# Patient Record
Sex: FEMALE | Race: WHITE | NOT HISPANIC OR LATINO | URBAN - METROPOLITAN AREA
[De-identification: names, ages, dates, MRNs, and addresses within clinical notes are randomized per-mention and may not be internally consistent; named-entity substitution may affect disease eponyms.]

---

## 2021-02-16 ENCOUNTER — INPATIENT (INPATIENT)
Facility: HOSPITAL | Age: 76
LOS: 23 days | Discharge: OTHER INSTITUTION | DRG: 216 | End: 2021-03-12
Attending: THORACIC SURGERY (CARDIOTHORACIC VASCULAR SURGERY) | Admitting: INTERNAL MEDICINE
Payer: MEDICARE

## 2021-02-16 ENCOUNTER — TRANSCRIPTION ENCOUNTER (OUTPATIENT)
Age: 76
End: 2021-02-16

## 2021-02-16 VITALS
HEART RATE: 76 BPM | SYSTOLIC BLOOD PRESSURE: 135 MMHG | DIASTOLIC BLOOD PRESSURE: 64 MMHG | OXYGEN SATURATION: 99 % | RESPIRATION RATE: 18 BRPM | TEMPERATURE: 98 F

## 2021-02-16 DIAGNOSIS — I21.4 NON-ST ELEVATION (NSTEMI) MYOCARDIAL INFARCTION: ICD-10-CM

## 2021-02-16 DIAGNOSIS — K92.2 GASTROINTESTINAL HEMORRHAGE, UNSPECIFIED: ICD-10-CM

## 2021-02-16 DIAGNOSIS — I25.10 ATHEROSCLEROTIC HEART DISEASE OF NATIVE CORONARY ARTERY WITHOUT ANGINA PECTORIS: ICD-10-CM

## 2021-02-16 DIAGNOSIS — D64.9 ANEMIA, UNSPECIFIED: ICD-10-CM

## 2021-02-16 DIAGNOSIS — N30.00 ACUTE CYSTITIS WITHOUT HEMATURIA: ICD-10-CM

## 2021-02-16 DIAGNOSIS — E03.9 HYPOTHYROIDISM, UNSPECIFIED: ICD-10-CM

## 2021-02-16 DIAGNOSIS — Z90.89 ACQUIRED ABSENCE OF OTHER ORGANS: Chronic | ICD-10-CM

## 2021-02-16 DIAGNOSIS — I35.0 NONRHEUMATIC AORTIC (VALVE) STENOSIS: ICD-10-CM

## 2021-02-16 LAB
A1C WITH ESTIMATED AVERAGE GLUCOSE RESULT: 5.2 % — SIGNIFICANT CHANGE UP (ref 4–5.6)
ABO RH CONFIRMATION: SIGNIFICANT CHANGE UP
ALBUMIN SERPL ELPH-MCNC: 3.3 G/DL — SIGNIFICANT CHANGE UP (ref 3.3–5.2)
ALP SERPL-CCNC: 112 U/L — SIGNIFICANT CHANGE UP (ref 40–120)
ALT FLD-CCNC: 17 U/L — SIGNIFICANT CHANGE UP
ANION GAP SERPL CALC-SCNC: 10 MMOL/L — SIGNIFICANT CHANGE UP (ref 5–17)
APPEARANCE UR: CLEAR — SIGNIFICANT CHANGE UP
APTT BLD: 74.4 SEC — HIGH (ref 27.5–35.5)
AST SERPL-CCNC: 35 U/L — HIGH
BASOPHILS # BLD AUTO: 0.04 K/UL — SIGNIFICANT CHANGE UP (ref 0–0.2)
BASOPHILS NFR BLD AUTO: 0.4 % — SIGNIFICANT CHANGE UP (ref 0–2)
BILIRUB SERPL-MCNC: 0.5 MG/DL — SIGNIFICANT CHANGE UP (ref 0.4–2)
BILIRUB UR-MCNC: NEGATIVE — SIGNIFICANT CHANGE UP
BLD GP AB SCN SERPL QL: SIGNIFICANT CHANGE UP
BUN SERPL-MCNC: 22 MG/DL — HIGH (ref 8–20)
CALCIUM SERPL-MCNC: 8.8 MG/DL — SIGNIFICANT CHANGE UP (ref 8.6–10.2)
CHLORIDE SERPL-SCNC: 109 MMOL/L — HIGH (ref 98–107)
CO2 SERPL-SCNC: 18 MMOL/L — LOW (ref 22–29)
COLOR SPEC: YELLOW — SIGNIFICANT CHANGE UP
CREAT SERPL-MCNC: 0.74 MG/DL — SIGNIFICANT CHANGE UP (ref 0.5–1.3)
DIFF PNL FLD: NEGATIVE — SIGNIFICANT CHANGE UP
EOSINOPHIL # BLD AUTO: 0.06 K/UL — SIGNIFICANT CHANGE UP (ref 0–0.5)
EOSINOPHIL NFR BLD AUTO: 0.6 % — SIGNIFICANT CHANGE UP (ref 0–6)
EPI CELLS # UR: ABNORMAL
ESTIMATED AVERAGE GLUCOSE: 103 MG/DL — SIGNIFICANT CHANGE UP (ref 68–114)
GLUCOSE SERPL-MCNC: 95 MG/DL — SIGNIFICANT CHANGE UP (ref 70–99)
GLUCOSE UR QL: NEGATIVE MG/DL — SIGNIFICANT CHANGE UP
HCT VFR BLD CALC: 35.5 % — SIGNIFICANT CHANGE UP (ref 34.5–45)
HGB BLD-MCNC: 11 G/DL — LOW (ref 11.5–15.5)
IMM GRANULOCYTES NFR BLD AUTO: 0.4 % — SIGNIFICANT CHANGE UP (ref 0–1.5)
INR BLD: 1.27 RATIO — HIGH (ref 0.88–1.16)
KETONES UR-MCNC: NEGATIVE — SIGNIFICANT CHANGE UP
LEUKOCYTE ESTERASE UR-ACNC: ABNORMAL
LYMPHOCYTES # BLD AUTO: 0.92 K/UL — LOW (ref 1–3.3)
LYMPHOCYTES # BLD AUTO: 9.6 % — LOW (ref 13–44)
MAGNESIUM SERPL-MCNC: 1.9 MG/DL — SIGNIFICANT CHANGE UP (ref 1.6–2.6)
MCHC RBC-ENTMCNC: 28.1 PG — SIGNIFICANT CHANGE UP (ref 27–34)
MCHC RBC-ENTMCNC: 31 GM/DL — LOW (ref 32–36)
MCV RBC AUTO: 90.6 FL — SIGNIFICANT CHANGE UP (ref 80–100)
MONOCYTES # BLD AUTO: 1.02 K/UL — HIGH (ref 0–0.9)
MONOCYTES NFR BLD AUTO: 10.7 % — SIGNIFICANT CHANGE UP (ref 2–14)
NEUTROPHILS # BLD AUTO: 7.46 K/UL — HIGH (ref 1.8–7.4)
NEUTROPHILS NFR BLD AUTO: 78.3 % — HIGH (ref 43–77)
NITRITE UR-MCNC: NEGATIVE — SIGNIFICANT CHANGE UP
NT-PROBNP SERPL-SCNC: HIGH PG/ML (ref 0–300)
PH UR: 5 — SIGNIFICANT CHANGE UP (ref 5–8)
PLATELET # BLD AUTO: 227 K/UL — SIGNIFICANT CHANGE UP (ref 150–400)
POTASSIUM SERPL-MCNC: 4.3 MMOL/L — SIGNIFICANT CHANGE UP (ref 3.5–5.3)
POTASSIUM SERPL-SCNC: 4.3 MMOL/L — SIGNIFICANT CHANGE UP (ref 3.5–5.3)
PREALB SERPL-MCNC: 15 MG/DL — LOW (ref 18–38)
PROT SERPL-MCNC: 5.9 G/DL — LOW (ref 6.6–8.7)
PROT UR-MCNC: 30 MG/DL
PROTHROM AB SERPL-ACNC: 14.6 SEC — HIGH (ref 10.6–13.6)
RBC # BLD: 3.92 M/UL — SIGNIFICANT CHANGE UP (ref 3.8–5.2)
RBC # FLD: 17.3 % — HIGH (ref 10.3–14.5)
RBC CASTS # UR COMP ASSIST: NEGATIVE /HPF — SIGNIFICANT CHANGE UP (ref 0–4)
SODIUM SERPL-SCNC: 137 MMOL/L — SIGNIFICANT CHANGE UP (ref 135–145)
SP GR SPEC: 1.02 — SIGNIFICANT CHANGE UP (ref 1.01–1.02)
TROPONIN T SERPL-MCNC: 1.57 NG/ML — HIGH (ref 0–0.06)
TROPONIN T SERPL-MCNC: 1.83 NG/ML — HIGH (ref 0–0.06)
TSH SERPL-MCNC: 11.63 UIU/ML — HIGH (ref 0.27–4.2)
UROBILINOGEN FLD QL: NEGATIVE MG/DL — SIGNIFICANT CHANGE UP
WBC # BLD: 9.54 K/UL — SIGNIFICANT CHANGE UP (ref 3.8–10.5)
WBC # FLD AUTO: 9.54 K/UL — SIGNIFICANT CHANGE UP (ref 3.8–10.5)
WBC UR QL: SIGNIFICANT CHANGE UP

## 2021-02-16 PROCEDURE — 99223 1ST HOSP IP/OBS HIGH 75: CPT

## 2021-02-16 PROCEDURE — 99222 1ST HOSP IP/OBS MODERATE 55: CPT

## 2021-02-16 PROCEDURE — 93880 EXTRACRANIAL BILAT STUDY: CPT | Mod: 26

## 2021-02-16 PROCEDURE — 93010 ELECTROCARDIOGRAM REPORT: CPT

## 2021-02-16 RX ORDER — METOPROLOL TARTRATE 50 MG
25 TABLET ORAL ONCE
Refills: 0 | Status: COMPLETED | OUTPATIENT
Start: 2021-02-16 | End: 2021-02-16

## 2021-02-16 RX ORDER — PANTOPRAZOLE SODIUM 20 MG/1
40 TABLET, DELAYED RELEASE ORAL EVERY 12 HOURS
Refills: 0 | Status: DISCONTINUED | OUTPATIENT
Start: 2021-02-16 | End: 2021-02-24

## 2021-02-16 RX ORDER — CEFTRIAXONE 500 MG/1
1000 INJECTION, POWDER, FOR SOLUTION INTRAMUSCULAR; INTRAVENOUS EVERY 24 HOURS
Refills: 0 | Status: COMPLETED | OUTPATIENT
Start: 2021-02-16 | End: 2021-02-16

## 2021-02-16 RX ORDER — PANTOPRAZOLE SODIUM 20 MG/1
40 TABLET, DELAYED RELEASE ORAL ONCE
Refills: 0 | Status: COMPLETED | OUTPATIENT
Start: 2021-02-16 | End: 2021-02-16

## 2021-02-16 RX ORDER — SODIUM CHLORIDE 9 MG/ML
3 INJECTION INTRAMUSCULAR; INTRAVENOUS; SUBCUTANEOUS EVERY 8 HOURS
Refills: 0 | Status: DISCONTINUED | OUTPATIENT
Start: 2021-02-16 | End: 2021-02-24

## 2021-02-16 RX ORDER — SOD SULF/SODIUM/NAHCO3/KCL/PEG
1000 SOLUTION, RECONSTITUTED, ORAL ORAL EVERY 4 HOURS
Refills: 0 | Status: COMPLETED | OUTPATIENT
Start: 2021-02-16 | End: 2021-02-16

## 2021-02-16 RX ORDER — ASPIRIN/CALCIUM CARB/MAGNESIUM 324 MG
81 TABLET ORAL ONCE
Refills: 0 | Status: COMPLETED | OUTPATIENT
Start: 2021-02-16 | End: 2021-02-16

## 2021-02-16 RX ORDER — ASPIRIN/CALCIUM CARB/MAGNESIUM 324 MG
81 TABLET ORAL DAILY
Refills: 0 | Status: DISCONTINUED | OUTPATIENT
Start: 2021-02-16 | End: 2021-02-24

## 2021-02-16 RX ORDER — LEVOTHYROXINE SODIUM 125 MCG
75 TABLET ORAL DAILY
Refills: 0 | Status: DISCONTINUED | OUTPATIENT
Start: 2021-02-16 | End: 2021-02-24

## 2021-02-16 RX ORDER — HYDRALAZINE HCL 50 MG
10 TABLET ORAL EVERY 8 HOURS
Refills: 0 | Status: DISCONTINUED | OUTPATIENT
Start: 2021-02-16 | End: 2021-02-17

## 2021-02-16 RX ORDER — METOPROLOL TARTRATE 50 MG
25 TABLET ORAL DAILY
Refills: 0 | Status: DISCONTINUED | OUTPATIENT
Start: 2021-02-16 | End: 2021-02-19

## 2021-02-16 RX ORDER — ATORVASTATIN CALCIUM 80 MG/1
40 TABLET, FILM COATED ORAL AT BEDTIME
Refills: 0 | Status: DISCONTINUED | OUTPATIENT
Start: 2021-02-16 | End: 2021-02-24

## 2021-02-16 RX ADMIN — PANTOPRAZOLE SODIUM 40 MILLIGRAM(S): 20 TABLET, DELAYED RELEASE ORAL at 08:30

## 2021-02-16 RX ADMIN — Medication 75 MICROGRAM(S): at 14:01

## 2021-02-16 RX ADMIN — Medication 10 MILLIGRAM(S): at 14:01

## 2021-02-16 RX ADMIN — Medication 10 MILLIGRAM(S): at 22:41

## 2021-02-16 RX ADMIN — ATORVASTATIN CALCIUM 40 MILLIGRAM(S): 80 TABLET, FILM COATED ORAL at 22:41

## 2021-02-16 RX ADMIN — Medication 1000 MILLILITER(S): at 22:41

## 2021-02-16 RX ADMIN — SODIUM CHLORIDE 3 MILLILITER(S): 9 INJECTION INTRAMUSCULAR; INTRAVENOUS; SUBCUTANEOUS at 22:42

## 2021-02-16 RX ADMIN — CEFTRIAXONE 100 MILLIGRAM(S): 500 INJECTION, POWDER, FOR SOLUTION INTRAMUSCULAR; INTRAVENOUS at 14:00

## 2021-02-16 RX ADMIN — Medication 25 MILLIGRAM(S): at 08:30

## 2021-02-16 RX ADMIN — Medication 81 MILLIGRAM(S): at 08:30

## 2021-02-16 RX ADMIN — Medication 1000 MILLILITER(S): at 20:31

## 2021-02-16 RX ADMIN — PANTOPRAZOLE SODIUM 40 MILLIGRAM(S): 20 TABLET, DELAYED RELEASE ORAL at 18:33

## 2021-02-16 NOTE — H&P PST ADULT - ASSESSMENT
Assessment: 75y/o female never smoker with history of hypothyroid and DIVYA who was brought to [facility] for dyspnea and diarrhea and was found to have melena. She received 2 units of PRBC for a hemoglobin of 6.9. She was found to have a troponin of 656 and 710 and a pro-BNP of 29,809. An echo showed moderately to severely reduced LVSF with moderate diffuse hypokinesis with regional variations, moderate concentric LVH and severe AS. She was also diagnosed with sepsis secondary to a UTI. She admits to GANN (walking up one flight of stairs or < 100 feet). She also states recent black stools, and loss of approximately 30 pounds over 1 year secondary to poor PO intake secondary to ageusia. She denies chest pain, orthopnea, PND, palpitations or syncope/near syncope.    ASA: 3  Mall: 2  ABR: 14.7%  COVID: Negative at [facility]    Problem List:   1. NSTEMI  ·	LHC and possible intervention. Consent obtained.  ·	Will start DAPT if PCI performed.  ·	IV: NS KVO  ·	Aspirin if not taken today.  ·	Lipitor 40 mg daily    2. Severe AS  ·	TAVR work up as per CT surgery    3. HFrEF  ·	Heart failure teaching  ·	Daily weights  ·	Strict I&O's  ·	Beta Blocker: [id number] mg daily  ·	ACEI/ARB: Will address with CT surgery  ·	Diuretic: Will assess need post procedure.    4. DIVYA secondary to GI bleed  ·	Trend CBC and transfuse as necessary  ·	Protonix 40 mg IV every 12 hours.    5. Sepsis secondary to UTI at previous hospital  ·	Urine C&S, UA  ·	continue Rocephin 2 gm IV daily    6. Hypothyroid  ·	Levothyroxine 75 mcg daily

## 2021-02-16 NOTE — PROGRESS NOTE ADULT - ASSESSMENT
Assessment: 76y Female with diagnosis of severe AS    Problem List:  1. Multivessel CAD:   ·	CT surgery evaluation for CABG vs. high risk PCI.  ·	Continue aspirin 81 mg daily.  ·	If PCI performed, will start DAPT  ·	Remove radial band at 11:00AM  ·	Right arm restrictions explained.    2. Severe AS:   ·	CT surgery evaluation for SAVR vs. TAVR    3. HFrEF:   ·	Toprol 25 mg daily  ·	Will hold off on RAAS inhibition due to probable CT surgery.  ·	Hydralazine 10 mg every 8 hours.  ·	Strict I&O    4. Iron deficiency anemia secondary to GI bleed:   ·	GI consult called.  ·	Protonix 40 mg IV BID  ·	Trend CBC  ·	Stool for OB    5. UTI  ·	Continue Rocephin 1 gm IV daily    To be followed by [facility]

## 2021-02-16 NOTE — CONSULT NOTE ADULT - SUBJECTIVE AND OBJECTIVE BOX
HPI:  77y/o female never smoker with history of hypothyroid and DIVYA who was brought to [facility] for dyspnea and diarrhea and was found to have ?dark stools. She received 2 units of PRBC for a hemoglobin of 6.9. She was found to have a troponin of 656 and 710 and a pro-BNP of 29,809. An echo showed moderately to severely reduced LVSF with moderate diffuse hypokinesis with regional variations, moderate concentric LVH and severe AS. She was also diagnosed with sepsis secondary to a UTI. She admits to GANN (walking up one flight of stairs or < 100 feet). She also states recent black stools, and loss of approximately 30 pounds over 1 year secondary to poor PO intake secondary to ageusia. She denies chest pain, orthopnea, PND, palpitations or syncope/near syncope.      Assessment of LVEF:       EF: 25-30%       Assessed by: Echo       Date: 2/13/2021      Echo: 2/13/2021       LVSF: Moderately to severely reduced LVSF with moderate diffuse hypokinesis with regional variations. Moderate concentric LVH.       EF: 25-30%       RVSF: Poorly visualized, mild to moderate pulmonary hypertension.       LA: Mildly dilated       RA: Normal       Mitral Valve: Severely to moderately calcified leaflets, severe MAC, moderate MR. Possible vegetation       Aortic Valve: Moderately calcified leaflets, mild AR, severe AS (max gradient: 77, mean gradient: 43).       Tricuspid Valve: Poorly visualized, mild TR.       Pulmonic Valve: Poorly visualized, no AR.              PAST MEDICAL & SURGICAL HISTORY:  Iron deficiency anemia due to chronic blood loss    Hypothyroid    History of tonsillectomy        ROS:  No Heartburn, regurgitation, dysphagia, odynophagia.  No dyspepsia  No abdominal pain.    No Nausea, vomiting.  No Bleeding.  No hematemesis.   No diarrhea.    No hematochesia.  No weight loss, anorexia.  No edema.      MEDICATIONS  (STANDING):  aspirin  chewable 81 milliGRAM(s) Oral daily  atorvastatin 40 milliGRAM(s) Oral at bedtime  cefTRIAXone   IVPB 1000 milliGRAM(s) IV Intermittent every 24 hours  hydrALAZINE 10 milliGRAM(s) Oral every 8 hours  levothyroxine 75 MICROGram(s) Oral daily  metoprolol succinate ER 25 milliGRAM(s) Oral daily  pantoprazole  Injectable 40 milliGRAM(s) IV Push every 12 hours  sodium chloride 0.9% lock flush 3 milliLiter(s) IV Push every 8 hours    MEDICATIONS  (PRN):      Allergies    No Known Allergies    Intolerances        SOCIAL HISTORY:Denies x 3    ENDOSCOPIC/GI HISTORY: No EGD. Colonoscopy 6 years ago    FAMILY HISTORY:  Family history of cardiac disorder (Father)    Family history of diabetes mellitus (Father)        Vital Signs Last 24 Hrs  T(C): 36.4 (16 Feb 2021 06:58), Max: 36.4 (16 Feb 2021 06:58)  T(F): 97.6 (16 Feb 2021 06:58), Max: 97.6 (16 Feb 2021 06:58)  HR: 64 (16 Feb 2021 12:50) (57 - 76)  BP: 99/56 (16 Feb 2021 12:50) (94/57 - 135/64)  BP(mean): --  RR: 16 (16 Feb 2021 12:50) (16 - 18)  SpO2: 100% (16 Feb 2021 12:50) (99% - 100%)    PHYSICAL EXAM:    GENERAL: NAD, well-groomed, well-developed  HEAD:  Atraumatic, Normocephalic  EYES: EOMI, PERRLA, conjunctiva and sclera clear  ENMT: No tonsillar erythema, exudates, or enlargement; Moist mucous membranes, Good dentition, No lesions  NECK: Supple, No JVD, Normal thyroid  CHEST/LUNG: Clear to percussion bilaterally; No rales, rhonchi, wheezing, or rubs  HEART: Regular rate and rhythm; No murmurs, rubs, or gallops  ABDOMEN: Soft, Nontender, Nondistended; Bowel sounds present  RECTAL: Formed dark stools  EXTREMITIES:  2+ Peripheral Pulses, No clubbing, cyanosis, or edema  LYMPH: No lymphadenopathy noted  SKIN: No rashes or lesions      LABS:                        11.0   9.54  )-----------( 227      ( 16 Feb 2021 08:20 )             35.5     02-16    137  |  109<H>  |  22.0<H>  ----------------------------<  95  4.3   |  18.0<L>  |  0.74    Ca    8.8      16 Feb 2021 08:20  Mg     1.9     02-16    TPro  5.9<L>  /  Alb  3.3  /  TBili  0.5  /  DBili  x   /  AST  35<H>  /  ALT  17  /  AlkPhos  112  02-16    PT/INR - ( 16 Feb 2021 12:38 )   PT: 14.6 sec;   INR: 1.27 ratio         PTT - ( 16 Feb 2021 12:38 )  PTT:74.4 sec       LIVER FUNCTIONS - ( 16 Feb 2021 08:20 )  Alb: 3.3 g/dL / Pro: 5.9 g/dL / ALK PHOS: 112 U/L / ALT: 17 U/L / AST: 35 U/L / GGT: x                 RADIOLOGY & ADDITIONAL STUDIES:

## 2021-02-16 NOTE — CONSULT NOTE ADULT - SUBJECTIVE AND OBJECTIVE BOX
Surgeon:  Nancy    Consult requesting by: Nito    HISTORY OF PRESENT ILLNESS:  76y Female with PMH anemia, and hypothyroidism.  Pt presented to [facility] a few days ago with fever and SOB.  She was found to have a UTI/sepsis with elevated lactate, + UA, and temp of 102.  She was started on Rocephin.  HGB was 6.  She was transfused 2 units of PRBC.  Per medical record, a year ago she had a GI bleed and was offered an endoscopy and colonoscopy but she refused and then failed to follow up outpatient.  Per medical record she did not want to stay at [place] but agreed once she ruled in for NSTEMI.  She was transferred to [facility] for cardiac cath. H/H stable today.  Cardiac cath today showed MVD, and she also has severe AS.  CT surgery called for evaluation.    Pt sitting up on stretcher in cath recovery.  Denies CP/SOB.  NAD noted.  Pt reports that she doesn't "believe" that she had a GI bleed or UTI, and that she would rather go home.    Stating that her  is very sick a fell at home a few days ago and she is worried about him.        PAST MEDICAL & SURGICAL HISTORY:  Iron deficiency anemia due to chronic blood loss    Hypothyroid    History of tonsillectomy    MEDICATIONS  (STANDING):  aspirin  chewable 81 milliGRAM(s) Oral daily  atorvastatin 40 milliGRAM(s) Oral at bedtime  cefTRIAXone   IVPB 1000 milliGRAM(s) IV Intermittent every 24 hours  hydrALAZINE 10 milliGRAM(s) Oral every 8 hours  levothyroxine 75 MICROGram(s) Oral daily  metoprolol succinate ER 25 milliGRAM(s) Oral daily  pantoprazole  Injectable 40 milliGRAM(s) IV Push every 12 hours  sodium chloride 0.9% lock flush 3 milliLiter(s) IV Push every 8 hours    MEDICATIONS  (PRN):    Antiplatelet therapy: ASA 81mg last dose today.        Last dose/amt:    Allergies    No Known Allergies    Intolerances    SOCIAL HISTORY:  Smoker: [ ] Yes  [x ] No        PACK YEARS:                         WHEN QUIT?  ETOH use: [ ] Yes  [x ] No              FREQUENCY / QUANTITY:  Ilicit Drug use:  [ ] Yes  [x ] No  Occupation: none  Live with: .  Assisted device use: cane and walker.    FAMILY HISTORY:  Family history of cardiac disorder (Father)    Family history of diabetes mellitus (Father)      Review of Systems  CONSTITUTIONAL:  Fevers[x ] chills[ ] sweats[ ] fatigue[ ] weight loss[ ] weight gain [ ]                                     NEGATIVE [ ]   NEURO:  parathesias[ ] seizures [ ]  syncope [ ]  confusion [ ]                                                                                NEGATIVE[x ]   EYES: glasses[ ]  blurry vision[ ]  discharge[ ] pain[ ] glaucoma [ ]                                                                          NEGATIVE[x ]   ENMT:  difficulty hearing [ ]  vertigo[ ]  dysphagia[ ] epistaxis[ ] recent dental work [ ]                                    NEGATIVE[x ]   CV:  chest pain[ ] palpitations[ ] GANN [ ] diaphoresis [ ]                                                                                           NEGATIVE[x ]   RESPIRATORY:  wheezing[ ] SOB[x ] cough [ ] sputum[ ] hemoptysis[ ]                                                                  NEGATIVE[ ]   GI:  nausea[ ]  vommiting [ ]  diarrhea[ ] constipation [ ] melena [ ]                                                                      NEGATIVE[x ]   : hematuria[ ]  dysuria[ ] urgency[ ] incontinence[ x]                                                                                            NEGATIVE[ ]   MUSKULOSKELETAL:  arthritis[ ]  joint swelling [ ] muscle weakness [ ]                                                                NEGATIVE[x ]   SKIN/BREAST:  rash[ ] itching [ ]  hair loss[ ] masses[ ]                                                                                              NEGATIVE[x ]   PSYCH:  dementia [ ] depresion [ ] anxiety[ ]                                                                                                               NEGATIVE[x ]   HEME/LYMPH:  bruises easily[ ] enlarged lymph nodes[ ] tender lymph nodes[ ]                                               NEGATIVE[x ]   ENDOCRINE:  cold intolerance[ ] heat intolerance[ ] polydipsia[ ]                                                                          NEGATIVE[x ]     PHYSICAL EXAM  Vital Signs Last 24 Hrs  T(C): 36.4 (16 Feb 2021 06:58), Max: 36.4 (16 Feb 2021 06:58)  T(F): 97.6 (16 Feb 2021 06:58), Max: 97.6 (16 Feb 2021 06:58)  HR: 58 (16 Feb 2021 10:20) (57 - 76)  BP: 98/62 (16 Feb 2021 10:05) (94/57 - 135/64)  BP(mean): --  RR: 16 (16 Feb 2021 10:20) (16 - 18)  SpO2: 100% (16 Feb 2021 10:20) (99% - 100%)    CONSTITUTIONAL:                                                                         Neuro: WNL[x ] Normal exam oriented to person/place & time with no focal motor or sensory  deficits. Other                     Eyes: WNL[x ]   Normal exam of conjunctiva & lids, pupils equally reactive. Other     ENT: WNL[x ]    Normal exam of nasal/oral mucosa with absence of cyanosis. Other  Neck: WNL[x ]  Normal exam of jugular veins, trachea & thyroid. Other  Chest: WNL[x ] Normal lung exam with good air movement absence of wheezes, rales, or rhonchi: Other                                                                                CV:  Auscultation: normal [x ] S3[ ] S4[ ] Irregular [ ] Rub[ ] Clicks[ ]    Murmurs none:[ ]systolic [x ]  diastolic [ ] holosystolic [ ]  Carotids: No Bruits[x ] Other                 Abdominal Aorta: normal [x ] nonpalpable[ ]Other                                                                                      GI:           WNL[ x] Normal exam of abdomen, liver & spleen with no noted masses or tenderness. Other                                                                                                        Extremities: WNL[x ] Normal no evidence of cyanosis or deformity Edema: none[x ]trace[ ]1+[ ]2+[ ]3+[ ]4+[ ]  Lower Extremity Pulses: Right[ pp] Left[pp ]Varicosities[- ]  SKIN :WNL[x ] Normal exam to inspection & palpation. Other:                                                          LABS:                        11.0   9.54  )-----------( 227      ( 16 Feb 2021 08:20 )             35.5     02-16    137  |  109<H>  |  22.0<H>  ----------------------------<  95  4.3   |  18.0<L>  |  0.74    Ca    8.8      16 Feb 2021 08:20  Mg     1.9     02-16    TPro  5.9<L>  /  Alb  3.3  /  TBili  0.5  /  DBili  x   /  AST  35<H>  /  ALT  17  /  AlkPhos  112  02-16      Cardiac Cath: < from: Cardiac Cath Lab - Adult (02.16.21 @ 08:33) >  INTERVENTIONAL IMPRESSIONS: Moderate Pulmonary Hypertension and elevation  of filling pressures. 2. Critical Aortic Stenosis with a mean PG >40mm Hg  and an SREEDHAR of <0.5cm2. 3. Severe LV Systolic dysfunction by TTE from  2/13/2021 with an estimated LVEF of 30%. 4. Triple Vessel CAD.  INTERVENTIONAL RECOMMENDATIONS: GDMT. 2. CTS consultation.  Prepared and signed by  Christopher Beauchamp M.D.  Signed 02/16/2021 09:44:44    < end of copied text >      TTE / LUIS MIGUEL: none

## 2021-02-16 NOTE — H&P PST ADULT - VENOUS THROMBOEMBOLISM CURRENT STATUS
(1) acute myocardial infarction (within 1 month)/(1) congestive heart failure (within 1 month)/(1) sepsis (within 1 month)

## 2021-02-16 NOTE — CONSULT NOTE ADULT - PROBLEM SELECTOR RECOMMENDATION 5
Continue home dose synthroid.  TSH pending.    Discussed with Dr. Cruz. Continue home dose synthroid.  TSH pending.

## 2021-02-16 NOTE — H&P PST ADULT - NSICDXFAMILYHX_GEN_ALL_CORE_FT
FAMILY HISTORY:  Father  Still living? No  Family history of cardiac disorder, Age at diagnosis: Age Unknown  Family history of diabetes mellitus, Age at diagnosis: Age Unknown

## 2021-02-16 NOTE — CONSULT NOTE ADULT - SUBJECTIVE AND OBJECTIVE BOX
HPI:  75y/o female never smoker with history of hypothyroid and DIVYA who was brought to [facility] for dyspnea and diarrhea and was found to have melena. She received 2 units of PRBC for a hemoglobin of 6.9. She was found to have a troponin high 600's and high BNP.  . An echo showed moderately to severely reduced LVSF with moderate diffuse hypokinesis with regional variations, moderate concentric LVH and severe AS. She was also diagnosed with sepsis secondary to a UTI.  She also states recent black stools, and loss of approximately 30 lbs  over 1 year due to ageusia with low appetite. She denies chest pain, orthopnea, PND, palpitations or syncope/near syncope.    Associated Risk Factors:        Cerebrovascular Disease: N/A       Chronic Lung Disease: N/A       Peripheral Arterial Disease: N/A       Chronic Kidney Disease (if yes, what is GFR): N/A       Uncontrolled Diabetes (if yes, what is HgbA1C or FBS): N/A       Poorly Controlled Hypertension (if yes, what is SBP): N/A       Morbid Obesity (if yes, what is BMI): N/A       History of Recent Ventricular Arrhythmia: N/A       Inability to Ambulate Safely: N/A       Need for Therapeutic Anticoagulation: N/A       Antiplatelet or Contrast Allergy: N/A (16 Feb 2021 07:35)    PAST MEDICAL & SURGICAL HISTORY:  Iron deficiency anemia due to chronic blood loss  Hypothyroid  History of tonsillectomy    FAMILY HISTORY:  Family history of cardiac disorder (Father)  Family history of diabetes mellitus (Father)    SOCIAL HISTORY:    REVIEW OF SYSTEMS:  Constitutional: No fever, no chills, no change in weight.  Eyes: No eye swelling,no  blurry vision, no redness, no loss of vision.  Neck: No neck pain, no change in voice.   Lungs: No shortness of breath, no wheezing, no cough  CV: No chest pain, no palpitations  GI: No nausea, no vomiting, no constipation, no diarrhea, no abdominal pain  : No urinary frequency, no blood in urine  Musculoskeletal: No muscle pain, no joint pain, no swelling.  Skin: No rash  Neurologic: No headaches, no weakness  Endocrine: No heat intolerance, no cold intolerance  Psych: No depression, no anxiety    MEDICATIONS  (STANDING):  aspirin  chewable 81 milliGRAM(s) Oral daily  atorvastatin 40 milliGRAM(s) Oral at bedtime  hydrALAZINE 10 milliGRAM(s) Oral every 8 hours  levothyroxine 75 MICROGram(s) Oral daily  metoprolol succinate ER 25 milliGRAM(s) Oral daily  pantoprazole  Injectable 40 milliGRAM(s) IV Push every 12 hours  polyethylene glycol/electrolyte Solution 1000 milliLiter(s) Oral every 4 hours  sodium chloride 0.9% lock flush 3 milliLiter(s) IV Push every 8 hours    Allergies  No Known Allergies    PHYSICAL EXAM:  Vital Signs Last 24 Hrs  T(C): 36.4 (16 Feb 2021 06:58), Max: 36.4 (16 Feb 2021 06:58)  T(F): 97.6 (16 Feb 2021 06:58), Max: 97.6 (16 Feb 2021 06:58)  HR: 64 (16 Feb 2021 12:50) (57 - 76)  BP: 99/56 (16 Feb 2021 12:50) (94/57 - 135/64)  BP(mean): --  RR: 16 (16 Feb 2021 12:50) (16 - 18)  SpO2: 100% (16 Feb 2021 12:50) (99% - 100%)  PHYSICAL EXAM:  GENERAL: NAD, well-groomed, well-developed  HEAD:  Atraumatic, Normocephalic  EYES: EOMI, PERRLA, conjunctiva and sclera clear  NECK: Supple, No JVD, Normal thyroid  CHEST/LUNG: Clear to percussion bilaterally; No rales, rhonchi, wheezing, or rubs  HEART: Regular rate and rhythm; No murmurs, rubs, or gallops  ABDOMEN: Soft, Nontender, Nondistended; Bowel sounds present  EXTREMITIES:  2+ Peripheral Pulses, No clubbing, cyanosis, or edema  SKIN: No rashes or lesions    LABS:                      11.0   9.54  )-----------( 227      ( 16 Feb 2021 08:20 )             35.5     02-16    137  |  109<H>  |  22.0<H>  ----------------------------<  95  4.3   |  18.0<L>  |  0.74    Ca    8.8      16 Feb 2021 08:20  Mg     1.9     02-16  TPro  5.9<L>  /  Alb  3.3  /  TBili  0.5  /  DBili  x   /  AST  35<H>  /  ALT  17  /  AlkPhos  112  02-16    LIVER FUNCTIONS - ( 16 Feb 2021 08:20 )  Alb: 3.3 g/dL / Pro: 5.9 g/dL / ALK PHOS: 112 U/L / ALT: 17 U/L / AST: 35 U/L / GGT       HPI:  77y/o female never smoker with history of hypothyroid and DIVYA who was brought to [facility] for dyspnea and diarrhea and was found to have melena. She received 2 units of PRBC for a hemoglobin of 6.9. She was found to have a troponin high 600's and high BNP.  . An echo showed moderately to severely reduced LVSF with moderate diffuse hypokinesis with regional variations, moderate concentric LVH and severe AS. She was also diagnosed with sepsis secondary to a UTI.  She also states recent black stools, and loss of approximately 30 lbs  over 1 year due to ageusia with low appetite. She denies chest pain, orthopnea, PND, palpitations or syncope/near syncope.    Associated Risk Factors:        Cerebrovascular Disease: N/A       Chronic Lung Disease: N/A       Peripheral Arterial Disease: N/A       Chronic Kidney Disease (if yes, what is GFR): N/A       Uncontrolled Diabetes (if yes, what is HgbA1C or FBS): N/A       Poorly Controlled Hypertension (if yes, what is SBP): N/A       Morbid Obesity (if yes, what is BMI): N/A       History of Recent Ventricular Arrhythmia: N/A       Inability to Ambulate Safely: N/A       Need for Therapeutic Anticoagulation: N/A       Antiplatelet or Contrast Allergy: N/A (16 Feb 2021 07:35)    PAST MEDICAL & SURGICAL HISTORY:  Iron deficiency anemia due to chronic blood loss  Hypothyroid  History of tonsillectomy    FAMILY HISTORY:  Family history of cardiac disorder (Father)  Family history of diabetes mellitus (Father)    SOCIAL HISTORY: no etoh, no tobacco, no drugs, lives w subband     REVIEW OF SYSTEMS:  Constitutional: No fever, no chills, no change in weight.  Eyes: No eye swelling,no  blurry vision, no redness, no loss of vision.  Neck: No neck pain, no change in voice.   Lungs: has GANN, no wheezing, no cough  CV: No chest pain, no palpitations  GI: No nausea, no vomiting, no constipation, no diarrhea, no abdominal pain, had melena   : No urinary frequency, no blood in urine  Skin: No rash  Neurologic: No headaches, no weakness  Endocrine: No heat intolerance, no cold intolerance  Psych: No depression, no anxiety    MEDICATIONS  (STANDING):  aspirin  chewable 81 milliGRAM(s) Oral daily  atorvastatin 40 milliGRAM(s) Oral at bedtime  hydrALAZINE 10 milliGRAM(s) Oral every 8 hours  levothyroxine 75 MICROGram(s) Oral daily  metoprolol succinate ER 25 milliGRAM(s) Oral daily  pantoprazole  Injectable 40 milliGRAM(s) IV Push every 12 hours  polyethylene glycol/electrolyte Solution 1000 milliLiter(s) Oral every 4 hours  sodium chloride 0.9% lock flush 3 milliLiter(s) IV Push every 8 hours    Allergies  No Known Allergies    PHYSICAL EXAM:  Vital Signs Last 24 Hrs  T(C): 36.4 (16 Feb 2021 06:58), Max: 36.4 (16 Feb 2021 06:58)  T(F): 97.6 (16 Feb 2021 06:58), Max: 97.6 (16 Feb 2021 06:58)  HR: 64 (16 Feb 2021 12:50) (57 - 76)  BP: 99/56 (16 Feb 2021 12:50) (94/57 - 135/64)  BP(mean): --  RR: 16 (16 Feb 2021 12:50) (16 - 18)  SpO2: 100% (16 Feb 2021 12:50) (99% - 100%)  PHYSICAL EXAM:  GENERAL: NAD, well-groomed, well-developed  HEAD:  Atraumatic, Normocephalic  EYES: EOMI, PERRLA, conjunctiva and sclera clear  NECK: Supple, No JVD, Normal thyroid  CHEST/LUNG: Clear to percussion bilaterally; No rales, rhonchi, wheezing, or rubs  HEART: Regular rate and rhythm; No murmurs, rubs, or gallops  ABDOMEN: Soft, Nontender, Nondistended; Bowel sounds present  EXTREMITIES:  2+ Peripheral Pulses, No clubbing, cyanosis, or edema  SKIN: No rashes or lesions    LABS:                      11.0   9.54  )-----------( 227      ( 16 Feb 2021 08:20 )             35.5     02-16    137  |  109<H>  |  22.0<H>  ----------------------------<  95  4.3   |  18.0<L>  |  0.74    Ca    8.8      16 Feb 2021 08:20  Mg     1.9     02-16  TPro  5.9<L>  /  Alb  3.3  /  TBili  0.5  /  DBili  x   /  AST  35<H>  /  ALT  17  /  AlkPhos  112  02-16    LIVER FUNCTIONS - ( 16 Feb 2021 08:20 )  Alb: 3.3 g/dL / Pro: 5.9 g/dL / ALK PHOS: 112 U/L / ALT: 17 U/L / AST: 35 U/L / GGT

## 2021-02-16 NOTE — CONSULT NOTE ADULT - PROBLEM SELECTOR RECOMMENDATION 9
TVD on cath today.  Admit to CT surgery service to Dr. Cruz.  Preop workup ordered including TTE, cxr, ekg, carotid US, PFT's, labs, UA, MRSA/MSSA PCR.  Beta blocker and statin.  Physical therapy evaluation.

## 2021-02-16 NOTE — CONSULT NOTE ADULT - PROBLEM SELECTOR RECOMMENDATION 6
UTI/sepsis at [facility] with elevated lactate, temp 102 and + UA.    Started on Rocephin.  Repeat UA pending.  Continue Rocephin.  WIll call ID consult.    Discussed with Dr. Cruz.

## 2021-02-16 NOTE — CONSULT NOTE ADULT - ASSESSMENT
76y Female with PMH anemia, and hypothyroidism.  Pt presented to [facility] a few days ago with fever and SOB.  She was found to have a UTI/sepsis with elevated lactate, + UA, and temp of 102.  She was started on Rocephin.  HGB was 6.  She was transfused 2 units of PRBC.  Per medical record, a year ago she had a GI bleed and was offered an endoscopy and colonoscopy but she refused and then failed to follow up outpatient.  Per medical record she did not want to stay at [facility] but agreed once she ruled in for NSTEMI.  She was transferred to [facility] for cardiac cath. H/H stable today.  Cardiac cath today showed MVD, and she also has severe AS.  CT surgery called for evaluation.

## 2021-02-16 NOTE — H&P PST ADULT - HISTORY OF PRESENT ILLNESS
75y/o female never smoker with history of hypothyroid and DIVYA who was brought to [facility] for dyspnea and diarrhea and was found to have melena. She received 2 units of PRBC for a hemoglobin of 6.9. She was found to have a troponin of 656 and 710 and a pro-BNP of 29,809. An echo showed moderately to severely reduced LVSF with moderate diffuse hypokinesis with regional variations, moderate concentric LVH and severe AS. She was also diagnosed with sepsis secondary to a UTI. She admits to GANN (walking up one flight of stairs or < 100 feet). She also states recent black stools, and loss of approximately 30 pounds over 1 year secondary to poor PO intake secondary to ageusia. She denies chest pain, orthopnea, PND, palpitations or syncope/near syncope.    Symptoms:        Angina (Class): N/A       Ischemic Symptoms: GANN (CCs class 3 anginal equivalent)    Heart Failure: ACC/AHA stage C, NYHA functional class 3 HFrEF    Assessment of LVEF:       EF: 25-30%       Assessed by: Echo       Date: 2/13/2021    Prior Cardiac Interventions:       PCI's: N/A       CABG: N/A    Noninvasive Testing:   Stress Test: N/A    Echo: 2/13/2021       LVSF: Moderately to severely reduced LVSF with moderate diffuse hypokinesis with regional variations. Moderate concentric LVH.       EF: 25-30%       RVSF: Poorly visualized, mild to moderate pulmonary hypertension.       LA: Mildly dilated       RA: Normal       Mitral Valve: Severely to moderately calcified leaflets, severe MAC, moderate MR. Possible vegetation       Aortic Valve: Moderately calcified leaflets, mild AR, severe AS (max gradient: 77, mean gradient: 43).       Tricuspid Valve: Poorly visualized, mild TR.       Pulmonic Valve: Poorly visualized, no WA.    XR: Enlarged but stable cardiac silhouette.    Antianginal Therapies:        Beta Blockers: Toprol 25 mg daily       Calcium Channel Blockers: N/A       Long Acting Nitrates: N/A       Ranexa: N/A    Associated Risk Factors:        Cerebrovascular Disease: N/A       Chronic Lung Disease: N/A       Peripheral Arterial Disease: N/A       Chronic Kidney Disease (if yes, what is GFR): N/A       Uncontrolled Diabetes (if yes, what is HgbA1C or FBS): N/A       Poorly Controlled Hypertension (if yes, what is SBP): N/A       Morbid Obesity (if yes, what is BMI): N/A       History of Recent Ventricular Arrhythmia: N/A       Inability to Ambulate Safely: N/A       Need for Therapeutic Anticoagulation: N/A       Antiplatelet or Contrast Allergy: N/A

## 2021-02-16 NOTE — CONSULT NOTE ADULT - ASSESSMENT
74 year old female with history of hypothyroidism and iron deficiency anemia brought to ED by son due to dyspnea and diarrhea. Patient was found to have melena in ED today. Patient states that she has been dyspneic on-an-off for the past year. She is a poor historian and cannot recall how long this episode of dyspnea lasted. Patient presented at [facility] 9/2019 for anemia found incidentally on labs at [facility]. She received 2U pRBCs at that time for Hgb of 6.9.     Andrei was admitted on 2/12/2021 to [facility].  She was hospitalized with Anemia (6.5) from GI bleed, s/p 2 U PRBC with improvement in Hgb to 8.5, remains >8 (8.2 on discharge). Patient also found to have NSTEMI on admission (Trop peak to 1700s max), Echo showed new onset HFrEF (EF 25-30%) and severe AS. she was septic with fever + hypotension and UA showed UTI, Urine Cx: E.coli (B.Cx remains negative for 2 days), and was treated on Ceftr for 2 days (need one more day to complete 3 days course). patient required pressor during the first night but remains hemodynamically stable off pressor for the past 24 hours. she also remains afebrile for more than the last 24 hours.  Will be transferred per Cardiology rec to [facility] for [facility] on Tuesday 02/16/2021. condition on discharge: fair  wer are called to evaluate patient post transfer to [facility] for her hx of UTI.   urine cx from 2/12/2021 from [facility] showed E coli SS to all tested agents, including Ceftriaxone.   Per note at [facility] on transfer, patient was on day #2 of 3 as of 2/16.  patient denies fevers, denies dysuria.  denies urinary frequency. denies foul smelling urine.     Impression:  E coli infection   UTI  Anemia  GIB  troponin elevation    Plan:  E coli infection   UTI  - will continue Ceftriaxone for 1 more day to complete a 3 day course as initially prescribed      Anemia due to GIB  - hemoglobin is stable; Hemoglobin: 11.0 g/dL (02-16-21 @ 08:20)    Troponin elevation  - work up as per cardiology team      GIB  troponin elevation

## 2021-02-16 NOTE — H&P PST ADULT - NEGATIVE NEUROLOGICAL SYMPTOMS
no transient paralysis/no weakness/no paresthesias/no generalized seizures/no focal seizures/no syncope/no tremors/no vertigo/no loss of sensation/no difficulty walking/no headache/no loss of consciousness/no hemiparesis

## 2021-02-16 NOTE — H&P PST ADULT - CARDIOVASCULAR
Motrin) for pain or fussiness. Read and follow all instructions on the label. Gently rub your child's gum where the tooth is erupting for about 2 minutes at a time. Make sure your finger is clean, or use a clean teething ring. Give your child safe objects to chew on, such as teething rings. Talk to your child's doctor about using numbing gels on your child's gums and other teething remedies. If your child is eating solids, try offering cold foods and fluids, which help to ease gum pain. You can also dip a clean washcloth in water, freeze it, and let your child chew on it. When should you call for help? Call your doctor now or seek immediate medical care if:   Your child has a fever. Your child keeps pulling on his or her ears. Your child has diarrhea or a severe diaper rash. Watch closely for changes in your child's health, and be sure to contact your doctor if:   You think your child has tooth decay. Your child is 21 months old and has not had an erupting tooth yet. Where can you learn more? Go to [email or web address] [email or web address] org and sign in to your LiveDeal account. Enter [phone] in the [facility] box to learn more about Teething: After Your Child's Visit.     If you do not have an account, please click on the Sign Up Now link. © 1881-9245 Healthwise, Incorporated. Care instructions adapted under license by [facility]. This care instruction is for use with your licensed healthcare professional. If you have questions about a medical condition or this instruction, always ask your healthcare professional. [place] [address] any warranty or liability for your use of this information. Content Version: 4.8.774302; Last Revised: June 8, 2011          Patient Education        Child's Well Visit, 12 Months: Care Instructions  Your Care Instructions    Your baby may start showing his or her own personality at 12 months.  He or she may show interest in the world around him or her. At this age, your baby may be ready to walk while holding on to furniture. Pat-a-cake and peekaboo are common games your baby may enjoy. He or she may point with fingers and look for hidden objects. Your baby may say 1 to 3 words and feed himself or herself. Follow-up care is a key part of your child's treatment and safety. Be sure to make and go to all appointments, and call your doctor if your child is having problems. It's also a good idea to know your child's test results and keep a list of the medicines your child takes. How can you care for your child at home? Feeding  · Keep breastfeeding as long as it works for you and your baby. · Give your child whole cow's milk or full-fat soy milk. Your child can drink nonfat or low-fat milk at age 3. If your child age 3 to 2 years has a family history of heart disease or obesity, reduced-fat (2%) soy or cow's milk may be okay. Ask your doctor what is best for your child. · Cut or grind your child's food into small pieces. · Let your child decide how much to eat. · Encourage your child to drink from a cup. Water and milk are best. Juice does not have the valuable fiber that whole fruit has. If you must give your child juice, limit it to 4 to 6 ounces a day. · Offer many types of healthy foods each day. These include fruits, well-cooked vegetables, low-sugar cereal, yogurt, cheese, whole-grain breads and crackers, lean meat, fish, and tofu. Safety  · Watch your child at all times when he or she is near water. Be careful around pools, hot tubs, buckets, bathtubs, toilets, and lakes. Swimming pools should be fenced on all sides and have a self-latching gate. · For every ride in a car, secure your child into a properly installed car seat that meets all current safety standards. For questions about car seats, call the [employer] at 6-667.247.4810.   · To prevent choking, do not let your child eat while he or she is walking around. Make sure your child sits down to eat. Do not let your child play with toys that have buttons, marbles, coins, balloons, or small parts that can be removed. Do not give your child foods that may cause choking. These include nuts, whole grapes, hard or sticky candy, and popcorn. · Keep drapery cords and electrical cords out of your child's reach. · If your child can't breathe or cry, he or she is probably choking. Call 911 right away. Then follow the 's instructions. · Do not use walkers. They can easily tip over and lead to serious injury. · Use sliding moore at both ends of stairs. Do not use accordion-style moore, because a child's head could get caught. Look for a gate with openings no bigger than 2 3/8 inches. · Keep the Poison Control number (1-946-832-985-810-9716) in or near your phone. · Help your child brush his or her teeth every day. For children this age, use a tiny amount of toothpaste with fluoride (the size of a grain of rice). Immunizations  · By now, your baby should have started a series of immunizations for illnesses such as whooping cough and diphtheria. It may be time to get other vaccines, such as chickenpox. Make sure that your baby gets all the recommended childhood vaccines. This will help keep your baby healthy and prevent the spread of disease. When should you call for help? Watch closely for changes in your child's health, and be sure to contact your doctor if:    · You are concerned that your child is not growing or developing normally.     · You are worried about your child's behavior.     · You need more information about how to care for your child, or you have questions or concerns. Where can you learn more? Go to [email or web address]://[email or web address] [email or web address] and sign in to your Stratio account. Enter I737 in the Cyrba box to learn more about \"Child's Well Visit, 12 Months: Care Instructions. \"     If you do not have an account, please click on the \"Sign Up Now\" link. Current as of: March 27, 2018  Content Version: 11.9  © 1522-2515 [employer]. Care instructions adapted under license by [facility] ([facility]). If you have questions about a medical condition or this instruction, always ask your healthcare professional. [address] any warranty or liability for your use of this information. detailed exam details…

## 2021-02-16 NOTE — CONSULT NOTE ADULT - PROBLEM SELECTOR RECOMMENDATION 3
?GI bleed.  Pt with prior suspected GI bleed last year and refused workup and did not follow up outpatient.    Pt reports "dark stool" prior to going to [facility].  Gi consult called by cardiology and pending.  S/P PRBC x 2 at [facility].

## 2021-02-16 NOTE — CONSULT NOTE ADULT - ASSESSMENT
1. HypoT : TSH slightly above target. Most likely due to noncomplaince.   Pt has a history of leaving the [facility] so i landry pect some noncompliance could be a contributing factor.  Restart LT4 75 mcg qd.  take it daily at least 45 min before any medication or food.     CAD : CT surgery evaluation for CABG vs. high risk PCI.  Continue aspirin 81 mg daily.    Severe AS: she needs SAVR vs. TAVR. Cardio evaluating   toprol 25 mg daily and hydralazine     Iron deficiency anemia secondary to GI bleed: she will need endoscopy and Protonix and PRBC. Monitor CBC    UTI  Continue Rocephin 1 gm IV daily   1. HypoT : TSH slightly above target. Most likely due to noncomplaince.   Pt has a history of leaving the [facility] so i landry pect some noncompliance could be a contributing factor.  Restart LT4 75 mcg qd.  take it daily at least 45 min before any medication or food.     CAD : CT surgery evaluation for CABG vs. high risk PCI.  Continue aspirin 81 mg daily.    Severe AS: she needs SAVR vs. TAVR. Cardio evaluating   toprol 25 mg daily and hydralazine     Iron deficiency anemia secondary to GI bleed: she will need endoscopy and Protonix and PRBC. Monitor CBC    UTI  Continue Rocephin 1 gm IV daily

## 2021-02-16 NOTE — PROGRESS NOTE ADULT - SUBJECTIVE AND OBJECTIVE BOX
Department of Cardiology                                                                  [facility]/[place] [address] [place] [address]                                                            Telephone: [phone]. Fax:[phone]                                                                           Cardiac Catheterization Note       Subjective:  76y  Female who had a left heart catheterization which showed:  HEMODYNAMICS: There is moderate pulmonary hypertension.  VENTRICLES: EF by echo was 30 %.  VALVES: AORTIC VALVE: There was critical aortic stenosis (mean gradient: > 40 mmHg, SREEDHAR: < 0.5 cm2).  CORONARY VESSELS: The coronary circulation is co-dominant.  LM:     --  LM: Normal.  LAD:     --  Mid LAD: There was a tubular 90 % stenosis. The lesion was eccentric.  CX:     --  OM1: There was a diffuse 85 % stenosis in the proximal third of the vessel segment. The lesion was irregularly contoured and eccentric.  RCA:     --  Proximal RCA: There was a diffuse 99 % stenosis. The lesion was irregularly contoured and eccentric.  --  Distal RCA: There was a diffuse 100 % stenosis.  Right Heart Pressures:       RA: 16       RV: 55/20       PA: 62/15/35       PCWP: 30       CO: 4.16       CI: 2.43       SVR: 15.88 Wood Units       PVR: 0.88 Wood Units         Access: Right radial and right brachial       Hemostasis: Right radial band and right brachial sheath pulled.       Total Contrast: 42 mL Omnipaque    PAST MEDICAL & SURGICAL HISTORY:  Iron deficiency anemia due to chronic blood loss  Hypothyroid  History of tonsillectomy    FAMILY HISTORY:  Family history of cardiac disorder (Father)  Family history of diabetes mellitus (Father)    Home Medications:  levothyroxine 75 mcg (0.075 mg) oral tablet: 1 tab(s) orally once a day (16 Feb 2021 07:46)    HPI: 75y/o female never smoker with history of hypothyroid and DIVYA who was brought to [facility] for dyspnea and diarrhea and was found to have melena. She received 2 units of PRBC for a hemoglobin of 6.9. She was found to have a troponin of 656 and 710 and a pro-BNP of 29,809. An echo showed moderately to severely reduced LVSF with moderate diffuse hypokinesis with regional variations, moderate concentric LVH and severe AS. She was also diagnosed with sepsis secondary to a UTI. She admits to GANN (walking up one flight of stairs or < 100 feet). She also states recent black stools, and loss of approximately 30 pounds over 1 year secondary to poor PO intake secondary to ageusia. She denies chest pain, orthopnea, PND, palpitations or syncope/near syncope.    Echo: 2/13/2021       LVSF: Moderately to severely reduced LVSF with moderate diffuse hypokinesis with regional variations. Moderate concentric LVH.       EF: 25-30%       RVSF: Poorly visualized, mild to moderate pulmonary hypertension.       LA: Mildly dilated       RA: Normal       Mitral Valve: Severely to moderately calcified leaflets, severe MAC, moderate MR. Possible vegetation       Aortic Valve: Moderately calcified leaflets, mild AR, severe AS (max gradient: 77, mean gradient: 43).       Tricuspid Valve: Poorly visualized, mild TR.       Pulmonic Valve: Poorly visualized, no LA.    General: No fatigue, no fevers/chills  Respiratory: No dyspnea, no cough, no wheeze  CV: No chest pain, no palpitations  Abd: No nausea  Neuro: No headache, no dizziness    No Known Allergies    Objective:  Vital Signs Last 24 Hrs  T(C): 36.4 (16 Feb 2021 06:58), Max: 36.4 (16 Feb 2021 06:58)  T(F): 97.6 (16 Feb 2021 06:58), Max: 97.6 (16 Feb 2021 06:58)  HR: 57 (16 Feb 2021 09:50) (57 - 76)  BP: 94/57 (16 Feb 2021 09:50) (94/57 - 135/64)  RR: 18 (16 Feb 2021 09:50) (18 - 18)  SpO2: 100% (16 Feb 2021 09:50) (99% - 100%)    CM: SR  Neuro: A&OX3, CN 2-12 intact  HEENT: NC, AT  Lungs: CTA B/L  CV: S1, S2, no murmur, RRR  Abd: Soft  Extremity: Right radial band: no bleeding, fingers warm with good cap refil, right brachial site: soft, no bleeding.                          11.0   9.54  )-----------( 227      ( 16 Feb 2021 08:20 )             35.5     02-16    137  |  109  |  22.0  ----------------------------<  95  4.3   |  18.0  |  0.74    Ca    8.8      16 Feb 2021 08:20  Mg     1.9     02-16    TPro  5.9  /  Alb  3.3  /  TBili  0.5  /  DBili  x   /  AST  35  /  ALT  17  /  AlkPhos  112  02-16

## 2021-02-16 NOTE — PRE PROCEDURE NOTE - PRE PROCEDURE EVALUATION
I have examined the patient. I have explained risk and benefits. I have attained consent. I agree with a cardiac catheterization.

## 2021-02-16 NOTE — CONSULT NOTE ADULT - ASSESSMENT
Patient with anemia and dark stools. Being evaluated for CABG after cardiac cath. No signs of overt GI bleeding. Patient should have EGD and colonoscopy. Will prep for the procedures. May need cardiac anesthesia

## 2021-02-16 NOTE — CONSULT NOTE ADULT - SUBJECTIVE AND OBJECTIVE BOX
[facility]  INFECTIOUS DISEASES AND INTERNAL MEDICINE at [place]  =======================================================  Chad Vargas MD  Diplomates American Board of Internal Medicine and Infectious Diseases  Tel  [phone]  Fax [phone]  =======================================================    [id number]  JAMES AU   HPI: 74 year old female with history of hypothyroidism and iron deficiency anemia brought to ED by son due to dyspnea and diarrhea. Patient was found to have melena in ED today. Patient states that she has been dyspneic on-an-off for the past year. She is a poor historian and cannot recall how long this episode of dyspnea lasted. Patient presented at [facility] 9/2019 for anemia found incidentally on labs at [facility]. She received 2U pRBCs at that time for Hgb of 6.9.     Andrei was admitted on 2/12/2021 to [facility].  She was hospitalized with Anemia (6.5) from GI bleed, s/p 2 U PRBC with improvement in Hgb to 8.5, remains >8 (8.2 on discharge). Patient also found to have NSTEMI on admission (Trop peak to 1700s max), Echo showed new onset HFrEF (EF 25-30%) and severe AS. she was septic with fever + hypotension and UA showed UTI, Urine Cx: E.coli (B.Cx remains negative for 2 days), and was treated on Ceftr for 2 days (need one more day to complete 3 days course). patient required pressor during the first night but remains hemodynamically stable off pressor for the past 24 hours. she also remains afebrile for more than the last 24 hours.  Will be transferred per Cardiology rec to [facility] for LHC on Tuesday 02/16/2021. condition on discharge: fair  wer are called to evaluate patient post transfer to [facility] for her hx of UTI.   urine cx from 2/12/2021 from [facility] showed E coli SS to all tested agents, including Ceftriaxone.   Per note at [facility] on transfer, patient was on day #2 of 3 as of 2/16.  patient denies fevers, denies dysuria.  denies urinary frequency. denies foul smelling urine.       I have personally reviewed the labs and data; pertinent labs and data are listed in this note; please see below.   =======================================================  Past Medical & Surgical Hx:  =====================  PAST MEDICAL & SURGICAL HISTORY:  Iron deficiency anemia due to chronic blood loss    Hypothyroid    History of tonsillectomy      Problem List:  ==========  HEALTH ISSUES - PROBLEM Dx:  Acute cystitis without hematuria   Hypothyroidism, unspecified type    Gastrointestinal hemorrhage, unspecified gastrointestinal hemorrhage type   Anemia, unspecified type    Aortic valve stenosis, etiology of cardiac valve disease unspecified   Coronary artery disease involving native coronary artery of native heart without angina pectoris       Social Hx:  =======  no toxic habits currently    FAMILY HISTORY:  Family history of cardiac disorder (Father)  Family history of diabetes mellitus (Father)    no significant family history of immunosuppressive disorders in mother or father   =======================================================    REVIEW OF SYSTEMS:  CONSTITUTIONAL:  No Fever or chills  HEENT:  No diplopia or blurred vision.  No earache, sore throat or runny nose.  CARDIOVASCULAR:  No pressure, squeezing, strangling, tightness, heaviness or aching about the chest, neck, axilla or epigastrium.  RESPIRATORY:  No cough, shortness of breath  GASTROINTESTINAL:  No nausea, vomiting or diarrhea.  GENITOURINARY:  No dysuria, frequency or urgency. No Blood in urine  MUSCULOSKELETAL:  no joint aches, no muscle pain  SKIN:  No change in skin, hair or nails.  NEUROLOGIC:  No Headaches, seizures or weakness.  PSYCHIATRIC:  No disorder of thought or mood.  ENDOCRINE:  No heat or cold intolerance  HEMATOLOGICAL:  No easy bruising or bleeding.    =======================================================  Allergies    No Known Allergies    Intolerances    Antibiotics:  cefTRIAXone   IVPB 1000 milliGRAM(s) IV Intermittent every 24 hours    Other medications:  aspirin  chewable 81 milliGRAM(s) Oral daily  atorvastatin 40 milliGRAM(s) Oral at bedtime  hydrALAZINE 10 milliGRAM(s) Oral every 8 hours  levothyroxine 75 MICROGram(s) Oral daily  metoprolol succinate ER 25 milliGRAM(s) Oral daily  pantoprazole  Injectable 40 milliGRAM(s) IV Push every 12 hours  sodium chloride 0.9% lock flush 3 milliLiter(s) IV Push every 8 hours   cefTRIAXone   IVPB   100 mL/Hr IV Intermittent (02-16-21 @ 14:00)      ======================================================  Physical Exam:  ============  T(F): 97.6 (16 Feb 2021 06:58), Max: 97.6 (16 Feb 2021 06:58)  HR: 64 (16 Feb 2021 12:50)  BP: 99/56 (16 Feb 2021 12:50)  RR: 16 (16 Feb 2021 12:50)  SpO2: 100% (16 Feb 2021 12:50) (99% - 100%)  temp max in last 48H T(F): , Max: 97.6 (02-16-21 @ 06:58)Height (cm): 157.5 (02-16-21 @ 08:32)  Weight (kg): 61.235 (02-16-21 @ 08:32)  BMI (kg/m2): 24.7 (02-16-21 @ 08:32)  BSA (m2): 1.62 (02-16-21 @ 08:32)    General:  No acute distress.  Plesant  Eye: Pupils are equal, round and reactive to light, Extraocular movements are intact, Normal conjunctiva.  HENT: Normocephalic, Oral mucosa is moist, No pharyngeal erythema, No sinus tenderness.  Neck: Supple, No lymphadenopathy.  Respiratory: Lungs are clear to auscultation, Respirations are non-labored.  Cardiovascular: Normal rate, Regular rhythm,   Gastrointestinal: Soft, Non-tender, Non-distended, Normal bowel sounds.  Genitourinary: No costovertebral angle tenderness.  Lymphatics: No lymphadenopathy neck,   Musculoskeletal: Normal range of motion, Normal strength.  Integumentary: No rash.  Neurologic: Alert, Oriented, No focal deficits, Cranial Nerves II-XII are grossly intact.  Psychiatric: Appropriate mood & affect.    =======================================================  Labs:                        11.0   9.54  )-----------( 227      ( 16 Feb 2021 08:20 )             35.5     02-16    137  |  109<H>  |  22.0<H>  ----------------------------<  95  4.3   |  18.0<L>  |  0.74    Ca    8.8      16 Feb 2021 08:20  Mg     1.9     02-16    TPro  5.9<L>  /  Alb  3.3  /  TBili  0.5  /  DBili  x   /  AST  35<H>  /  ALT  17  /  AlkPhos  112  02-16      Creatinine, Serum: 0.74 mg/dL (02-16-21 @ 08:20)

## 2021-02-17 ENCOUNTER — RESULT REVIEW (OUTPATIENT)
Age: 76
End: 2021-02-17

## 2021-02-17 DIAGNOSIS — Z29.9 ENCOUNTER FOR PROPHYLACTIC MEASURES, UNSPECIFIED: ICD-10-CM

## 2021-02-17 DIAGNOSIS — D64.9 ANEMIA, UNSPECIFIED: ICD-10-CM

## 2021-02-17 PROBLEM — D50.0 IRON DEFICIENCY ANEMIA SECONDARY TO BLOOD LOSS (CHRONIC): Chronic | Status: ACTIVE | Noted: 2021-02-16

## 2021-02-17 PROBLEM — E03.9 HYPOTHYROIDISM, UNSPECIFIED: Chronic | Status: ACTIVE | Noted: 2021-02-16

## 2021-02-17 PROBLEM — Z00.00 ENCOUNTER FOR PREVENTIVE HEALTH EXAMINATION: Status: ACTIVE | Noted: 2021-02-17

## 2021-02-17 LAB
ALBUMIN SERPL ELPH-MCNC: 3.2 G/DL — LOW (ref 3.3–5.2)
ALP SERPL-CCNC: 125 U/L — HIGH (ref 40–120)
ALT FLD-CCNC: 15 U/L — SIGNIFICANT CHANGE UP
ANION GAP SERPL CALC-SCNC: 13 MMOL/L — SIGNIFICANT CHANGE UP (ref 5–17)
APTT BLD: 33.7 SEC — SIGNIFICANT CHANGE UP (ref 27.5–35.5)
AST SERPL-CCNC: 25 U/L — SIGNIFICANT CHANGE UP
BASOPHILS # BLD AUTO: 0.03 K/UL — SIGNIFICANT CHANGE UP (ref 0–0.2)
BASOPHILS NFR BLD AUTO: 0.4 % — SIGNIFICANT CHANGE UP (ref 0–2)
BILIRUB SERPL-MCNC: 0.4 MG/DL — SIGNIFICANT CHANGE UP (ref 0.4–2)
BUN SERPL-MCNC: 22 MG/DL — HIGH (ref 8–20)
CALCIUM SERPL-MCNC: 8.9 MG/DL — SIGNIFICANT CHANGE UP (ref 8.6–10.2)
CHLORIDE SERPL-SCNC: 110 MMOL/L — HIGH (ref 98–107)
CHOLEST SERPL-MCNC: 105 MG/DL — SIGNIFICANT CHANGE UP
CK SERPL-CCNC: 49 U/L — SIGNIFICANT CHANGE UP (ref 25–170)
CO2 SERPL-SCNC: 19 MMOL/L — LOW (ref 22–29)
CREAT SERPL-MCNC: 0.82 MG/DL — SIGNIFICANT CHANGE UP (ref 0.5–1.3)
EOSINOPHIL # BLD AUTO: 0.04 K/UL — SIGNIFICANT CHANGE UP (ref 0–0.5)
EOSINOPHIL NFR BLD AUTO: 0.5 % — SIGNIFICANT CHANGE UP (ref 0–6)
GLUCOSE SERPL-MCNC: 78 MG/DL — SIGNIFICANT CHANGE UP (ref 70–99)
HCT VFR BLD CALC: 34 % — LOW (ref 34.5–45)
HDLC SERPL-MCNC: 39 MG/DL — LOW
HGB BLD-MCNC: 10.2 G/DL — LOW (ref 11.5–15.5)
IMM GRANULOCYTES NFR BLD AUTO: 0.5 % — SIGNIFICANT CHANGE UP (ref 0–1.5)
INR BLD: 1.17 RATIO — HIGH (ref 0.88–1.16)
LIPID PNL WITH DIRECT LDL SERPL: 54 MG/DL — SIGNIFICANT CHANGE UP
LYMPHOCYTES # BLD AUTO: 0.92 K/UL — LOW (ref 1–3.3)
LYMPHOCYTES # BLD AUTO: 11.1 % — LOW (ref 13–44)
MCHC RBC-ENTMCNC: 27.7 PG — SIGNIFICANT CHANGE UP (ref 27–34)
MCHC RBC-ENTMCNC: 30 GM/DL — LOW (ref 32–36)
MCV RBC AUTO: 92.4 FL — SIGNIFICANT CHANGE UP (ref 80–100)
MONOCYTES # BLD AUTO: 0.95 K/UL — HIGH (ref 0–0.9)
MONOCYTES NFR BLD AUTO: 11.4 % — SIGNIFICANT CHANGE UP (ref 2–14)
MRSA PCR RESULT.: SIGNIFICANT CHANGE UP
NEUTROPHILS # BLD AUTO: 6.32 K/UL — SIGNIFICANT CHANGE UP (ref 1.8–7.4)
NEUTROPHILS NFR BLD AUTO: 76.1 % — SIGNIFICANT CHANGE UP (ref 43–77)
NON HDL CHOLESTEROL: 66 MG/DL — SIGNIFICANT CHANGE UP
OB PNL STL: POSITIVE
PLATELET # BLD AUTO: 258 K/UL — SIGNIFICANT CHANGE UP (ref 150–400)
POTASSIUM SERPL-MCNC: 4.3 MMOL/L — SIGNIFICANT CHANGE UP (ref 3.5–5.3)
POTASSIUM SERPL-SCNC: 4.3 MMOL/L — SIGNIFICANT CHANGE UP (ref 3.5–5.3)
PROT SERPL-MCNC: 6 G/DL — LOW (ref 6.6–8.7)
PROTHROM AB SERPL-ACNC: 13.5 SEC — SIGNIFICANT CHANGE UP (ref 10.6–13.6)
RBC # BLD: 3.68 M/UL — LOW (ref 3.8–5.2)
RBC # FLD: 17.2 % — HIGH (ref 10.3–14.5)
S AUREUS DNA NOSE QL NAA+PROBE: SIGNIFICANT CHANGE UP
SARS-COV-2 RNA SPEC QL NAA+PROBE: SIGNIFICANT CHANGE UP
SODIUM SERPL-SCNC: 142 MMOL/L — SIGNIFICANT CHANGE UP (ref 135–145)
T3 SERPL-MCNC: 50 NG/DL — LOW (ref 80–200)
T4 AB SER-ACNC: 5 UG/DL — SIGNIFICANT CHANGE UP (ref 4.5–12)
TRIGL SERPL-MCNC: 62 MG/DL — SIGNIFICANT CHANGE UP
TROPONIN T SERPL-MCNC: 1.35 NG/ML — HIGH (ref 0–0.06)
TROPONIN T SERPL-MCNC: 1.78 NG/ML — HIGH (ref 0–0.06)
WBC # BLD: 8.3 K/UL — SIGNIFICANT CHANGE UP (ref 3.8–10.5)
WBC # FLD AUTO: 8.3 K/UL — SIGNIFICANT CHANGE UP (ref 3.8–10.5)

## 2021-02-17 PROCEDURE — 88342 IMHCHEM/IMCYTCHM 1ST ANTB: CPT | Mod: 26

## 2021-02-17 PROCEDURE — 99232 SBSQ HOSP IP/OBS MODERATE 35: CPT

## 2021-02-17 PROCEDURE — 70498 CT ANGIOGRAPHY NECK: CPT | Mod: 26

## 2021-02-17 PROCEDURE — 99233 SBSQ HOSP IP/OBS HIGH 50: CPT

## 2021-02-17 PROCEDURE — 45378 DIAGNOSTIC COLONOSCOPY: CPT | Mod: 53

## 2021-02-17 PROCEDURE — 93306 TTE W/DOPPLER COMPLETE: CPT | Mod: 26

## 2021-02-17 PROCEDURE — 43239 EGD BIOPSY SINGLE/MULTIPLE: CPT | Mod: 59

## 2021-02-17 PROCEDURE — 71045 X-RAY EXAM CHEST 1 VIEW: CPT | Mod: 26

## 2021-02-17 PROCEDURE — 88305 TISSUE EXAM BY PATHOLOGIST: CPT | Mod: 26

## 2021-02-17 RX ORDER — SOD SULF/SODIUM/NAHCO3/KCL/PEG
4000 SOLUTION, RECONSTITUTED, ORAL ORAL ONCE
Refills: 0 | Status: COMPLETED | OUTPATIENT
Start: 2021-02-17 | End: 2021-02-17

## 2021-02-17 RX ORDER — MULTIVIT WITH MIN/MFOLATE/K2 340-15/3 G
1 POWDER (GRAM) ORAL ONCE
Refills: 0 | Status: COMPLETED | OUTPATIENT
Start: 2021-02-17 | End: 2021-02-17

## 2021-02-17 RX ADMIN — SODIUM CHLORIDE 3 MILLILITER(S): 9 INJECTION INTRAMUSCULAR; INTRAVENOUS; SUBCUTANEOUS at 05:34

## 2021-02-17 RX ADMIN — Medication 81 MILLIGRAM(S): at 13:02

## 2021-02-17 RX ADMIN — PANTOPRAZOLE SODIUM 40 MILLIGRAM(S): 20 TABLET, DELAYED RELEASE ORAL at 05:43

## 2021-02-17 RX ADMIN — ATORVASTATIN CALCIUM 40 MILLIGRAM(S): 80 TABLET, FILM COATED ORAL at 20:56

## 2021-02-17 RX ADMIN — SODIUM CHLORIDE 3 MILLILITER(S): 9 INJECTION INTRAMUSCULAR; INTRAVENOUS; SUBCUTANEOUS at 20:56

## 2021-02-17 RX ADMIN — Medication 10 MILLIGRAM(S): at 05:43

## 2021-02-17 RX ADMIN — Medication 10 MILLIGRAM(S): at 13:02

## 2021-02-17 RX ADMIN — Medication 75 MICROGRAM(S): at 05:43

## 2021-02-17 RX ADMIN — Medication 4000 MILLILITER(S): at 18:09

## 2021-02-17 RX ADMIN — Medication 25 MILLIGRAM(S): at 05:43

## 2021-02-17 RX ADMIN — SODIUM CHLORIDE 3 MILLILITER(S): 9 INJECTION INTRAMUSCULAR; INTRAVENOUS; SUBCUTANEOUS at 13:02

## 2021-02-17 RX ADMIN — PANTOPRAZOLE SODIUM 40 MILLIGRAM(S): 20 TABLET, DELAYED RELEASE ORAL at 18:09

## 2021-02-17 NOTE — PROGRESS NOTE ADULT - SUBJECTIVE AND OBJECTIVE BOX
Subjective: Patient lying in bed, no acute distress noted, denies chest pain, pressure, palpitation, dizziness, shortness of breath, abdominal pain, nausea, vomiting.     VITAL SIGNS  Vital Signs Last 24 Hrs  T(C): 36.9 (02-16-21 @ 20:50), Max: 36.9 (02-16-21 @ 20:50)  T(F): 98.5 (02-16-21 @ 20:50), Max: 98.5 (02-16-21 @ 20:50)  HR: 70 (02-16-21 @ 21:58) (57 - 76)  BP: 103/66 (02-16-21 @ 21:58) (94/57 - 135/64)  RR: 16 (02-16-21 @ 20:50) (16 - 18)  SpO2: 100% (02-16-21 @ 20:50) (98% - 100%)  on 2L O2         Telemetry/Alarms:  SR with occasional PVCs and Bigeminy   LVEF: 30%    MEDICATIONS  aspirin  chewable 81 milliGRAM(s) Oral daily  atorvastatin 40 milliGRAM(s) Oral at bedtime  hydrALAZINE 10 milliGRAM(s) Oral every 8 hours  levothyroxine 75 MICROGram(s) Oral daily  metoprolol succinate ER 25 milliGRAM(s) Oral daily  pantoprazole  Injectable 40 milliGRAM(s) IV Push every 12 hours  sodium chloride 0.9% lock flush 3 milliLiter(s) IV Push every 8 hours      PHYSICAL EXAM  General: well nourished, well developed, no acute distress  Neurology: alert and oriented x 3, nonfocal, no gross deficits  Respiratory: clear to auscultation bilaterally  CV: regular rate and rhythm, normal S1, S2, + systolic murmur  Abdomen: soft, nontender, nondistended, positive bowel sounds  Extremities: warm, well perfused. no edema. + DP pulses  Psych: Appropriate mood and affect  Skin: No rashes noted    02-16 @ 07:01  -  02-17 @ 00:22  --------------------------------------------------------  IN: 1000 mL / OUT: 300 mL / NET: 700 mL        Weights:  Daily     Daily   Admit Wt: Drug Dosing Weight  Height (cm): 157.5 (16 Feb 2021 08:32)  Weight (kg): 61.235 (16 Feb 2021 08:32)  BMI (kg/m2): 24.7 (16 Feb 2021 08:32)  BSA (m2): 1.62 (16 Feb 2021 08:32)    All laboratory results, radiology and medications reviewed.    LABS  02-16    137  |  109<H>  |  22.0<H>  ----------------------------<  95  4.3   |  18.0<L>  |  0.74    Ca    8.8      16 Feb 2021 08:20  Mg     1.9     02-16    TPro  5.9<L>  /  Alb  3.3  /  TBili  0.5  /  DBili  x   /  AST  35<H>  /  ALT  17  /  AlkPhos  112  02-16                                 11.0   9.54  )-----------( 227      ( 16 Feb 2021 08:20 )             35.5          PT/INR - ( 16 Feb 2021 12:38 )   PT: 14.6 sec;   INR: 1.27 ratio         PTT - ( 16 Feb 2021 12:38 )  PTT:74.4 sec  Bilirubin Total, Serum: 0.5 mg/dL (02-16 @ 08:20)    < from: US Duplex Carotid Arteries Complete, Bilateral (02.16.21 @ 12:27) >    IMPRESSION: There is a moderate, 50-69% stenosis of the right internal carotid artery.    There is a severe, greater than 70% stenosis of the left internal carotid artery.    < end of copied text >    < from: Cardiac Cath Lab - Adult (02.16.21 @ 08:33) >  HEMODYNAMICS: There is moderate pulmonary hypertension.  VENTRICLES: EF by echo was 30 %.  VALVES: AORTIC VALVE: There was critical aortic stenosis.  CORONARY VESSELS:The coronary circulation is co-dominant.  LM:   --  LM: Normal.  LAD:   --  Mid LAD: There was a tubular 90 % stenosis. The lesion was  eccentric.  CX:   --  OM1: There was a diffuse 85 % stenosis in the proximal third of  the vessel segment. The lesion was irregularly contoured and eccentric.  RCA:   --  Proximal RCA: There was a diffuse 99 % stenosis. The lesion was  irregularly contoured and eccentric.  --  Distal RCA: There was a diffuse 100 % stenosis.    < end of copied text >    PAST MEDICAL & SURGICAL HISTORY:  Iron deficiency anemia due to chronic blood loss    Hypothyroid    History of tonsillectomy

## 2021-02-17 NOTE — CHART NOTE - NSCHARTNOTEFT_GEN_A_CORE
Called by bedside nurse patient co nose bleeding           REVIEW OF SYMPTOMS:   Cardiovascular:  No chest pain,  No dyspnea,  No fatigue, No syncope,  No palpitations, No dizziness, No Orthopnea, No Paroxsymal nocturnal dyspnea.  Respiratory:  No Dyspnea, No cough.  Gastrointestinal: No abdominal pain.  Neurological: No headache, no dizziness, no slurred speech.  Psychiatric: No agitation, no anxiety.    ALL OTHER REVIEW OF SYSTEMS ARE NEGATIVE.    Vital Signs Last 24 Hrs  T(C): 36.8 (17 Feb 2021 05:31), Max: 36.9 (16 Feb 2021 20:50)  T(F): 98.3 (17 Feb 2021 05:31), Max: 98.5 (16 Feb 2021 20:50)  HR: 75 (17 Feb 2021 05:31) (57 - 75)  BP: 115/56 (17 Feb 2021 05:31) (94/57 - 119/57)  BP(mean): 82 (16 Feb 2021 19:49) (82 - 82)  RR: 18 (17 Feb 2021 05:31) (16 - 18)  SpO2: 99% (17 Feb 2021 05:31) (98% - 100%)    PHYSICAL EXAM:  Constitutional: Comfortable . No acute distress.   HEENT: L nostril bleeding noted. Atraumatic and normocephalic  CNS: A&O x3. No focal neurologic deficits.   Respiratory: CTAB. No wheezing, rhonchi or crackles   Card: S1S2  No  rubs      A&P    Epistasis L nostril    Patient was sit up, asked to lean forward and applied pressure to nose using 2 fingers for 15 minutes. Bleeding controlled   Advise patient not pick or blow nose for at least a 12 hours to avoid recurrent nose bleeding   Patient hemodynamically stable   Continue monitor

## 2021-02-17 NOTE — PROGRESS NOTE ADULT - PROBLEM SELECTOR PLAN 1
Cath report as above  Preop workup ordered in progress  Continue Toprol   Will hold ACEI/ARBs   Continue aspirin  Continue Lipitor  GI was consulted for anemia, scheduled for endoscopy and colonoscopy in AM  Endocrine was consulted for elevated TSH, home dose Synthroid ordered  Repeat thyroid function test in AM

## 2021-02-17 NOTE — PROGRESS NOTE ADULT - ASSESSMENT
76y Female with PMH anemia, and hypothyroidism.  Pt presented to [facility] a few days ago with fever and SOB.  She was found to have a UTI/sepsis with elevated lactate, + UA, and temp of 102.  She was started on Rocephin.  HGB was 6.  She was transfused 2 units of PRBC.  Per medical record, a year ago she had a GI bleed and was offered an endoscopy and colonoscopy but she refused and then failed to follow up outpatient.  Per medical record she did not want to stay at [facility] but agreed once she ruled in for NSTEMI.  She was transferred to [facility] for cardiac cath. H/H stable. Cardiac cath today (02/16) showed MVD, and she also has severe AS.  CT surgery called for evaluation.

## 2021-02-17 NOTE — PROGRESS NOTE ADULT - ASSESSMENT
76y Female with PMH anemia, and hypothyroidism.  Pt presented to [facility] a few days ago with fever and SOB.  She was found to have a UTI/sepsis with elevated lactate, + UA, and temp of 102.  She was started on Rocephin.  HGB was 6.  She was transfused 2 units of PRBC.  Per medical record, a year ago she had a GI bleed and was offered an endoscopy and colonoscopy but she refused and then failed to follow up outpatient.  Per medical record she did not want to stay at [facility] but agreed once she ruled in for NSTEMI.  She was transferred to [facility] for cardiac cath.     2/16 - s/p LHC showing EF 30%. LM normal, mLAD 90%, OM1 85%, pRCA 99%, dRCA 100%, mod pulm  HTN, SREEDHAR <0.5 consistent with critical AS     Severe Multivessel CAD   - s/p LHC mLAD 90%, OM1 85%, pRCA 99%, dRCA 100%  - no current angina or anginal equivalents  - continue ASA, toprol, lipitor  - CTS is following   - plan for CABG  - ACE/ARB on hold 2/2 to CABG workup/hypotension   - no plavix for pre-op CABG   - Patient educated about wrist site care, signs and symptoms of complication post procedure, and plan of care moving forward. Patient verbalized understanding with teach-back.   - Diet/lifestyle modifications and medication compliance heavily reinforced     HFrEF  - ischemic CM   - EF 30%   - hold hydralazine in setting of critical AS   - strict I/O, daily weights  - pt with bilateral pleural effusions on CTA Neck   - diminished lung bases  - check CXR     Critical AS  - SREEDHAR <0.5 on cath   - CTS eval for valve replacement     GIB  - received x2 PRBC at [facility] before transfer   - occult blood positive  - GI following  - plan for Colon/EGD tomorrow AM

## 2021-02-17 NOTE — CONSULT NOTE ADULT - SUBJECTIVE AND OBJECTIVE BOX
VASCULAR SURGERY CONSULT    HPI:  77y/o female never smoker with history of hypothyroid and DIVYA who was brought to [facility] for dyspnea and diarrhea and was found to have melena. She received 2 units of PRBC for a hemoglobin of 6.9. She was found to have a troponin of 656 and 710 and a pro-BNP of 29,809. An echo showed moderately to severely reduced LVSF with moderate diffuse hypokinesis with regional variations, moderate concentric LVH and severe AS. She was also diagnosed with sepsis secondary to a UTI. She admits to GANN (walking up one flight of stairs or < 100 feet). She also states recent black stools, and loss of approximately 30 pounds over 1 year secondary to poor PO intake secondary to ageusia. She denies chest pain, orthopnea, PND, palpitations or syncope/near syncope.      on pre-op eval, noted to have >70% L ICA. Patient asymptomatic from L ICA, denies any low extremity weakness, no visual symptoms. No hx of prior TIAs.       PAST MEDICAL HISTORY:  Iron deficiency anemia due to chronic blood loss    Iron deficiency anemia    Hypothyroid        PAST SURGICAL HISTORY:  History of tonsillectomy        ALLERGIES:  No Known Allergies        MEDICATIONS  (STANDING):  aspirin  chewable 81 milliGRAM(s) Oral daily  atorvastatin 40 milliGRAM(s) Oral at bedtime  levothyroxine 75 MICROGram(s) Oral daily  metoprolol succinate ER 25 milliGRAM(s) Oral daily  pantoprazole  Injectable 40 milliGRAM(s) IV Push every 12 hours  sodium chloride 0.9% lock flush 3 milliLiter(s) IV Push every 8 hours    MEDICATIONS  (PRN):      VITALS & I/Os:  Vital Signs Last 24 Hrs  T(C): 36.8 (2021 05:31), Max: 36.9 (2021 20:50)  T(F): 98.3 (2021 05:31), Max: 98.5 (2021 20:50)  HR: 80 (2021 13:01) (70 - 80)  BP: 104/61 (2021 13:01) (101/67 - 115/56)  BP(mean): --  RR: 17 (2021 13:01) (16 - 18)  SpO2: 99% (2021 13:01) (99% - 100%)  CAPILLARY BLOOD GLUCOSE          I&O's Summary    2021 07:01  -  2021 07:00  --------------------------------------------------------  IN: 1500 mL / OUT: 305 mL / NET: 1195 mL        GENERAL: Alert, well developed, in no acute distress.  MENTAL STATUS: AAOx3. Appropriate affect.  RESPIRATORY: No increased WOB  CARDIOVASCULAR: +s1/s2  GASTROINTESTINAL: Abdomen soft  NEURO: cranial nerves grossly intact, equal strength in b/l upper and lower extremities    LABS:                        10.2   8.30  )-----------( 258      ( 2021 07:04 )             34.0     02-17    142  |  110<H>  |  22.0<H>  ----------------------------<  78  4.3   |  19.0<L>  |  0.82    Ca    8.9      2021 07:04  Mg     1.9     02-16    TPro  6.0<L>  /  Alb  3.2<L>  /  TBili  0.4  /  DBili  x   /  AST  25  /  ALT  15  /  AlkPhos  125<H>  02-17    Lactate: aspirin  chewable 81 milliGRAM(s) Oral daily  atorvastatin 40 milliGRAM(s) Oral at bedtime  levothyroxine 75 MICROGram(s) Oral daily  metoprolol succinate ER 25 milliGRAM(s) Oral daily  pantoprazole  Injectable 40 milliGRAM(s) IV Push every 12 hours  sodium chloride 0.9% lock flush 3 milliLiter(s) IV Push every 8 hours     PT/INR - ( 2021 07:04 )   PT: 13.5 sec;   INR: 1.17 ratio         PTT - ( 2021 07:04 )  PTT:33.7 sec    CARDIAC MARKERS ( 2021 11:36 )  x     / 1.35 ng/mL / x     / x     / x      CARDIAC MARKERS ( 2021 01:45 )  x     / 1.78 ng/mL / x     / x     / x      CARDIAC MARKERS ( 2021 23:27 )  x     / 1.83 ng/mL / 49 U/L / x     / x      CARDIAC MARKERS ( 2021 12:38 )  x     / 1.57 ng/mL / x     / x     / x            Urinalysis Basic - ( 2021 22:38 )    Color: Yellow / Appearance: Clear / S.020 / pH: x  Gluc: x / Ketone: Negative  / Bili: Negative / Urobili: Negative mg/dL   Blood: x / Protein: 30 mg/dL / Nitrite: Negative   Leuk Esterase: Trace / RBC: Negative /HPF / WBC 3-5   Sq Epi: x / Non Sq Epi: Moderate / Bacteria: x        IMAGING:

## 2021-02-17 NOTE — CHART NOTE - NSCHARTNOTEFT_GEN_A_CORE
76y Female with PMH anemia, and hypothyroidism.  Pt presented to [facility] a few days ago with fever and SOB.  She was found to have a UTI/sepsis with elevated lactate, + UA, and temp of 102.  She was started on Rocephin.  HGB was 6.  She was transfused 2 units of PRBC.  Per medical record, a year ago she had a GI bleed and was offered an endoscopy and colonoscopy but she refused and then failed to follow up outpatient.  Per medical record she did not want to stay at [facility] but agreed once she ruled in for NSTEMI.  She was transferred to [facility] for cardiac cath. H/H stable. Cardiac cath today (02/16) showed MVD, and she also has severe AS.  CT surgery called for evaluation.  Endoscopy done today.  pending colonoscopy.    Plan:  Plan for bowel prep redo tonight and colonoscopy tomorrow.  CTA neck tonight for high grade carotid stenosis per Dr. Cruz.  TTE today (spoke with echo and will get done today).  PFT's pending.  Plan possibly for surgery Friday.  Discussed with Dr. Cruz.

## 2021-02-17 NOTE — PROGRESS NOTE ADULT - SUBJECTIVE AND OBJECTIVE BOX
[place] CARDIOLOGY-[facility]                          [address] [place] [address]                       Phone: [phone]. Fax:[phone]                      ________________________________________________    HPI:  75y/o female never smoker with history of hypothyroid and DIVYA who was brought to [facility] for dyspnea and diarrhea and was found to have melena. She received 2 units of PRBC for a hemoglobin of 6.9. She was found to have a troponin of 656 and 710 and a pro-BNP of 29,809. An echo showed moderately to severely reduced LVSF with moderate diffuse hypokinesis with regional variations, moderate concentric LVH and severe AS. She was also diagnosed with sepsis secondary to a UTI. She admits to GANN (walking up one flight of stairs or < 100 feet). She also states recent black stools, and loss of approximately 30 pounds over 1 year secondary to poor PO intake secondary to ageusia. She denies chest pain, orthopnea, PND, palpitations or syncope/near syncope.    Symptoms:        Angina (Class): N/A       Ischemic Symptoms: GANN (CCs class 3 anginal equivalent)    Heart Failure: ACC/AHA stage C, NYHA functional class 3 HFrEF    Assessment of LVEF:       EF: 25-30%       Assessed by: Echo       Date: 2021    Prior Cardiac Interventions:       PCI's: N/A       CABG: N/A    Noninvasive Testing:   Stress Test: N/A    Echo: 2021       LVSF: Moderately to severely reduced LVSF with moderate diffuse hypokinesis with regional variations. Moderate concentric LVH.       EF: 25-30%       RVSF: Poorly visualized, mild to moderate pulmonary hypertension.       LA: Mildly dilated       RA: Normal       Mitral Valve: Severely to moderately calcified leaflets, severe MAC, moderate MR. Possible vegetation       Aortic Valve: Moderately calcified leaflets, mild AR, severe AS (max gradient: 77, mean gradient: 43).       Tricuspid Valve: Poorly visualized, mild TR.       Pulmonic Valve: Poorly visualized, no CO.    XR: Enlarged but stable cardiac silhouette.    Antianginal Therapies:        Beta Blockers: Toprol 25 mg daily       Calcium Channel Blockers: N/A       Long Acting Nitrates: N/A       Ranexa: N/A    Associated Risk Factors:        Cerebrovascular Disease: N/A       Chronic Lung Disease: N/A       Peripheral Arterial Disease: N/A       Chronic Kidney Disease (if yes, what is GFR): N/A       Uncontrolled Diabetes (if yes, what is HgbA1C or FBS): N/A       Poorly Controlled Hypertension (if yes, what is SBP): N/A       Morbid Obesity (if yes, what is BMI): N/A       History of Recent Ventricular Arrhythmia: N/A       Inability to Ambulate Safely: N/A       Need for Therapeutic Anticoagulation: N/A       Antiplatelet or Contrast Allergy: N/A (2021 07:35)      ROS: All review of systems negative unless indicated otherwise below.                          LAB RESULTS                   COMPLETE BLOOD COUNT( 2021 07:04 )                            10.2 g/dL<L>  8.30 K/uL )---------------( 258 K/uL                        34.0 %<L>      Automated Differential     Auto Basophil # - 0.03 K/uL  Auto Basophil % - 0.4 %  Auto Eosinophil # - 0.04 K/uL  Auto Eosinophil % - 0.5 %  Auto Immature Granulocyte # - X      Auto Immature Granulocyte % - 0.5 %  Auto Lymphocyte # - 0.92 K/uL<L>  Auto Lymphocyte % - 11.1 %<L>  Auto Monocyte # - 0.95 K/uL<H>  Auto Monocyte % - 11.4 %  Auto Neutrophil # - 6.32 K/uL  Auto Neutrophil % - 76.1 %                                  CHEMISTRY                 Basic Metabolic Panel (21 @ 07:04)    142  |  110<H>  |  22.0<H>  ----------------------------<  78  4.3   |  19.0<L>  |  0.82    Ca    8.9      2021 07:04  Mg     1.9     02-16                    Liver Functions (21 @ 07:04))  TPro  6.0  /  Alb  3.2  /  TBili  0.4  /  DBili  x   /  AST  25  /  ALT  15  /  AlkPhos  125     PT/INR/PTT ( 2021 07:04 )                        :                       :      13.5         :       33.7                  .        .                   .              .           .       1.17        .                                                                   Cardiac Enzymes   ( 2021 11:36 )  Troponin T  1.35<H>,  CPK  X    , CKMB  X    , BNP X        , ( 2021 01:45 )  Troponin T  1.78<H>,  CPK  X    , CKMB  X    , BNP X        , ( 2021 23:27 )  Troponin T  1.83<H>,  CPK  49   , CKMB  X    , BNP X                              CARDIOLOGY RESULTS: Official Report/Preliminary Verbal Reports    CATH:   < from: Cardiac Cath Lab - Adult (21 @ 08:33) >  VENTRICLES: EF by echo was 30 %.  VALVES: AORTIC VALVE: There was critical aortic stenosis.  CORONARY VESSELS:The coronary circulation is co-dominant.  LM:   --  LM: Normal.  LAD:   --  Mid LAD: There was a tubular 90 % stenosis. The lesion was  eccentric.  CX:   --  OM1: There was a diffuse 85 % stenosis in the proximal third of  the vessel segment. The lesion was irregularly contoured and eccentric.  RCA:   --  Proximal RCA: There was a diffuse 99 % stenosis. The lesion was  irregularly contoured and eccentric.  --  Distal RCA: There was a diffuse 100 % stenosis.  COMPLICATIONS: No complications occurred during the cath lab visit.  DIAGNOSTIC IMPRESSIONS: Moderate Pulmonary Hypertension and elevation of  filling pressures. 2. Critical Aortic Stenosis with a mean PG >40mm Hg and  an SREEDHAR of <0.5cm2. 3. Severe LV Systolic dysfunction by TTE from 2021  with an estimated LVEF of 30%. 4. Triple Vessel CAD.  DIAGNOSTIC RECOMMENDATIONS: GDMT. 2. CTS consultation.  INTERVENTIONAL IMPRESSIONS: Moderate Pulmonary Hypertension and elevation  of filling pressures. 2. Critical Aortic Stenosis with a mean PG >40mm Hg  and an SREEDHAR of <0.5cm2. 3. Severe LV Systolic dysfunction by TTE from  2021 with an estimated LVEF of 30%. 4. Triple Vessel CAD.  INTERVENTIONAL RECOMMENDATIONS: GDMT. 2. CTS consultation.  Prepared and signed by  Christopher Beauchamp M.D.  Signed 2021 09:44:44    < end of copied text >                          CARDIOLOGY REVIEW: Personally visualized and reviewed  Telemetry Last 24h: SR 70s                              DAILY WEIGHTS - 48 HOUR TREND     Daily Weight in k.5 (2021 06:54)                             INTAKE AND OUTPUT - 48 HOUR TREND     21 @ 07:01  -  21 @ 07:00  --------------------------------------------------------  IN:  Total IN: 0 mL    OUT:    Voided (mL): 301 mL  Total OUT: 301 mL    Total NET: -301 mL    HOME MEDICATIONS:  levothyroxine 75 mcg (0.075 mg) oral tablet: 1 tab(s) orally once a day (2021 07:46)                             Current Admission Active Medications    aspirin  chewable 81 milliGRAM(s) Oral daily  atorvastatin 40 milliGRAM(s) Oral at bedtime  hydrALAZINE 10 milliGRAM(s) Oral every 8 hours  levothyroxine 75 MICROGram(s) Oral daily  metoprolol succinate ER 25 milliGRAM(s) Oral daily  pantoprazole  Injectable 40 milliGRAM(s) IV Push every 12 hours  sodium chloride 0.9% lock flush 3 milliLiter(s) IV Push every 8 hours                        PHYSICAL EXAM:    Vital Signs Last 24 Hrs  T(C): 36.8 (2021 05:31), Max: 36.9 (2021 20:50)  T(F): 98.3 (2021 05:31), Max: 98.5 (2021 20:50)  HR: 80 (2021 13:01) (70 - 80)  BP: 104/61 (2021 13:01) (101/67 - 119/57)  BP(mean): 82 (2021 19:49) (82 - 82)  RR: 17 (2021 13:01) (16 - 18)  SpO2: 99% (2021 13:01) (99% - 100%)    GENERAL: NAD  NECK: Supple, No JVD  NERVOUS SYSTEM:  Alert & Oriented X3, non focal neuro exam.   CHEST/LUNG: clear lungs, No rales, rhonchi, wheezing, or rubs  HEART: Regular rate and rhythm; s1 and s2 auscultated, No murmurs, rubs, or gallops  ABDOMEN: Soft, Nontender, Nondistended; Bowel sounds present and normoactive.   EXTREMITIES:  2+ Peripheral Pulses, No clubbing, cyanosis, or edema  CATH SITE: Right wrist benign s/p cath.  No bleeding, no ecchymosis, no hematoma. Extremity Warm to touch, with palpable distal pulses, and brisk capillary refill.

## 2021-02-17 NOTE — CONSULT NOTE ADULT - ASSESSMENT
76F w/ severe CAD and severe AS, on pre-operative eval for CABG found to have >70% L ICA stenosis, asymptomatic    -patient with asymptomatic high grade stenosis of L ICA, no indication for CEA at this time  -may proceed with CABG at this time  -vascular surgery will follow

## 2021-02-18 DIAGNOSIS — R19.5 OTHER FECAL ABNORMALITIES: ICD-10-CM

## 2021-02-18 LAB
ALBUMIN SERPL ELPH-MCNC: 2.9 G/DL — LOW (ref 3.3–5.2)
ALP SERPL-CCNC: 113 U/L — SIGNIFICANT CHANGE UP (ref 40–120)
ALT FLD-CCNC: 10 U/L — SIGNIFICANT CHANGE UP
ANION GAP SERPL CALC-SCNC: 13 MMOL/L — SIGNIFICANT CHANGE UP (ref 5–17)
AST SERPL-CCNC: 18 U/L — SIGNIFICANT CHANGE UP
BILIRUB SERPL-MCNC: 0.3 MG/DL — LOW (ref 0.4–2)
BUN SERPL-MCNC: 21 MG/DL — HIGH (ref 8–20)
CALCIUM SERPL-MCNC: 9.1 MG/DL — SIGNIFICANT CHANGE UP (ref 8.6–10.2)
CHLORIDE SERPL-SCNC: 111 MMOL/L — HIGH (ref 98–107)
CO2 SERPL-SCNC: 20 MMOL/L — LOW (ref 22–29)
CREAT SERPL-MCNC: 0.77 MG/DL — SIGNIFICANT CHANGE UP (ref 0.5–1.3)
GLUCOSE SERPL-MCNC: 85 MG/DL — SIGNIFICANT CHANGE UP (ref 70–99)
HCT VFR BLD CALC: 31.2 % — LOW (ref 34.5–45)
HGB BLD-MCNC: 9.5 G/DL — LOW (ref 11.5–15.5)
MAGNESIUM SERPL-MCNC: 1.7 MG/DL — SIGNIFICANT CHANGE UP (ref 1.6–2.6)
MCHC RBC-ENTMCNC: 27.7 PG — SIGNIFICANT CHANGE UP (ref 27–34)
MCHC RBC-ENTMCNC: 30.4 GM/DL — LOW (ref 32–36)
MCV RBC AUTO: 91 FL — SIGNIFICANT CHANGE UP (ref 80–100)
PHOSPHATE SERPL-MCNC: 3.1 MG/DL — SIGNIFICANT CHANGE UP (ref 2.4–4.7)
PLATELET # BLD AUTO: 245 K/UL — SIGNIFICANT CHANGE UP (ref 150–400)
POTASSIUM SERPL-MCNC: 3.8 MMOL/L — SIGNIFICANT CHANGE UP (ref 3.5–5.3)
POTASSIUM SERPL-SCNC: 3.8 MMOL/L — SIGNIFICANT CHANGE UP (ref 3.5–5.3)
PROT SERPL-MCNC: 5.3 G/DL — LOW (ref 6.6–8.7)
RBC # BLD: 3.43 M/UL — LOW (ref 3.8–5.2)
RBC # FLD: 17.2 % — HIGH (ref 10.3–14.5)
SODIUM SERPL-SCNC: 144 MMOL/L — SIGNIFICANT CHANGE UP (ref 135–145)
WBC # BLD: 6.32 K/UL — SIGNIFICANT CHANGE UP (ref 3.8–10.5)
WBC # FLD AUTO: 6.32 K/UL — SIGNIFICANT CHANGE UP (ref 3.8–10.5)

## 2021-02-18 PROCEDURE — 99232 SBSQ HOSP IP/OBS MODERATE 35: CPT

## 2021-02-18 PROCEDURE — 99233 SBSQ HOSP IP/OBS HIGH 50: CPT

## 2021-02-18 RX ORDER — MULTIVIT WITH MIN/MFOLATE/K2 340-15/3 G
1 POWDER (GRAM) ORAL ONCE
Refills: 0 | Status: COMPLETED | OUTPATIENT
Start: 2021-02-18 | End: 2021-02-18

## 2021-02-18 RX ORDER — MAGNESIUM SULFATE 500 MG/ML
2 VIAL (ML) INJECTION ONCE
Refills: 0 | Status: COMPLETED | OUTPATIENT
Start: 2021-02-18 | End: 2021-02-18

## 2021-02-18 RX ORDER — INFLUENZA VIRUS VACCINE 15; 15; 15; 15 UG/.5ML; UG/.5ML; UG/.5ML; UG/.5ML
0.5 SUSPENSION INTRAMUSCULAR ONCE
Refills: 0 | Status: DISCONTINUED | OUTPATIENT
Start: 2021-02-18 | End: 2021-02-19

## 2021-02-18 RX ADMIN — Medication 25 MILLIGRAM(S): at 05:33

## 2021-02-18 RX ADMIN — Medication 1 BOTTLE: at 12:12

## 2021-02-18 RX ADMIN — ATORVASTATIN CALCIUM 40 MILLIGRAM(S): 80 TABLET, FILM COATED ORAL at 20:33

## 2021-02-18 RX ADMIN — SODIUM CHLORIDE 3 MILLILITER(S): 9 INJECTION INTRAMUSCULAR; INTRAVENOUS; SUBCUTANEOUS at 05:32

## 2021-02-18 RX ADMIN — SODIUM CHLORIDE 3 MILLILITER(S): 9 INJECTION INTRAMUSCULAR; INTRAVENOUS; SUBCUTANEOUS at 20:34

## 2021-02-18 RX ADMIN — SODIUM CHLORIDE 3 MILLILITER(S): 9 INJECTION INTRAMUSCULAR; INTRAVENOUS; SUBCUTANEOUS at 14:21

## 2021-02-18 RX ADMIN — PANTOPRAZOLE SODIUM 40 MILLIGRAM(S): 20 TABLET, DELAYED RELEASE ORAL at 05:33

## 2021-02-18 RX ADMIN — Medication 75 MICROGRAM(S): at 05:33

## 2021-02-18 RX ADMIN — Medication 10 MILLIGRAM(S): at 21:22

## 2021-02-18 RX ADMIN — PANTOPRAZOLE SODIUM 40 MILLIGRAM(S): 20 TABLET, DELAYED RELEASE ORAL at 18:03

## 2021-02-18 RX ADMIN — Medication 81 MILLIGRAM(S): at 12:12

## 2021-02-18 RX ADMIN — Medication 50 GRAM(S): at 18:04

## 2021-02-18 NOTE — PROGRESS NOTE ADULT - ASSESSMENT
76y Female with PMH anemia, and hypothyroidism.  Pt presented to [facility] a few days ago with fever and SOB.  She was found to have a UTI/sepsis with elevated lactate, + UA, and temp of 102.  She was started on Rocephin.  HGB was 6.  She was transfused 2 units of PRBC.  Per medical record, a year ago she had a GI bleed and was offered an endoscopy and colonoscopy but she refused and then failed to follow up outpatient.  Per medical record she did not want to stay at [facility] but agreed once she ruled in for NSTEMI.  She was transferred to [facility] for cardiac cath.     2/16 - s/p LHC showing EF 30%. LM normal, mLAD 90%, OM1 85%, pRCA 99%, dRCA 100%, mod pulm  HTN, SREEDHAR <0.5 consistent with critical AS  2-17- EGD with old blood suspect from pharynx/epistaxis?, poor bowel prep  2/18- plan for repeat colonoscopy     Severe Multivessel CAD   - s/p C mLAD 90%, OM1 85%, pRCA 99%, dRCA 100%  - no current angina or anginal equivalents  - continue ASA 81, low dose metoprolol, statin, LDL 54  - CTS is following, plan for CABG  - ACE/ARB on hold 2/2 to CABG workup/hypotension   - no P2Y12 inhibitor for pre-op CABG       HFrEF 30%  - ischemic CM   - hold hydralazine in setting of critical AS and low BP  - strict I/O, daily weights  - pt with bilateral pleural effusions noted on CTA neck and Echo, CXR likely incorrect  - consider low dose IV Lasix 20mg after colonoscopy given on bowel prep currently    Critical AS  - SREEDHAR <0.5 on cath   - CTS eval for valve replacement with CABG    GIB  - received x2 PRBC at [facility] before transfer   - occult blood positive, source unclear  - GI following, plan for repeat colonoscopy with cardiac anesthesia      Chet Grimaldo DO, [facility]  Faculty Non-Invasive Cardiologist  [phone]

## 2021-02-18 NOTE — PROGRESS NOTE ADULT - SUBJECTIVE AND OBJECTIVE BOX
[place] CARDIOLOGY-[facility]                                                               Office: [address] [address]                                                              Telephone: [phone]. Fax:[phone]                                                                             PROGRESS NOTE  Reason for follow up: CAD, severe AS  Overnight: No new events.   Update: drinking bowel prep since last night, planning for repeat colonoscopy this afternoon      Review of symptoms:   Cardiac:  No chest pain. No dyspnea. No palpitations.  Respiratory: No cough. No dyspnea  Gastrointestinal: No diarrhea. No abdominal pain. No bleeding.     Past medical history: No updates.   	  Vital Signs Last 24 Hrs  T(C): 36.4 (02-18-21 @ 08:13), Max: 36.6 (02-18-21 @ 05:29)  T(F): 97.6 (02-18-21 @ 08:13), Max: 97.8 (02-18-21 @ 05:29)  HR: 66 (02-18-21 @ 08:13) (66 - 68)  BP: 111/70 (02-18-21 @ 08:13) (103/61 - 111/70)  BP(mean): --  RR: 17 (02-18-21 @ 08:13) (17 - 17)  SpO2: 97% (02-18-21 @ 08:13) (97% - 99%)  I&O's Summary    17 Feb 2021 07:01  -  18 Feb 2021 07:00  --------------------------------------------------------  IN: 1240 mL / OUT: 153 mL / NET: 1087 mL          PHYSICAL EXAM:  Appearance: Comfortable. No acute distress  HEENT:  Head and neck: Atraumatic. Normocephalic.  Normal oral mucosa, PERRL, Neck is supple.   Neurologic: A&Ox 3, no focal deficits. EOMI, Cranial nerves are intact.  Lymphatic: No cervical lymphadenopathy  Cardiovascular: Normal S1 S2, 3/6 systolic murmur, rubs/gallops.   Respiratory: decreased breath sounds basilar   Gastrointestinal:  Soft, Non-tender, + BS  Lower Extremities: No edema  Psychiatry: Patient is calm. No agitation. Mood & affect appropriate  Skin: No rashes/ecchymoses/cyanosis/ulcers visualized on the face, hands or feet.      CURRENT MEDICATIONS:  MEDICATIONS  (STANDING):  aspirin  chewable 81 milliGRAM(s) Oral daily  atorvastatin 40 milliGRAM(s) Oral at bedtime  bisacodyl 10 milliGRAM(s) Oral once  influenza   Vaccine 0.5 milliLiter(s) IntraMuscular once  levothyroxine 75 MICROGram(s) Oral daily  magnesium sulfate  IVPB 2 Gram(s) IV Intermittent once  metoprolol succinate ER 25 milliGRAM(s) Oral daily  pantoprazole  Injectable 40 milliGRAM(s) IV Push every 12 hours  sodium chloride 0.9% lock flush 3 milliLiter(s) IV Push every 8 hours    MEDICATIONS  (PRN):      DIAGNOSTIC TESTING:  [ ] Echocardiogram:   < from: TTE Echo Complete w/ Contrast w/ Doppler (02.17.21 @ 14:43) >    Summary:   1. Severely decreased global left ventricular systolic function.   2. Left ventricular ejection fraction, by visual estimation, is 25 to 30%.   3. Normal left ventricular internal cavity size.   4. Severely increased LV wall thickness.   5. Left ventricle chordal calcification. Severe global left ventricle hypokinesis.   6. Mildly enlarged right ventricle.   7. Mildly reduced RV systolic function.   8. Severely enlarged left atrium.   9. Right atrial enlargement.  10. Small secundum atrial septal defect with predominantly left to right shunting across the atrial septum.  11. Severe mitral annular calcification.  12. Severe thickening and calcification of the anterior and posterior mitral valve leaflets.  13. Moderate mitral valve regurgitation.  14. Moderate tricuspid regurgitation.  15. The aortic valve leaflets are heavily calcified. Severe/critical aortic valve stenosis (peak velocity 5.2 m/s, mean gradient 64 mmHg, calculated SREEDHAR by continuity equation 0.35 cm^2, dimensionless index 0.11).  16. Mild aortic regurgitation.  17. Mild pulmonic valve regurgitation.  18. There is at least mild pulmonary hypertension (RVSP    44 mmHg).  19. Trivial pericardial effusion.  20. Moderate pleural effusion in both left and right lateral regions.    < end of copied text >    [ ]  Catheterization:  < from: Cardiac Cath Lab - Adult (02.16.21 @ 08:33) >  CORONARY VESSELS:The coronary circulation is co-dominant.  LM:   --  LM: Normal.  LAD:   --  Mid LAD: There was a tubular 90 % stenosis. The lesion was  eccentric.  CX:   --  OM1: There was a diffuse 85 % stenosis in the proximal third of  the vessel segment. The lesion was irregularly contoured and eccentric.  RCA:   --  Proximal RCA: There was a diffuse 99 % stenosis. The lesion was  irregularly contoured and eccentric.  --  Distal RCA: There was a diffuse 100 % stenosis.  COMPLICATIONS: No complications occurred during the cath lab visit.  DIAGNOSTIC IMPRESSIONS: Moderate Pulmonary Hypertension and elevation of  filling pressures. 2. Critical Aortic Stenosis with a mean PG >40mm Hg and  an SREEDHAR of <0.5cm2. 3. Severe LV Systolic dysfunction by TTE from 2/13/2021  with an estimated LVEF of 30%. 4. Triple Vessel CAD.  DIAGNOSTIC RECOMMENDATIONS: GDMT. 2. CTS consultation.  INTERVENTIONAL IMPRESSIONS: Moderate Pulmonary Hypertension and elevation  of filling pressures. 2. Critical Aortic Stenosis with a mean PG >40mm Hg  and an SREEDHAR of <0.5cm2. 3. Severe LV Systolic dysfunction by TTE from  2/13/2021 with an estimated LVEF of 30%. 4. Triple Vessel CAD.  INTERVENTIONAL RECOMMENDATIONS: GDMT. 2. CTS consultation.    < end of copied text >    [ ] Stress Test:      Labs:                        9.5    6.32  )-----------( 245      ( 18 Feb 2021 06:29 )             31.2     02-18    144  |  111<H>  |  21.0<H>  ----------------------------<  85  3.8   |  20.0<L>  |  0.77    Ca    9.1      18 Feb 2021 06:29  Phos  3.1     02-18  Mg     1.7     02-18    TPro  5.3<L>  /  Alb  2.9<L>  /  TBili  0.3<L>  /  DBili  x   /  AST  18  /  ALT  10  /  AlkPhos  113  02-18 17 Feb 2021 11:36 Troponin 1.35 ng/mL / Creatine Kinase x     /  CKMB x     / CPK Mass Assay % x       17 Feb 2021 01:45 Troponin 1.78 ng/mL / Creatine Kinase x     /  CKMB x     / CPK Mass Assay % x       16 Feb 2021 23:27 Troponin 1.83 ng/mL / Creatine Kinase 49 U/L /  CKMB x     / CPK Mass Assay % x          Serum Pro-Brain Natriuretic Peptide: 02117 pg/mL (02-16-21 @ 12:38)    Cholesterol 105 mg/dL; Direct LDL --; HDL Cholesterol, Serum 39 mg/dL; HDL/ Total Cholesterol Ratio Measurement --; Total Cholesterol/ HDL Ratio Measurement --; Triglycerides, Serum 62 mg/dL  A1C with Estimated Average Glucose Result: 5.2 % (02-16-21 @ 12:38)      TELEMETRY: Sinus 60-70s, PVCs

## 2021-02-18 NOTE — PROGRESS NOTE ADULT - PROBLEM SELECTOR PLAN 1
Cath report as above. Critical AS also noted.   Preop workup in progress for possible CABG/AVR.   Continue ASA 81mg and lipitor for CAD.   Continue Toprol.  Will hold ACEI/ARBs in preop setting.   GI was consulted for anemia. Endoscopy 2/17 revealed a hiatal hernia. Plan for Colonoscopy later today.   Endocrine was consulted for elevated TSH due to medication noncompliance. Continue home dose Synthroid.

## 2021-02-18 NOTE — PROGRESS NOTE ADULT - SUBJECTIVE AND OBJECTIVE BOX
Preop multivessel CAD, Severe AS, and Moderate MR    PAST MEDICAL & SURGICAL HISTORY:  Iron deficiency anemia due to chronic blood loss  Hypothyroid  History of tonsillectomy    FAMILY HISTORY:  Family history of cardiac disorder (Father)  Family history of diabetes mellitus (Father)    Brief Hospital Course: 76 year old Female with a PMH of anemia, hypothyroidism, and GI bleed 1 year ago (refused Endo/colonoscopy and never followed up) presented to [facility] originally with fever and SOB.  She was found to have a UTI/sepsis with elevated lactate, + UA, and temp of 102.  She was treated with Rocephin (completed course ). Anemia noted with a Hgb of 6 and 2 units of PRBC transfused.  Patient wanted to sign out AMA from [place], but then ruled in for a NSTEMI, so she was agreeable to transfer to [facility] for cardiac cath. Cardiac cath  revealed Multivessel CAD and Severe AS. Further workup revealed moderate Right ICA stenosis and Severe Left ICA stenosis on carotid US, which was confirmed on CTA neck. Vascular surgery was consulted and cleared patient for open heart surgery with no plan for CEA at this time. Patient underwent Endoscopy  as part of her GI anemia workup, which only revealed a hiatal hernia. Plan is for Colonoscopy later today for further evaluation.      Subjective: Patient lying in bed in no acute distress. Patient refused her bowel prep initially last night, but then was agreeable later on. Denies fevers, chills, lightheadedness, dizziness, HA, CP, palpitations, SOB, cough, abdominal pain, N/V, numbness/tingling in extremities, or any other acute complaints.    MEDICATIONS  (STANDING):  aspirin  chewable 81 milliGRAM(s) Oral daily  atorvastatin 40 milliGRAM(s) Oral at bedtime  levothyroxine 75 MICROGram(s) Oral daily  metoprolol succinate ER 25 milliGRAM(s) Oral daily  pantoprazole  Injectable 40 milliGRAM(s) IV Push every 12 hours  sodium chloride 0.9% lock flush 3 milliLiter(s) IV Push every 8 hours    Allergies: No Known Allergies    Vitals   T(C): 36.5 (2021 20:54), Max: 36.8 (2021 05:31)  T(F): 97.7 (2021 20:54), Max: 98.3 (2021 05:31)  HR: 68 (2021 20:54) (68 - 80)  BP: 107/67 (2021 20:54) (104/61 - 115/56)  RR: 17 (2021 20:54) (17 - 18)  SpO2: 98% (2021 20:54) (98% - 99%)    I&O's Detail    2021 07:01  -  2021 07:00  --------------------------------------------------------  IN:    Oral Fluid: 1500 mL  Total IN: 1500 mL    OUT:    Stool (mL): 4 mL    Voided (mL): 301 mL  Total OUT: 305 mL    Total NET: 1195 mL      2021 07:01  -  2021 02:47  --------------------------------------------------------  IN:    Oral Fluid: 1000 mL  Total IN: 1000 mL    OUT:    Stool (mL): 1 mL  Total OUT: 1 mL    Total NET: 999 mL    Physical Exam  Neuro: A+O x 3, non-focal, speech clear and intact  HEENT:  NCAT, PERRL, EOMI. No conjuctival edema or icterus, no thrush.    Neck:  Supple, trachea midline  Pulm: Diminished BSs at bases b/l, no rales/rhonchi/wheezing, no accessory muscle use noted  CV: regular rate, regular rhythm, +S1S2, +ANJEL  Abd: soft, NT, ND, + BS  Ext: YATES x 4, no edema, no cyanosis or clubbing, distal motor/neuro/circ intact  Skin: warm, dry, well perfused    LABS                        10.2   8.30  )-----------( 258      ( 2021 07:04 )             34.0     02-17    142  |  110<H>  |  22.0<H>  ----------------------------<  78  4.3   |  19.0<L>  |  0.82    Ca    8.9      2021 07:04  Mg     1.9         TPro  6.0<L>  /  Alb  3.2<L>  /  TBili  0.4  /  DBili  x   /  AST  25  /  ALT  15  /  AlkPhos  125<H>      PT/INR - ( 2021 07:04 )   PT: 13.5 sec;   INR: 1.17 ratio      PTT - ( 2021 07:04 )  PTT:33.7 sec  CARDIAC MARKERS ( 2021 11:36 )  CKx     / CKMBx     / Troponin T1.35 ng/mL /    Urinalysis Basic - ( 2021 22:38 )  Color: Yellow / Appearance: Clear / S.020 / pH: x  Gluc: x / Ketone: Negative  / Bili: Negative / Urobili: Negative mg/dL   Blood: x / Protein: 30 mg/dL / Nitrite: Negative   Leuk Esterase: Trace / RBC: Negative /HPF / WBC 3-5   Sq Epi: x / Non Sq Epi: Moderate / Bacteria: x    MRSA/MSSA PCR (21 @ 22:39)    MRSA PCR Result.: NotDetec: NOT DETECTED RESULT: MRSA and MSSA not detected    Staph Aureus PCR Result: NotDetec    Cardiac Cath:  < from: Cardiac Cath Lab - Adult (21 @ 08:33) >  HEMODYNAMICS: There is moderate pulmonary hypertension.  VENTRICLES: EF by echo was 30 %.  VALVES: AORTIC VALVE: There was critical aortic stenosis.  CORONARY VESSELS:The coronary circulation is co-dominant.  LM:   --  LM: Normal.  LAD:   --  Mid LAD: There was a tubular 90 % stenosis. The lesion was eccentric.  CX:   --  OM1: There was a diffuse 85 % stenosis in the proximal third of the vessel segment. The lesion was irregularly contoured and eccentric.  RCA:   --  Proximal RCA: There was a diffuse 99 % stenosis. The lesion was irregularly contoured and eccentric.  --  Distal RCA: There was a diffuse 100 % stenosis.  COMPLICATIONS: No complications occurred during the cath lab visit.  DIAGNOSTIC IMPRESSIONS: Moderate Pulmonary Hypertension and elevation of filling pressures. 2. Critical Aortic Stenosis with a mean PG >40mm Hg and an SREEDHAR of <0.5cm2. 3. Severe LV Systolic dysfunction by TTE from 2021 with an estimated LVEF of 30%. 4. Triple Vessel CAD.  < end of copied text >    Last CXR:  Formal radiological reading pending.     TTE:  < from: TTE Echo Complete w/ Contrast w/ Doppler (21 @ 14:43) >  Summary:   1. Severely decreased global left ventricular systolic function.   2. Left ventricular ejection fraction, by visual estimation, is 25 to 30%.   3. Normal left ventricular internal cavity size.   4. Severely increased LV wall thickness.   5. Left ventricle chordal calcification. Severe global left ventricle hypokinesis.   6. Mildly enlarged right ventricle.   7. Mildly reduced RV systolic function.   8. Severely enlarged left atrium.   9. Right atrial enlargement.  10. Small secundum atrial septal defect with predominantly left to right shunting across the atrial septum.  11. Severe mitral annular calcification.  12. Severe thickening and calcification of the anterior and posterior mitral valve leaflets.  13. Moderate mitral valve regurgitation.  14. Moderate tricuspid regurgitation.  15. The aortic valve leaflets are heavily calcified. Severe/critical aortic valve stenosis (peak velocity 5.2 m/s, mean gradient 64 mmHg, calculated SREEDHAR by continuity equation 0.35 cm^2, dimensionless index 0.11).  16. Mild aortic regurgitation.  17. Mild pulmonic valve regurgitation.  18. There is at least mild pulmonary hypertension (RVSP 44 mmHg).  19. Trivial pericardial effusion.  20. Moderate pleural effusion in both left and right lateral regions.  21. Recommend clinical correlation with the above findings.  < end of copied text >    Carotid US:  < from: US Duplex Carotid Arteries Complete, Bilateral (21 @ 12:27) >  IMPRESSION: There is a moderate, 50-69% stenosis of the right internal carotid artery.  There is a severe, greater than 70% stenosis of the left internal carotid artery.  < end of copied text >

## 2021-02-18 NOTE — PROGRESS NOTE ADULT - SUBJECTIVE AND OBJECTIVE BOX
Pt seen and examined f/u for anemia, N/N with stool positive for occult blood    This AM patient only took about 1/3 of prep and none of the mag citrate. Rectal still with liquid and some solid brown stool. No other complaints. Hgb down slightly to 9.5. EGD yesterday was normal other than some scant blood in the posterior pharynx and esophagus due to a nosebleed.    REVIEW OF SYSTEMS:    CONSTITUTIONAL: No fever, weight loss, or fatigue  EYES: No eye pain, visual disturbances, or discharge  ENMT:  No difficulty hearing, tinnitus, vertigo; No sinus or throat pain  RESPIRATORY: No cough, wheezing, chills or hemoptysis; No shortness of breath  CARDIOVASCULAR: No chest pain, palpitations, dizziness, or leg swelling  GASTROINTESTINAL: No abdominal or epigastric pain. No nausea, vomiting, or hematemesis; No diarrhea or constipation. No melena or hematochezia.    MEDICATIONS:  MEDICATIONS  (STANDING):  aspirin  chewable 81 milliGRAM(s) Oral daily  atorvastatin 40 milliGRAM(s) Oral at bedtime  influenza   Vaccine 0.5 milliLiter(s) IntraMuscular once  levothyroxine 75 MICROGram(s) Oral daily  magnesium sulfate  IVPB 2 Gram(s) IV Intermittent once  metoprolol succinate ER 25 milliGRAM(s) Oral daily  pantoprazole  Injectable 40 milliGRAM(s) IV Push every 12 hours  sodium chloride 0.9% lock flush 3 milliLiter(s) IV Push every 8 hours    MEDICATIONS  (PRN):      Allergies    No Known Allergies    Intolerances        Vital Signs Last 24 Hrs  T(C): 36.6 (2021 05:29), Max: 36.6 (2021 05:29)  T(F): 97.8 (2021 05:29), Max: 97.8 (2021 05:29)  HR: 68 (2021 05:29) (68 - 80)  BP: 103/61 (2021 05:29) (103/61 - 107/67)  BP(mean): --  RR: 17 (2021 05:29) (17 - 17)  SpO2: 99% (2021 05:29) (98% - 99%)    - @ 07:01  -  - @ 07:00  --------------------------------------------------------  IN: 1240 mL / OUT: 153 mL / NET: 1087 mL        PHYSICAL EXAM:    General: Well developed; well nourished; in no acute distress  HEENT: MMM, conjunctiva and sclera clear  Gastrointestinal:Abdomen: Soft non-tender non-distended; Normal bowel sounds; No hepatosplenomegaly  Extremities: no cyanosis, clubbing or edema.  Skin: Warm and dry. No obvious rash  IQRA: liquids and scant solid brown stool present    LABS:      CBC Full  -  ( 2021 06:29 )  WBC Count : 6.32 K/uL  RBC Count : 3.43 M/uL  Hemoglobin : 9.5 g/dL  Hematocrit : 31.2 %  Platelet Count - Automated : 245 K/uL  Mean Cell Volume : 91.0 fl  Mean Cell Hemoglobin : 27.7 pg  Mean Cell Hemoglobin Concentration : 30.4 gm/dL  Auto Neutrophil # : x  Auto Lymphocyte # : x  Auto Monocyte # : x  Auto Eosinophil # : x  Auto Basophil # : x  Auto Neutrophil % : x  Auto Lymphocyte % : x  Auto Monocyte % : x  Auto Eosinophil % : x  Auto Basophil % : x    02-18    144  |  111<H>  |  21.0<H>  ----------------------------<  85  3.8   |  20.0<L>  |  0.77    Ca    9.1      2021 06:29  Phos  3.1     02-18  Mg     1.7     -18    TPro  5.3<L>  /  Alb  2.9<L>  /  TBili  0.3<L>  /  DBili  x   /  AST  18  /  ALT  10  /  AlkPhos  113  02-18    PT/INR - ( 2021 07:04 )   PT: 13.5 sec;   INR: 1.17 ratio         PTT - ( 2021 07:04 )  PTT:33.7 sec      Urinalysis Basic - ( 2021 22:38 )    Color: Yellow / Appearance: Clear / S.020 / pH: x  Gluc: x / Ketone: Negative  / Bili: Negative / Urobili: Negative mg/dL   Blood: x / Protein: 30 mg/dL / Nitrite: Negative   Leuk Esterase: Trace / RBC: Negative /HPF / WBC 3-5   Sq Epi: x / Non Sq Epi: Moderate / Bacteria: x

## 2021-02-18 NOTE — PROGRESS NOTE ADULT - SUBJECTIVE AND OBJECTIVE BOX
Subjective: "Oh, I can't have the surgery tomorrow? I thought I was drinking it all" Patient cancelled for colonoscopy today due to poor bowel prep and failure to complete prep. Pt denies chest pain, palpitations, dizziness, headache, shortness of breath, abdominal pain or N/V/C.     VITAL SIGNS  Vital Signs Last 24 Hrs  T(C): 36.4 (21 @ 08:13), Max: 36.6 (21 @ 05:29)  T(F): 97.6 (21 @ 08:13), Max: 97.8 (21 @ 05:29)  HR: 66 (21 @ 08:13) (66 - 68)  BP: 111/70 (21 @ 08:13) (103/61 - 111/70)  RR: 17 (21 @ 08:13) (17 - 17)  SpO2: 97% (21 @ 08:13) (97% - 99%)  on 2 L NC             Telemetry/Alarms:  SR PACs  LVEF: 30    MEDICATIONS  aspirin  chewable 81 milliGRAM(s) Oral daily  atorvastatin 40 milliGRAM(s) Oral at bedtime  bisacodyl 10 milliGRAM(s) Oral once  influenza   Vaccine 0.5 milliLiter(s) IntraMuscular once  levothyroxine 75 MICROGram(s) Oral daily  magnesium sulfate  IVPB 2 Gram(s) IV Intermittent once  metoprolol succinate ER 25 milliGRAM(s) Oral daily  pantoprazole  Injectable 40 milliGRAM(s) IV Push every 12 hours  sodium chloride 0.9% lock flush 3 milliLiter(s) IV Push every 8 hours      PHYSICAL EXAM  General: well nourished, well developed, no acute distress  Neurology: alert and oriented x 3, nonfocal, no gross deficits  Respiratory: diminished right base  CV: regular rate and rhythm, normal S1, S2 + alma  Abdomen: soft, nontender, nondistended, positive bowel sounds, last bowel movement today  Extremities: warm, well perfused. +1 edema. + DP pulses    02-17 @ 07:01  -   @ 07:00  --------------------------------------------------------  IN: 1240 mL / OUT: 153 mL / NET: 1087 mL        Weights:  Daily     Daily Weight in k.5 (2021 05:29)  Admit Wt: Drug Dosing Weight  Height (cm): 157.5 (2021 08:32)  Weight (kg): 61.235 (2021 08:32)  BMI (kg/m2): 24.7 (2021 08:32)  BSA (m2): 1.62 (2021 08:32)    All laboratory results, radiology and medications reviewed.    LABS      144  |  111<H>  |  21.0<H>  ----------------------------<  85  3.8   |  20.0<L>  |  0.77    Ca    9.1      2021 06:29  Phos  3.1       Mg     1.7         TPro  5.3<L>  /  Alb  2.9<L>  /  TBili  0.3<L>  /  DBili  x   /  AST  18  /  ALT  10  /  AlkPhos  113                                   9.5    6.32  )-----------( 245      ( 2021 06:29 )             31.2          PT/INR - ( 2021 07:04 )   PT: 13.5 sec;   INR: 1.17 ratio         PTT - ( 2021 07:04 )  PTT:33.7 sec  Bilirubin Total, Serum: 0.3 mg/dL ( @ 06:29)    CAPILLARY BLOOD GLUCOSE               Last CXR:   < from: Xray Chest 1 View- PORTABLE-Urgent (Xray Chest 1 View- PORTABLE-Urgent .) (21 @ 19:19) >      IMPRESSION:   No evidence of active chest disease.     Lateral radiograph recommended to exclude posterior lung base pathology.    < end of copied text >    Last EKG: < from: 12 Lead ECG (21 @ 07:57) >    Ventricular Rate 74 BPM    Atrial Rate 74 BPM    P-R Interval 180 ms    QRS Duration 110 ms    Q-T Interval 410 ms    QTC Calculation(Bazett) 455 ms    P Axis 46 degrees    R Axis 36 degrees    T Axis 196 degrees    Diagnosis Line *** Poor data quality, interpretation may be adversely affected  Normal sinus rhythm  Possible Left atrial enlargement  Anterior infarct , age undetermined  Marked ST abnormality, possible lateral subendocardial injury  Abnormal ECG    Confirmed by NADIRA WAGGONER ([id number])on 2021 6:09:06 PM    < end of copied text >      PAST MEDICAL & SURGICAL HISTORY:  Iron deficiency anemia due to chronic blood loss    Hypothyroid    History of tonsillectomy

## 2021-02-18 NOTE — PROGRESS NOTE ADULT - ASSESSMENT
76 year old Female with a PMH of anemia, hypothyroidism, and GI bleed 1 year ago (refused Endo/colonoscopy and never followed up) presented to [facility] originally with fever and SOB.  She was found to have a UTI/sepsis with elevated lactate, + UA, and temp of 102.  She was treated with Rocephin (completed course 2/17). Anemia noted with a Hgb of 6 and 2 units of PRBC transfused.  Patient wanted to sign out AMA from [place], but then ruled in for a NSTEMI, so she was agreeable to transfer to [facility] for cardiac cath. Cardiac cath 2/16 revealed Multivessel CAD and Severe AS. Further workup revealed moderate Right ICA stenosis and Severe Left ICA stenosis on carotid US, which was confirmed on CTA neck. Vascular surgery was consulted and cleared patient for open heart surgery with no plan for CEA at this time. Patient underwent Endoscopy 2/17 as part of her GI anemia workup, which only revealed a hiatal hernia. Plan is for Colonoscopy later today for further evaluation.

## 2021-02-18 NOTE — PROGRESS NOTE ADULT - ASSESSMENT
76 year old Female with a PMH of anemia, hypothyroidism, and GI bleed 1 year ago (refused Endo/colonoscopy and never followed up) presented to [facility] originally with fever and SOB.  She was found to have a UTI/sepsis with elevated lactate, + UA, and temp of 102.  She was treated with Rocephin (completed course 2/17). Anemia noted with a Hgb of 6 and 2 units of PRBC transfused.  Patient wanted to sign out AMA from [place], but then ruled in for a NSTEMI, so she was agreeable to transfer to [facility] for cardiac cath. Cardiac cath 2/16 revealed Multivessel CAD and Severe AS. Further workup revealed moderate Right ICA stenosis and Severe Left ICA stenosis on carotid US, which was confirmed on CTA neck. Vascular surgery was consulted and cleared patient for open heart surgery with no plan for CEA at this time. Patient underwent Endoscopy 2/17 as part of her GI anemia workup, which only revealed a hiatal hernia. 2/18 failed adequate bowel prep again. Plan is for Colonoscopy now TOMORROW. Open heart surgery now cancelled and to be rescheduled.

## 2021-02-18 NOTE — PROGRESS NOTE ADULT - PROBLEM SELECTOR PLAN 1
Cath report as above. Critical AS also noted.   Preop workup in progress for possible CABG/AVR.   Continue ASA 81mg and lipitor for CAD.   Continue Toprol.  Will hold ACEI/ARBs in preop setting.   GI was consulted for anemia. Endoscopy 2/17 revealed a hiatal hernia. Plan for Colonoscopy tomorrow.   Endocrine was consulted for elevated TSH due to medication noncompliance. Continue home dose Synthroid.

## 2021-02-19 ENCOUNTER — APPOINTMENT (OUTPATIENT)
Dept: CARDIOTHORACIC SURGERY | Facility: HOSPITAL | Age: 76
End: 2021-02-19

## 2021-02-19 DIAGNOSIS — I65.23 OCCLUSION AND STENOSIS OF BILATERAL CAROTID ARTERIES: ICD-10-CM

## 2021-02-19 LAB
ANION GAP SERPL CALC-SCNC: 12 MMOL/L — SIGNIFICANT CHANGE UP (ref 5–17)
BUN SERPL-MCNC: 18 MG/DL — SIGNIFICANT CHANGE UP (ref 8–20)
CALCIUM SERPL-MCNC: 8.7 MG/DL — SIGNIFICANT CHANGE UP (ref 8.6–10.2)
CHLORIDE SERPL-SCNC: 111 MMOL/L — HIGH (ref 98–107)
CO2 SERPL-SCNC: 19 MMOL/L — LOW (ref 22–29)
CREAT SERPL-MCNC: 0.58 MG/DL — SIGNIFICANT CHANGE UP (ref 0.5–1.3)
GLUCOSE SERPL-MCNC: 92 MG/DL — SIGNIFICANT CHANGE UP (ref 70–99)
HCT VFR BLD CALC: 32.5 % — LOW (ref 34.5–45)
HGB BLD-MCNC: 10 G/DL — LOW (ref 11.5–15.5)
MAGNESIUM SERPL-MCNC: 2 MG/DL — SIGNIFICANT CHANGE UP (ref 1.6–2.6)
MCHC RBC-ENTMCNC: 28 PG — SIGNIFICANT CHANGE UP (ref 27–34)
MCHC RBC-ENTMCNC: 30.8 GM/DL — LOW (ref 32–36)
MCV RBC AUTO: 91 FL — SIGNIFICANT CHANGE UP (ref 80–100)
PHOSPHATE SERPL-MCNC: 2.8 MG/DL — SIGNIFICANT CHANGE UP (ref 2.4–4.7)
PLATELET # BLD AUTO: 277 K/UL — SIGNIFICANT CHANGE UP (ref 150–400)
POTASSIUM SERPL-MCNC: 3.5 MMOL/L — SIGNIFICANT CHANGE UP (ref 3.5–5.3)
POTASSIUM SERPL-SCNC: 3.5 MMOL/L — SIGNIFICANT CHANGE UP (ref 3.5–5.3)
RBC # BLD: 3.57 M/UL — LOW (ref 3.8–5.2)
RBC # FLD: 16.9 % — HIGH (ref 10.3–14.5)
SODIUM SERPL-SCNC: 142 MMOL/L — SIGNIFICANT CHANGE UP (ref 135–145)
WBC # BLD: 6.44 K/UL — SIGNIFICANT CHANGE UP (ref 3.8–10.5)
WBC # FLD AUTO: 6.44 K/UL — SIGNIFICANT CHANGE UP (ref 3.8–10.5)

## 2021-02-19 PROCEDURE — 99232 SBSQ HOSP IP/OBS MODERATE 35: CPT

## 2021-02-19 PROCEDURE — 45378 DIAGNOSTIC COLONOSCOPY: CPT

## 2021-02-19 PROCEDURE — 99233 SBSQ HOSP IP/OBS HIGH 50: CPT

## 2021-02-19 PROCEDURE — 93010 ELECTROCARDIOGRAM REPORT: CPT

## 2021-02-19 RX ORDER — FUROSEMIDE 40 MG
40 TABLET ORAL ONCE
Refills: 0 | Status: COMPLETED | OUTPATIENT
Start: 2021-02-19 | End: 2021-02-19

## 2021-02-19 RX ORDER — FUROSEMIDE 40 MG
40 TABLET ORAL
Refills: 0 | Status: DISCONTINUED | OUTPATIENT
Start: 2021-02-20 | End: 2021-02-22

## 2021-02-19 RX ORDER — POTASSIUM CHLORIDE 20 MEQ
20 PACKET (EA) ORAL
Refills: 0 | Status: DISCONTINUED | OUTPATIENT
Start: 2021-02-20 | End: 2021-02-23

## 2021-02-19 RX ORDER — POTASSIUM CHLORIDE 20 MEQ
40 PACKET (EA) ORAL EVERY 4 HOURS
Refills: 0 | Status: COMPLETED | OUTPATIENT
Start: 2021-02-19 | End: 2021-02-19

## 2021-02-19 RX ADMIN — PANTOPRAZOLE SODIUM 40 MILLIGRAM(S): 20 TABLET, DELAYED RELEASE ORAL at 05:25

## 2021-02-19 RX ADMIN — SODIUM CHLORIDE 3 MILLILITER(S): 9 INJECTION INTRAMUSCULAR; INTRAVENOUS; SUBCUTANEOUS at 05:24

## 2021-02-19 RX ADMIN — SODIUM CHLORIDE 3 MILLILITER(S): 9 INJECTION INTRAMUSCULAR; INTRAVENOUS; SUBCUTANEOUS at 15:33

## 2021-02-19 RX ADMIN — SODIUM CHLORIDE 3 MILLILITER(S): 9 INJECTION INTRAMUSCULAR; INTRAVENOUS; SUBCUTANEOUS at 23:11

## 2021-02-19 RX ADMIN — Medication 40 MILLIEQUIVALENT(S): at 15:31

## 2021-02-19 RX ADMIN — Medication 40 MILLIGRAM(S): at 15:31

## 2021-02-19 RX ADMIN — PANTOPRAZOLE SODIUM 40 MILLIGRAM(S): 20 TABLET, DELAYED RELEASE ORAL at 18:41

## 2021-02-19 RX ADMIN — Medication 75 MICROGRAM(S): at 05:25

## 2021-02-19 RX ADMIN — ATORVASTATIN CALCIUM 40 MILLIGRAM(S): 80 TABLET, FILM COATED ORAL at 23:15

## 2021-02-19 RX ADMIN — Medication 40 MILLIEQUIVALENT(S): at 18:41

## 2021-02-19 RX ADMIN — Medication 81 MILLIGRAM(S): at 15:31

## 2021-02-19 NOTE — PROGRESS NOTE ADULT - SUBJECTIVE AND OBJECTIVE BOX
[facility]                                                               Office: [address] [address]                                                              Telephone: [phone]. Fax:[phone]                                                                             PROGRESS NOTE  Reason for follow up:   Overnight: No new events.   Update:       Review of symptoms:   Cardiac:  No chest pain. No dyspnea. No palpitations.  Respiratory: No cough. No dyspnea  Gastrointestinal: No diarrhea. No abdominal pain. No bleeding.     Past medical history: No updates.   	  Vital Signs Last 24 Hrs  T(C): 36.3 (02-19-21 @ 10:32), Max: 36.9 (02-18-21 @ 15:41)  T(F): 97.4 (02-19-21 @ 10:32), Max: 98.4 (02-18-21 @ 15:41)  HR: 61 (02-19-21 @ 10:32) (54 - 77)  BP: 113/59 (02-19-21 @ 10:32) (105/55 - 126/74)  BP(mean): --  RR: 20 (02-19-21 @ 10:32) (17 - 20)  SpO2: 96% (02-19-21 @ 10:32) (96% - 99%)  I&O's Summary    18 Feb 2021 07:01  -  19 Feb 2021 07:00  --------------------------------------------------------  IN: 240 mL / OUT: 201 mL / NET: 39 mL          PHYSICAL EXAM:  Appearance: Comfortable. No acute distress  HEENT:  Head and neck: Atraumatic. Normocephalic.  Normal oral mucosa, PERRL, Neck is supple. No JVD, No carotid bruit.   Neurologic: A&Ox 3, no focal deficits. EOMI, Cranial nerves are intact.  Lymphatic: No cervical lymphadenopathy  Cardiovascular: Normal S1 S2, No murmur, rubs/gallops. No JVD, No edema  Respiratory: Lungs clear to auscultation  Gastrointestinal:  Soft, Non-tender, + BS  Lower Extremities: No edema  Psychiatry: Patient is calm. No agitation. Mood & affect appropriate  Skin: No rashes/ecchymoses/cyanosis/ulcers visualized on the face, hands or feet.      CURRENT MEDICATIONS:  MEDICATIONS  (STANDING):  aspirin  chewable 81 milliGRAM(s) Oral daily  atorvastatin 40 milliGRAM(s) Oral at bedtime  levothyroxine 75 MICROGram(s) Oral daily  pantoprazole  Injectable 40 milliGRAM(s) IV Push every 12 hours  sodium chloride 0.9% lock flush 3 milliLiter(s) IV Push every 8 hours    MEDICATIONS  (PRN):      DIAGNOSTIC TESTING:  [ ] Echocardiogram:   [ ]  Catheterization:  [ ] Stress Test:      Labs:                        10.0   6.44  )-----------( 277      ( 19 Feb 2021 06:28 )             32.5     02-19    142  |  111<H>  |  18.0  ----------------------------<  92  3.5   |  19.0<L>  |  0.58    Ca    8.7      19 Feb 2021 06:28  Phos  2.8     02-19  Mg     2.0     02-19    TPro  5.3<L>  /  Alb  2.9<L>  /  TBili  0.3<L>  /  DBili  x   /  AST  18  /  ALT  10  /  AlkPhos  113  02-18 17 Feb 2021 11:36 Troponin 1.35 ng/mL / Creatine Kinase x     /  CKMB x     / CPK Mass Assay % x       17 Feb 2021 01:45 Troponin 1.78 ng/mL / Creatine Kinase x     /  CKMB x     / CPK Mass Assay % x       16 Feb 2021 23:27 Troponin 1.83 ng/mL / Creatine Kinase 49 U/L /  CKMB x     / CPK Mass Assay % x          Serum Pro-Brain Natriuretic Peptide: 93742 pg/mL (02-16-21 @ 12:38)    Cholesterol 105 mg/dL; Direct LDL --; HDL Cholesterol, Serum 39 mg/dL; HDL/ Total Cholesterol Ratio Measurement --; Total Cholesterol/ HDL Ratio Measurement --; Triglycerides, Serum 62 mg/dL  A1C with Estimated Average Glucose Result: 5.2 % (02-16-21 @ 12:38)      TELEMETRY: Reviewed    ECG:  Reviewed by me. 	                                                                     [facility]                                                               Office: [address] [address]                                                              Telephone: [phone]. Fax:[phone]                                                                             PROGRESS NOTE  Reason for follow up: CAD/TVD, ICM/HFrEF, severe AS  Overnight: No new events.   Update: underwent colonoscopy today found extensive diveritculosis, no bleeding source, denies any chest pain or shortness of breath; overnight telemetry and AM noted with episodes of junctional bradycardia 40-50s.       Review of symptoms:   Cardiac:  No chest pain. No dyspnea. No palpitations.  Respiratory: No cough. No dyspnea  Gastrointestinal: No diarrhea. No abdominal pain. No bleeding.     Past medical history: No updates.   	  Vital Signs Last 24 Hrs  T(C): 36.3 (02-19-21 @ 10:32), Max: 36.9 (02-18-21 @ 15:41)  T(F): 97.4 (02-19-21 @ 10:32), Max: 98.4 (02-18-21 @ 15:41)  HR: 61 (02-19-21 @ 10:32) (54 - 77)  BP: 113/59 (02-19-21 @ 10:32) (105/55 - 126/74)  BP(mean): --  RR: 20 (02-19-21 @ 10:32) (17 - 20)  SpO2: 96% (02-19-21 @ 10:32) (96% - 99%)  I&O's Summary    18 Feb 2021 07:01  -  19 Feb 2021 07:00  --------------------------------------------------------  IN: 240 mL / OUT: 201 mL / NET: 39 mL          PHYSICAL EXAM:  Appearance: Comfortable. No acute distress  HEENT:  Head and neck: Atraumatic. Normocephalic.  Normal oral mucosa, PERRL, Neck is supple. No JVD, No carotid bruit.   Neurologic: A&Ox 3, no focal deficits. EOMI, Cranial nerves are intact.  Lymphatic: No cervical lymphadenopathy  Cardiovascular: Normal S1 S2, No murmur, rubs/gallops. JVD above clavicle   Respiratory: Lungs clear to auscultation  Gastrointestinal:  Soft, Non-tender, + BS  Lower Extremities: No edema  Psychiatry: Patient is calm. No agitation. Mood & affect appropriate  Skin: No rashes/ecchymoses/cyanosis/ulcers visualized on the face, hands or feet.      CURRENT MEDICATIONS:  MEDICATIONS  (STANDING):  aspirin  chewable 81 milliGRAM(s) Oral daily  atorvastatin 40 milliGRAM(s) Oral at bedtime  levothyroxine 75 MICROGram(s) Oral daily  pantoprazole  Injectable 40 milliGRAM(s) IV Push every 12 hours  sodium chloride 0.9% lock flush 3 milliLiter(s) IV Push every 8 hours    MEDICATIONS  (PRN):      DIAGNOSTIC TESTING:  [ ] Echocardiogram:   [ ]  Catheterization:  [ ] Stress Test:      Labs:                        10.0   6.44  )-----------( 277      ( 19 Feb 2021 06:28 )             32.5     02-19    142  |  111<H>  |  18.0  ----------------------------<  92  3.5   |  19.0<L>  |  0.58    Ca    8.7      19 Feb 2021 06:28  Phos  2.8     02-19  Mg     2.0     02-19    TPro  5.3<L>  /  Alb  2.9<L>  /  TBili  0.3<L>  /  DBili  x   /  AST  18  /  ALT  10  /  AlkPhos  113  02-18 17 Feb 2021 11:36 Troponin 1.35 ng/mL / Creatine Kinase x     /  CKMB x     / CPK Mass Assay % x       17 Feb 2021 01:45 Troponin 1.78 ng/mL / Creatine Kinase x     /  CKMB x     / CPK Mass Assay % x       16 Feb 2021 23:27 Troponin 1.83 ng/mL / Creatine Kinase 49 U/L /  CKMB x     / CPK Mass Assay % x          Serum Pro-Brain Natriuretic Peptide: 76241 pg/mL (02-16-21 @ 12:38)    Cholesterol 105 mg/dL; Direct LDL --; HDL Cholesterol, Serum 39 mg/dL; HDL/ Total Cholesterol Ratio Measurement --; Total Cholesterol/ HDL Ratio Measurement --; Triglycerides, Serum 62 mg/dL  A1C with Estimated Average Glucose Result: 5.2 % (02-16-21 @ 12:38)      TELEMETRY: Reviewed    ECG:  Reviewed by me. 	                                                                     [facility]                                                               Office: [address] [address]                                                              Telephone: [phone]. Fax:[phone]                                                                             PROGRESS NOTE  Reason for follow up: CAD/TVD, ICM/HFrEF, severe AS  Overnight: No new events.   Update: underwent colonoscopy today found extensive diveritculosis, no bleeding source, denies any chest pain or shortness of breath; overnight telemetry and AM noted with episodes of junctional bradycardia 40-50s.       Review of symptoms:   Cardiac:  No chest pain. No dyspnea. No palpitations.  Respiratory: No cough. No dyspnea  Gastrointestinal: No diarrhea. No abdominal pain. No bleeding.     Past medical history: No updates.   	  Vital Signs Last 24 Hrs  T(C): 36.3 (02-19-21 @ 10:32), Max: 36.9 (02-18-21 @ 15:41)  T(F): 97.4 (02-19-21 @ 10:32), Max: 98.4 (02-18-21 @ 15:41)  HR: 61 (02-19-21 @ 10:32) (54 - 77)  BP: 113/59 (02-19-21 @ 10:32) (105/55 - 126/74)  BP(mean): --  RR: 20 (02-19-21 @ 10:32) (17 - 20)  SpO2: 96% (02-19-21 @ 10:32) (96% - 99%)  I&O's Summary    18 Feb 2021 07:01  -  19 Feb 2021 07:00  --------------------------------------------------------  IN: 240 mL / OUT: 201 mL / NET: 39 mL          PHYSICAL EXAM:  Appearance: Comfortable. No acute distress  HEENT:  Head and neck: Atraumatic. Normocephalic.  Normal oral mucosa, PERRL, Neck is supple. No JVD, No carotid bruit.   Neurologic: A&Ox 3, no focal deficits. EOMI, Cranial nerves are intact.  Lymphatic: No cervical lymphadenopathy  Cardiovascular: Normal S1 S2, No murmur, rubs/gallops. JVD above clavicle   Respiratory: Lungs clear to auscultation  Gastrointestinal:  Soft, Non-tender, + BS  Lower Extremities: No edema  Psychiatry: Patient is calm. No agitation. Mood & affect appropriate  Skin: No rashes/ecchymoses/cyanosis/ulcers visualized on the face, hands or feet.      CURRENT MEDICATIONS:  MEDICATIONS  (STANDING):  aspirin  chewable 81 milliGRAM(s) Oral daily  atorvastatin 40 milliGRAM(s) Oral at bedtime  levothyroxine 75 MICROGram(s) Oral daily  pantoprazole  Injectable 40 milliGRAM(s) IV Push every 12 hours  sodium chloride 0.9% lock flush 3 milliLiter(s) IV Push every 8 hours    MEDICATIONS  (PRN):      DIAGNOSTIC TESTING:  [ ] Echocardiogram:   < from: TTE Echo Complete w/ Contrast w/ Doppler (02.17.21 @ 14:43) >    Summary:   1. Severely decreased global left ventricular systolic function.   2. Left ventricular ejection fraction, by visual estimation, is 25 to 30%.   3. Normal left ventricular internal cavity size.   4. Severely increased LV wall thickness.   5. Left ventricle chordal calcification. Severe global left ventricle hypokinesis.   6. Mildly enlarged right ventricle.   7. Mildly reduced RV systolic function.   8. Severely enlarged left atrium.   9. Right atrial enlargement.  10. Small secundum atrial septal defect with predominantly left to right shunting across the atrial septum.  11. Severe mitral annular calcification.  12. Severe thickening and calcification of the anterior and posterior mitral valve leaflets.  13. Moderate mitral valve regurgitation.  14. Moderate tricuspid regurgitation.  15. The aortic valve leaflets are heavily calcified. Severe/critical aortic valve stenosis (peak velocity 5.2 m/s, mean gradient 64 mmHg, calculated SREEDHAR by continuity equation 0.35 cm^2, dimensionless index 0.11).  16. Mild aortic regurgitation.  17. Mild pulmonic valve regurgitation.  18. There is at least mild pulmonary hypertension (RVSP    44 mmHg).  19. Trivial pericardial effusion.  20. Moderate pleural effusion in both left and right lateral regions.    < end of copied text >    [ ]  Catheterization:  < from: Cardiac Cath Lab - Adult (02.16.21 @ 08:33) >  CORONARY VESSELS:The coronary circulation is co-dominant.  LM:   --  LM: Normal.  LAD:   --  Mid LAD: There was a tubular 90 % stenosis. The lesion was  eccentric.  CX:   --  OM1: There was a diffuse 85 % stenosis in the proximal third of  the vessel segment. The lesion was irregularly contoured and eccentric.  RCA:   --  Proximal RCA: There was a diffuse 99 % stenosis. The lesion was  irregularly contoured and eccentric.  --  Distal RCA: There was a diffuse 100 % stenosis.  COMPLICATIONS: No complications occurred during the cath lab visit.  DIAGNOSTIC IMPRESSIONS: Moderate Pulmonary Hypertension and elevation of  filling pressures. 2. Critical Aortic Stenosis with a mean PG >40mm Hg and  an SREEDHAR of <0.5cm2. 3. Severe LV Systolic dysfunction by TTE from 2/13/2021  with an estimated LVEF of 30%. 4. Triple Vessel CAD.  DIAGNOSTIC RECOMMENDATIONS: GDMT. 2. CTS consultation.  INTERVENTIONAL IMPRESSIONS: Moderate Pulmonary Hypertension and elevation  of filling pressures. 2. Critical Aortic Stenosis with a mean PG >40mm Hg  and an SREEDHAR of <0.5cm2. 3. Severe LV Systolic dysfunction by TTE from  2/13/2021 with an estimated LVEF of 30%. 4. Triple Vessel CAD.  INTERVENTIONAL RECOMMENDATIONS: GDMT. 2. CTS consultation.     Labs:                        10.0   6.44  )-----------( 277      ( 19 Feb 2021 06:28 )             32.5     02-19    142  |  111<H>  |  18.0  ----------------------------<  92  3.5   |  19.0<L>  |  0.58    Ca    8.7      19 Feb 2021 06:28  Phos  2.8     02-19  Mg     2.0     02-19    TPro  5.3<L>  /  Alb  2.9<L>  /  TBili  0.3<L>  /  DBili  x   /  AST  18  /  ALT  10  /  AlkPhos  113  02-18     17 Feb 2021 11:36 Troponin 1.35 ng/mL / Creatine Kinase x     /  CKMB x     / CPK Mass Assay % x       17 Feb 2021 01:45 Troponin 1.78 ng/mL / Creatine Kinase x     /  CKMB x     / CPK Mass Assay % x       16 Feb 2021 23:27 Troponin 1.83 ng/mL / Creatine Kinase 49 U/L /  CKMB x     / CPK Mass Assay % x          Serum Pro-Brain Natriuretic Peptide: 50414 pg/mL (02-16-21 @ 12:38)    Cholesterol 105 mg/dL; Direct LDL --; HDL Cholesterol, Serum 39 mg/dL; HDL/ Total Cholesterol Ratio Measurement --; Total Cholesterol/ HDL Ratio Measurement --; Triglycerides, Serum 62 mg/dL  A1C with Estimated Average Glucose Result: 5.2 % (02-16-21 @ 12:38)      TELEMETRY: Sinus 60s, episode of junctional kunal 40-50s

## 2021-02-19 NOTE — PROGRESS NOTE ADULT - PROBLEM SELECTOR PLAN 8
SCD for DVT prophylaxis.  Protonix for PUD prophylaxis.    Plan for OR on Monday.  Discussed with Dr Cruz

## 2021-02-19 NOTE — PROGRESS NOTE ADULT - SUBJECTIVE AND OBJECTIVE BOX
Subjective:  PT sitting up in bed.  Denies CP or SOB.  NAD noted.      Tele:  SB-SR with intermittent JR 40-50 overnight .                         T(F): 97.4 (21 @ 10:32), Max: 98.4 (21 @ 15:41)  HR: 61 (21 @ 10:32) (54 - 77)  BP: 113/59 (21 @ 10:32) (105/55 - 126/74)  RR: 20 (21 @ 10:32) (17 - 20)  SpO2: 96% (21 @ 10:32) (96% - 99%) room air.   Wt(kg): --      Daily     Daily Weight in k.6 (2021 04:15)    LV EF: 30%    No Known Allergies          142  |  111<H>  |  18.0  ----------------------------<  92  3.5   |  19.0<L>  |  0.58    Ca    8.7      2021 06:28  Phos  2.8       Mg     2.0         TPro  5.3<L>  /  Alb  2.9<L>  /  TBili  0.3<L>  /  DBili  x   /  AST  18  /  ALT  10  /  AlkPhos  113                                 10.0   6.44  )-----------( 277      ( 2021 06:28 )             32.5          CXR: < from: Xray Chest 1 View- PORTABLE-Urgent (Xray Chest 1 View- PORTABLE-Urgent .) (21 @ 19:19) >     EXAM:  XR CHEST PORTABLE URGENT 1V                          PROCEDURE DATE:  2021      INTERPRETATION:  Portable chest radiograph    CLINICAL INFORMATION: Pleural effusion    TECHNIQUE:  Portable  AP view of the chest was obtained.    COMPARISON: No previous examinations are available for review.    FINDINGS:  The lungs are clear of airspace consolidations or effusions. No pneumothorax.    The heart and mediastinum are within normal limits.    Visualized osseous structures are intact.    IMPRESSION:   No evidence of active chest disease.     Lateral radiograph recommended to exclude posterior lung base pathology.    MAYRA JOHNSON MD; Attending Radiologist  This document has been electronically signed. 2021  8:24AM    < end of copied text >      I&O's Detail    2021 07:01  -  2021 07:00  --------------------------------------------------------  IN:    Oral Fluid: 240 mL  Total IN: 240 mL    OUT:    Stool (mL): 1 mL    Voided (mL): 200 mL  Total OUT: 201 mL    Total NET: 39 mL      2021 07:01  -  2021 15:07  --------------------------------------------------------  IN:    Oral Fluid: 360 mL  Total IN: 360 mL    OUT:  Total OUT: 0 mL    Total NET: 360 mL    Active Medications:  aspirin  chewable 81 milliGRAM(s) Oral daily  atorvastatin 40 milliGRAM(s) Oral at bedtime  furosemide   Injectable 40 milliGRAM(s) IV Push once  levothyroxine 75 MICROGram(s) Oral daily  pantoprazole  Injectable 40 milliGRAM(s) IV Push every 12 hours  sodium chloride 0.9% lock flush 3 milliLiter(s) IV Push every 8 hours      Physical Exam:    Neuro: AAOX3.  Non focal.     Pulm: Diminished BLL.    CV: RRR.  +S1+S2.    Abd: Soft/NT/ND.  +BS.     Extremities: Mild BLE edema.  +pp.                     PAST MEDICAL & SURGICAL HISTORY:  Iron deficiency anemia due to chronic blood loss    Hypothyroid    History of tonsillectomy

## 2021-02-19 NOTE — PROGRESS NOTE ADULT - ASSESSMENT
76 year old Female with a PMH of anemia, hypothyroidism, and GI bleed 1 year ago (refused Endo/colonoscopy and never followed up) presented to [facility] originally with fever and SOB.  She was found to have a UTI/sepsis with elevated lactate, + UA, and temp of 102.  She was treated with Rocephin (completed course 2/17). Anemia noted with a Hgb of 6 and 2 units of PRBC transfused.  Patient wanted to sign out AMA from [place], but then ruled in for a NSTEMI, so she was agreeable to transfer to [facility] for cardiac cath. Cardiac cath 2/16 revealed Multivessel CAD and Severe AS. Further workup revealed moderate Right ICA stenosis and Severe Left ICA stenosis on carotid US, which was confirmed on CTA neck. Vascular surgery was consulted and cleared patient for open heart surgery with no plan for CEA at this time. Patient underwent Endoscopy 2/17 as part of her GI anemia workup, which only revealed a hiatal hernia. 2/18 failed adequate bowel prep again.

## 2021-02-19 NOTE — PROGRESS NOTE ADULT - PROBLEM SELECTOR PLAN 1
Cath report as above. Critical AS also noted.   Preop workup in progress for possible CABG/AVR.   Continue ASA 81mg and lipitor for CAD.   Toprol held with bradycardia/junctional rhythm.  Will hold ACEI/ARBs in preop setting.   GI was consulted for anemia. Endoscopy 2/17 revealed a hiatal hernia. Colonoscopy today without bleeding or acute findings.  Cleared for surgery from GI standpoint.  Endocrine was consulted for elevated TSH due to medication noncompliance. Continue home dose Synthroid.

## 2021-02-19 NOTE — PROGRESS NOTE ADULT - ASSESSMENT
76y Female with PMH anemia, and hypothyroidism.  Pt presented to [facility] a few days ago with fever and SOB.  She was found to have a UTI/sepsis with elevated lactate, + UA, and temp of 102.  She was started on Rocephin.  HGB was 6.  She was transfused 2 units of PRBC.  Per medical record, a year ago she had a GI bleed and was offered an endoscopy and colonoscopy but she refused and then failed to follow up outpatient.  Per medical record she did not want to stay at [facility] but agreed once she ruled in for NSTEMI.  She was transferred to [facility] for cardiac cath.     2/16 - s/p LHC showing EF 30%. LM normal, mLAD 90%, OM1 85%, pRCA 99%, dRCA 100%, mod pulm  HTN, SREEDHAR <0.5 consistent with critical AS  2-17- EGD with old blood suspect from pharynx/epistaxis?, poor bowel prep  2/18- plan for repeat colonoscopy     Severe Multivessel CAD   - s/p C mLAD 90%, OM1 85%, pRCA 99%, dRCA 100%  - no current angina or anginal equivalents  - continue ASA 81, statin, LDL 54  - CTS is following, plan for CABG possibly Monday?  - ACE/ARB on hold 2/2 to CABG workup/hypotension   - no P2Y12 inhibitor for pre-op CABG       HFrEF 30%  - ischemic CM  - hold hydralazine in setting of critical AS and low BP  - hold metoprolol given junctional bradycardia  - strict I/O, daily weights  - bilateral pleural effusions noted on CTA neck and Echo, CXR likely incorrect  - consider low dose IV Lasix 20mg daily for diuresis prior to OR- repeat pBNP in 2 days    Critical AS  - SREEDHAR <0.5 on cath   - CTS eval for valve replacement with CABG    Acute blood loss anemia  - received x2 PRBC at [place] before transfer   - occult blood positive, source unclear, colonoscopy with diverticulosis          Chet Grimaldo DO, [facility]  Faculty Non-Invasive Cardiologist  [phone]

## 2021-02-20 LAB
ALBUMIN SERPL ELPH-MCNC: 2.9 G/DL — LOW (ref 3.3–5.2)
ALP SERPL-CCNC: 110 U/L — SIGNIFICANT CHANGE UP (ref 40–120)
ALT FLD-CCNC: 10 U/L — SIGNIFICANT CHANGE UP
ANION GAP SERPL CALC-SCNC: 9 MMOL/L — SIGNIFICANT CHANGE UP (ref 5–17)
AST SERPL-CCNC: 17 U/L — SIGNIFICANT CHANGE UP
BILIRUB SERPL-MCNC: 0.3 MG/DL — LOW (ref 0.4–2)
BUN SERPL-MCNC: 12 MG/DL — SIGNIFICANT CHANGE UP (ref 8–20)
CALCIUM SERPL-MCNC: 8.5 MG/DL — LOW (ref 8.6–10.2)
CHLORIDE SERPL-SCNC: 110 MMOL/L — HIGH (ref 98–107)
CO2 SERPL-SCNC: 22 MMOL/L — SIGNIFICANT CHANGE UP (ref 22–29)
CREAT SERPL-MCNC: 0.76 MG/DL — SIGNIFICANT CHANGE UP (ref 0.5–1.3)
GLUCOSE SERPL-MCNC: 94 MG/DL — SIGNIFICANT CHANGE UP (ref 70–99)
HCT VFR BLD CALC: 30.8 % — LOW (ref 34.5–45)
HGB BLD-MCNC: 9.4 G/DL — LOW (ref 11.5–15.5)
MAGNESIUM SERPL-MCNC: 1.8 MG/DL — SIGNIFICANT CHANGE UP (ref 1.6–2.6)
MCHC RBC-ENTMCNC: 27.6 PG — SIGNIFICANT CHANGE UP (ref 27–34)
MCHC RBC-ENTMCNC: 30.5 GM/DL — LOW (ref 32–36)
MCV RBC AUTO: 90.3 FL — SIGNIFICANT CHANGE UP (ref 80–100)
NT-PROBNP SERPL-SCNC: HIGH PG/ML (ref 0–300)
PLATELET # BLD AUTO: 263 K/UL — SIGNIFICANT CHANGE UP (ref 150–400)
POTASSIUM SERPL-MCNC: 4.1 MMOL/L — SIGNIFICANT CHANGE UP (ref 3.5–5.3)
POTASSIUM SERPL-SCNC: 4.1 MMOL/L — SIGNIFICANT CHANGE UP (ref 3.5–5.3)
PROT SERPL-MCNC: 5.2 G/DL — LOW (ref 6.6–8.7)
RBC # BLD: 3.41 M/UL — LOW (ref 3.8–5.2)
RBC # FLD: 17.2 % — HIGH (ref 10.3–14.5)
SODIUM SERPL-SCNC: 141 MMOL/L — SIGNIFICANT CHANGE UP (ref 135–145)
TSH SERPL-MCNC: 10.94 UIU/ML — HIGH (ref 0.27–4.2)
WBC # BLD: 7.6 K/UL — SIGNIFICANT CHANGE UP (ref 3.8–10.5)
WBC # FLD AUTO: 7.6 K/UL — SIGNIFICANT CHANGE UP (ref 3.8–10.5)

## 2021-02-20 PROCEDURE — 99232 SBSQ HOSP IP/OBS MODERATE 35: CPT

## 2021-02-20 PROCEDURE — 71045 X-RAY EXAM CHEST 1 VIEW: CPT | Mod: 26

## 2021-02-20 RX ORDER — CHLORHEXIDINE GLUCONATE 213 G/1000ML
15 SOLUTION TOPICAL
Refills: 0 | Status: DISCONTINUED | OUTPATIENT
Start: 2021-02-20 | End: 2021-02-20

## 2021-02-20 RX ORDER — CHLORHEXIDINE GLUCONATE 213 G/1000ML
1 SOLUTION TOPICAL
Refills: 0 | Status: DISCONTINUED | OUTPATIENT
Start: 2021-02-20 | End: 2021-02-20

## 2021-02-20 RX ORDER — PHYTONADIONE (VIT K1) 5 MG
5 TABLET ORAL DAILY
Refills: 0 | Status: COMPLETED | OUTPATIENT
Start: 2021-02-20 | End: 2021-02-23

## 2021-02-20 RX ORDER — MAGNESIUM SULFATE 500 MG/ML
2 VIAL (ML) INJECTION ONCE
Refills: 0 | Status: COMPLETED | OUTPATIENT
Start: 2021-02-20 | End: 2021-02-20

## 2021-02-20 RX ADMIN — Medication 5 MILLIGRAM(S): at 18:26

## 2021-02-20 RX ADMIN — Medication 20 MILLIEQUIVALENT(S): at 16:45

## 2021-02-20 RX ADMIN — Medication 20 MILLIEQUIVALENT(S): at 07:03

## 2021-02-20 RX ADMIN — SODIUM CHLORIDE 3 MILLILITER(S): 9 INJECTION INTRAMUSCULAR; INTRAVENOUS; SUBCUTANEOUS at 13:30

## 2021-02-20 RX ADMIN — Medication 81 MILLIGRAM(S): at 16:45

## 2021-02-20 RX ADMIN — Medication 40 MILLIGRAM(S): at 16:45

## 2021-02-20 RX ADMIN — Medication 50 GRAM(S): at 10:47

## 2021-02-20 RX ADMIN — PANTOPRAZOLE SODIUM 40 MILLIGRAM(S): 20 TABLET, DELAYED RELEASE ORAL at 16:45

## 2021-02-20 RX ADMIN — Medication 75 MICROGRAM(S): at 07:04

## 2021-02-20 RX ADMIN — ATORVASTATIN CALCIUM 40 MILLIGRAM(S): 80 TABLET, FILM COATED ORAL at 22:10

## 2021-02-20 RX ADMIN — PANTOPRAZOLE SODIUM 40 MILLIGRAM(S): 20 TABLET, DELAYED RELEASE ORAL at 07:03

## 2021-02-20 RX ADMIN — SODIUM CHLORIDE 3 MILLILITER(S): 9 INJECTION INTRAMUSCULAR; INTRAVENOUS; SUBCUTANEOUS at 06:58

## 2021-02-20 RX ADMIN — SODIUM CHLORIDE 3 MILLILITER(S): 9 INJECTION INTRAMUSCULAR; INTRAVENOUS; SUBCUTANEOUS at 22:08

## 2021-02-20 RX ADMIN — Medication 40 MILLIGRAM(S): at 10:47

## 2021-02-20 NOTE — PROGRESS NOTE ADULT - ASSESSMENT
76 year old Female with a PMH of anemia, hypothyroidism, and GI bleed 1 year ago (refused Endo/colonoscopy and never followed up) presented to [facility] originally with fever and SOB.  She was found to have a UTI/sepsis with elevated lactate, + UA, and temp of 102.  She was treated with Rocephin (completed course 2/17). Anemia noted with a Hgb of 6 and 2 units of PRBC transfused.  Patient wanted to sign out AMA from [place], but then ruled in for a NSTEMI, so she was agreeable to transfer to [facility] for cardiac cath. Cardiac cath 2/16 revealed Multivessel CAD and Severe AS. Further workup revealed moderate Right ICA stenosis and Severe Left ICA stenosis on carotid US, which was confirmed on CTA neck. Vascular surgery was consulted and cleared patient for open heart surgery with no plan for CEA at this time. Patient underwent Endoscopy 2/17 as part of her GI anemia workup, which only revealed a hiatal hernia. Colonoscopy 2/19 without bleeding or acute pathology. Cleared for OR from GI standpoint. Plan for CABG/AVR next week.

## 2021-02-20 NOTE — PROGRESS NOTE ADULT - PROBLEM SELECTOR PLAN 1
Cath report as above. Critical AS also noted.   Continue ASA 81mg and lipitor for CAD.   Toprol held with bradycardia/junctional rhythm.  Will hold ACEI/ARBs in preop setting.   GI was consulted for anemia.   Endoscopy 2/17 revealed a hiatal hernia.   Colonoscopy 2/19 without acute bleed or pathology.  Cleared for surgery from GI standpoint.  Endocrine was consulted for elevated TSH due to medication noncompliance. Continue home dose Synthroid.  Preop workup completed.   Plan for CABG/AVR next week.

## 2021-02-20 NOTE — PROGRESS NOTE ADULT - ASSESSMENT
EGD negative.  Colonoscopy:  Diverticulosis coli, external hemorrhoid.  Tolerated both procedures well despite recent NSTMI.    Awaiting Cardiac surgery.  Tolerates DASH/ High fiber diet.    Source for anemia and + FOBT:  likely swallowed blood for epistaxis.    Reconsult GI prn.

## 2021-02-20 NOTE — PROGRESS NOTE ADULT - SUBJECTIVE AND OBJECTIVE BOX
Patient seen and examined; chart reviewed.  Tolerated yesterdays colonoscopy well.  No abdominal pain or fever.  No BM's since.  Resumed diet.  Hgb remains stable.  No chest pain.    Colonoscopy:  Near universal diverticulosis and external hemorrhoid.    Remains comfortable at rest.      PAST MEDICAL & SURGICAL HISTORY:  Iron deficiency anemia due to chronic blood loss    Hypothyroid    History of tonsillectomy        ROS:  No Heartburn, regurgitation, dysphagia, odynophagia.  No dyspepsia  No abdominal pain.    No Nausea, vomiting.  No Bleeding.  No hematemesis.   No diarrhea.    No hematochesia.  No weight loss, anorexia.  No edema.      MEDICATIONS  (STANDING):  aspirin  chewable 81 milliGRAM(s) Oral daily  atorvastatin 40 milliGRAM(s) Oral at bedtime  furosemide   Injectable 40 milliGRAM(s) IV Push <User Schedule>  levothyroxine 75 MICROGram(s) Oral daily  pantoprazole  Injectable 40 milliGRAM(s) IV Push every 12 hours  potassium chloride    Tablet ER 20 milliEquivalent(s) Oral two times a day  sodium chloride 0.9% lock flush 3 milliLiter(s) IV Push every 8 hours    MEDICATIONS  (PRN):      Allergies    No Known Allergies    Intolerances        Vital Signs Last 24 Hrs  T(C): 37.1 (20 Feb 2021 09:02), Max: 37.5 (19 Feb 2021 23:17)  T(F): 98.8 (20 Feb 2021 09:02), Max: 99.5 (19 Feb 2021 23:17)  HR: 73 (20 Feb 2021 10:39) (72 - 91)  BP: 110/65 (20 Feb 2021 10:39) (93/58 - 110/65)  BP(mean): --  RR: 18 (20 Feb 2021 09:02) (18 - 18)  SpO2: 94% (20 Feb 2021 09:02) (94% - 96%)    PHYSICAL EXAM:    GENERAL: NAD, well-groomed, well-developed  HEAD:  Atraumatic, Normocephalic  EYES: EOMI, PERRLA, conjunctiva and sclera clear  ENMT: No tonsillar erythema, exudates, or enlargement; Moist mucous membranes, Good dentition, No lesions  NECK: Supple, No JVD, Normal thyroid  CHEST/LUNG: Clear to percussion bilaterally; No rales, rhonchi, wheezing, or rubs  HEART: Regular rate and rhythm; No murmurs, rubs, or gallops  ABDOMEN: Soft, Nontender, Nondistended; Bowel sounds present  EXTREMITIES:  2+ Peripheral Pulses, No clubbing, cyanosis, or edema  LYMPH: No lymphadenopathy noted  SKIN: No rashes or lesions      LABS:                        9.4    7.60  )-----------( 263      ( 20 Feb 2021 06:31 )             30.8     02-20    141  |  110<H>  |  12.0  ----------------------------<  94  4.1   |  22.0  |  0.76    Ca    8.5<L>      20 Feb 2021 06:31  Phos  2.8     02-19  Mg     1.8     02-20    TPro  5.2<L>  /  Alb  2.9<L>  /  TBili  0.3<L>  /  DBili  x   /  AST  17  /  ALT  10  /  AlkPhos  110  02-20             RADIOLOGY & ADDITIONAL STUDIES:

## 2021-02-20 NOTE — PROGRESS NOTE ADULT - SUBJECTIVE AND OBJECTIVE BOX
Preop multivessel CAD, Severe AS, and Moderate MR    PAST MEDICAL & SURGICAL HISTORY:  Iron deficiency anemia due to chronic blood loss  Hypothyroid  History of tonsillectomy    FAMILY HISTORY:  Family history of cardiac disorder (Father)  Family history of diabetes mellitus (Father)    Brief Hospital Course: 76 year old Female with a PMH of anemia, hypothyroidism, and GI bleed 1 year ago (refused Endo/colonoscopy and never followed up) presented to [facility] originally with fever and SOB.  She was found to have a UTI/sepsis with elevated lactate, + UA, and temp of 102.  She was treated with Rocephin (completed course ). Anemia noted with a Hgb of 6 and 2 units of PRBC transfused.  Patient wanted to sign out AMA from [place], but then ruled in for a NSTEMI, so she was agreeable to transfer to [facility] for cardiac cath. Cardiac cath  revealed Multivessel CAD and Severe AS. Further workup revealed moderate Right ICA stenosis and Severe Left ICA stenosis on carotid US, which was confirmed on CTA neck. Vascular surgery was consulted and cleared patient for open heart surgery with no plan for CEA at this time. Patient underwent Endoscopy  as part of her GI anemia workup, which only revealed a hiatal hernia. Colonoscopy  without bleeding or acute pathology. Cleared for OR from GI standpoint. Plan for CABG/AVR next week.     Subjective: Patient lying in bed in no acute distress. Patient had successful colonoscopy yesterday. Feeling well. Awaiting her surgery. "I'm ready whenever you are. My  isn't doing good at home. He falls a lot. I need to get back there."  Denies fevers, chills, lightheadedness, dizziness, HA, CP, palpitations, SOB, cough, abdominal pain, N/V, numbness/tingling in extremities, or any other acute complaints.    MEDICATIONS  (STANDING):  aspirin  chewable 81 milliGRAM(s) Oral daily  atorvastatin 40 milliGRAM(s) Oral at bedtime  furosemide   Injectable 40 milliGRAM(s) IV Push <User Schedule>  levothyroxine 75 MICROGram(s) Oral daily  pantoprazole  Injectable 40 milliGRAM(s) IV Push every 12 hours  potassium chloride    Tablet ER 20 milliEquivalent(s) Oral two times a day  sodium chloride 0.9% lock flush 3 milliLiter(s) IV Push every 8 hours    Allergies: No Known Allergies    Vitals   T(C): 37.5 (2021 23:17), Max: 37.5 (2021 23:17)  T(F): 99.5 (2021 23:17), Max: 99.5 (2021 23:17)  HR: 74 (2021 23:17) (54 - 91)  BP: 97/57 (2021 23:17) (97/57 - 113/59)  RR: 18 (2021 23:17) (17 - 20)  SpO2: 96% (2021 23:17) (94% - 98%)    I&O's Detail    2021 07:01  -  2021 07:00  --------------------------------------------------------  IN:    Oral Fluid: 240 mL  Total IN: 240 mL    OUT:    Stool (mL): 1 mL    Voided (mL): 200 mL  Total OUT: 201 mL    Total NET: 39 mL      2021 07:01  -  2021 01:17  --------------------------------------------------------  IN:    Oral Fluid: 480 mL  Total IN: 480 mL    OUT:    Voided (mL): 1100 mL  Total OUT: 1100 mL    Total NET: -620 mL    Physical Exam  Neuro: A+O x 3, non-focal, speech clear and intact  HEENT:  NCAT, PERRL, EOMI. No conjuctival edema or icterus, no thrush.    Neck:  Supple, trachea midline  Pulm: Diminished BSs at bases b/l, no rales/rhonchi/wheezing, no accessory muscle use noted  CV: regular rate, regular rhythm, +S1S2, +ANJEL  Abd: soft, NT, ND, + BS  Ext: YATES x 4, no edema, no cyanosis or clubbing, distal motor/neuro/circ intact  Skin: warm, dry, well perfused    LABS                        10.0   6.44  )-----------( 277      ( 2021 06:28 )             32.5         142  |  111<H>  |  18.0  ----------------------------<  92  3.5   |  19.0<L>  |  0.58    Ca    8.7      2021 06:28  Phos  2.8       Mg     2.0         TPro  5.3<L>  /  Alb  2.9<L>  /  TBili  0.3<L>  /  DBili  x   /  AST  18  /  ALT  10  /  AlkPhos  113      Urinalysis Basic - ( 2021 22:38 )  Color: Yellow / Appearance: Clear / S.020 / pH: x  Gluc: x / Ketone: Negative  / Bili: Negative / Urobili: Negative mg/dL   Blood: x / Protein: 30 mg/dL / Nitrite: Negative   Leuk Esterase: Trace / RBC: Negative /HPF / WBC 3-5   Sq Epi: x / Non Sq Epi: Moderate / Bacteria: x    MRSA/MSSA PCR (21 @ 22:39)    MRSA PCR Result.: NotDetec: NOT DETECTED RESULT: MRSA and MSSA not detected    Staph Aureus PCR Result: NotDetec    Last CXR:  < from: Xray Chest 1 View- PORTABLE-Urgent (Xray Chest 1 View- PORTABLE-Urgent .) (21 @ 19:19) >  FINDINGS:  The lungs are clear of airspace consolidations or effusions. No pneumothorax.  The heart and mediastinum are within normal limits.  Visualized osseous structures are intact.  IMPRESSION:   No evidence of active chest disease.  Lateral radiograph recommended to exclude posterior lung base pathology.  < end of copied text >    Cardiac Cath:  < from: Cardiac Cath Lab - Adult (21 @ 08:33) >  HEMODYNAMICS: There is moderate pulmonary hypertension.  VENTRICLES: EF by echo was 30 %.  VALVES: AORTIC VALVE: There was critical aortic stenosis.  CORONARY VESSELS:The coronary circulation is co-dominant.  LM:   --  LM: Normal.  LAD:   --  Mid LAD: There was a tubular 90 % stenosis. The lesion was eccentric.  CX:   --  OM1: There was a diffuse 85 % stenosis in the proximal third of the vessel segment. The lesion was irregularly contoured and eccentric.  RCA:   --  Proximal RCA: There was a diffuse 99 % stenosis. The lesion was irregularly contoured and eccentric.  --  Distal RCA: There was a diffuse 100 % stenosis.  COMPLICATIONS: No complications occurred during the cath lab visit.  DIAGNOSTIC IMPRESSIONS: Moderate Pulmonary Hypertension and elevation of filling pressures. 2. Critical Aortic Stenosis with a mean PG >40mm Hg and an SREEDHAR of <0.5cm2. 3. Severe LV Systolic dysfunction by TTE from 2021 with an estimated LVEF of 30%. 4. Triple Vessel CAD.  < end of copied text >    TTE:  < from: TTE Echo Complete w/ Contrast w/ Doppler (21 @ 14:43) >  Summary:   1. Severely decreased global left ventricular systolic function.   2. Left ventricular ejection fraction, by visual estimation, is 25 to 30%.   3. Normal left ventricular internal cavity size.   4. Severely increased LV wall thickness.   5. Left ventricle chordal calcification. Severe global left ventricle hypokinesis.   6. Mildly enlarged right ventricle.   7. Mildly reduced RV systolic function.   8. Severely enlarged left atrium.   9. Right atrial enlargement.  10. Small secundum atrial septal defect with predominantly left to right shunting across the atrial septum.  11. Severe mitral annular calcification.  12. Severe thickening and calcification of the anterior and posterior mitral valve leaflets.  13. Moderate mitral valve regurgitation.  14. Moderate tricuspid regurgitation.  15. The aortic valve leaflets are heavily calcified. Severe/critical aortic valve stenosis (peak velocity 5.2 m/s, mean gradient 64 mmHg, calculated SREEDHAR by continuity equation 0.35 cm^2, dimensionless index 0.11).  16. Mild aortic regurgitation.  17. Mild pulmonic valve regurgitation.  18. There is at least mild pulmonary hypertension (RVSP 44 mmHg).  19. Trivial pericardial effusion.  20. Moderate pleural effusion in both left and right lateral regions.  21. Recommend clinical correlation with the above findings.  < end of copied text >    Carotid US:  < from: US Duplex Carotid Arteries Complete, Bilateral (21 @ 12:27) >  IMPRESSION: There is a moderate, 50-69% stenosis of the right internal carotid artery.  There is a severe, greater than 70% stenosis of the left internal carotid artery.  < end of copied text >

## 2021-02-20 NOTE — PROGRESS NOTE ADULT - PROBLEM SELECTOR PLAN 8
SCD for DVT prophylaxis.  Protonix for PUD prophylaxis.    Plan for OR next week.  Plan to be discussed further with CT Surgery attending / team in AM rounds.

## 2021-02-20 NOTE — CHART NOTE - NSCHARTNOTEFT_GEN_A_CORE
CTS ACP Addendum    Briefly, 76F h/o hypothyroid, anemia with TVD and severe AS s/p EGD with hiatal hernia and colonoscopy with no bleeding. Justice syed noted 2/18, resolved.    Patient seen and examined. Notes, flowsheets, medications, radiologic images and labs reviewed. SR 70-80, soft BP 93/58, crackles right and diminished left base on exam with trace LE edema, RRR + ANJEL.     Plan:  - OR Wednesday, AVR/CABG  - TSH remains elevated  - T3T4 ordered  - Continue diuresis for small bilateral effusion    Case discussed with Dr. Delaney

## 2021-02-21 LAB
ANION GAP SERPL CALC-SCNC: 11 MMOL/L — SIGNIFICANT CHANGE UP (ref 5–17)
BLD GP AB SCN SERPL QL: SIGNIFICANT CHANGE UP
BUN SERPL-MCNC: 16 MG/DL — SIGNIFICANT CHANGE UP (ref 8–20)
CALCIUM SERPL-MCNC: 8.6 MG/DL — SIGNIFICANT CHANGE UP (ref 8.6–10.2)
CHLORIDE SERPL-SCNC: 104 MMOL/L — SIGNIFICANT CHANGE UP (ref 98–107)
CO2 SERPL-SCNC: 25 MMOL/L — SIGNIFICANT CHANGE UP (ref 22–29)
CREAT SERPL-MCNC: 0.79 MG/DL — SIGNIFICANT CHANGE UP (ref 0.5–1.3)
GLUCOSE SERPL-MCNC: 99 MG/DL — SIGNIFICANT CHANGE UP (ref 70–99)
HCT VFR BLD CALC: 32.7 % — LOW (ref 34.5–45)
HGB BLD-MCNC: 10.2 G/DL — LOW (ref 11.5–15.5)
MAGNESIUM SERPL-MCNC: 1.9 MG/DL — SIGNIFICANT CHANGE UP (ref 1.8–2.6)
MCHC RBC-ENTMCNC: 27.8 PG — SIGNIFICANT CHANGE UP (ref 27–34)
MCHC RBC-ENTMCNC: 31.2 GM/DL — LOW (ref 32–36)
MCV RBC AUTO: 89.1 FL — SIGNIFICANT CHANGE UP (ref 80–100)
NT-PROBNP SERPL-SCNC: HIGH PG/ML (ref 0–300)
PLATELET # BLD AUTO: 290 K/UL — SIGNIFICANT CHANGE UP (ref 150–400)
POTASSIUM SERPL-MCNC: 4 MMOL/L — SIGNIFICANT CHANGE UP (ref 3.5–5.3)
POTASSIUM SERPL-SCNC: 4 MMOL/L — SIGNIFICANT CHANGE UP (ref 3.5–5.3)
RBC # BLD: 3.67 M/UL — LOW (ref 3.8–5.2)
RBC # FLD: 17.1 % — HIGH (ref 10.3–14.5)
SARS-COV-2 RNA SPEC QL NAA+PROBE: SIGNIFICANT CHANGE UP
SODIUM SERPL-SCNC: 140 MMOL/L — SIGNIFICANT CHANGE UP (ref 135–145)
T3 SERPL-MCNC: 60 NG/DL — LOW (ref 80–200)
T4 AB SER-ACNC: 4.9 UG/DL — SIGNIFICANT CHANGE UP (ref 4.5–12)
WBC # BLD: 8.32 K/UL — SIGNIFICANT CHANGE UP (ref 3.8–10.5)
WBC # FLD AUTO: 8.32 K/UL — SIGNIFICANT CHANGE UP (ref 3.8–10.5)

## 2021-02-21 PROCEDURE — 71045 X-RAY EXAM CHEST 1 VIEW: CPT | Mod: 26

## 2021-02-21 PROCEDURE — 99232 SBSQ HOSP IP/OBS MODERATE 35: CPT

## 2021-02-21 RX ORDER — MAGNESIUM SULFATE 500 MG/ML
1 VIAL (ML) INJECTION ONCE
Refills: 0 | Status: COMPLETED | OUTPATIENT
Start: 2021-02-21 | End: 2021-02-21

## 2021-02-21 RX ORDER — CHLORHEXIDINE GLUCONATE 213 G/1000ML
15 SOLUTION TOPICAL
Refills: 0 | Status: DISCONTINUED | OUTPATIENT
Start: 2021-02-21 | End: 2021-02-24

## 2021-02-21 RX ORDER — MAGNESIUM SULFATE 500 MG/ML
2 VIAL (ML) INJECTION
Refills: 0 | Status: DISCONTINUED | OUTPATIENT
Start: 2021-02-21 | End: 2021-02-21

## 2021-02-21 RX ORDER — VANCOMYCIN HCL 1 G
1000 VIAL (EA) INTRAVENOUS ONCE
Refills: 0 | Status: COMPLETED | OUTPATIENT
Start: 2021-02-21 | End: 2022-01-20

## 2021-02-21 RX ORDER — CHLORHEXIDINE GLUCONATE 213 G/1000ML
1 SOLUTION TOPICAL
Refills: 0 | Status: DISCONTINUED | OUTPATIENT
Start: 2021-02-22 | End: 2021-02-24

## 2021-02-21 RX ORDER — CEFUROXIME AXETIL 250 MG
1500 TABLET ORAL ONCE
Refills: 0 | Status: COMPLETED | OUTPATIENT
Start: 2021-02-21 | End: 2022-01-20

## 2021-02-21 RX ADMIN — Medication 75 MICROGRAM(S): at 05:03

## 2021-02-21 RX ADMIN — ATORVASTATIN CALCIUM 40 MILLIGRAM(S): 80 TABLET, FILM COATED ORAL at 21:45

## 2021-02-21 RX ADMIN — Medication 20 MILLIEQUIVALENT(S): at 16:08

## 2021-02-21 RX ADMIN — Medication 81 MILLIGRAM(S): at 16:09

## 2021-02-21 RX ADMIN — SODIUM CHLORIDE 3 MILLILITER(S): 9 INJECTION INTRAMUSCULAR; INTRAVENOUS; SUBCUTANEOUS at 14:00

## 2021-02-21 RX ADMIN — Medication 20 MILLIEQUIVALENT(S): at 05:03

## 2021-02-21 RX ADMIN — CHLORHEXIDINE GLUCONATE 15 MILLILITER(S): 213 SOLUTION TOPICAL at 16:07

## 2021-02-21 RX ADMIN — Medication 5 MILLIGRAM(S): at 16:07

## 2021-02-21 RX ADMIN — PANTOPRAZOLE SODIUM 40 MILLIGRAM(S): 20 TABLET, DELAYED RELEASE ORAL at 05:03

## 2021-02-21 RX ADMIN — Medication 100 GRAM(S): at 14:20

## 2021-02-21 RX ADMIN — SODIUM CHLORIDE 3 MILLILITER(S): 9 INJECTION INTRAMUSCULAR; INTRAVENOUS; SUBCUTANEOUS at 05:15

## 2021-02-21 RX ADMIN — SODIUM CHLORIDE 3 MILLILITER(S): 9 INJECTION INTRAMUSCULAR; INTRAVENOUS; SUBCUTANEOUS at 22:54

## 2021-02-21 RX ADMIN — Medication 40 MILLIGRAM(S): at 14:20

## 2021-02-21 RX ADMIN — PANTOPRAZOLE SODIUM 40 MILLIGRAM(S): 20 TABLET, DELAYED RELEASE ORAL at 16:07

## 2021-02-21 NOTE — PROGRESS NOTE ADULT - ASSESSMENT
76 year old Female with a PMH of anemia, hypothyroidism, and GI bleed 1 year ago (refused Endo/colonoscopy and never followed up) presented to [facility] originally with fever and SOB.  She was found to have a UTI/sepsis with elevated lactate, + UA, and temp of 102.  She was treated with Rocephin (completed course 2/17). Anemia noted with a Hgb of 6 and 2 units of PRBC transfused.  Patient wanted to sign out AMA from [place], but then ruled in for a NSTEMI, so she was agreeable to transfer to [facility] for cardiac cath. Cardiac cath 2/16 revealed Multivessel CAD and Severe AS. Further workup revealed moderate Right ICA stenosis and Severe Left ICA stenosis on carotid US, which was confirmed on CTA neck. Vascular surgery was consulted and cleared patient for open heart surgery with no plan for CEA at this time. Patient underwent Endoscopy 2/17 as part of her GI anemia workup, which only revealed a hiatal hernia. Colonoscopy 2/19 without bleeding or acute pathology. Cleared for OR from GI standpoint. Plan for CABG/AVR Wednesday 2/24/21.

## 2021-02-21 NOTE — DIETITIAN INITIAL EVALUATION ADULT. - PERTINENT LABORATORY DATA
02-21 Na140 mmol/L Glu 99 mg/dL K+ 4.0 mmol/L Cr  0.79 mg/dL BUN 16.0 mg/dL Phos n/a   Alb n/a   PAB n/a

## 2021-02-21 NOTE — DIETITIAN INITIAL EVALUATION ADULT. - ORAL INTAKE PTA/DIET HISTORY
Please inform patient that H. Pylori breath test is negative. H. Pylori has been treated and he requires no further antibiotic treatment. Recommend to decrease Protonix to 40mg once a day to control heartburn if he still has heartburn.
Pt reports good po intake PTA. Pt reports current weight is now ~150#. Bedscale weight is 176#. Pt c/o constipation. As per EMR pt reports 30# weight loss in one year.

## 2021-02-21 NOTE — PROGRESS NOTE ADULT - PROBLEM SELECTOR PLAN 1
Cath report as above. Critical AS also noted.   Continue ASA 81mg and lipitor for CAD.   Toprol held with bradycardia/junctional rhythm.  Will hold ACEI/ARBs in preop setting.   GI was consulted for anemia.   Endoscopy 2/17 revealed a hiatal hernia.   Colonoscopy 2/19 without acute bleed or pathology.  Cleared for surgery from GI standpoint.  Endocrine was consulted for elevated TSH due to medication noncompliance. Continue home dose Synthroid.  Preop workup completed.   Plan for CABG/AVR Wednesday 2/24/21.

## 2021-02-21 NOTE — DIETITIAN INITIAL EVALUATION ADULT. - OTHER INFO
76 year old Female with a PMH of anemia, hypothyroidism, and GI bleed 1 year ago (refused Endo/colonoscopy and never followed up) presented to [facility] originally with fever and SOB.  She was found to have a UTI/sepsis.   Patient wanted to sign out AMA from [place], but then ruled in for a NSTEMI, so she was agreeable to transfer to [facility] for cardiac cath. Cardiac cath 2/16 revealed Multivessel CAD and Severe AS. Further workup revealed moderate Right ICA stenosis and Severe Left ICA stenosis on carotid US. Patient underwent Endoscopy 2/17 as part of her GI anemia workup, which only revealed a hiatal hernia. Colonoscopy 2/19 without bleeding or acute pathology. Cleared for OR from GI standpoint. Plan for CABG/AVR Wednesday 2/24/21. Pt insists she is not 171# as per bedscale. Pt believes she is ~150#. Food preferences obtained. Pt unhappy she only received oatmeal for breakfast this am. Having fresh fruit delivered. Reviewed high fiber, dash diet with pt with fair understanding.

## 2021-02-21 NOTE — PROGRESS NOTE ADULT - SUBJECTIVE AND OBJECTIVE BOX
Preop multivessel CAD, Severe AS, and Moderate MR    PAST MEDICAL & SURGICAL HISTORY:  Iron deficiency anemia due to chronic blood loss  Hypothyroid  History of tonsillectomy    FAMILY HISTORY:  Family history of cardiac disorder (Father)  Family history of diabetes mellitus (Father)    Brief Hospital Course: 76 year old Female with a PMH of anemia, hypothyroidism, and GI bleed 1 year ago (refused Endo/colonoscopy and never followed up) presented to [facility] originally with fever and SOB.  She was found to have a UTI/sepsis with elevated lactate, + UA, and temp of 102.  She was treated with Rocephin (completed course 2/17). Anemia noted with a Hgb of 6 and 2 units of PRBC transfused.  Patient wanted to sign out AMA from [place], but then ruled in for a NSTEMI, so she was agreeable to transfer to [facility] for cardiac cath. Cardiac cath 2/16 revealed Multivessel CAD and Severe AS. Further workup revealed moderate Right ICA stenosis and Severe Left ICA stenosis on carotid US, which was confirmed on CTA neck. Vascular surgery was consulted and cleared patient for open heart surgery with no plan for CEA at this time. Patient underwent Endoscopy 2/17 as part of her GI anemia workup, which only revealed a hiatal hernia. Colonoscopy 2/19 without bleeding or acute pathology. Cleared for OR from GI standpoint. Plan for CABG/AVR Wednesday 2/24/21.     Subjective: Patient lying in bed in no acute distress. Denies fevers, chills, lightheadedness, dizziness, HA, CP, palpitations, SOB, cough, abdominal pain, N/V, numbness/tingling in extremities, or any other acute complaints.    MEDICATIONS  (STANDING):  aspirin  chewable 81 milliGRAM(s) Oral daily  atorvastatin 40 milliGRAM(s) Oral at bedtime  furosemide   Injectable 40 milliGRAM(s) IV Push <User Schedule>  levothyroxine 75 MICROGram(s) Oral daily  pantoprazole  Injectable 40 milliGRAM(s) IV Push every 12 hours  phytonadione   Solution 5 milliGRAM(s) Oral daily  potassium chloride    Tablet ER 20 milliEquivalent(s) Oral two times a day  sodium chloride 0.9% lock flush 3 milliLiter(s) IV Push every 8 hours    Allergies: No Known Allergies    Vitals   T(C): 37.6 (20 Feb 2021 22:15), Max: 37.6 (20 Feb 2021 22:15)  T(F): 99.7 (20 Feb 2021 22:15), Max: 99.7 (20 Feb 2021 22:15)  HR: 75 (20 Feb 2021 22:15) (72 - 87)  BP: 105/64 (20 Feb 2021 22:15) (93/58 - 110/65)  RR: 18 (20 Feb 2021 22:15) (18 - 18)  SpO2: 96% (20 Feb 2021 22:15) (94% - 98%)    I&O's Detail    19 Feb 2021 07:01  -  20 Feb 2021 07:00  --------------------------------------------------------  IN:    Oral Fluid: 600 mL  Total IN: 600 mL    OUT:    Voided (mL): 2000 mL  Total OUT: 2000 mL    Total NET: -1400 mL      20 Feb 2021 07:01  -  21 Feb 2021 01:35  --------------------------------------------------------  IN:  Total IN: 0 mL    OUT:    Voided (mL): 1950 mL  Total OUT: 1950 mL    Total NET: -1950 mL    Physical Exam  Neuro: A+O x 3, non-focal, speech clear and intact  HEENT:  NCAT, PERRL, EOMI. No conjuctival edema or icterus, no thrush.    Neck:  Supple, trachea midline  Pulm: Diminished BSs at bases b/l, no rales/rhonchi/wheezing, no accessory muscle use noted  CV: regular rate, regular rhythm, +S1S2, +ANJEL  Abd: soft, NT, ND, + BS  Ext: YATES x 4, no edema, no cyanosis or clubbing, distal motor/neuro/circ intact  Skin: warm, dry, well perfused    LABS                        9.4    7.60  )-----------( 263      ( 20 Feb 2021 06:31 )             30.8     02-20    141  |  110<H>  |  12.0  ----------------------------<  94  4.1   |  22.0  |  0.76    Ca    8.5<L>      20 Feb 2021 06:31  Phos  2.8     02-19  Mg     1.8     02-20    TPro  5.2<L>  /  Alb  2.9<L>  /  TBili  0.3<L>  /  DBili  x   /  AST  17  /  ALT  10  /  AlkPhos  110  02-20      Last CXR:  < from: Xray Chest 1 View- PORTABLE-Routine (Xray Chest 1 View- PORTABLE-Routine in AM.) (02.20.21 @ 05:04) >  Heart is enlarged.  There is a mild to moderate right base effusionand a mild left base effusion.  Mitral area is calcified.  Chest is similar to February 17.  IMPRESSION: Unchanged chest as above with basilar effusions.  < end of copied text >  Cardiac Cath:  < from: Cardiac Cath Lab - Adult (02.16.21 @ 08:33) >  HEMODYNAMICS: There is moderate pulmonary hypertension.  VENTRICLES: EF by echo was 30 %.  VALVES: AORTIC VALVE: There was critical aortic stenosis.  CORONARY VESSELS: The coronary circulation is co-dominant.  LM:   --  LM: Normal.  LAD:   --  Mid LAD: There was a tubular 90 % stenosis. The lesion was eccentric.  CX:   --  OM1: There was a diffuse 85 % stenosis in the proximal third of the vessel segment. The lesion was irregularly contoured and eccentric.  RCA:   --  Proximal RCA: There was a diffuse 99 % stenosis. The lesion was irregularly contoured and eccentric.  --  Distal RCA: There was a diffuse 100 % stenosis.  COMPLICATIONS: No complications occurred during the cath lab visit.  DIAGNOSTIC IMPRESSIONS: Moderate Pulmonary Hypertension and elevation of filling pressures. 2. Critical Aortic Stenosis with a mean PG >40mm Hg and an SREEDHAR of <0.5cm2. 3. Severe LV Systolic dysfunction by TTE from 2/13/2021 with an estimated LVEF of 30%. 4. Triple Vessel CAD.  < end of copied text >    TTE:  < from: TTE Echo Complete w/ Contrast w/ Doppler (02.17.21 @ 14:43) >  Summary:   1. Severely decreased global left ventricular systolic function.   2. Left ventricular ejection fraction, by visual estimation, is 25 to 30%.   3. Normal left ventricular internal cavity size.   4. Severely increased LV wall thickness.   5. Left ventricle chordal calcification. Severe global left ventricle hypokinesis.   6. Mildly enlarged right ventricle.   7. Mildly reduced RV systolic function.   8. Severely enlarged left atrium.   9. Right atrial enlargement.  10. Small secundum atrial septal defect with predominantly left to right shunting across the atrial septum.  11. Severe mitral annular calcification.  12. Severe thickening and calcification of the anterior and posterior mitral valve leaflets.  13. Moderate mitral valve regurgitation.  14. Moderate tricuspid regurgitation.  15. The aortic valve leaflets are heavily calcified. Severe/critical aortic valve stenosis (peak velocity 5.2 m/s, mean gradient 64 mmHg, calculated SREEDHAR by continuity equation 0.35 cm^2, dimensionless index 0.11).  16. Mild aortic regurgitation.  17. Mild pulmonic valve regurgitation.  18. There is at least mild pulmonary hypertension (RVSP 44 mmHg).  19. Trivial pericardial effusion.  20. Moderate pleural effusion in both left and right lateral regions.  21. Recommend clinical correlation with the above findings.  < end of copied text >    Carotid US:  < from: US Duplex Carotid Arteries Complete, Bilateral (02.16.21 @ 12:27) >  IMPRESSION: There is a moderate, 50-69% stenosis of the right internal carotid artery.  There is a severe, greater than 70% stenosis of the left internal carotid artery.  < end of copied text >

## 2021-02-21 NOTE — PROGRESS NOTE ADULT - PROBLEM SELECTOR PLAN 8
SCD for DVT prophylaxis.  Protonix for PUD prophylaxis.    Plan for OR Wednesday 2/24/21.   Plan to be discussed further with CT Surgery attending / team in AM rounds.

## 2021-02-21 NOTE — CHART NOTE - NSCHARTNOTEFT_GEN_A_CORE
CTS Addendum Note:     Briefly, 76 year old female with a PMH of anemia, hypothyroidism, and GI bleed 1 year ago (refused Endo/colonoscopy and never followed up) presented to [facility] originally with fever and SOB.  She was found to have a UTI/sepsis with elevated lactate, + UA, and temp of 102.  She was treated with Rocephin (completed course 2/17). Anemia noted with a Hgb of 6 and 2 units of PRBC transfused.  Patient wanted to sign out AMA from [place], but then ruled in for a NSTEMI, so she was agreeable to transfer to [facility] for cardiac cath. Cardiac cath 2/16 revealed Multivessel CAD and Severe AS. Further workup revealed moderate Right ICA stenosis and Severe Left ICA stenosis on carotid US, which was confirmed on CTA neck. Vascular surgery was consulted and cleared patient for open heart surgery with no plan for CEA at this time. Patient underwent Endoscopy 2/17 as part of her GI anemia workup, which only revealed a hiatal hernia. Colonoscopy 2/19 without bleeding or acute pathology. Cleared for OR from GI standpoint. Plan for CABG/AVR next week.     Pt. seen & examined, vitals stable, medications & laboratory findings reviewed.  Pt. remains in Sinus rhythm. Pt. states that she was sitting OOB to chair CTS Addendum Note:     Briefly, 76 year old female with a PMH of anemia, hypothyroidism, and GI bleed 1 year ago (refused Endo/colonoscopy and never followed up) presented to [facility] originally with fever and SOB.  She was found to have a UTI/sepsis with elevated lactate, + UA, and temp of 102.  She was treated with Rocephin (completed course 2/17). Anemia noted with a Hgb of 6 and 2 units of PRBC transfused.  Patient wanted to sign out AMA from [place], but then ruled in for a NSTEMI, so she was agreeable to transfer to [facility] for cardiac cath. Cardiac cath 2/16 revealed Multivessel CAD and Severe AS. Further workup revealed moderate Right ICA stenosis and Severe Left ICA stenosis on carotid US, which was confirmed on CTA neck. Vascular surgery was consulted and cleared patient for open heart surgery with no plan for CEA at this time. Patient underwent Endoscopy 2/17 as part of her GI anemia workup, which only revealed a hiatal hernia. Colonoscopy 2/19 without bleeding or acute pathology. Cleared for OR from GI standpoint. Plan for CABG/AVR next week.     Pt. seen & examined, vitals stable, medications & laboratory findings reviewed.  Pt. remains in Sinus rhythm. Pt. states that she was sitting OOB to chair yesterday and tired today.  Pt. denies any SOB or chest pain, NAD noted.       PHYSICAL EXAM  General: WN/WD NAD  Neurology: A&Ox3, nonfocal, YATES x 4  Respiratory: Diminished at the bases   CV: RRR, S1S2, no murmurs, rubs or gallops, III/VI systolic murmur  Abdominal: Soft, NT, ND +BS, Last BM 2/20/21  Extremities: YATES x4, +1 edema, + peripheral pulses      Plan:  Pre-op for OHS in progress  CABG/AVR tentatively on 2/24/21  Continue ASA & Lipitor  Continue diuresis  Daily weights  Strict I/O's  Discussed plan with Dr. Delaney & CTS in morning rounds

## 2021-02-22 LAB
ALBUMIN SERPL ELPH-MCNC: 3.2 G/DL — LOW (ref 3.3–5.2)
ALP SERPL-CCNC: 146 U/L — HIGH (ref 40–120)
ALT FLD-CCNC: 13 U/L — SIGNIFICANT CHANGE UP
ANION GAP SERPL CALC-SCNC: 12 MMOL/L — SIGNIFICANT CHANGE UP (ref 5–17)
AST SERPL-CCNC: 21 U/L — SIGNIFICANT CHANGE UP
BILIRUB SERPL-MCNC: 0.6 MG/DL — SIGNIFICANT CHANGE UP (ref 0.4–2)
BUN SERPL-MCNC: 16 MG/DL — SIGNIFICANT CHANGE UP (ref 8–20)
CALCIUM SERPL-MCNC: 9.1 MG/DL — SIGNIFICANT CHANGE UP (ref 8.6–10.2)
CHLORIDE SERPL-SCNC: 99 MMOL/L — SIGNIFICANT CHANGE UP (ref 98–107)
CO2 SERPL-SCNC: 27 MMOL/L — SIGNIFICANT CHANGE UP (ref 22–29)
CREAT SERPL-MCNC: 0.75 MG/DL — SIGNIFICANT CHANGE UP (ref 0.5–1.3)
GLUCOSE SERPL-MCNC: 104 MG/DL — HIGH (ref 70–99)
HCT VFR BLD CALC: 35.5 % — SIGNIFICANT CHANGE UP (ref 34.5–45)
HGB BLD-MCNC: 10.9 G/DL — LOW (ref 11.5–15.5)
MAGNESIUM SERPL-MCNC: 1.9 MG/DL — SIGNIFICANT CHANGE UP (ref 1.6–2.6)
MCHC RBC-ENTMCNC: 27.5 PG — SIGNIFICANT CHANGE UP (ref 27–34)
MCHC RBC-ENTMCNC: 30.7 GM/DL — LOW (ref 32–36)
MCV RBC AUTO: 89.4 FL — SIGNIFICANT CHANGE UP (ref 80–100)
PHOSPHATE SERPL-MCNC: 3 MG/DL — SIGNIFICANT CHANGE UP (ref 2.4–4.7)
PLATELET # BLD AUTO: 353 K/UL — SIGNIFICANT CHANGE UP (ref 150–400)
POTASSIUM SERPL-MCNC: 4.5 MMOL/L — SIGNIFICANT CHANGE UP (ref 3.5–5.3)
POTASSIUM SERPL-SCNC: 4.5 MMOL/L — SIGNIFICANT CHANGE UP (ref 3.5–5.3)
PROT SERPL-MCNC: 6.5 G/DL — LOW (ref 6.6–8.7)
RBC # BLD: 3.97 M/UL — SIGNIFICANT CHANGE UP (ref 3.8–5.2)
RBC # FLD: 17 % — HIGH (ref 10.3–14.5)
SODIUM SERPL-SCNC: 138 MMOL/L — SIGNIFICANT CHANGE UP (ref 135–145)
SURGICAL PATHOLOGY STUDY: SIGNIFICANT CHANGE UP
WBC # BLD: 8.95 K/UL — SIGNIFICANT CHANGE UP (ref 3.8–10.5)
WBC # FLD AUTO: 8.95 K/UL — SIGNIFICANT CHANGE UP (ref 3.8–10.5)

## 2021-02-22 PROCEDURE — 93010 ELECTROCARDIOGRAM REPORT: CPT

## 2021-02-22 PROCEDURE — 99233 SBSQ HOSP IP/OBS HIGH 50: CPT

## 2021-02-22 PROCEDURE — 99232 SBSQ HOSP IP/OBS MODERATE 35: CPT

## 2021-02-22 PROCEDURE — 71045 X-RAY EXAM CHEST 1 VIEW: CPT | Mod: 26

## 2021-02-22 RX ORDER — METOPROLOL TARTRATE 50 MG
12.5 TABLET ORAL
Refills: 0 | Status: DISCONTINUED | OUTPATIENT
Start: 2021-02-22 | End: 2021-02-24

## 2021-02-22 RX ORDER — SENNA PLUS 8.6 MG/1
2 TABLET ORAL AT BEDTIME
Refills: 0 | Status: DISCONTINUED | OUTPATIENT
Start: 2021-02-22 | End: 2021-02-24

## 2021-02-22 RX ORDER — BUMETANIDE 0.25 MG/ML
2 INJECTION INTRAMUSCULAR; INTRAVENOUS
Refills: 0 | Status: DISCONTINUED | OUTPATIENT
Start: 2021-02-22 | End: 2021-02-22

## 2021-02-22 RX ORDER — PSYLLIUM SEED (WITH DEXTROSE)
1 POWDER (GRAM) ORAL
Refills: 0 | Status: DISCONTINUED | OUTPATIENT
Start: 2021-02-22 | End: 2021-02-24

## 2021-02-22 RX ORDER — ACETAMINOPHEN 500 MG
650 TABLET ORAL EVERY 6 HOURS
Refills: 0 | Status: DISCONTINUED | OUTPATIENT
Start: 2021-02-22 | End: 2021-02-24

## 2021-02-22 RX ORDER — FUROSEMIDE 40 MG
40 TABLET ORAL DAILY
Refills: 0 | Status: DISCONTINUED | OUTPATIENT
Start: 2021-02-23 | End: 2021-02-23

## 2021-02-22 RX ADMIN — Medication 650 MILLIGRAM(S): at 12:51

## 2021-02-22 RX ADMIN — PANTOPRAZOLE SODIUM 40 MILLIGRAM(S): 20 TABLET, DELAYED RELEASE ORAL at 04:48

## 2021-02-22 RX ADMIN — Medication 5 MILLIGRAM(S): at 12:51

## 2021-02-22 RX ADMIN — PANTOPRAZOLE SODIUM 40 MILLIGRAM(S): 20 TABLET, DELAYED RELEASE ORAL at 17:20

## 2021-02-22 RX ADMIN — CHLORHEXIDINE GLUCONATE 15 MILLILITER(S): 213 SOLUTION TOPICAL at 21:04

## 2021-02-22 RX ADMIN — SENNA PLUS 2 TABLET(S): 8.6 TABLET ORAL at 21:03

## 2021-02-22 RX ADMIN — Medication 75 MICROGRAM(S): at 04:49

## 2021-02-22 RX ADMIN — Medication 40 MILLIGRAM(S): at 04:48

## 2021-02-22 RX ADMIN — CHLORHEXIDINE GLUCONATE 1 APPLICATION(S): 213 SOLUTION TOPICAL at 21:03

## 2021-02-22 RX ADMIN — Medication 20 MILLIEQUIVALENT(S): at 17:20

## 2021-02-22 RX ADMIN — Medication 20 MILLIEQUIVALENT(S): at 04:49

## 2021-02-22 RX ADMIN — CHLORHEXIDINE GLUCONATE 15 MILLILITER(S): 213 SOLUTION TOPICAL at 04:49

## 2021-02-22 RX ADMIN — SODIUM CHLORIDE 3 MILLILITER(S): 9 INJECTION INTRAMUSCULAR; INTRAVENOUS; SUBCUTANEOUS at 21:03

## 2021-02-22 RX ADMIN — ATORVASTATIN CALCIUM 40 MILLIGRAM(S): 80 TABLET, FILM COATED ORAL at 21:03

## 2021-02-22 RX ADMIN — SODIUM CHLORIDE 3 MILLILITER(S): 9 INJECTION INTRAMUSCULAR; INTRAVENOUS; SUBCUTANEOUS at 14:55

## 2021-02-22 RX ADMIN — Medication 81 MILLIGRAM(S): at 12:51

## 2021-02-22 NOTE — PROGRESS NOTE ADULT - SUBJECTIVE AND OBJECTIVE BOX
[place] [facility]                                                               Office: [address] [address]                                                              Telephone: [phone]. Fax:[phone]                                                                             PROGRESS NOTE  Reason for follow up: Severe AS, CAD/ICM  Overnight: No new events.   Update: no recurrent bradycardia on telemetry, sitting upright saturating well on room air, denies any chest pain or shortness of breath, low SBP 90s on IV Bumex 2mg BID, tentative plan for CABG/AVR in 2 days. She is eager for eventual discharge as her  also just got hospitalized and has a dog at home.       Review of symptoms:   Cardiac:  No chest pain. No dyspnea. No palpitations.  Respiratory: No cough. No dyspnea  Gastrointestinal: No diarrhea. No abdominal pain. No bleeding.     Past medical history: No updates.   	  Vital Signs Last 24 Hrs  T(C): 37.1 (02-22-21 @ 11:00), Max: 37.3 (02-21-21 @ 21:30)  T(F): 98.7 (02-22-21 @ 11:00), Max: 99.1 (02-21-21 @ 21:30)  HR: 86 (02-22-21 @ 15:49) (68 - 86)  BP: 92/60 (02-22-21 @ 14:56) (92/60 - 102/67)  BP(mean): --  RR: 18 (02-22-21 @ 15:49) (17 - 18)  SpO2: 96% (02-22-21 @ 11:00) (96% - 98%)  I&O's Summary    21 Feb 2021 07:01  -  22 Feb 2021 07:00  --------------------------------------------------------  IN: 200 mL / OUT: 1650 mL / NET: -1450 mL    22 Feb 2021 07:01  -  22 Feb 2021 16:05  --------------------------------------------------------  IN: 480 mL / OUT: 1000 mL / NET: -520 mL          PHYSICAL EXAM:  Appearance: Comfortable. No acute distress  HEENT:  Head and neck: Atraumatic. Normocephalic.  Normal oral mucosa, PERRL, Neck is supple.  Neurologic: A&Ox 3, no focal deficits. EOMI, Cranial nerves are intact.  Lymphatic: No cervical lymphadenopathy  Cardiovascular: Normal S1 S2, No murmur, rubs/gallops.   Respiratory: decreased breath sounds basilar R>L  Gastrointestinal:  Soft, Non-tender, + BS  Lower Extremities: No edema  Psychiatry: Patient is calm. No agitation. Mood & affect appropriate  Skin: No rashes/ecchymoses/cyanosis/ulcers visualized on the face, hands or feet.      CURRENT MEDICATIONS:  MEDICATIONS  (STANDING):  aspirin  chewable 81 milliGRAM(s) Oral daily  atorvastatin 40 milliGRAM(s) Oral at bedtime  cefuroxime  IVPB 1500 milliGRAM(s) IV Intermittent once  chlorhexidine 0.12% Liquid 15 milliLiter(s) Swish and Spit two times a day  chlorhexidine 4% Liquid 1 Application(s) Topical two times a day  levothyroxine 75 MICROGram(s) Oral daily  metoprolol tartrate 12.5 milliGRAM(s) Oral two times a day  pantoprazole  Injectable 40 milliGRAM(s) IV Push every 12 hours  phytonadione   Solution 5 milliGRAM(s) Oral daily  potassium chloride    Tablet ER 20 milliEquivalent(s) Oral two times a day  sodium chloride 0.9% lock flush 3 milliLiter(s) IV Push every 8 hours  vancomycin  IVPB 1000 milliGRAM(s) IV Intermittent once    MEDICATIONS  (PRN):  acetaminophen   Tablet .. 650 milliGRAM(s) Oral every 6 hours PRN Moderate Pain (4 - 6)      DIAGNOSTIC TESTING:  [ ] Echocardiogram:   < from: TTE Echo Complete w/ Contrast w/ Doppler (02.17.21 @ 14:43) >    Summary:   1. Severely decreased global left ventricular systolic function.   2. Left ventricular ejection fraction, by visual estimation, is 25 to 30%.   3. Normal left ventricular internal cavity size.   4. Severely increased LV wall thickness.   5. Left ventricle chordal calcification. Severe global left ventricle hypokinesis.   6. Mildly enlarged right ventricle.   7. Mildly reduced RV systolic function.   8. Severely enlarged left atrium.   9. Right atrial enlargement.  10. Small secundum atrial septal defect with predominantly left to right shunting across the atrial septum.  11. Severe mitral annular calcification.  12. Severe thickening and calcification of the anterior and posterior mitral valve leaflets.  13. Moderate mitral valve regurgitation.  14. Moderate tricuspid regurgitation.  15. The aortic valve leaflets are heavily calcified. Severe/critical aortic valve stenosis (peak velocity 5.2 m/s, mean gradient 64 mmHg, calculated SREEDHAR by continuity equation 0.35 cm^2, dimensionless index 0.11).  16. Mild aortic regurgitation.  17. Mild pulmonic valve regurgitation.  18. There is at least mild pulmonary hypertension (RVSP    44 mmHg).  19. Trivial pericardial effusion.  20. Moderate pleural effusion in both left and right lateral regions.    < end of copied text >    [ ]  Catheterization:  < from: Cardiac Cath Lab - Adult (02.16.21 @ 08:33) >  CORONARY VESSELS:The coronary circulation is co-dominant.  LM:   --  LM: Normal.  LAD:   --  Mid LAD: There was a tubular 90 % stenosis. The lesion was  eccentric.  CX:   --  OM1: There was a diffuse 85 % stenosis in the proximal third of  the vessel segment. The lesion was irregularly contoured and eccentric.  RCA:   --  Proximal RCA: There was a diffuse 99 % stenosis. The lesion was  irregularly contoured and eccentric.  --  Distal RCA: There was a diffuse 100 % stenosis.  COMPLICATIONS: No complications occurred during the cath lab visit.  DIAGNOSTIC IMPRESSIONS: Moderate Pulmonary Hypertension and elevation of  filling pressures. 2. Critical Aortic Stenosis with a mean PG >40mm Hg and  an SREEDHAR of <0.5cm2. 3. Severe LV Systolic dysfunction by TTE from 2/13/2021  with an estimated LVEF of 30%. 4. Triple Vessel CAD.  DIAGNOSTIC RECOMMENDATIONS: GDMT. 2. CTS consultation.  INTERVENTIONAL IMPRESSIONS: Moderate Pulmonary Hypertension and elevation  of filling pressures. 2. Critical Aortic Stenosis with a mean PG >40mm Hg  and an SREEDHAR of <0.5cm2. 3. Severe LV Systolic dysfunction by TTE from  2/13/2021 with an estimated LVEF of 30%. 4. Triple Vessel CAD.  INTERVENTIONAL RECOMMENDATIONS: GDMT. 2. CTS consultation.     Labs:                        10.9   8.95  )-----------( 353      ( 22 Feb 2021 07:52 )             35.5     02-22    138  |  99  |  16.0  ----------------------------<  104<H>  4.5   |  27.0  |  0.75    Ca    9.1      22 Feb 2021 07:52  Phos  3.0     02-22  Mg     1.9     02-22    TPro  6.5<L>  /  Alb  3.2<L>  /  TBili  0.6  /  DBili  x   /  AST  21  /  ALT  13  /  AlkPhos  146<H>  02-22 17 Feb 2021 11:36 Troponin 1.35 ng/mL / Creatine Kinase x     /  CKMB x     / CPK Mass Assay % x       17 Feb 2021 01:45 Troponin 1.78 ng/mL / Creatine Kinase x     /  CKMB x     / CPK Mass Assay % x       16 Feb 2021 23:27 Troponin 1.83 ng/mL / Creatine Kinase 49 U/L /  CKMB x     / CPK Mass Assay % x          Serum Pro-Brain Natriuretic Peptide: 57074 pg/mL (02-21-21 @ 06:04)  Serum Pro-Brain Natriuretic Peptide: 52207 pg/mL (02-20-21 @ 06:31)  Serum Pro-Brain Natriuretic Peptide: 05964 pg/mL (02-16-21 @ 12:38)    Cholesterol 105 mg/dL; Direct LDL --; HDL Cholesterol, Serum 39 mg/dL; HDL/ Total Cholesterol Ratio Measurement --; Total Cholesterol/ HDL Ratio Measurement --; Triglycerides, Serum 62 mg/dL  A1C with Estimated Average Glucose Result: 5.2 % (02-16-21 @ 12:38)      TELEMETRY: Sinus 70s

## 2021-02-22 NOTE — CHART NOTE - NSCHARTNOTEFT_GEN_A_CORE
Pre-op AVR/MVR  CABG for Wed    VSS.   Patient seen and examined. Notes, flowsheets, medications, radiologic images and labs reviewed.    Neuro: A+O x 3, non-focal, speech clear and intact  HEENT:  NCAT, PERRL, EOMI. No conjuctival edema or icterus, no thrush.  Neck: supple, trachea midline  Pulm: CTA, good air entry, equal bilaterally, no rales/rhonchi/wheezing, no accessory muscle use noted  CV: regular rate, regular rhythm, +S1S2, +murmur   Abd: soft, NT, ND, + BS  Ext: YATES x 4, no edema, no cyanosis or clubbing, R groin +ecchymosis but C/D/I/soft/no hematoma. 2+ DP b/l, distal motor/neuro/circ intact.   Skin: warm, dry, well perfused    Plan   Bumex held as per Cards, Pt now on lasix 40 QD to start tomorrow.   Toprol switched to low dose lopressor 12.5 with hold parameters.  cont ASA, and synthroid, protonix IV BID     Plan is for patient to go to OR wednesday 2/24    Plan of care d/w with Dr. Ulrich in AM rounds

## 2021-02-22 NOTE — PHYSICAL THERAPY INITIAL EVALUATION ADULT - GENERAL OBSERVATIONS, REHAB EVAL
Pt received seated in bedside chair + telemetry + Primafit. c/o "aching" pain "In my bottom", pt agreeable to PT, requesting tylenol after ambulation, RN Aware.

## 2021-02-22 NOTE — PROGRESS NOTE ADULT - SUBJECTIVE AND OBJECTIVE BOX
SUBJECTIVE   "i am okay, I am just sleepy."   without acute complaints   pillows and blankets readjusted     INTERIM HISTORY SIGNIFICANT FOR   patient currently pre op for AVR MVR CABG     Patient is a 76y old  Female who presents with a chief complaint of NSTEMI;  Anemia + FOBT. (21 Feb 2021 09:34)    HPI:  77y/o female never smoker with history of hypothyroid and DIVYA who was brought to [facility] for dyspnea and diarrhea and was found to have melena. She received 2 units of PRBC for a hemoglobin of 6.9. She was found to have a troponin of 656 and 710 and a pro-BNP of 29,809. An echo showed moderately to severely reduced LVSF with moderate diffuse hypokinesis with regional variations, moderate concentric LVH and severe AS. She was also diagnosed with sepsis secondary to a UTI. She admits to GANN (walking up one flight of stairs or < 100 feet). She also states recent black stools, and loss of approximately 30 pounds over 1 year secondary to poor PO intake secondary to ageusia. She denies chest pain, orthopnea, PND, palpitations or syncope/near syncope.    Symptoms:        Angina (Class): N/A       Ischemic Symptoms: GANN (CCs class 3 anginal equivalent)    Heart Failure: ACC/AHA stage C, NYHA functional class 3 HFrEF    Assessment of LVEF:       EF: 25-30%       Assessed by: Echo       Date: 2/13/2021    Prior Cardiac Interventions:       PCI's: N/A       CABG: N/A    Noninvasive Testing:   Stress Test: N/A    Echo: 2/13/2021       LVSF: Moderately to severely reduced LVSF with moderate diffuse hypokinesis with regional variations. Moderate concentric LVH.       EF: 25-30%       RVSF: Poorly visualized, mild to moderate pulmonary hypertension.       LA: Mildly dilated       RA: Normal       Mitral Valve: Severely to moderately calcified leaflets, severe MAC, moderate MR. Possible vegetation       Aortic Valve: Moderately calcified leaflets, mild AR, severe AS (max gradient: 77, mean gradient: 43).       Tricuspid Valve: Poorly visualized, mild TR.       Pulmonic Valve: Poorly visualized, no NY.    XR: Enlarged but stable cardiac silhouette.    Antianginal Therapies:        Beta Blockers: Toprol 25 mg daily       Calcium Channel Blockers: N/A       Long Acting Nitrates: N/A       Ranexa: N/A    Associated Risk Factors:        Cerebrovascular Disease: N/A       Chronic Lung Disease: N/A       Peripheral Arterial Disease: N/A       Chronic Kidney Disease (if yes, what is GFR): N/A       Uncontrolled Diabetes (if yes, what is HgbA1C or FBS): N/A       Poorly Controlled Hypertension (if yes, what is SBP): N/A       Morbid Obesity (if yes, what is BMI): N/A       History of Recent Ventricular Arrhythmia: N/A       Inability to Ambulate Safely: N/A       Need for Therapeutic Anticoagulation: N/A       Antiplatelet or Contrast Allergy: N/A (16 Feb 2021 07:35)    OBJECTIVE  PAST MEDICAL & SURGICAL HISTORY:  Iron deficiency anemia due to chronic blood loss    Hypothyroid    History of tonsillectomy      No Known Allergies    Home Medications:  levothyroxine 75 mcg (0.075 mg) oral tablet: 1 tab(s) orally once a day (16 Feb 2021 07:46)    VITALS  Currently in sinus rhythm with vitals as below  ICU Vital Signs Last 24 Hrs  T(C): 37.3 (21 Feb 2021 21:30), Max: 37.3 (21 Feb 2021 21:30)  T(F): 99.1 (21 Feb 2021 21:30), Max: 99.1 (21 Feb 2021 21:30)  HR: 80 (21 Feb 2021 21:30) (73 - 87)  BP: 102/67 (21 Feb 2021 21:30) (91/53 - 115/75)  BP(mean): --  ABP: --  ABP(mean): --  RR: 18 (21 Feb 2021 21:30) (18 - 18)  SpO2: 98% (21 Feb 2021 21:30) (96% - 99%)      LABS                        10.2   8.32  )-----------( 290      ( 21 Feb 2021 06:04 )             32.7   02-21    140  |  104  |  16.0  ----------------------------<  99  4.0   |  25.0  |  0.79    Ca    8.6      21 Feb 2021 06:04  Mg     1.9     02-21    TPro  5.2<L>  /  Alb  2.9<L>  /  TBili  0.3<L>  /  DBili  x   /  AST  17  /  ALT  10  /  AlkPhos  110  02-20  CAPILLARY BLOOD GLUCOSE        Serum Pro-Brain Natriuretic Peptide: 06886 pg/mL (02-21-21 @ 06:04)        IN/OUT    02-20-21 @ 07:01  -  02-21-21 @ 07:00  --------------------------------------------------------  IN: 240 mL / OUT: 2450 mL / NET: -2210 mL    02-21-21 @ 07:01  -  02-22-21 @ 03:02  --------------------------------------------------------  IN: 100 mL / OUT: 800 mL / NET: -700 mL      IMAGING  personally reviewed imaging   Xray Chest 1 View- PORTABLE-Routine:    EXAM:  XR CHEST PORTABLE ROUTINE 1V                          PROCEDURE DATE:  02/21/2021          INTERPRETATION:  AP chest on February 21, 2021 at 4:24 AM. Patient has heart failure.    Gross heart enlargement again noted.    There are bilateral effusions moderate on the right and mild on the left. There is increase particularly on the right compared to February 20.    IMPRESSION: Bilateral effusions somewhat increased particularly on the right.            MAYRA DOE MD; Attending Radiologist  This document has been electronically signed. Feb 21 2021 12:20PM (02-21-21 @ 04:52)    CURRENT MEDICATIONS  MEDICATIONS  (STANDING):  aspirin  chewable 81 milliGRAM(s) Oral daily  atorvastatin 40 milliGRAM(s) Oral at bedtime  cefuroxime  IVPB 1500 milliGRAM(s) IV Intermittent once  chlorhexidine 0.12% Liquid 15 milliLiter(s) Swish and Spit two times a day  chlorhexidine 4% Liquid 1 Application(s) Topical two times a day  furosemide   Injectable 40 milliGRAM(s) IV Push <User Schedule>  levothyroxine 75 MICROGram(s) Oral daily  pantoprazole  Injectable 40 milliGRAM(s) IV Push every 12 hours  phytonadione   Solution 5 milliGRAM(s) Oral daily  potassium chloride    Tablet ER 20 milliEquivalent(s) Oral two times a day  sodium chloride 0.9% lock flush 3 milliLiter(s) IV Push every 8 hours  vancomycin  IVPB 1000 milliGRAM(s) IV Intermittent once    MEDICATIONS  (PRN):

## 2021-02-22 NOTE — PROGRESS NOTE ADULT - ASSESSMENT
76 year old Female with a PMH of anemia, hypothyroidism, and GI bleed 1 year ago (refused Endo/colonoscopy and never followed up) presented to [facility] originally with fever and SOB.  She was found to have a UTI/sepsis with elevated lactate, + UA, and temp of 102.  She was treated with Rocephin (completed course 2/17). Anemia noted with a Hgb of 6 and 2 units of PRBC transfused.  Patient wanted to sign out AMA from [place], but then ruled in for a NSTEMI, so she was agreeable to transfer to [facility] for cardiac cath. Cardiac cath 2/16 revealed Multivessel CAD and Severe AS. Further workup revealed moderate Right ICA stenosis and Severe Left ICA stenosis on carotid US, which was confirmed on CTA neck. Vascular surgery was consulted and cleared patient for open heart surgery with no plan for CEA at this time. Patient underwent Endoscopy 2/17 as part of her GI anemia workup, which only revealed a hiatal hernia. Colonoscopy 2/19 without bleeding or acute pathology. Cleared for OR from GI standpoint. Plan for CABG/AVR/MVR Wednesday 2/24/21.

## 2021-02-22 NOTE — PROGRESS NOTE ADULT - ASSESSMENT
76y Female with PMH anemia, and hypothyroidism.  Pt presented to [facility] a few days ago with fever and SOB.  She was found to have a UTI/sepsis with elevated lactate, + UA, and temp of 102.  She was started on Rocephin.  HGB was 6.  She was transfused 2 units of PRBC.  Per medical record, a year ago she had a GI bleed and was offered an endoscopy and colonoscopy but she refused and then failed to follow up outpatient.  Per medical record she did not want to stay at [facility] but agreed once she ruled in for NSTEMI.  She was transferred to [facility] for cardiac cath.     2/16 - s/p LHC showing EF 30%. LM normal, mLAD 90%, OM1 85%, pRCA 99%, dRCA 100%, mod pulm  HTN, SREEDHAR <0.5 consistent with critical AS  2-17- EGD with old blood suspect from pharynx/epistaxis?, poor bowel prep  2/18- plan for repeat colonoscopy  2/19- diverticulosis on colonoscopy        Severe Multivessel CAD   - s/p C mLAD 90%, OM1 85%, pRCA 99%, dRCA 100%  - no current angina or anginal equivalents  - continue ASA 81, statin, LDL 54  - plan for CABG possibly Wednesday?  - ACE/ARB on hold 2/2 to CABG workup/hypotension   - no P2Y12 inhibitor for pre-op CABG       HFrEF 30%  - ischemic CM  - hold hydralazine in setting of critical AS and low BP  - on low dose metoprolol   - strict I/O, daily weights  - bilateral pleural effusions noted on CTA neck and Echo, overall asymptomatic without decompensated HF, reduce diuretic dose given low BP and severe AS preload dependent      Critical AS  - SREEDHAR <0.5 on cath   - CTS eval for valve replacement with CABG    Acute blood loss anemia  - received x2 PRBC at [place] before transfer, since H/H stable.   - occult blood positive, source unclear, colonoscopy with diverticulosis          Chet Grimaldo DO, [facility]  Faculty Non-Invasive Cardiologist  [phone]

## 2021-02-23 ENCOUNTER — TRANSCRIPTION ENCOUNTER (OUTPATIENT)
Age: 76
End: 2021-02-23

## 2021-02-23 DIAGNOSIS — I50.21 ACUTE SYSTOLIC (CONGESTIVE) HEART FAILURE: ICD-10-CM

## 2021-02-23 DIAGNOSIS — I21.4 NON-ST ELEVATION (NSTEMI) MYOCARDIAL INFARCTION: ICD-10-CM

## 2021-02-23 LAB
ANION GAP SERPL CALC-SCNC: 8 MMOL/L — SIGNIFICANT CHANGE UP (ref 5–17)
BUN SERPL-MCNC: 17 MG/DL — SIGNIFICANT CHANGE UP (ref 8–20)
CALCIUM SERPL-MCNC: 8.9 MG/DL — SIGNIFICANT CHANGE UP (ref 8.6–10.2)
CHLORIDE SERPL-SCNC: 101 MMOL/L — SIGNIFICANT CHANGE UP (ref 98–107)
CO2 SERPL-SCNC: 28 MMOL/L — SIGNIFICANT CHANGE UP (ref 22–29)
CREAT SERPL-MCNC: 0.75 MG/DL — SIGNIFICANT CHANGE UP (ref 0.5–1.3)
GLUCOSE SERPL-MCNC: 90 MG/DL — SIGNIFICANT CHANGE UP (ref 70–99)
HCT VFR BLD CALC: 33.4 % — LOW (ref 34.5–45)
HGB BLD-MCNC: 10.1 G/DL — LOW (ref 11.5–15.5)
MAGNESIUM SERPL-MCNC: 1.9 MG/DL — SIGNIFICANT CHANGE UP (ref 1.6–2.6)
MCHC RBC-ENTMCNC: 26.9 PG — LOW (ref 27–34)
MCHC RBC-ENTMCNC: 30.2 GM/DL — LOW (ref 32–36)
MCV RBC AUTO: 88.8 FL — SIGNIFICANT CHANGE UP (ref 80–100)
PLATELET # BLD AUTO: 337 K/UL — SIGNIFICANT CHANGE UP (ref 150–400)
POTASSIUM SERPL-MCNC: 4.3 MMOL/L — SIGNIFICANT CHANGE UP (ref 3.5–5.3)
POTASSIUM SERPL-SCNC: 4.3 MMOL/L — SIGNIFICANT CHANGE UP (ref 3.5–5.3)
RBC # BLD: 3.76 M/UL — LOW (ref 3.8–5.2)
RBC # FLD: 16.9 % — HIGH (ref 10.3–14.5)
SARS-COV-2 RNA SPEC QL NAA+PROBE: SIGNIFICANT CHANGE UP
SODIUM SERPL-SCNC: 137 MMOL/L — SIGNIFICANT CHANGE UP (ref 135–145)
WBC # BLD: 7.93 K/UL — SIGNIFICANT CHANGE UP (ref 3.8–10.5)
WBC # FLD AUTO: 7.93 K/UL — SIGNIFICANT CHANGE UP (ref 3.8–10.5)

## 2021-02-23 PROCEDURE — 99233 SBSQ HOSP IP/OBS HIGH 50: CPT

## 2021-02-23 RX ADMIN — ATORVASTATIN CALCIUM 40 MILLIGRAM(S): 80 TABLET, FILM COATED ORAL at 22:03

## 2021-02-23 RX ADMIN — PANTOPRAZOLE SODIUM 40 MILLIGRAM(S): 20 TABLET, DELAYED RELEASE ORAL at 05:01

## 2021-02-23 RX ADMIN — Medication 75 MICROGRAM(S): at 05:01

## 2021-02-23 RX ADMIN — CHLORHEXIDINE GLUCONATE 1 APPLICATION(S): 213 SOLUTION TOPICAL at 22:01

## 2021-02-23 RX ADMIN — Medication 12.5 MILLIGRAM(S): at 17:25

## 2021-02-23 RX ADMIN — CHLORHEXIDINE GLUCONATE 15 MILLILITER(S): 213 SOLUTION TOPICAL at 22:03

## 2021-02-23 RX ADMIN — CHLORHEXIDINE GLUCONATE 15 MILLILITER(S): 213 SOLUTION TOPICAL at 05:01

## 2021-02-23 RX ADMIN — Medication 81 MILLIGRAM(S): at 09:16

## 2021-02-23 RX ADMIN — PANTOPRAZOLE SODIUM 40 MILLIGRAM(S): 20 TABLET, DELAYED RELEASE ORAL at 17:25

## 2021-02-23 RX ADMIN — Medication 20 MILLIEQUIVALENT(S): at 05:01

## 2021-02-23 RX ADMIN — Medication 1 PACKET(S): at 05:01

## 2021-02-23 RX ADMIN — CHLORHEXIDINE GLUCONATE 1 APPLICATION(S): 213 SOLUTION TOPICAL at 13:50

## 2021-02-23 RX ADMIN — Medication 5 MILLIGRAM(S): at 12:31

## 2021-02-23 RX ADMIN — SODIUM CHLORIDE 3 MILLILITER(S): 9 INJECTION INTRAMUSCULAR; INTRAVENOUS; SUBCUTANEOUS at 13:07

## 2021-02-23 RX ADMIN — SODIUM CHLORIDE 3 MILLILITER(S): 9 INJECTION INTRAMUSCULAR; INTRAVENOUS; SUBCUTANEOUS at 22:02

## 2021-02-23 RX ADMIN — SODIUM CHLORIDE 3 MILLILITER(S): 9 INJECTION INTRAMUSCULAR; INTRAVENOUS; SUBCUTANEOUS at 05:00

## 2021-02-23 NOTE — PROGRESS NOTE ADULT - PROBLEM SELECTOR PLAN 1
OR wed 2/24 for CABG/AVR/MVR  cont asa, lipitor  ordered for lopressor 12.5q12, but held for parameters, pt SBP 90s

## 2021-02-23 NOTE — PROGRESS NOTE ADULT - ASSESSMENT
76y Female with PMH anemia, and hypothyroidism.  Pt presented to [facility] a few days ago with fever and SOB.  She was found to have a UTI/sepsis with elevated lactate, + UA, and temp of 102.  She was started on Rocephin.  HGB was 6.  She was transfused 2 units of PRBC.  Per medical record, a year ago she had a GI bleed and was offered an endoscopy and colonoscopy but she refused and then failed to follow up outpatient.  Per medical record she did not want to stay at [place] but agreed once she ruled in for NSTEMI.  She was transferred to [facility] for cardiac cath.     2/16 - s/p LHC showing EF 30%. LM normal, mLAD 90%, OM1 85%, pRCA 99%, dRCA 100%, mod pulm  HTN, SREEDHAR <0.5 consistent with critical AS  2-17- EGD with old blood suspect from pharynx/epistaxis?, poor bowel prep  2/18- plan for repeat colonoscopy  2/19- diverticulosis on colonoscopy        Severe Multivessel CAD   - s/p C mLAD 90%, OM1 85%, pRCA 99%, dRCA 100%  - no current angina or anginal equivalents  - continue ASA 81, statin, LDL 54  - plan for CABG tomorrow  - ACE/ARB on hold 2/2 to CABG workup/hypotension   - no P2Y12 inhibitor for pre-op CABG       HFrEF 30%, ICM  - ischemic CM  - on low dose metoprolol, not able to add GDMT due to low BP   - strict I/O, daily weights  - bilateral pleural effusions noted on CTA neck and Echo, overall asymptomatic without decompensated HF, not tolerating diuresis given ow BP and severe AS preload dependent, plan for drainage in the OR      Critical AS  - SREEDHAR <0.5 on cath   - CTS plan for valve replacement with CABG    Acute blood loss anemia  - received x2 PRBC at [place] before transfer, since H/H stable.   - occult blood positive, source unclear, colonoscopy with diverticulosis          Chet Grimaldo DO, [facility]  Faculty Non-Invasive Cardiologist  [phone]

## 2021-02-23 NOTE — PROGRESS NOTE ADULT - SUBJECTIVE AND OBJECTIVE BOX
[facility]                                                               Office: [address] [address]                                                              Telephone: [phone]. Fax:[phone]                                                                             PROGRESS NOTE  Reason for follow up: severe AS, TVD/ICM/HFrEF  Overnight: No new events.   Update: still with low SBP 90s not tolerating IV diuresis, denies discomfort, not dyspneic and saturating well on room air; eager to get surgery done and go home as  also hospitalized at [place].       Review of symptoms:   Cardiac:  No chest pain. No dyspnea. No palpitations.  Respiratory: No cough. No dyspnea  Gastrointestinal: No diarrhea. No abdominal pain. No bleeding.     Past medical history: No updates.   	  Vital Signs Last 24 Hrs  T(C): 36.4 (02-23-21 @ 15:00), Max: 36.9 (02-23-21 @ 09:42)  T(F): 97.5 (02-23-21 @ 15:00), Max: 98.4 (02-23-21 @ 09:42)  HR: 78 (02-23-21 @ 16:43) (69 - 78)  BP: 101/70 (02-23-21 @ 15:00) (91/52 - 103/65)  BP(mean): --  RR: 19 (02-23-21 @ 15:00) (18 - 19)  SpO2: 97% (02-23-21 @ 09:42) (96% - 97%)  I&O's Summary    22 Feb 2021 07:01  -  23 Feb 2021 07:00  --------------------------------------------------------  IN: 960 mL / OUT: 1550 mL / NET: -590 mL          PHYSICAL EXAM:  Appearance: Comfortable. No acute distress, laying flat in bed  HEENT:  Head and neck: Atraumatic. Normocephalic.  Normal oral mucosa, PERRL, Neck is supple.  Neurologic: A&Ox 3, no focal deficits. EOMI, Cranial nerves are intact.  Lymphatic: No cervical lymphadenopathy  Cardiovascular: Normal S1 S2, No murmur, rubs/gallops.   Respiratory: decreased breath basilar  Gastrointestinal:  Soft, Non-tender, + BS  Lower Extremities: trace edema  Psychiatry: Patient is calm. No agitation. Mood & affect appropriate  Skin: No rashes/ecchymoses/cyanosis/ulcers visualized on the face, hands or feet.      CURRENT MEDICATIONS:  MEDICATIONS  (STANDING):  aspirin  chewable 81 milliGRAM(s) Oral daily  atorvastatin 40 milliGRAM(s) Oral at bedtime  cefuroxime  IVPB 1500 milliGRAM(s) IV Intermittent once  chlorhexidine 0.12% Liquid 15 milliLiter(s) Swish and Spit two times a day  chlorhexidine 4% Liquid 1 Application(s) Topical two times a day  levothyroxine 75 MICROGram(s) Oral daily  metoprolol tartrate 12.5 milliGRAM(s) Oral two times a day  pantoprazole  Injectable 40 milliGRAM(s) IV Push every 12 hours  psyllium Powder 1 Packet(s) Oral two times a day  senna 2 Tablet(s) Oral at bedtime  sodium chloride 0.9% lock flush 3 milliLiter(s) IV Push every 8 hours  vancomycin  IVPB 1000 milliGRAM(s) IV Intermittent once    MEDICATIONS  (PRN):  acetaminophen   Tablet .. 650 milliGRAM(s) Oral every 6 hours PRN Moderate Pain (4 - 6)      DIAGNOSTIC TESTING:  [ ] Echocardiogram:   < from: TTE Echo Complete w/ Contrast w/ Doppler (02.17.21 @ 14:43) >    Summary:   1. Severely decreased global left ventricular systolic function.   2. Left ventricular ejection fraction, by visual estimation, is 25 to 30%.   3. Normal left ventricular internal cavity size.   4. Severely increased LV wall thickness.   5. Left ventricle chordal calcification. Severe global left ventricle hypokinesis.   6. Mildly enlarged right ventricle.   7. Mildly reduced RV systolic function.   8. Severely enlarged left atrium.   9. Right atrial enlargement.  10. Small secundum atrial septal defect with predominantly left to right shunting across the atrial septum.  11. Severe mitral annular calcification.  12. Severe thickening and calcification of the anterior and posterior mitral valve leaflets.  13. Moderate mitral valve regurgitation.  14. Moderate tricuspid regurgitation.  15. The aortic valve leaflets are heavily calcified. Severe/critical aortic valve stenosis (peak velocity 5.2 m/s, mean gradient 64 mmHg, calculated SREEDHAR by continuity equation 0.35 cm^2, dimensionless index 0.11).  16. Mild aortic regurgitation.  17. Mild pulmonic valve regurgitation.  18. There is at least mild pulmonary hypertension (RVSP    44 mmHg).  19. Trivial pericardial effusion.  20. Moderate pleural effusion in both left and right lateral regions.    < end of copied text >    [ ]  Catheterization:  < from: Cardiac Cath Lab - Adult (02.16.21 @ 08:33) >  CORONARY VESSELS:The coronary circulation is co-dominant.  LM:   --  LM: Normal.  LAD:   --  Mid LAD: There was a tubular 90 % stenosis. The lesion was  eccentric.  CX:   --  OM1: There was a diffuse 85 % stenosis in the proximal third of  the vessel segment. The lesion was irregularly contoured and eccentric.  RCA:   --  Proximal RCA: There was a diffuse 99 % stenosis. The lesion was  irregularly contoured and eccentric.  --  Distal RCA: There was a diffuse 100 % stenosis.  COMPLICATIONS: No complications occurred during the cath lab visit.  DIAGNOSTIC IMPRESSIONS: Moderate Pulmonary Hypertension and elevation of  filling pressures. 2. Critical Aortic Stenosis with a mean PG >40mm Hg and  an SREEDHAR of <0.5cm2. 3. Severe LV Systolic dysfunction by TTE from 2/13/2021  with an estimated LVEF of 30%. 4. Triple Vessel CAD.  DIAGNOSTIC RECOMMENDATIONS: GDMT. 2. CTS consultation.  INTERVENTIONAL IMPRESSIONS: Moderate Pulmonary Hypertension and elevation  of filling pressures. 2. Critical Aortic Stenosis with a mean PG >40mm Hg  and an SREEDHAR of <0.5cm2. 3. Severe LV Systolic dysfunction by TTE from  2/13/2021 with an estimated LVEF of 30%. 4. Triple Vessel CAD.  INTERVENTIONAL RECOMMENDATIONS: GDMT. 2. CTS consultation.   Labs:                        10.1   7.93  )-----------( 337      ( 23 Feb 2021 05:49 )             33.4     02-23    137  |  101  |  17.0  ----------------------------<  90  4.3   |  28.0  |  0.75    Ca    8.9      23 Feb 2021 05:49  Phos  3.0     02-22  Mg     1.9     02-23    TPro  6.5<L>  /  Alb  3.2<L>  /  TBili  0.6  /  DBili  x   /  AST  21  /  ALT  13  /  AlkPhos  146<H>  02-22 17 Feb 2021 11:36 Troponin 1.35 ng/mL / Creatine Kinase x     /  CKMB x     / CPK Mass Assay % x       17 Feb 2021 01:45 Troponin 1.78 ng/mL / Creatine Kinase x     /  CKMB x     / CPK Mass Assay % x       16 Feb 2021 23:27 Troponin 1.83 ng/mL / Creatine Kinase 49 U/L /  CKMB x     / CPK Mass Assay % x          Serum Pro-Brain Natriuretic Peptide: 58243 pg/mL (02-21-21 @ 06:04)  Serum Pro-Brain Natriuretic Peptide: 24432 pg/mL (02-20-21 @ 06:31)  Serum Pro-Brain Natriuretic Peptide: 44719 pg/mL (02-16-21 @ 12:38)    Cholesterol 105 mg/dL; Direct LDL --; HDL Cholesterol, Serum 39 mg/dL; HDL/ Total Cholesterol Ratio Measurement --; Total Cholesterol/ HDL Ratio Measurement --; Triglycerides, Serum 62 mg/dL  A1C with Estimated Average Glucose Result: 5.2 % (02-16-21 @ 12:38)      TELEMETRY: Sinus 70s, PVCs

## 2021-02-23 NOTE — PROGRESS NOTE ADULT - SUBJECTIVE AND OBJECTIVE BOX
Brief Hospital Course: 2/15 tx from [place] with NSTEMI, catheterization with multivessel dz and severe AS, s/p endoscopy and colonoscopy with diverticulosis, completed course of Rocephin for UTI, elevated TSH, endo following, seen by vascular for b/l carotid stenosis (Severe L, mod R)    Significant recent/past 24 hr events: no events overnight    Subjective: no complaints, denies CP, palpitations, SOB, cough, fever, chills, itchiness/rash, diaphoresis, vision changes, HA, dizziness/lightheadedness, numbness/tingling, abd pain, N/V     Review of Systems: as above        Patient is a 76y old  Female who presents with a chief complaint of NSTEMI;  Anemia + FOBT. (21 Feb 2021 09:34)    HPI:  77y/o female never smoker with history of hypothyroid and DIVYA who was brought to [facility] for dyspnea and diarrhea and was found to have melena. She received 2 units of PRBC for a hemoglobin of 6.9. She was found to have a troponin of 656 and 710 and a pro-BNP of 29,809. An echo showed moderately to severely reduced LVSF with moderate diffuse hypokinesis with regional variations, moderate concentric LVH and severe AS. She was also diagnosed with sepsis secondary to a UTI. She admits to GANN (walking up one flight of stairs or < 100 feet). She also states recent black stools, and loss of approximately 30 pounds over 1 year secondary to poor PO intake secondary to ageusia. She denies chest pain, orthopnea, PND, palpitations or syncope/near syncope.    Symptoms:        Angina (Class): N/A       Ischemic Symptoms: GANN (CCs class 3 anginal equivalent)    Heart Failure: ACC/AHA stage C, NYHA functional class 3 HFrEF    Assessment of LVEF:       EF: 25-30%       Assessed by: Echo       Date: 2/13/2021    Prior Cardiac Interventions:       PCI's: N/A       CABG: N/A    Noninvasive Testing:   Stress Test: N/A    Echo: 2/13/2021       LVSF: Moderately to severely reduced LVSF with moderate diffuse hypokinesis with regional variations. Moderate concentric LVH.       EF: 25-30%       RVSF: Poorly visualized, mild to moderate pulmonary hypertension.       LA: Mildly dilated       RA: Normal       Mitral Valve: Severely to moderately calcified leaflets, severe MAC, moderate MR. Possible vegetation       Aortic Valve: Moderately calcified leaflets, mild AR, severe AS (max gradient: 77, mean gradient: 43).       Tricuspid Valve: Poorly visualized, mild TR.       Pulmonic Valve: Poorly visualized, no NY.    XR: Enlarged but stable cardiac silhouette.    Antianginal Therapies:        Beta Blockers: Toprol 25 mg daily       Calcium Channel Blockers: N/A       Long Acting Nitrates: N/A       Ranexa: N/A    Associated Risk Factors:        Cerebrovascular Disease: N/A       Chronic Lung Disease: N/A       Peripheral Arterial Disease: N/A       Chronic Kidney Disease (if yes, what is GFR): N/A       Uncontrolled Diabetes (if yes, what is HgbA1C or FBS): N/A       Poorly Controlled Hypertension (if yes, what is SBP): N/A       Morbid Obesity (if yes, what is BMI): N/A       History of Recent Ventricular Arrhythmia: N/A       Inability to Ambulate Safely: N/A       Need for Therapeutic Anticoagulation: N/A       Antiplatelet or Contrast Allergy: N/A (16 Feb 2021 07:35)    PAST MEDICAL & SURGICAL HISTORY:  Iron deficiency anemia due to chronic blood loss    Hypothyroid    History of tonsillectomy    FAMILY HISTORY:  Family history of cardiac disorder (Father)  Family history of diabetes mellitus (Father)      Vitals   ICU Vital Signs Last 24 Hrs  T(C): 36.6 (22 Feb 2021 20:30), Max: 37.1 (22 Feb 2021 04:42)  T(F): 97.9 (22 Feb 2021 20:30), Max: 98.8 (22 Feb 2021 04:42)  HR: 69 (22 Feb 2021 20:30) (66 - 86)  BP: 103/65 (22 Feb 2021 20:30) (91/50 - 103/65)  RR: 18 (22 Feb 2021 20:30) (17 - 20)  SpO2: 97% (22 Feb 2021 20:30) (94% - 97%)    I&O's Detail    21 Feb 2021 07:01  -  22 Feb 2021 07:00  --------------------------------------------------------  IN:    Oral Fluid: 200 mL  Total IN: 200 mL    OUT:    Voided (mL): 1650 mL  Total OUT: 1650 mL  Total NET: -1450 mL    22 Feb 2021 07:01  -  23 Feb 2021 00:17  --------------------------------------------------------  IN:    Oral Fluid: 480 mL  Total IN: 480 mL    OUT:    Voided (mL): 1200 mL  Total OUT: 1200 mL  Total NET: -720 mL    LABS                        10.9   8.95  )-----------( 353      ( 22 Feb 2021 07:52 )             35.5     02-22    138  |  99  |  16.0  ----------------------------<  104<H>  4.5   |  27.0  |  0.75    Ca    9.1      22 Feb 2021 07:52  Phos  3.0     02-22  Mg     1.9     02-22    TPro  6.5<L>  /  Alb  3.2<L>  /  TBili  0.6  /  DBili  x   /  AST  21  /  ALT  13  /  AlkPhos  146<H>  02-22    LIVER FUNCTIONS - ( 22 Feb 2021 07:52 )  Alb: 3.2 g/dL / Pro: 6.5 g/dL / ALK PHOS: 146 U/L / ALT: 13 U/L / AST: 21 U/L / GGT: x           MEDICATIONS  (STANDING):  aspirin  chewable 81 milliGRAM(s) Oral daily  atorvastatin 40 milliGRAM(s) Oral at bedtime  cefuroxime  IVPB 1500 milliGRAM(s) IV Intermittent once  chlorhexidine 0.12% Liquid 15 milliLiter(s) Swish and Spit two times a day  chlorhexidine 4% Liquid 1 Application(s) Topical two times a day  levothyroxine 75 MICROGram(s) Oral daily  metoprolol tartrate 12.5 milliGRAM(s) Oral two times a day  pantoprazole  Injectable 40 milliGRAM(s) IV Push every 12 hours  phytonadione   Solution 5 milliGRAM(s) Oral daily  potassium chloride    Tablet ER 20 milliEquivalent(s) Oral two times a day  psyllium Powder 1 Packet(s) Oral two times a day  senna 2 Tablet(s) Oral at bedtime  sodium chloride 0.9% lock flush 3 milliLiter(s) IV Push every 8 hours  vancomycin  IVPB 1000 milliGRAM(s) IV Intermittent once    MEDICATIONS  (PRN):  acetaminophen   Tablet .. 650 milliGRAM(s) Oral every 6 hours PRN Moderate Pain (4 - 6)      Allergies:  No Known Allergies      Physical Exam:  Gen: NAD, well-groomed, well-developed  Neuro: A&Ox3, non-focal, speech clear and intact  Neck: supple, no JVD  CV: S1S2 RRR, +ANJEL  Pulm: CTA b/l, no wheezing/rales/rhonchi, no use of accessory muscles  Abd:+ BS soft NT ND  Ext: YATES, 1+ b/l LE edema, no cyanosis  Vascular:2+radial pulses b/l faint palpable DP pulses b/l  Skin: warm, well perfused, no diaphoresis or lesions  psych: calm, appropriate affect

## 2021-02-23 NOTE — PROGRESS NOTE ADULT - ASSESSMENT
76 F,  PMH of anemia, hypothyroidism, and GI bleed 1 year ago (refused Endo/colonoscopy and never followed up) presented to [facility] originally with fever and SOB.  She was found to have a UTI/sepsis with elevated lactate, + UA, and temp of 102.  She was treated with Rocephin (completed course 2/17). Anemia noted with a Hgb of 6 and 2 units of PRBC transfused.  Patient wanted to sign out AMA from [place], but then ruled in for a NSTEMI, so she was agreeable to transfer to [facility] for cardiac cath. Cardiac cath 2/16 revealed Multivessel CAD and Severe AS. Further workup revealed moderate Right ICA stenosis and Severe Left ICA stenosis on carotid US, which was confirmed on CTA neck. Vascular surgery was consulted and cleared patient for open heart surgery with no plan for CEA at this time. Patient underwent Endoscopy 2/17 as part of her GI anemia workup, which only revealed a hiatal hernia. Colonoscopy 2/19 without bleeding or acute pathology. Cleared for OR from GI standpoint. Plan for CABG/AVR/MVR Wednesday 2/24/21.    76 F,  PMH of anemia, hypothyroidism, and GI bleed 1 year ago (refused Endo/colonoscopy and never followed up) presented to [facility] originally with fever and SOB.  She was found to have a UTI/sepsis with elevated lactate, + UA, and temp of 102.  She was treated with Rocephin (completed course 2/17). Anemia noted with a Hgb of 6 and 2 units of PRBC transfused.  Patient wanted to sign out AMA from [place], but then ruled in for a NSTEMI, so she was agreeable to transfer to [facility] for cardiac cath. Cardiac cath 2/16 revealed Multivessel CAD and Severe AS. Further workup revealed moderate Right ICA stenosis and Severe Left ICA stenosis on carotid US, which was confirmed on CTA neck. Vascular surgery was consulted and cleared patient for open heart surgery with no plan for CEA at this time. Patient underwent Endoscopy 2/17 as part of her GI anemia workup, which only revealed a hiatal hernia. Colonoscopy 2/19 without bleeding or acute pathology. Cleared for OR from GI standpoint. Plan for CABG/AVR/MVR Wednesday 2/24/21.         below plan of care to be discussed with CT surgery attending in AM rounds

## 2021-02-23 NOTE — CHART NOTE - NSCHARTNOTEFT_GEN_A_CORE
Cardiac Surgery Pre-op Note:    CC: Patient is a 76y old  Female who presents with a chief complaint of NSTEMI;  Anemia + FOBT. (21 Feb 2021 09:34)      Referring Physician: Nito                                                                                                           Surgeon: Nancy    Procedure: 2/24/21 AVR/CABG, possible MVR    Allergies    No Known Allergies    Intolerances        HPI:  75y/o female never smoker with history of hypothyroid and DIVYA who was brought to [facility] for dyspnea and diarrhea and was found to have melena. She received 2 units of PRBC for a hemoglobin of 6.9. She was found to have a troponin of 656 and 710 and a pro-BNP of 29,809. An echo showed moderately to severely reduced LVSF with moderate diffuse hypokinesis with regional variations, moderate concentric LVH and severe AS. She was also diagnosed with sepsis secondary to a UTI. She admits to GANN (walking up one flight of stairs or < 100 feet). She also states recent black stools, and loss of approximately 30 pounds over 1 year secondary to poor PO intake secondary to ageusia. She denies chest pain, orthopnea, PND, palpitations or syncope/near syncope.    Symptoms:        Angina (Class): N/A       Ischemic Symptoms: GANN (CCs class 3 anginal equivalent)    Heart Failure: ACC/AHA stage C, NYHA functional class 3 HFrEF    Assessment of LVEF:       EF: 25-30%       Assessed by: Echo       Date: 2/13/2021    Prior Cardiac Interventions:       PCI's: N/A       CABG: N/A    Noninvasive Testing:   Stress Test: N/A    Echo: 2/13/2021       LVSF: Moderately to severely reduced LVSF with moderate diffuse hypokinesis with regional variations. Moderate concentric LVH.       EF: 25-30%       RVSF: Poorly visualized, mild to moderate pulmonary hypertension.       LA: Mildly dilated       RA: Normal       Mitral Valve: Severely to moderately calcified leaflets, severe MAC, moderate MR. Possible vegetation       Aortic Valve: Moderately calcified leaflets, mild AR, severe AS (max gradient: 77, mean gradient: 43).       Tricuspid Valve: Poorly visualized, mild TR.       Pulmonic Valve: Poorly visualized, no MA.    XR: Enlarged but stable cardiac silhouette.    Antianginal Therapies:        Beta Blockers: Toprol 25 mg daily       Calcium Channel Blockers: N/A       Long Acting Nitrates: N/A       Ranexa: N/A    Associated Risk Factors:        Cerebrovascular Disease: N/A       Chronic Lung Disease: N/A       Peripheral Arterial Disease: N/A       Chronic Kidney Disease (if yes, what is GFR): N/A       Uncontrolled Diabetes (if yes, what is HgbA1C or FBS): N/A       Poorly Controlled Hypertension (if yes, what is SBP): N/A       Morbid Obesity (if yes, what is BMI): N/A       History of Recent Ventricular Arrhythmia: N/A       Inability to Ambulate Safely: N/A       Need for Therapeutic Anticoagulation: N/A       Antiplatelet or Contrast Allergy: N/A (16 Feb 2021 07:35)      Subjective Assessment: No complaints. Appears comfortable    PAST MEDICAL & SURGICAL HISTORY:  Iron deficiency anemia due to chronic blood loss    Hypothyroid    History of tonsillectomy        MEDICATIONS  (STANDING):  aspirin  chewable 81 milliGRAM(s) Oral daily  atorvastatin 40 milliGRAM(s) Oral at bedtime  cefuroxime  IVPB 1500 milliGRAM(s) IV Intermittent once  chlorhexidine 0.12% Liquid 15 milliLiter(s) Swish and Spit two times a day  chlorhexidine 4% Liquid 1 Application(s) Topical two times a day  levothyroxine 75 MICROGram(s) Oral daily  metoprolol tartrate 12.5 milliGRAM(s) Oral two times a day  pantoprazole  Injectable 40 milliGRAM(s) IV Push every 12 hours  psyllium Powder 1 Packet(s) Oral two times a day  senna 2 Tablet(s) Oral at bedtime  sodium chloride 0.9% lock flush 3 milliLiter(s) IV Push every 8 hours  vancomycin  IVPB 1000 milliGRAM(s) IV Intermittent once    MEDICATIONS  (PRN):  acetaminophen   Tablet .. 650 milliGRAM(s) Oral every 6 hours PRN Moderate Pain (4 - 6)        Labs:                        10.1   7.93  )-----------( 337      ( 23 Feb 2021 05:49 )             33.4     02-23    137  |  101  |  17.0  ----------------------------<  90  4.3   |  28.0  |  0.75    Ca    8.9      23 Feb 2021 05:49  Phos  3.0     02-22  Mg     1.9     02-23    TPro  6.5<L>  /  Alb  3.2<L>  /  TBili  0.6  /  DBili  x   /  AST  21  /  ALT  13  /  AlkPhos  146<H>  02-22        Blood Type:   HGB A1C:   Prealbumin:   Pro-BNP: Serum Pro-Brain Natriuretic Peptide: 68651 pg/mL (02-21 @ 06:04)    Thyroid Panel: 02-21 @ 06:04/--  --/4.9/60  02-20 @ 06:31/10.94  --/--/--  02-16 @ 23:27/--  --/5.0/50    MRSA:  / MSSA: NEG / NEG      CXR: < from: Xray Chest 1 View- PORTABLE-Routine (Xray Chest 1 View- PORTABLE-Routine in AM.) (02.22.21 @ 06:05) >      FINDINGS:  The lungs show RIGHT greater than LEFT mild to moderate pleural effusions and/or basilar airspace disease. Upper lobes clear.    The  heart is enlarged in transverse diameter.No hilar mass.  . No pneumothorax.      Visualized osseous structures are intact.        IMPRESSION:   No significant change..      < end of copied text >        EKG: < from: 12 Lead ECG (02.22.21 @ 07:28) >      Ventricular Rate 73 BPM    Atrial Rate 73 BPM    P-R Interval 154 ms    QRS Duration 106 ms    Q-T Interval 412 ms    QTC Calculation(Bazett) 453 ms    P Axis 6 degrees    R Axis 9 degrees    T Axis 183 degrees    Diagnosis Line Normal sinus rhythm  Left ventricular hypertrophy with repolarization abnormality  Abnormal ECG    Confirmed by NADIRA WAGGONER ([id number]) on 2/22/2021 3:43:24 PM    < end of copied text >        Carotid Duplex:  < from: US Duplex Carotid Arteries Complete, Bilateral (02.16.21 @ 12:27) >      IMPRESSION: There is a moderate, 50-69% stenosis of the right internal carotid artery.    There is a severe, greater than 70% stenosis of the left internal carotid artery.    < end of copied text >        PFT's: 92% predicted    Echocardiogram: < from: TTE Echo Complete w/ Contrast w/ Doppler (02.17.21 @ 14:43) >        Summary:   1. Severely decreased global left ventricular systolic function.   2. Left ventricular ejection fraction, by visual estimation, is 25 to 30%.   3. Normal left ventricular internal cavity size.   4. Severely increased LV wall thickness.   5. Left ventricle chordal calcification. Severe global left ventricle hypokinesis.   6. Mildly enlarged right ventricle.   7. Mildly reduced RV systolic function.   8. Severely enlarged left atrium.   9. Right atrial enlargement.  10. Small secundum atrial septal defect with predominantly left to right shunting across the atrial septum.  11. Severe mitral annular calcification.  12. Severe thickening and calcification of the anterior and posterior mitral valve leaflets.  13. Moderate mitral valve regurgitation.  14. Moderate tricuspid regurgitation.  15. The aortic valve leaflets are heavily calcified. Severe/critical aortic valve stenosis (peak velocity 5.2 m/s, mean gradient 64 mmHg, calculated SREEDHAR by continuity equation 0.35 cm^2, dimensionless index 0.11).  16. Mild aortic regurgitation.  17. Mild pulmonic valve regurgitation.  18. There is at least mild pulmonary hypertension (RVSP    44 mmHg).  19. Trivial pericardial effusion.  20. Moderate pleural effusion in both left and right lateral regions.  21. Recommend clinical correlation with the above findings.    < end of copied text >        Cardiac catheterization: TVD    Vein Mapping: done    Gen: WN/WD NAD  Neuro: AAOx3, nonfocal  Pulm: CTA B/L  CV: RRR, S1S2  Abd: Soft, NT, ND +BS  Ext: +1 edema, + peripheral pulses      Pt has AICD/PPM [ ] Yes  [x ] No             Brand Name:  Pre-op Beta Blocker ordered within 24 hrs of surgery (CABG ONLY)?  [x ] Yes  [ ] No  If not, Why?  Type & Cross  [x ] Yes  [ ] No  NPO after Midnight [x ] Yes  [ ] No  Pre-op ABX ordered, to be taped on chart:  [ x] Yes  [ ] No     Hibiclens/Peridex ordered [x ] Yes  [ ] No  Intraop Items Ordered: PRBCs, CXR, LUIS MIGUEL [x ]   Consent obtained, or blank in chart  [ ] Yes  [ ] No

## 2021-02-24 ENCOUNTER — RESULT REVIEW (OUTPATIENT)
Age: 76
End: 2021-02-24

## 2021-02-24 ENCOUNTER — APPOINTMENT (OUTPATIENT)
Dept: CARDIOTHORACIC SURGERY | Facility: HOSPITAL | Age: 76
End: 2021-02-24

## 2021-02-24 LAB
ACANTHOCYTES BLD QL SMEAR: SLIGHT — SIGNIFICANT CHANGE UP
ALBUMIN SERPL ELPH-MCNC: 2.9 G/DL — LOW (ref 3.3–5.2)
ALP SERPL-CCNC: 67 U/L — SIGNIFICANT CHANGE UP (ref 40–120)
ALT FLD-CCNC: 10 U/L — SIGNIFICANT CHANGE UP
ANION GAP SERPL CALC-SCNC: 16 MMOL/L — SIGNIFICANT CHANGE UP (ref 5–17)
ANION GAP SERPL CALC-SCNC: 8 MMOL/L — SIGNIFICANT CHANGE UP (ref 5–17)
ANISOCYTOSIS BLD QL: SLIGHT — SIGNIFICANT CHANGE UP
APTT BLD: 35.7 SEC — HIGH (ref 27.5–35.5)
AST SERPL-CCNC: 39 U/L — HIGH
BASOPHILS # BLD AUTO: 0 K/UL — SIGNIFICANT CHANGE UP (ref 0–0.2)
BASOPHILS NFR BLD AUTO: 0 % — SIGNIFICANT CHANGE UP (ref 0–2)
BILIRUB SERPL-MCNC: 0.9 MG/DL — SIGNIFICANT CHANGE UP (ref 0.4–2)
BLD GP AB SCN SERPL QL: SIGNIFICANT CHANGE UP
BUN SERPL-MCNC: 14 MG/DL — SIGNIFICANT CHANGE UP (ref 8–20)
BUN SERPL-MCNC: 14 MG/DL — SIGNIFICANT CHANGE UP (ref 8–20)
CALCIUM SERPL-MCNC: 8.4 MG/DL — LOW (ref 8.6–10.2)
CALCIUM SERPL-MCNC: 9.1 MG/DL — SIGNIFICANT CHANGE UP (ref 8.6–10.2)
CHLORIDE SERPL-SCNC: 102 MMOL/L — SIGNIFICANT CHANGE UP (ref 98–107)
CHLORIDE SERPL-SCNC: 104 MMOL/L — SIGNIFICANT CHANGE UP (ref 98–107)
CO2 SERPL-SCNC: 24 MMOL/L — SIGNIFICANT CHANGE UP (ref 22–29)
CO2 SERPL-SCNC: 25 MMOL/L — SIGNIFICANT CHANGE UP (ref 22–29)
CREAT SERPL-MCNC: 0.52 MG/DL — SIGNIFICANT CHANGE UP (ref 0.5–1.3)
CREAT SERPL-MCNC: 0.85 MG/DL — SIGNIFICANT CHANGE UP (ref 0.5–1.3)
ELLIPTOCYTES BLD QL SMEAR: SLIGHT — SIGNIFICANT CHANGE UP
EOSINOPHIL # BLD AUTO: 0 K/UL — SIGNIFICANT CHANGE UP (ref 0–0.5)
EOSINOPHIL NFR BLD AUTO: 0 % — SIGNIFICANT CHANGE UP (ref 0–6)
GAS PNL BLDA: SIGNIFICANT CHANGE UP
GLUCOSE BLDC GLUCOMTR-MCNC: 196 MG/DL — HIGH (ref 70–99)
GLUCOSE BLDC GLUCOMTR-MCNC: 197 MG/DL — HIGH (ref 70–99)
GLUCOSE SERPL-MCNC: 198 MG/DL — HIGH (ref 70–99)
GLUCOSE SERPL-MCNC: 93 MG/DL — SIGNIFICANT CHANGE UP (ref 70–99)
HCT VFR BLD CALC: 33.6 % — LOW (ref 34.5–45)
HGB BLD-MCNC: 10.8 G/DL — LOW (ref 11.5–15.5)
INR BLD: 1.14 RATIO — SIGNIFICANT CHANGE UP (ref 0.88–1.16)
INR BLD: 1.59 RATIO — HIGH (ref 0.88–1.16)
LYMPHOCYTES # BLD AUTO: 0.24 K/UL — LOW (ref 1–3.3)
LYMPHOCYTES # BLD AUTO: 0.9 % — LOW (ref 13–44)
MACROCYTES BLD QL: SLIGHT — SIGNIFICANT CHANGE UP
MAGNESIUM SERPL-MCNC: 3.1 MG/DL — HIGH (ref 1.8–2.6)
MANUAL SMEAR VERIFICATION: SIGNIFICANT CHANGE UP
MCHC RBC-ENTMCNC: 27.2 PG — SIGNIFICANT CHANGE UP (ref 27–34)
MCHC RBC-ENTMCNC: 32.1 GM/DL — SIGNIFICANT CHANGE UP (ref 32–36)
MCV RBC AUTO: 84.6 FL — SIGNIFICANT CHANGE UP (ref 80–100)
METAMYELOCYTES # FLD: 0.9 % — HIGH (ref 0–0)
MICROCYTES BLD QL: SLIGHT — SIGNIFICANT CHANGE UP
MONOCYTES # BLD AUTO: 0.69 K/UL — SIGNIFICANT CHANGE UP (ref 0–0.9)
MONOCYTES NFR BLD AUTO: 2.6 % — SIGNIFICANT CHANGE UP (ref 2–14)
NEUTROPHILS # BLD AUTO: 25.45 K/UL — HIGH (ref 1.8–7.4)
NEUTROPHILS NFR BLD AUTO: 92.1 % — HIGH (ref 43–77)
NEUTS BAND # BLD: 3.5 % — SIGNIFICANT CHANGE UP (ref 0–8)
OVALOCYTES BLD QL SMEAR: SLIGHT — SIGNIFICANT CHANGE UP
PLAT MORPH BLD: NORMAL — SIGNIFICANT CHANGE UP
PLATELET # BLD AUTO: 243 K/UL — SIGNIFICANT CHANGE UP (ref 150–400)
POIKILOCYTOSIS BLD QL AUTO: SLIGHT — SIGNIFICANT CHANGE UP
POLYCHROMASIA BLD QL SMEAR: SIGNIFICANT CHANGE UP
POTASSIUM SERPL-MCNC: 4.3 MMOL/L — SIGNIFICANT CHANGE UP (ref 3.5–5.3)
POTASSIUM SERPL-MCNC: 4.9 MMOL/L — SIGNIFICANT CHANGE UP (ref 3.5–5.3)
POTASSIUM SERPL-SCNC: 4.3 MMOL/L — SIGNIFICANT CHANGE UP (ref 3.5–5.3)
POTASSIUM SERPL-SCNC: 4.9 MMOL/L — SIGNIFICANT CHANGE UP (ref 3.5–5.3)
PROT SERPL-MCNC: 4.4 G/DL — LOW (ref 6.6–8.7)
PROTHROM AB SERPL-ACNC: 13.1 SEC — SIGNIFICANT CHANGE UP (ref 10.6–13.6)
PROTHROM AB SERPL-ACNC: 18 SEC — HIGH (ref 10.6–13.6)
RBC # BLD: 3.97 M/UL — SIGNIFICANT CHANGE UP (ref 3.8–5.2)
RBC # FLD: 20 % — HIGH (ref 10.3–14.5)
RBC BLD AUTO: ABNORMAL
SODIUM SERPL-SCNC: 135 MMOL/L — SIGNIFICANT CHANGE UP (ref 135–145)
SODIUM SERPL-SCNC: 144 MMOL/L — SIGNIFICANT CHANGE UP (ref 135–145)
TSH SERPL-MCNC: 9.9 UIU/ML — HIGH (ref 0.27–4.2)
WBC # BLD: 26.62 K/UL — HIGH (ref 3.8–10.5)
WBC # FLD AUTO: 26.62 K/UL — HIGH (ref 3.8–10.5)

## 2021-02-24 PROCEDURE — 33405 REPLACEMENT AORTIC VALVE OPN: CPT | Mod: AS

## 2021-02-24 PROCEDURE — 71045 X-RAY EXAM CHEST 1 VIEW: CPT | Mod: 26,76

## 2021-02-24 PROCEDURE — 33518 CABG ARTERY-VEIN TWO: CPT | Mod: AS

## 2021-02-24 PROCEDURE — 93010 ELECTROCARDIOGRAM REPORT: CPT

## 2021-02-24 PROCEDURE — 88305 TISSUE EXAM BY PATHOLOGIST: CPT | Mod: 26

## 2021-02-24 PROCEDURE — 33405 REPLACEMENT AORTIC VALVE OPN: CPT

## 2021-02-24 PROCEDURE — 88304 TISSUE EXAM BY PATHOLOGIST: CPT | Mod: 26

## 2021-02-24 PROCEDURE — 33508 ENDOSCOPIC VEIN HARVEST: CPT | Mod: 59

## 2021-02-24 PROCEDURE — 33533 CABG ARTERIAL SINGLE: CPT

## 2021-02-24 PROCEDURE — 76376 3D RENDER W/INTRP POSTPROCES: CPT | Mod: 26

## 2021-02-24 PROCEDURE — 93312 ECHO TRANSESOPHAGEAL: CPT | Mod: 26

## 2021-02-24 PROCEDURE — 93325 DOPPLER ECHO COLOR FLOW MAPG: CPT | Mod: 26

## 2021-02-24 PROCEDURE — 33533 CABG ARTERIAL SINGLE: CPT | Mod: AS

## 2021-02-24 PROCEDURE — 33518 CABG ARTERY-VEIN TWO: CPT

## 2021-02-24 PROCEDURE — 33508 ENDOSCOPIC VEIN HARVEST: CPT | Mod: AS,59

## 2021-02-24 PROCEDURE — 93320 DOPPLER ECHO COMPLETE: CPT | Mod: 26

## 2021-02-24 RX ORDER — CEFUROXIME AXETIL 250 MG
1500 TABLET ORAL ONCE
Refills: 0 | Status: DISCONTINUED | OUTPATIENT
Start: 2021-02-24 | End: 2021-02-24

## 2021-02-24 RX ORDER — LEVOTHYROXINE SODIUM 125 MCG
75 TABLET ORAL DAILY
Refills: 0 | Status: DISCONTINUED | OUTPATIENT
Start: 2021-02-25 | End: 2021-03-04

## 2021-02-24 RX ORDER — DEXTROSE 50 % IN WATER 50 %
50 SYRINGE (ML) INTRAVENOUS
Refills: 0 | Status: DISCONTINUED | OUTPATIENT
Start: 2021-02-24 | End: 2021-02-26

## 2021-02-24 RX ORDER — SODIUM CHLORIDE 9 MG/ML
1000 INJECTION INTRAMUSCULAR; INTRAVENOUS; SUBCUTANEOUS
Refills: 0 | Status: DISCONTINUED | OUTPATIENT
Start: 2021-02-24 | End: 2021-03-04

## 2021-02-24 RX ORDER — SENNA PLUS 8.6 MG/1
2 TABLET ORAL AT BEDTIME
Refills: 0 | Status: DISCONTINUED | OUTPATIENT
Start: 2021-02-24 | End: 2021-03-12

## 2021-02-24 RX ORDER — ASPIRIN/CALCIUM CARB/MAGNESIUM 324 MG
81 TABLET ORAL ONCE
Refills: 0 | Status: COMPLETED | OUTPATIENT
Start: 2021-02-25 | End: 2021-02-25

## 2021-02-24 RX ORDER — VANCOMYCIN HCL 1 G
1000 VIAL (EA) INTRAVENOUS ONCE
Refills: 0 | Status: DISCONTINUED | OUTPATIENT
Start: 2021-02-24 | End: 2021-02-24

## 2021-02-24 RX ORDER — INSULIN HUMAN 100 [IU]/ML
2 INJECTION, SOLUTION SUBCUTANEOUS
Qty: 50 | Refills: 0 | Status: DISCONTINUED | OUTPATIENT
Start: 2021-02-24 | End: 2021-02-25

## 2021-02-24 RX ORDER — CHLORHEXIDINE GLUCONATE 213 G/1000ML
1 SOLUTION TOPICAL DAILY
Refills: 0 | Status: DISCONTINUED | OUTPATIENT
Start: 2021-02-24 | End: 2021-03-01

## 2021-02-24 RX ORDER — NOREPINEPHRINE BITARTRATE/D5W 8 MG/250ML
0.05 PLASTIC BAG, INJECTION (ML) INTRAVENOUS
Qty: 8 | Refills: 0 | Status: DISCONTINUED | OUTPATIENT
Start: 2021-02-24 | End: 2021-02-28

## 2021-02-24 RX ORDER — POLYETHYLENE GLYCOL 3350 17 G/17G
17 POWDER, FOR SOLUTION ORAL DAILY
Refills: 0 | Status: DISCONTINUED | OUTPATIENT
Start: 2021-02-24 | End: 2021-03-12

## 2021-02-24 RX ORDER — MILRINONE LACTATE 1 MG/ML
0.12 INJECTION, SOLUTION INTRAVENOUS
Qty: 20 | Refills: 0 | Status: DISCONTINUED | OUTPATIENT
Start: 2021-02-24 | End: 2021-02-27

## 2021-02-24 RX ORDER — SODIUM CHLORIDE 9 MG/ML
1000 INJECTION INTRAMUSCULAR; INTRAVENOUS; SUBCUTANEOUS
Refills: 0 | Status: DISCONTINUED | OUTPATIENT
Start: 2021-02-24 | End: 2021-03-01

## 2021-02-24 RX ORDER — EPINEPHRINE 0.3 MG/.3ML
0.01 INJECTION INTRAMUSCULAR; SUBCUTANEOUS
Qty: 4 | Refills: 0 | Status: DISCONTINUED | OUTPATIENT
Start: 2021-02-24 | End: 2021-02-25

## 2021-02-24 RX ORDER — VANCOMYCIN HCL 1 G
1000 VIAL (EA) INTRAVENOUS EVERY 12 HOURS
Refills: 0 | Status: DISCONTINUED | OUTPATIENT
Start: 2021-02-25 | End: 2021-02-25

## 2021-02-24 RX ORDER — ATORVASTATIN CALCIUM 80 MG/1
40 TABLET, FILM COATED ORAL AT BEDTIME
Refills: 0 | Status: DISCONTINUED | OUTPATIENT
Start: 2021-02-24 | End: 2021-03-12

## 2021-02-24 RX ORDER — ASPIRIN/CALCIUM CARB/MAGNESIUM 324 MG
81 TABLET ORAL DAILY
Refills: 0 | Status: DISCONTINUED | OUTPATIENT
Start: 2021-02-25 | End: 2021-03-12

## 2021-02-24 RX ORDER — CHLORHEXIDINE GLUCONATE 213 G/1000ML
5 SOLUTION TOPICAL
Refills: 0 | Status: DISCONTINUED | OUTPATIENT
Start: 2021-02-24 | End: 2021-02-27

## 2021-02-24 RX ORDER — DEXTROSE 50 % IN WATER 50 %
25 SYRINGE (ML) INTRAVENOUS
Refills: 0 | Status: DISCONTINUED | OUTPATIENT
Start: 2021-02-24 | End: 2021-02-26

## 2021-02-24 RX ORDER — PANTOPRAZOLE SODIUM 20 MG/1
40 TABLET, DELAYED RELEASE ORAL
Refills: 0 | Status: DISCONTINUED | OUTPATIENT
Start: 2021-02-25 | End: 2021-02-27

## 2021-02-24 RX ORDER — PANTOPRAZOLE SODIUM 20 MG/1
40 TABLET, DELAYED RELEASE ORAL ONCE
Refills: 0 | Status: COMPLETED | OUTPATIENT
Start: 2021-02-24 | End: 2021-02-24

## 2021-02-24 RX ORDER — CEFUROXIME AXETIL 250 MG
1500 TABLET ORAL EVERY 8 HOURS
Refills: 0 | Status: COMPLETED | OUTPATIENT
Start: 2021-02-24 | End: 2021-02-26

## 2021-02-24 RX ADMIN — ATORVASTATIN CALCIUM 40 MILLIGRAM(S): 80 TABLET, FILM COATED ORAL at 22:41

## 2021-02-24 RX ADMIN — PANTOPRAZOLE SODIUM 40 MILLIGRAM(S): 20 TABLET, DELAYED RELEASE ORAL at 05:55

## 2021-02-24 RX ADMIN — CHLORHEXIDINE GLUCONATE 1 APPLICATION(S): 213 SOLUTION TOPICAL at 22:40

## 2021-02-24 RX ADMIN — Medication 1 PACKET(S): at 05:56

## 2021-02-24 RX ADMIN — MILRINONE LACTATE 5.51 MICROGRAM(S)/KG/MIN: 1 INJECTION, SOLUTION INTRAVENOUS at 22:39

## 2021-02-24 RX ADMIN — INSULIN HUMAN 2 UNIT(S)/HR: 100 INJECTION, SOLUTION SUBCUTANEOUS at 20:57

## 2021-02-24 RX ADMIN — PANTOPRAZOLE SODIUM 40 MILLIGRAM(S): 20 TABLET, DELAYED RELEASE ORAL at 22:41

## 2021-02-24 RX ADMIN — Medication 5.74 MICROGRAM(S)/KG/MIN: at 22:39

## 2021-02-24 RX ADMIN — CHLORHEXIDINE GLUCONATE 5 MILLILITER(S): 213 SOLUTION TOPICAL at 22:41

## 2021-02-24 RX ADMIN — CHLORHEXIDINE GLUCONATE 1 APPLICATION(S): 213 SOLUTION TOPICAL at 05:55

## 2021-02-24 RX ADMIN — EPINEPHRINE 2 MICROGRAM(S)/KG/MIN: 0.3 INJECTION INTRAMUSCULAR; SUBCUTANEOUS at 22:39

## 2021-02-24 RX ADMIN — Medication 100 MILLIGRAM(S): at 22:39

## 2021-02-24 RX ADMIN — Medication 75 MICROGRAM(S): at 05:55

## 2021-02-24 RX ADMIN — Medication 81 MILLIGRAM(S): at 10:08

## 2021-02-24 RX ADMIN — SODIUM CHLORIDE 10 MILLILITER(S): 9 INJECTION INTRAMUSCULAR; INTRAVENOUS; SUBCUTANEOUS at 22:40

## 2021-02-24 RX ADMIN — CHLORHEXIDINE GLUCONATE 15 MILLILITER(S): 213 SOLUTION TOPICAL at 05:54

## 2021-02-24 RX ADMIN — Medication 12.5 MILLIGRAM(S): at 05:54

## 2021-02-24 RX ADMIN — SODIUM CHLORIDE 3 MILLILITER(S): 9 INJECTION INTRAMUSCULAR; INTRAVENOUS; SUBCUTANEOUS at 05:56

## 2021-02-24 NOTE — BRIEF OPERATIVE NOTE - NSICDXBRIEFPREOP_GEN_ALL_CORE_FT
PRE-OP DIAGNOSIS:  Aortic stenosis 24-Feb-2021 19:53:18  Artur Gonzalez  CAD in native artery 24-Feb-2021 19:52:41  Artur Gonzalez  
PRE-OP DIAGNOSIS:  Anemia 17-Feb-2021 09:39:56  Dory Siddiqui  Iron deficiency anemia due to chronic blood loss 16-Feb-2021 09:55:51  Artur Crystal  
PRE-OP DIAGNOSIS:  Acute blood loss anemia 17-Feb-2021 09:39:38  Dory Siddiqui  Severe aortic valve stenosis 16-Feb-2021 09:55:14  Artur Crystal  3-vessel coronary artery disease 16-Feb-2021 09:55:04  Artur Crystal

## 2021-02-24 NOTE — PROGRESS NOTE ADULT - ASSESSMENT
76 F,  PMH of anemia, hypothyroidism, and GI bleed 1 year ago (refused Endo/colonoscopy and never followed up) presented to [facility] originally with fever and SOB.  She was found to have a UTI/sepsis with elevated lactate, + UA, and temp of 102.  She was treated with Rocephin (completed course 2/17). Anemia noted with a Hgb of 6 and 2 units of PRBC transfused.  Patient wanted to sign out AMA from [place], but then ruled in for a NSTEMI, so she was agreeable to transfer to [facility] for cardiac cath. Cardiac cath 2/16 revealed Multivessel CAD and Severe AS. Further workup revealed moderate Right ICA stenosis and Severe Left ICA stenosis on carotid US, which was confirmed on CTA neck. Vascular surgery was consulted and cleared patient for open heart surgery with no plan for CEA at this time. Patient underwent Endoscopy 2/17 as part of her GI anemia workup, which only revealed a hiatal hernia. Colonoscopy 2/19 without bleeding or acute pathology. Cleared for OR from GI standpoint. Plan for CABG/AVR/MVR Wednesday 2/24/21.         below plan of care to be discussed with CT surgery attending in AM rounds

## 2021-02-24 NOTE — BRIEF OPERATIVE NOTE - OPERATION/FINDINGS
AS, CAD
TI / ICV were normal.  Cecum, Asc C / Hep Flex:  Normal.  Transverse colon through to the sigmoid colon:  Extensive diverticulosis coli.  No SRH.  No vessels.  No colitis; No AVM;  No neoplasia.  Normal rectum.  A Single large external hemorrhoid not thrombosed was present.  Prep Good;  Withdrawal time:  10 minutes.
Patient had hx of epistaxis  yesterday and this am. She had blood in oropharynx and in the esophagus. The blood was suctioned and cleared. No ulcers or bleeding noted.  Patient with 4 cm hiatal hernia  40-44 cm from incisors.  Stomach no blood. Normal cardia, fundus, antrum and body. ROutine biopsies of antrum and body to r/o HP. D1, D2  normal  Biopsies of D2 to R/O celiac sprue      Pt  had scope passed to 20 cm from anal verge. Sold stool- poor prep

## 2021-02-24 NOTE — BRIEF OPERATIVE NOTE - NSICDXBRIEFPOSTOP_GEN_ALL_CORE_FT
POST-OP DIAGNOSIS:  External hemorrhoid 19-Feb-2021 09:01:12  Priyank Nettles  Colon, diverticulosis 19-Feb-2021 09:00:48  Priyank Nettles  
POST-OP DIAGNOSIS:  CAD in native artery 24-Feb-2021 19:53:55  Artur Gonzalez  Aortic stenosis 24-Feb-2021 19:53:38  Artur Gonzalez  
POST-OP DIAGNOSIS:  Hiatal hernia 17-Feb-2021 09:40:18  Dory Siddiqui

## 2021-02-24 NOTE — PROGRESS NOTE ADULT - SUBJECTIVE AND OBJECTIVE BOX
Cardiac Surgery Pre-op Note:                                                                                                         Surgeon: Nancy    Procedure: MVR/TVR CABG    Allergies    No Known Allergies    Intolerances    HPI:  75y/o female never smoker with history of hypothyroid and DIVYA who was brought to [facility] for dyspnea and diarrhea and was found to have melena. She received 2 units of PRBC for a hemoglobin of 6.9. She was found to have a troponin of 656 and 710 and a pro-BNP of 29,809. An echo showed moderately to severely reduced LVSF with moderate diffuse hypokinesis with regional variations, moderate concentric LVH and severe AS. She was also diagnosed with sepsis secondary to a UTI. She admits to GANN (walking up one flight of stairs or < 100 feet). She also states recent black stools, and loss of approximately 30 pounds over 1 year secondary to poor PO intake secondary to ageusia. She denies chest pain, orthopnea, PND, palpitations or syncope/near syncope.    Symptoms:        Angina (Class): N/A       Ischemic Symptoms: GANN (CCs class 3 anginal equivalent)    Heart Failure: ACC/AHA stage C, NYHA functional class 3 HFrEF    Assessment of LVEF:       EF: 25-30%       Assessed by: Echo       Date: 2/13/2021    Prior Cardiac Interventions:       PCI's: N/A       CABG: N/A    Noninvasive Testing:   Stress Test: N/A    Echo: 2/13/2021       LVSF: Moderately to severely reduced LVSF with moderate diffuse hypokinesis with regional variations. Moderate concentric LVH.       EF: 25-30%       RVSF: Poorly visualized, mild to moderate pulmonary hypertension.       LA: Mildly dilated       RA: Normal       Mitral Valve: Severely to moderately calcified leaflets, severe MAC, moderate MR. Possible vegetation       Aortic Valve: Moderately calcified leaflets, mild AR, severe AS (max gradient: 77, mean gradient: 43).       Tricuspid Valve: Poorly visualized, mild TR.       Pulmonic Valve: Poorly visualized, no SD.    XR: Enlarged but stable cardiac silhouette.    Antianginal Therapies:        Beta Blockers: Toprol 25 mg daily       Calcium Channel Blockers: N/A       Long Acting Nitrates: N/A       Ranexa: N/A    Associated Risk Factors:        Cerebrovascular Disease: N/A       Chronic Lung Disease: N/A       Peripheral Arterial Disease: N/A       Chronic Kidney Disease (if yes, what is GFR): N/A       Uncontrolled Diabetes (if yes, what is HgbA1C or FBS): N/A       Poorly Controlled Hypertension (if yes, what is SBP): N/A       Morbid Obesity (if yes, what is BMI): N/A       History of Recent Ventricular Arrhythmia: N/A       Inability to Ambulate Safely: N/A       Need for Therapeutic Anticoagulation: N/A       Antiplatelet or Contrast Allergy: N/A (16 Feb 2021 07:35)      PAST MEDICAL & SURGICAL HISTORY:  Iron deficiency anemia due to chronic blood loss    Hypothyroid    History of tonsillectomy        MEDICATIONS  (STANDING):  aspirin  chewable 81 milliGRAM(s) Oral daily  atorvastatin 40 milliGRAM(s) Oral at bedtime  cefuroxime  IVPB 1500 milliGRAM(s) IV Intermittent once  chlorhexidine 0.12% Liquid 15 milliLiter(s) Swish and Spit two times a day  chlorhexidine 4% Liquid 1 Application(s) Topical two times a day  levothyroxine 75 MICROGram(s) Oral daily  metoprolol tartrate 12.5 milliGRAM(s) Oral two times a day  pantoprazole  Injectable 40 milliGRAM(s) IV Push every 12 hours  psyllium Powder 1 Packet(s) Oral two times a day  senna 2 Tablet(s) Oral at bedtime  sodium chloride 0.9% lock flush 3 milliLiter(s) IV Push every 8 hours  vancomycin  IVPB 1000 milliGRAM(s) IV Intermittent once    MEDICATIONS  (PRN):  acetaminophen   Tablet .. 650 milliGRAM(s) Oral every 6 hours PRN Moderate Pain (4 - 6)        Labs:                        10.1   7.93  )-----------( 337      ( 23 Feb 2021 05:49 )             33.4     02-23    137  |  101  |  17.0  ----------------------------<  90  4.3   |  28.0  |  0.75    Ca    8.9      23 Feb 2021 05:49  Phos  3.0     02-22  Mg     1.9     02-23    TPro  6.5<L>  /  Alb  3.2<L>  /  TBili  0.6  /  DBili  x   /  AST  21  /  ALT  13  /  AlkPhos  146<H>  02-22      LIVER FUNCTIONS - ( 22 Feb 2021 07:52 )  Alb: 3.2 g/dL / Pro: 6.5 g/dL / ALK PHOS: 146 U/L / ALT: 13 U/L / AST: 21 U/L / GGT: x             CXR:  < from: Xray Chest 1 View- PORTABLE-Routine (Xray Chest 1 View- PORTABLE-Routine in AM.) (02.22.21 @ 06:05) >  IMPRESSION:   No significant change..    < end of copied text >      EKG:  < from: 12 Lead ECG (02.22.21 @ 07:28) >  Ventricular Rate 73 BPM    Atrial Rate 73 BPM    P-R Interval 154 ms    QRS Duration 106 ms    Q-T Interval 412 ms    QTC Calculation(Bazett) 453 ms    P Axis 6 degrees    R Axis 9 degrees    T Axis 183 degrees    Diagnosis Line Normal sinus rhythm  Left ventricular hypertrophy with repolarization abnormality  Abnormal ECG    < end of copied text >    Carotid Duplex:  < from: US Duplex Carotid Arteries Complete, Bilateral (02.16.21 @ 12:27) >  IMPRESSION: There is a moderate, 50-69% stenosis of the right internal carotid artery.    There is a severe, greater than 70% stenosis of the left internal carotid arter    < end of copied text >    < from: TTE Echo Complete w/ Contrast w/ Doppler (02.17.21 @ 14:43) >  Summary:   1. Severely decreased global left ventricular systolic function.   2. Left ventricular ejection fraction, by visual estimation, is 25 to 30%.   3. Normal left ventricular internal cavity size.   4. Severely increased LV wall thickness.   5. Left ventricle chordal calcification. Severe global left ventricle hypokinesis.   6. Mildly enlarged right ventricle.   7. Mildly reduced RV systolic function.   8. Severely enlarged left atrium.   9. Right atrial enlargement.  10. Small secundum atrial septal defect with predominantly left to right shunting across the atrial septum.  11. Severe mitral annular calcification.  12. Severe thickening and calcification of the anterior and posterior mitral valve leaflets.  13. Moderate mitral valve regurgitation.  14. Moderate tricuspid regurgitation.  15. The aortic valve leaflets are heavily calcified. Severe/critical aortic valve stenosis (peak velocity 5.2 m/s, mean gradient 64 mmHg, calculated SREEDHAR by continuity equation 0.35 cm^2, dimensionless index 0.11).  16. Mild aortic regurgitation.  17. Mild pulmonic valve regurgitation.  18. There is at least mild pulmonary hypertension (RVSP    44 mmHg).  19. Trivial pericardial effusion.  20. Moderate pleural effusion in both left and right lateral regions.  21. Recommend clinical correlation with the above findings.    < end of copied text >      Cath report:    < from: Cardiac Cath Lab - Adult (02.16.21 @ 08:33) >  HEMODYNAMICS: There is moderate pulmonary hypertension.  VENTRICLES: EF by echo was 30 %.  VALVES: AORTIC VALVE: There was critical aortic stenosis.  CORONARY VESSELS:The coronary circulation is co-dominant.  LM:   --  LM: Normal.  LAD:   --  Mid LAD: There was a tubular 90 % stenosis. The lesion was  eccentric.  CX:   --  OM1: There was a diffuse 85 % stenosis in the proximal third of  the vessel segment. The lesion was irregularly contoured and eccentric.  RCA:   --  Proximal RCA: There was a diffuse 99 % stenosis. The lesion was  irregularly contoured and eccentric.  --  Distal RCA: There was a diffuse 100 % stenosis.    < end of copied text >      Pt has AICD/PPm [ ] Yes  [x ] No             Brand Name:  Pre-op Beta Blocker ordered within 24 hrs of surgery?  [x ] Yes  [ ] No  If not, Why? SBP however has been low 90  Pre-op Hibiclens & Peridex (BID x 2 days preferred) [x ] Yes  [ ] No  Type & Cross  [x ] Yes  [ ] No  NPO after Midnight [ x] Yes  [ ] No  Pre-op ABX ordered, to be taped on chart:  [x ] Yes  [ ] No   ( Vanco only if Anaphylaxis, or MRSA)  Consent obtained  [ ] Yes  [ ] No at the bedside with the surgeon

## 2021-02-24 NOTE — BRIEF OPERATIVE NOTE - NSICDXBRIEFPROCEDURE_GEN_ALL_CORE_FT
PROCEDURES:  Sigmoidoscopy, flexible, with anesthesia 17-Feb-2021 09:39:28  Dory Siddiqui  EGD with biopsy 17-Feb-2021 09:39:04  Dory Siddiqui  
PROCEDURES:  CABG, with aortic valve replacement and LUIS MIGUEL 24-Feb-2021 19:51:51 CABGx3 LIMA-LAD, Vein-OM1, OM2  AVR 23mm Capps 2700  Endoscopic harvest of greater saphenous vein from right lower extremity Artur Gonzalez  
PROCEDURES:  Stool occult blood screen 19-Feb-2021 09:00:20  Priyank Nettles  Simple control of epistaxis 19-Feb-2021 08:59:59  Priyank Nettles  Colonoscopy, diagnostic, flexible 19-Feb-2021 08:59:22  Priyank Nettles

## 2021-02-25 LAB
ANION GAP SERPL CALC-SCNC: 13 MMOL/L — SIGNIFICANT CHANGE UP (ref 5–17)
ANION GAP SERPL CALC-SCNC: 16 MMOL/L — SIGNIFICANT CHANGE UP (ref 5–17)
APTT BLD: 43.7 SEC — HIGH (ref 27.5–35.5)
BUN SERPL-MCNC: 17 MG/DL — SIGNIFICANT CHANGE UP (ref 8–20)
BUN SERPL-MCNC: 23 MG/DL — HIGH (ref 8–20)
CALCIUM SERPL-MCNC: 8.2 MG/DL — LOW (ref 8.6–10.2)
CALCIUM SERPL-MCNC: 9.2 MG/DL — SIGNIFICANT CHANGE UP (ref 8.6–10.2)
CHLORIDE SERPL-SCNC: 106 MMOL/L — SIGNIFICANT CHANGE UP (ref 98–107)
CHLORIDE SERPL-SCNC: 108 MMOL/L — HIGH (ref 98–107)
CO2 SERPL-SCNC: 23 MMOL/L — SIGNIFICANT CHANGE UP (ref 22–29)
CO2 SERPL-SCNC: 24 MMOL/L — SIGNIFICANT CHANGE UP (ref 22–29)
CREAT SERPL-MCNC: 1.02 MG/DL — SIGNIFICANT CHANGE UP (ref 0.5–1.3)
CREAT SERPL-MCNC: 1.41 MG/DL — HIGH (ref 0.5–1.3)
GAS PNL BLDA: SIGNIFICANT CHANGE UP
GLUCOSE BLDC GLUCOMTR-MCNC: 116 MG/DL — HIGH (ref 70–99)
GLUCOSE BLDC GLUCOMTR-MCNC: 118 MG/DL — HIGH (ref 70–99)
GLUCOSE BLDC GLUCOMTR-MCNC: 119 MG/DL — HIGH (ref 70–99)
GLUCOSE BLDC GLUCOMTR-MCNC: 121 MG/DL — HIGH (ref 70–99)
GLUCOSE BLDC GLUCOMTR-MCNC: 123 MG/DL — HIGH (ref 70–99)
GLUCOSE BLDC GLUCOMTR-MCNC: 132 MG/DL — HIGH (ref 70–99)
GLUCOSE BLDC GLUCOMTR-MCNC: 138 MG/DL — HIGH (ref 70–99)
GLUCOSE BLDC GLUCOMTR-MCNC: 139 MG/DL — HIGH (ref 70–99)
GLUCOSE BLDC GLUCOMTR-MCNC: 142 MG/DL — HIGH (ref 70–99)
GLUCOSE BLDC GLUCOMTR-MCNC: 145 MG/DL — HIGH (ref 70–99)
GLUCOSE BLDC GLUCOMTR-MCNC: 151 MG/DL — HIGH (ref 70–99)
GLUCOSE BLDC GLUCOMTR-MCNC: 157 MG/DL — HIGH (ref 70–99)
GLUCOSE BLDC GLUCOMTR-MCNC: 162 MG/DL — HIGH (ref 70–99)
GLUCOSE BLDC GLUCOMTR-MCNC: 182 MG/DL — HIGH (ref 70–99)
GLUCOSE BLDC GLUCOMTR-MCNC: 186 MG/DL — HIGH (ref 70–99)
GLUCOSE SERPL-MCNC: 137 MG/DL — HIGH (ref 70–99)
GLUCOSE SERPL-MCNC: 180 MG/DL — HIGH (ref 70–99)
HCT VFR BLD CALC: 24 % — LOW (ref 34.5–45)
HCT VFR BLD CALC: 27.2 % — LOW (ref 34.5–45)
HCT VFR BLD CALC: 32.6 % — LOW (ref 34.5–45)
HGB BLD-MCNC: 10 G/DL — LOW (ref 11.5–15.5)
HGB BLD-MCNC: 7.6 G/DL — LOW (ref 11.5–15.5)
HGB BLD-MCNC: 8.8 G/DL — LOW (ref 11.5–15.5)
INR BLD: 1.32 RATIO — HIGH (ref 0.88–1.16)
MAGNESIUM SERPL-MCNC: 2.9 MG/DL — HIGH (ref 1.6–2.6)
MCHC RBC-ENTMCNC: 26.7 PG — LOW (ref 27–34)
MCHC RBC-ENTMCNC: 27 PG — SIGNIFICANT CHANGE UP (ref 27–34)
MCHC RBC-ENTMCNC: 27.8 PG — SIGNIFICANT CHANGE UP (ref 27–34)
MCHC RBC-ENTMCNC: 30.7 GM/DL — LOW (ref 32–36)
MCHC RBC-ENTMCNC: 31.7 GM/DL — LOW (ref 32–36)
MCHC RBC-ENTMCNC: 32.4 GM/DL — SIGNIFICANT CHANGE UP (ref 32–36)
MCV RBC AUTO: 85.1 FL — SIGNIFICANT CHANGE UP (ref 80–100)
MCV RBC AUTO: 86.1 FL — SIGNIFICANT CHANGE UP (ref 80–100)
MCV RBC AUTO: 86.9 FL — SIGNIFICANT CHANGE UP (ref 80–100)
PHOSPHATE SERPL-MCNC: 3.6 MG/DL — SIGNIFICANT CHANGE UP (ref 2.4–4.7)
PLATELET # BLD AUTO: 143 K/UL — LOW (ref 150–400)
PLATELET # BLD AUTO: 149 K/UL — LOW (ref 150–400)
PLATELET # BLD AUTO: 247 K/UL — SIGNIFICANT CHANGE UP (ref 150–400)
POTASSIUM SERPL-MCNC: 4.1 MMOL/L — SIGNIFICANT CHANGE UP (ref 3.5–5.3)
POTASSIUM SERPL-MCNC: 5.2 MMOL/L — SIGNIFICANT CHANGE UP (ref 3.5–5.3)
POTASSIUM SERPL-SCNC: 4.1 MMOL/L — SIGNIFICANT CHANGE UP (ref 3.5–5.3)
POTASSIUM SERPL-SCNC: 5.2 MMOL/L — SIGNIFICANT CHANGE UP (ref 3.5–5.3)
PROTHROM AB SERPL-ACNC: 15.1 SEC — HIGH (ref 10.6–13.6)
RBC # BLD: 2.82 M/UL — LOW (ref 3.8–5.2)
RBC # BLD: 3.16 M/UL — LOW (ref 3.8–5.2)
RBC # BLD: 3.75 M/UL — LOW (ref 3.8–5.2)
RBC # FLD: 18.9 % — HIGH (ref 10.3–14.5)
RBC # FLD: 20.4 % — HIGH (ref 10.3–14.5)
RBC # FLD: 20.6 % — HIGH (ref 10.3–14.5)
SODIUM SERPL-SCNC: 143 MMOL/L — SIGNIFICANT CHANGE UP (ref 135–145)
SODIUM SERPL-SCNC: 147 MMOL/L — HIGH (ref 135–145)
WBC # BLD: 23.7 K/UL — HIGH (ref 3.8–10.5)
WBC # BLD: 23.91 K/UL — HIGH (ref 3.8–10.5)
WBC # BLD: 27.97 K/UL — HIGH (ref 3.8–10.5)
WBC # FLD AUTO: 23.7 K/UL — HIGH (ref 3.8–10.5)
WBC # FLD AUTO: 23.91 K/UL — HIGH (ref 3.8–10.5)
WBC # FLD AUTO: 27.97 K/UL — HIGH (ref 3.8–10.5)

## 2021-02-25 PROCEDURE — 99233 SBSQ HOSP IP/OBS HIGH 50: CPT

## 2021-02-25 PROCEDURE — 71045 X-RAY EXAM CHEST 1 VIEW: CPT | Mod: 26,77

## 2021-02-25 PROCEDURE — 99232 SBSQ HOSP IP/OBS MODERATE 35: CPT

## 2021-02-25 PROCEDURE — 71045 X-RAY EXAM CHEST 1 VIEW: CPT | Mod: 26

## 2021-02-25 PROCEDURE — 99223 1ST HOSP IP/OBS HIGH 75: CPT

## 2021-02-25 RX ORDER — ACETAMINOPHEN 500 MG
1000 TABLET ORAL ONCE
Refills: 0 | Status: COMPLETED | OUTPATIENT
Start: 2021-02-25 | End: 2021-02-25

## 2021-02-25 RX ORDER — HYDROMORPHONE HYDROCHLORIDE 2 MG/ML
0.25 INJECTION INTRAMUSCULAR; INTRAVENOUS; SUBCUTANEOUS ONCE
Refills: 0 | Status: DISCONTINUED | OUTPATIENT
Start: 2021-02-25 | End: 2021-02-25

## 2021-02-25 RX ORDER — ALBUMIN HUMAN 25 %
250 VIAL (ML) INTRAVENOUS ONCE
Refills: 0 | Status: COMPLETED | OUTPATIENT
Start: 2021-02-25 | End: 2021-02-25

## 2021-02-25 RX ORDER — HEPARIN SODIUM 5000 [USP'U]/ML
5000 INJECTION INTRAVENOUS; SUBCUTANEOUS EVERY 8 HOURS
Refills: 0 | Status: DISCONTINUED | OUTPATIENT
Start: 2021-02-26 | End: 2021-03-01

## 2021-02-25 RX ORDER — GLUCAGON INJECTION, SOLUTION 0.5 MG/.1ML
1 INJECTION, SOLUTION SUBCUTANEOUS ONCE
Refills: 0 | Status: DISCONTINUED | OUTPATIENT
Start: 2021-02-25 | End: 2021-02-27

## 2021-02-25 RX ORDER — ALBUMIN HUMAN 25 %
250 VIAL (ML) INTRAVENOUS
Refills: 0 | Status: COMPLETED | OUTPATIENT
Start: 2021-02-25 | End: 2021-02-25

## 2021-02-25 RX ORDER — FUROSEMIDE 40 MG
40 TABLET ORAL ONCE
Refills: 0 | Status: COMPLETED | OUTPATIENT
Start: 2021-02-25 | End: 2021-02-25

## 2021-02-25 RX ORDER — VANCOMYCIN HCL 1 G
1000 VIAL (EA) INTRAVENOUS ONCE
Refills: 0 | Status: COMPLETED | OUTPATIENT
Start: 2021-02-26 | End: 2021-02-26

## 2021-02-25 RX ORDER — DEXTROSE 50 % IN WATER 50 %
15 SYRINGE (ML) INTRAVENOUS ONCE
Refills: 0 | Status: DISCONTINUED | OUTPATIENT
Start: 2021-02-25 | End: 2021-02-26

## 2021-02-25 RX ORDER — SODIUM CHLORIDE 9 MG/ML
1000 INJECTION, SOLUTION INTRAVENOUS
Refills: 0 | Status: DISCONTINUED | OUTPATIENT
Start: 2021-02-25 | End: 2021-02-26

## 2021-02-25 RX ORDER — DOPAMINE HYDROCHLORIDE 40 MG/ML
2.5 INJECTION, SOLUTION, CONCENTRATE INTRAVENOUS
Qty: 400 | Refills: 0 | Status: DISCONTINUED | OUTPATIENT
Start: 2021-02-25 | End: 2021-02-26

## 2021-02-25 RX ORDER — DOBUTAMINE HCL 250MG/20ML
2.5 VIAL (ML) INTRAVENOUS
Qty: 500 | Refills: 0 | Status: DISCONTINUED | OUTPATIENT
Start: 2021-02-25 | End: 2021-02-25

## 2021-02-25 RX ORDER — INSULIN LISPRO 100/ML
VIAL (ML) SUBCUTANEOUS
Refills: 0 | Status: DISCONTINUED | OUTPATIENT
Start: 2021-02-25 | End: 2021-03-01

## 2021-02-25 RX ORDER — FUROSEMIDE 40 MG
20 TABLET ORAL ONCE
Refills: 0 | Status: COMPLETED | OUTPATIENT
Start: 2021-02-25 | End: 2021-02-25

## 2021-02-25 RX ADMIN — Medication 250 MILLIGRAM(S): at 03:16

## 2021-02-25 RX ADMIN — Medication 400 MILLIGRAM(S): at 16:00

## 2021-02-25 RX ADMIN — Medication 125 MILLILITER(S): at 16:36

## 2021-02-25 RX ADMIN — Medication 125 MILLILITER(S): at 01:33

## 2021-02-25 RX ADMIN — Medication 4.59 MICROGRAM(S)/KG/MIN: at 01:33

## 2021-02-25 RX ADMIN — Medication 81 MILLIGRAM(S): at 20:53

## 2021-02-25 RX ADMIN — Medication 125 MILLILITER(S): at 06:01

## 2021-02-25 RX ADMIN — Medication 125 MILLILITER(S): at 03:15

## 2021-02-25 RX ADMIN — SENNA PLUS 2 TABLET(S): 8.6 TABLET ORAL at 21:06

## 2021-02-25 RX ADMIN — HYDROMORPHONE HYDROCHLORIDE 0.25 MILLIGRAM(S): 2 INJECTION INTRAMUSCULAR; INTRAVENOUS; SUBCUTANEOUS at 22:22

## 2021-02-25 RX ADMIN — Medication 81 MILLIGRAM(S): at 06:07

## 2021-02-25 RX ADMIN — Medication 100 MILLIGRAM(S): at 06:01

## 2021-02-25 RX ADMIN — ATORVASTATIN CALCIUM 40 MILLIGRAM(S): 80 TABLET, FILM COATED ORAL at 21:06

## 2021-02-25 RX ADMIN — CHLORHEXIDINE GLUCONATE 1 APPLICATION(S): 213 SOLUTION TOPICAL at 14:24

## 2021-02-25 RX ADMIN — Medication 125 MILLILITER(S): at 16:38

## 2021-02-25 RX ADMIN — CHLORHEXIDINE GLUCONATE 5 MILLILITER(S): 213 SOLUTION TOPICAL at 06:09

## 2021-02-25 RX ADMIN — DOPAMINE HYDROCHLORIDE 11.5 MICROGRAM(S)/KG/MIN: 40 INJECTION, SOLUTION, CONCENTRATE INTRAVENOUS at 03:16

## 2021-02-25 RX ADMIN — Medication 400 MILLIGRAM(S): at 20:30

## 2021-02-25 RX ADMIN — Medication 125 MILLILITER(S): at 01:38

## 2021-02-25 RX ADMIN — HYDROMORPHONE HYDROCHLORIDE 0.25 MILLIGRAM(S): 2 INJECTION INTRAMUSCULAR; INTRAVENOUS; SUBCUTANEOUS at 16:37

## 2021-02-25 RX ADMIN — PANTOPRAZOLE SODIUM 40 MILLIGRAM(S): 20 TABLET, DELAYED RELEASE ORAL at 06:12

## 2021-02-25 RX ADMIN — Medication 400 MILLIGRAM(S): at 06:03

## 2021-02-25 RX ADMIN — Medication 100 MILLIGRAM(S): at 14:24

## 2021-02-25 RX ADMIN — Medication 40 MILLIGRAM(S): at 21:06

## 2021-02-25 RX ADMIN — Medication 75 MICROGRAM(S): at 06:06

## 2021-02-25 RX ADMIN — Medication 100 MILLIGRAM(S): at 21:05

## 2021-02-25 RX ADMIN — Medication 20 MILLIGRAM(S): at 16:37

## 2021-02-25 NOTE — CONSULT NOTE ADULT - SUBJECTIVE AND OBJECTIVE BOX
[place] CARDIAC ELECTROPHYSIOLOGY  [facility] Faculty Practice   Office: [address] [place] [address]  Telephone: [phone] Fax:[phone]      HPI:  76 year old Female with a PMH of anemia, hypothyroidism, and GI bleed 1 year ago (refused Endo/colonoscopy at that time) presented to [facility] originally with fever and SOB.  She was found to have urosepsis and was treated with Rocephin (completed course ). Anemia noted with a Hgb of 6 and 2 units of PRBC transfused.  Patient wanted to sign out AMA from [place], but then ruled in for a NSTEMI. LUIS MIGUEL revealed "Moderately to severely reduced LVSF with moderate diffuse hypokinesis with regional variations. Severe Aortic stenosis. EF 25-30%".  Patient agreed to be transfer to [facility] for further workup and treatement. She underwent a cardiac cath on  which revealed Multivessel CAD and severe AS. Further workup revealed moderate Right ICA stenosis and severe Left ICA stenosis on carotid US, which was confirmed on CTA neck. Vascular surgery was consulted and cleared patient for open heart surgery with no plan for CEA at this time. Patient underwent Endoscopy  and Colonoscopy on  which revealed a hiatal hernia, extensive diverticulitis and external hemorrhoids. No source of bleeding was identified and  patient was cleared for OR from GI standpoint. Patient underwent CABG x3 (LIMA-LAD, SVG-OM1, OM2) and bio-AVR (23mm Capps) on 21 by Dr Cruz. Post procedure, patient required pacing support. She remains intubated, on milrinone, norepinephrine and dopamine this morning for junctional kunal, currently AV pacing; EP called for consult.     EK2021: Sinus rhythm at 65bpm, narrow QRS, normal axis and intervals          2021: Sinus rhythm at 73bpm, narrow QRS, normal axis and intervals  Tele: AV paced @80bpm      PAST MEDICAL & SURGICAL HISTORY:  Iron deficiency anemia due to chronic blood loss  Hypothyroid  History of tonsillectomy      REVIEW OF SYSTEMS:  patient intubated    MEDICATIONS  (STANDING):  aspirin enteric coated 81 milliGRAM(s) Oral daily  atorvastatin 40 milliGRAM(s) Oral at bedtime  cefuroxime  IVPB 1500 milliGRAM(s) IV Intermittent every 8 hours  chlorhexidine 0.12% Liquid 5 milliLiter(s) Oral Mucosa two times a day  chlorhexidine 2% Cloths 1 Application(s) Topical daily  dextrose 50% Injectable 50 milliLiter(s) IV Push every 15 minutes  dextrose 50% Injectable 25 milliLiter(s) IV Push every 15 minutes  DOBUTamine Infusion 2.5 MICROgram(s)/kG/Min (4.59 mL/Hr) IV Continuous <Continuous>  DOPamine Infusion 5 MICROgram(s)/kG/Min (11.5 mL/Hr) IV Continuous <Continuous>  EPINEPHrine    Infusion 0.009 MICROgram(s)/kG/Min (2 mL/Hr) IV Continuous <Continuous>  insulin regular Infusion 2 Unit(s)/Hr (2 mL/Hr) IV Continuous <Continuous>  levothyroxine 75 MICROGram(s) Oral daily  milrinone Infusion 0.3 MICROgram(s)/kG/Min (5.51 mL/Hr) IV Continuous <Continuous>  norepinephrine Infusion 0.05 MICROgram(s)/kG/Min (5.74 mL/Hr) IV Continuous <Continuous>  pantoprazole    Tablet 40 milliGRAM(s) Oral before breakfast  senna 2 Tablet(s) Oral at bedtime  sodium chloride 0.9%. 1000 milliLiter(s) (10 mL/Hr) IV Continuous <Continuous>  sodium chloride 0.9%. 1000 milliLiter(s) (5 mL/Hr) IV Continuous <Continuous>    MEDICATIONS  (PRN):  polyethylene glycol 3350 17 Gram(s) Oral daily PRN Constipation      Allergies  No Known Allergies      FAMILY HISTORY:  Family history of cardiac disorder (Father)  Family history of diabetes mellitus (Father)      Vital Signs Last 24 Hrs  T(C): 35.9 (2021 07:15), Max: 37.2 (2021 11:49)  T(F): 96.6 (2021 07:15), Max: 99 (2021 11:49)  HR: 79 (2021 09:45) (69 - 80)  BP: 108/56 (2021 12:10) (93/55 - 108/56)  RR: 12 (2021 09:45) (0 - 30)  SpO2: 100% (2021 09:45) (93% - 100%)    Physical Exam:  Constitutional: Awake, intubated, NAD, arousable and following commands  Neck: supple, No JVD. Right sided IJ  Cardiovascular: + Stereotomy with pressing in place.+S1S2 Regular   Pulmonary: CTA b/l, unlabored, no wheezes, rales. rhonci  Abdomen: +BS, soft NTND  Extremities: b/l UE and LE 1+ edema     LABS:                        10.0   27.97 )-----------( 247      ( 2021 02:45 )             32.6   02-25    147<H>  |  108<H>  |  17.0  ----------------------------<  180<H>  4.1   |  23.0  |  1.02    Ca    8.2<L>      2021 02:45  Phos  3.6     02-25  Mg     2.9     02-25    TPro  4.4<L>  /  Alb  2.9<L>  /  TBili  0.9  /  DBili  x   /  AST  39<H>  /  ALT  10  /  AlkPhos  67  02-24  LIVER FUNCTIONS - ( 2021 20:27 )  Alb: 2.9 g/dL / Pro: 4.4 g/dL / ALK PHOS: 67 U/L / ALT: 10 U/L / AST: 39 U/L / GGT: x           PT/INR - ( 2021 02:45 )   PT: 15.1 sec;   INR: 1.32 ratio         PTT - ( 2021 02:45 )  PTT:43.7 sec      RADIOLOGY & ADDITIONAL STUDIES:    TTE Echo Complete w/ Contrast w/ Doppler 21   Summary:   1. Severely decreased global left ventricular systolic function.   2. Left ventricular ejection fraction, by visual estimation, is 25 to 30%.   3. Normal left ventricular internal cavity size.   4. Severely increased LV wall thickness.   5. Left ventricle chordal calcification. Severe global left ventricle hypokinesis.   6. Mildly enlarged right ventricle.   7. Mildly reduced RV systolic function.   8. Severely enlarged left atrium.   9. Right atrial enlargement.  10. Small secundum atrial septal defect with predominantly left to right shunting across the atrial septum.  11. Severe mitral annular calcification.  12. Severe thickening and calcification of the anterior and posterior mitral valve leaflets.  13. Moderate mitral valve regurgitation.  14. Moderate tricuspid regurgitation.  15. The aortic valve leaflets are heavily calcified. Severe/critical aortic valve stenosis (peak velocity 5.2 m/s, mean gradient 64 mmHg, calculated SREEDHAR by continuity equation 0.35 cm^2, dimensionless index 0.11).  16. Mild aortic regurgitation.  17. Mild pulmonic valve regurgitation.  18. There is at least mild pulmonary hypertension (RVSP    44 mmHg).  19. Trivial pericardial effusion.  20. Moderate pleural effusion in both left and right lateral regions.        Catheterization:  Cardiac Cath: 21   CORONARY VESSELS:The coronary circulation is co-dominant.  LM:   --  LM: Normal.  LAD:   --  Mid LAD: There was a tubular 90 % stenosis. The lesion was  eccentric.  CX:   --  OM1: There was a diffuse 85 % stenosis in the proximal third of  the vessel segment. The lesion was irregularly contoured and eccentric.  RCA:   --  Proximal RCA: There was a diffuse 99 % stenosis. The lesion was  irregularly contoured and eccentric.  --  Distal RCA: There was a diffuse 100 % stenosis.  COMPLICATIONS: No complications occurred during the cath lab visit.  DIAGNOSTIC IMPRESSIONS: Moderate Pulmonary Hypertension and elevation of  filling pressures. 2. Critical Aortic Stenosis with a mean PG >40mm Hg and  an SREEDHAR of <0.5cm2. 3. Severe LV Systolic dysfunction by TTE from 2021  with an estimated LVEF of 30%. 4. Triple Vessel CAD.  DIAGNOSTIC RECOMMENDATIONS: GDMT. 2. CTS consultation.  INTERVENTIONAL IMPRESSIONS: Moderate Pulmonary Hypertension and elevation  of filling pressures. 2. Critical Aortic Stenosis with a mean PG >40mm Hg  and an SREEDHAR of <0.5cm2. 3. Severe LV Systolic dysfunction by TTE from  2021 with an estimated LVEF of 30%. 4. Triple Vessel CAD.  INTERVENTIONAL RECOMMENDATIONS: GDMT. 2. CTS consultation.      [place] CARDIAC ELECTROPHYSIOLOGY  [facility] Faculty Practice   Office: [address] [place] [address]  Telephone: [phone] Fax:[phone]      HPI:  76 year old Female with a PMH of anemia, hypothyroidism, and GI bleed 1 year ago (refused Endo/colonoscopy at that time) presented to [facility] originally with fever and SOB.  She was found to have urosepsis and was treated with Rocephin (completed course ). Anemia noted with a Hgb of 6 and 2 units of PRBC transfused.  Patient wanted to sign out AMA from [place], but then ruled in for a NSTEMI. LUIS MIGUEL revealed "Moderately to severely reduced LVSF with moderate diffuse hypokinesis with regional variations. Severe Aortic stenosis. EF 25-30%".  Patient agreed to be transfer to [facility] for further workup and treatement. She underwent a cardiac cath on  which revealed Multivessel CAD and severe AS. Further workup revealed moderate Right ICA stenosis and severe Left ICA stenosis on carotid US, which was confirmed on CTA neck. Vascular surgery was consulted and cleared patient for open heart surgery with no plan for CEA at this time. Patient underwent Endoscopy  and Colonoscopy on  which revealed a hiatal hernia, extensive diverticulitis and external hemorrhoids. No source of bleeding was identified and  patient was cleared for OR from GI standpoint. Patient underwent CABG x3 (LIMA-LAD, SVG-OM1, OM2) and bio-AVR (23mm Capps) on 21 by Dr Cruz. Post procedure, patient required pacing support. She remains intubated, on milrinone, norepinephrine and dopamine this morning for junctional kunal, currently AV pacing; EP called for consult.     EK2021: Sinus rhythm at 65bpm, narrow QRS, normal axis and intervals          2021: Sinus rhythm at 73bpm, narrow QRS, normal axis and intervals  Tele: AV paced @80bpm      PAST MEDICAL & SURGICAL HISTORY:  Iron deficiency anemia due to chronic blood loss  Hypothyroid  History of tonsillectomy      REVIEW OF SYSTEMS:  patient intubated    MEDICATIONS  (STANDING):  aspirin enteric coated 81 milliGRAM(s) Oral daily  atorvastatin 40 milliGRAM(s) Oral at bedtime  cefuroxime  IVPB 1500 milliGRAM(s) IV Intermittent every 8 hours  chlorhexidine 0.12% Liquid 5 milliLiter(s) Oral Mucosa two times a day  chlorhexidine 2% Cloths 1 Application(s) Topical daily  dextrose 50% Injectable 50 milliLiter(s) IV Push every 15 minutes  dextrose 50% Injectable 25 milliLiter(s) IV Push every 15 minutes  DOBUTamine Infusion 2.5 MICROgram(s)/kG/Min (4.59 mL/Hr) IV Continuous <Continuous>  DOPamine Infusion 5 MICROgram(s)/kG/Min (11.5 mL/Hr) IV Continuous <Continuous>  EPINEPHrine    Infusion 0.009 MICROgram(s)/kG/Min (2 mL/Hr) IV Continuous <Continuous>  insulin regular Infusion 2 Unit(s)/Hr (2 mL/Hr) IV Continuous <Continuous>  levothyroxine 75 MICROGram(s) Oral daily  milrinone Infusion 0.3 MICROgram(s)/kG/Min (5.51 mL/Hr) IV Continuous <Continuous>  norepinephrine Infusion 0.05 MICROgram(s)/kG/Min (5.74 mL/Hr) IV Continuous <Continuous>  pantoprazole    Tablet 40 milliGRAM(s) Oral before breakfast  senna 2 Tablet(s) Oral at bedtime  sodium chloride 0.9%. 1000 milliLiter(s) (10 mL/Hr) IV Continuous <Continuous>  sodium chloride 0.9%. 1000 milliLiter(s) (5 mL/Hr) IV Continuous <Continuous>    MEDICATIONS  (PRN):  polyethylene glycol 3350 17 Gram(s) Oral daily PRN Constipation      Allergies  No Known Allergies      FAMILY HISTORY:  Family history of cardiac disorder (Father)  Family history of diabetes mellitus (Father)      Vital Signs Last 24 Hrs  T(C): 35.9 (2021 07:15), Max: 37.2 (2021 11:49)  T(F): 96.6 (2021 07:15), Max: 99 (2021 11:49)  HR: 79 (2021 09:45) (69 - 80)  BP: 108/56 (2021 12:10) (93/55 - 108/56)  RR: 12 (2021 09:45) (0 - 30)  SpO2: 100% (2021 09:45) (93% - 100%)    Physical Exam:  Constitutional: Awake, intubated, NAD, arousable and following commands  Neck: supple, No JVD. Right sided IJ  Cardiovascular: +S1S2 Regular + Stereotomy with pressing in place. +Chest tubes    Pulmonary: CTA b/l, unlabored, no wheezes, rales. rhonchi  Abdomen: +BS, soft NTND +briscoe  Extremities: b/l UE and LE 1+ edema. Ace wrap in place    LABS:                        10.0   27.97 )-----------( 247      ( 2021 02:45 )             32.6   02-25    147<H>  |  108<H>  |  17.0  ----------------------------<  180<H>  4.1   |  23.0  |  1.02    Ca    8.2<L>      2021 02:45  Phos  3.6     02-25  Mg     2.9     02-25    TPro  4.4<L>  /  Alb  2.9<L>  /  TBili  0.9  /  DBili  x   /  AST  39<H>  /  ALT  10  /  AlkPhos  67  02-24  LIVER FUNCTIONS - ( 2021 20:27 )  Alb: 2.9 g/dL / Pro: 4.4 g/dL / ALK PHOS: 67 U/L / ALT: 10 U/L / AST: 39 U/L / GGT: x           PT/INR - ( 2021 02:45 )   PT: 15.1 sec;   INR: 1.32 ratio         PTT - ( 2021 02:45 )  PTT:43.7 sec      RADIOLOGY & ADDITIONAL STUDIES:    TTE Echo Complete w/ Contrast w/ Doppler 21   Summary:   1. Severely decreased global left ventricular systolic function.   2. Left ventricular ejection fraction, by visual estimation, is 25 to 30%.   3. Normal left ventricular internal cavity size.   4. Severely increased LV wall thickness.   5. Left ventricle chordal calcification. Severe global left ventricle hypokinesis.   6. Mildly enlarged right ventricle.   7. Mildly reduced RV systolic function.   8. Severely enlarged left atrium.   9. Right atrial enlargement.  10. Small secundum atrial septal defect with predominantly left to right shunting across the atrial septum.  11. Severe mitral annular calcification.  12. Severe thickening and calcification of the anterior and posterior mitral valve leaflets.  13. Moderate mitral valve regurgitation.  14. Moderate tricuspid regurgitation.  15. The aortic valve leaflets are heavily calcified. Severe/critical aortic valve stenosis (peak velocity 5.2 m/s, mean gradient 64 mmHg, calculated SREEDHAR by continuity equation 0.35 cm^2, dimensionless index 0.11).  16. Mild aortic regurgitation.  17. Mild pulmonic valve regurgitation.  18. There is at least mild pulmonary hypertension (RVSP    44 mmHg).  19. Trivial pericardial effusion.  20. Moderate pleural effusion in both left and right lateral regions.        Catheterization:  Cardiac Cath: 21   CORONARY VESSELS:The coronary circulation is co-dominant.  LM:   --  LM: Normal.  LAD:   --  Mid LAD: There was a tubular 90 % stenosis. The lesion was  eccentric.  CX:   --  OM1: There was a diffuse 85 % stenosis in the proximal third of  the vessel segment. The lesion was irregularly contoured and eccentric.  RCA:   --  Proximal RCA: There was a diffuse 99 % stenosis. The lesion was  irregularly contoured and eccentric.  --  Distal RCA: There was a diffuse 100 % stenosis.  COMPLICATIONS: No complications occurred during the cath lab visit.  DIAGNOSTIC IMPRESSIONS: Moderate Pulmonary Hypertension and elevation of  filling pressures. 2. Critical Aortic Stenosis with a mean PG >40mm Hg and  an SREEDHAR of <0.5cm2. 3. Severe LV Systolic dysfunction by TTE from 2021  with an estimated LVEF of 30%. 4. Triple Vessel CAD.  DIAGNOSTIC RECOMMENDATIONS: GDMT. 2. CTS consultation.  INTERVENTIONAL IMPRESSIONS: Moderate Pulmonary Hypertension and elevation  of filling pressures. 2. Critical Aortic Stenosis with a mean PG >40mm Hg  and an SREEDHAR of <0.5cm2. 3. Severe LV Systolic dysfunction by TTE from  2021 with an estimated LVEF of 30%. 4. Triple Vessel CAD.  INTERVENTIONAL RECOMMENDATIONS: GDMT. 2. CTS consultation.      [place] CARDIAC ELECTROPHYSIOLOGY  [facility] Faculty Practice   Office: [address] [place] [address]  Telephone: [phone] Fax:[phone]      HPI:  76 year old Female with a PMH of iron-deficiency anemia, hypothyroidism, and GI bleed 1 year ago (refused Endo/colonoscopy at that time) presented to [facility] originally with fever and SOB.  She was found to have urosepsis and was treated with Rocephin (completed course ). Anemia noted with a Hgb of 6 and 2 units of PRBC transfused.  Patient wanted to sign out AMA from [place], but then ruled in for a NSTEMI. LUIS MIGUEL revealed "Moderately to severely reduced LVSF with moderate diffuse hypokinesis with regional variations. Severe Aortic stenosis. EF 25-30%".  Patient agreed to be transfer to [facility] for further workup and treatement. She underwent a cardiac cath on  which revealed Multivessel CAD and severe AS. Further workup revealed moderate Right ICA stenosis and severe Left ICA stenosis on carotid US, which was confirmed on CTA neck. Vascular surgery was consulted and cleared patient for open heart surgery with no plan for CEA at this time. Patient underwent Endoscopy  and Colonoscopy on  which revealed a hiatal hernia, extensive diverticulitis and external hemorrhoids. No source of bleeding was identified and  patient was cleared for OR from GI standpoint. Patient underwent CABG x3 (LIMA-LAD, SVG-OM1, OM2) and bio-AVR (23mm Capps) on 21 by Dr Cruz. Post procedure, patient required pacing support. She remains intubated, on milrinone, norepinephrine and dopamine this morning for junctional kunal, currently AV pacing; EP called for consult.     EK2021: Sinus rhythm at 65bpm, narrow QRS, normal axis and intervals          2021: Sinus rhythm at 73bpm, narrow QRS, normal axis and intervals  Tele: AV paced @80bpm      PAST MEDICAL & SURGICAL HISTORY:  Iron deficiency anemia due to chronic blood loss  Hypothyroid  History of tonsillectomy      REVIEW OF SYSTEMS:  patient intubated    MEDICATIONS  (STANDING):  aspirin enteric coated 81 milliGRAM(s) Oral daily  atorvastatin 40 milliGRAM(s) Oral at bedtime  cefuroxime  IVPB 1500 milliGRAM(s) IV Intermittent every 8 hours  chlorhexidine 0.12% Liquid 5 milliLiter(s) Oral Mucosa two times a day  chlorhexidine 2% Cloths 1 Application(s) Topical daily  dextrose 50% Injectable 50 milliLiter(s) IV Push every 15 minutes  dextrose 50% Injectable 25 milliLiter(s) IV Push every 15 minutes  DOBUTamine Infusion 2.5 MICROgram(s)/kG/Min (4.59 mL/Hr) IV Continuous <Continuous>  DOPamine Infusion 5 MICROgram(s)/kG/Min (11.5 mL/Hr) IV Continuous <Continuous>  EPINEPHrine    Infusion 0.009 MICROgram(s)/kG/Min (2 mL/Hr) IV Continuous <Continuous>  insulin regular Infusion 2 Unit(s)/Hr (2 mL/Hr) IV Continuous <Continuous>  levothyroxine 75 MICROGram(s) Oral daily  milrinone Infusion 0.3 MICROgram(s)/kG/Min (5.51 mL/Hr) IV Continuous <Continuous>  norepinephrine Infusion 0.05 MICROgram(s)/kG/Min (5.74 mL/Hr) IV Continuous <Continuous>  pantoprazole    Tablet 40 milliGRAM(s) Oral before breakfast  senna 2 Tablet(s) Oral at bedtime  sodium chloride 0.9%. 1000 milliLiter(s) (10 mL/Hr) IV Continuous <Continuous>  sodium chloride 0.9%. 1000 milliLiter(s) (5 mL/Hr) IV Continuous <Continuous>    MEDICATIONS  (PRN):  polyethylene glycol 3350 17 Gram(s) Oral daily PRN Constipation      Allergies  No Known Allergies      FAMILY HISTORY:  Family history of cardiac disorder (Father)  Family history of diabetes mellitus (Father)      Vital Signs Last 24 Hrs  T(C): 35.9 (2021 07:15), Max: 37.2 (2021 11:49)  T(F): 96.6 (2021 07:15), Max: 99 (2021 11:49)  HR: 79 (2021 09:45) (69 - 80)  BP: 108/56 (2021 12:10) (93/55 - 108/56)  RR: 12 (2021 09:45) (0 - 30)  SpO2: 100% (2021 09:45) (93% - 100%)    Physical Exam:  Constitutional: Awake, intubated, NAD, arousable and following commands  Neck: supple, No JVD. Right sided IJ  Cardiovascular: +S1S2 Regular + Stereotomy with pressing in place. +Chest tubes    Pulmonary: CTA b/l, unlabored, no wheezes, rales. rhonchi  Abdomen: +BS, soft NTND +briscoe  Extremities: b/l UE and LE 1+ edema. Ace wrap in place    LABS:                        10.0   27.97 )-----------( 247      ( 2021 02:45 )             32.6   02-25    147<H>  |  108<H>  |  17.0  ----------------------------<  180<H>  4.1   |  23.0  |  1.02    Ca    8.2<L>      2021 02:45  Phos  3.6     02-25  Mg     2.9     02-25    TPro  4.4<L>  /  Alb  2.9<L>  /  TBili  0.9  /  DBili  x   /  AST  39<H>  /  ALT  10  /  AlkPhos  67  02-24  LIVER FUNCTIONS - ( 2021 20:27 )  Alb: 2.9 g/dL / Pro: 4.4 g/dL / ALK PHOS: 67 U/L / ALT: 10 U/L / AST: 39 U/L / GGT: x           PT/INR - ( 2021 02:45 )   PT: 15.1 sec;   INR: 1.32 ratio         PTT - ( 2021 02:45 )  PTT:43.7 sec      RADIOLOGY & ADDITIONAL STUDIES:    TTE Echo Complete w/ Contrast w/ Doppler 21   Summary:   1. Severely decreased global left ventricular systolic function.   2. Left ventricular ejection fraction, by visual estimation, is 25 to 30%.   3. Normal left ventricular internal cavity size.   4. Severely increased LV wall thickness.   5. Left ventricle chordal calcification. Severe global left ventricle hypokinesis.   6. Mildly enlarged right ventricle.   7. Mildly reduced RV systolic function.   8. Severely enlarged left atrium.   9. Right atrial enlargement.  10. Small secundum atrial septal defect with predominantly left to right shunting across the atrial septum.  11. Severe mitral annular calcification.  12. Severe thickening and calcification of the anterior and posterior mitral valve leaflets.  13. Moderate mitral valve regurgitation.  14. Moderate tricuspid regurgitation.  15. The aortic valve leaflets are heavily calcified. Severe/critical aortic valve stenosis (peak velocity 5.2 m/s, mean gradient 64 mmHg, calculated SREEDHAR by continuity equation 0.35 cm^2, dimensionless index 0.11).  16. Mild aortic regurgitation.  17. Mild pulmonic valve regurgitation.  18. There is at least mild pulmonary hypertension (RVSP    44 mmHg).  19. Trivial pericardial effusion.  20. Moderate pleural effusion in both left and right lateral regions.        Catheterization:  Cardiac Cath: 21   CORONARY VESSELS:The coronary circulation is co-dominant.  LM:   --  LM: Normal.  LAD:   --  Mid LAD: There was a tubular 90 % stenosis. The lesion was  eccentric.  CX:   --  OM1: There was a diffuse 85 % stenosis in the proximal third of  the vessel segment. The lesion was irregularly contoured and eccentric.  RCA:   --  Proximal RCA: There was a diffuse 99 % stenosis. The lesion was  irregularly contoured and eccentric.  --  Distal RCA: There was a diffuse 100 % stenosis.  COMPLICATIONS: No complications occurred during the cath lab visit.  DIAGNOSTIC IMPRESSIONS: Moderate Pulmonary Hypertension and elevation of  filling pressures. 2. Critical Aortic Stenosis with a mean PG >40mm Hg and  an SREEDHAR of <0.5cm2. 3. Severe LV Systolic dysfunction by TTE from 2021  with an estimated LVEF of 30%. 4. Triple Vessel CAD.  DIAGNOSTIC RECOMMENDATIONS: GDMT. 2. CTS consultation.  INTERVENTIONAL IMPRESSIONS: Moderate Pulmonary Hypertension and elevation  of filling pressures. 2. Critical Aortic Stenosis with a mean PG >40mm Hg  and an SREEDHAR of <0.5cm2. 3. Severe LV Systolic dysfunction by TTE from  2021 with an estimated LVEF of 30%. 4. Triple Vessel CAD.  INTERVENTIONAL RECOMMENDATIONS: GDMT. 2. CTS consultation.      [place] CARDIAC ELECTROPHYSIOLOGY  [facility] Faculty Practice   Office: [address] [place] [address]  Telephone: [phone] Fax:[phone]      HPI:  76 year old Female with a PMH of iron-deficiency anemia, hypothyroidism, and GI bleed 1 year ago (refused Endo/colonoscopy at that time) presented to [facility] originally with fever and SOB.  She was found to have urosepsis and was treated with Rocephin (completed course ). Anemia noted with a Hgb of 6 and 2 units of PRBC transfused.  Patient wanted to sign out AMA from [place], but then ruled in for a NSTEMI. LUIS MIGUEL revealed "Moderately to severely reduced LVSF with moderate diffuse hypokinesis with regional variations. Severe Aortic stenosis. EF 25-30%".  Patient agreed to be transfer to [facility] for further workup and treatement. She underwent a cardiac cath on  which revealed Multivessel CAD and severe AS. Further workup revealed moderate Right ICA stenosis and severe Left ICA stenosis on carotid US, which was confirmed on CTA neck. Vascular surgery was consulted and cleared patient for open heart surgery with no plan for CEA at this time. Patient underwent Endoscopy  and Colonoscopy on  which revealed a hiatal hernia, extensive diverticulitis and external hemorrhoids. No source of bleeding was identified and  patient was cleared for OR from GI standpoint. Patient underwent CABG x3 (LIMA-LAD, SVG-OM1, OM2) and bio-AVR (23mm Capps) on 21 by Dr Cruz. Post procedure, patient required pacing support. She remains intubated, on milrinone, norepinephrine and dopamine this morning for junctional kunal, currently AV pacing; EP called for consult.     EK2021: Sinus rhythm at 65bpm, narrow QRS, normal axis and intervals          2021: Sinus rhythm at 73bpm, narrow QRS, normal axis and intervals  Tele: AV paced @80bpm; underlying rhythm of CHB with junctional escape rhythm.    PAST MEDICAL & SURGICAL HISTORY:  Iron deficiency anemia due to chronic blood loss  Hypothyroid  History of tonsillectomy    REVIEW OF SYSTEMS:  patient intubated    MEDICATIONS  (STANDING):  aspirin enteric coated 81 milliGRAM(s) Oral daily  atorvastatin 40 milliGRAM(s) Oral at bedtime  cefuroxime  IVPB 1500 milliGRAM(s) IV Intermittent every 8 hours  chlorhexidine 0.12% Liquid 5 milliLiter(s) Oral Mucosa two times a day  chlorhexidine 2% Cloths 1 Application(s) Topical daily  dextrose 50% Injectable 50 milliLiter(s) IV Push every 15 minutes  dextrose 50% Injectable 25 milliLiter(s) IV Push every 15 minutes  DOBUTamine Infusion 2.5 MICROgram(s)/kG/Min (4.59 mL/Hr) IV Continuous <Continuous>  DOPamine Infusion 5 MICROgram(s)/kG/Min (11.5 mL/Hr) IV Continuous <Continuous>  EPINEPHrine    Infusion 0.009 MICROgram(s)/kG/Min (2 mL/Hr) IV Continuous <Continuous>  insulin regular Infusion 2 Unit(s)/Hr (2 mL/Hr) IV Continuous <Continuous>  levothyroxine 75 MICROGram(s) Oral daily  milrinone Infusion 0.3 MICROgram(s)/kG/Min (5.51 mL/Hr) IV Continuous <Continuous>  norepinephrine Infusion 0.05 MICROgram(s)/kG/Min (5.74 mL/Hr) IV Continuous <Continuous>  pantoprazole    Tablet 40 milliGRAM(s) Oral before breakfast  senna 2 Tablet(s) Oral at bedtime  sodium chloride 0.9%. 1000 milliLiter(s) (10 mL/Hr) IV Continuous <Continuous>  sodium chloride 0.9%. 1000 milliLiter(s) (5 mL/Hr) IV Continuous <Continuous>    MEDICATIONS  (PRN):  polyethylene glycol 3350 17 Gram(s) Oral daily PRN Constipation      Allergies  No Known Allergies      FAMILY HISTORY:  Family history of cardiac disorder (Father)  Family history of diabetes mellitus (Father)      Vital Signs Last 24 Hrs  T(C): 35.9 (2021 07:15), Max: 37.2 (2021 11:49)  T(F): 96.6 (2021 07:15), Max: 99 (2021 11:49)  HR: 79 (2021 09:45) (69 - 80)  BP: 108/56 (2021 12:10) (93/55 - 108/56)  RR: 12 (2021 09:45) (0 - 30)  SpO2: 100% (2021 09:45) (93% - 100%)    Physical Exam:  Constitutional: Awake, intubated, NAD, arousable and following commands  Neck: supple, No JVD. Right sided IJ  Cardiovascular: +S1S2 Regular + Stereotomy with pressing in place. +Chest tubes    Pulmonary: CTA b/l, unlabored, no wheezes, rales. rhonchi  Abdomen: +BS, soft NTND +briscoe  Extremities: b/l UE and LE 1+ edema. Ace wrap in place    LABS:                        10.0   27.97 )-----------( 247      ( 2021 02:45 )             32.6   02-25    147<H>  |  108<H>  |  17.0  ----------------------------<  180<H>  4.1   |  23.0  |  1.02    Ca    8.2<L>      2021 02:45  Phos  3.6     02-25  Mg     2.9     02-25    TPro  4.4<L>  /  Alb  2.9<L>  /  TBili  0.9  /  DBili  x   /  AST  39<H>  /  ALT  10  /  AlkPhos  67  02-24  LIVER FUNCTIONS - ( 2021 20:27 )  Alb: 2.9 g/dL / Pro: 4.4 g/dL / ALK PHOS: 67 U/L / ALT: 10 U/L / AST: 39 U/L / GGT: x           PT/INR - ( 2021 02:45 )   PT: 15.1 sec;   INR: 1.32 ratio         PTT - ( 2021 02:45 )  PTT:43.7 sec      RADIOLOGY & ADDITIONAL STUDIES:    TTE Echo Complete w/ Contrast w/ Doppler 21   Summary:   1. Severely decreased global left ventricular systolic function.   2. Left ventricular ejection fraction, by visual estimation, is 25 to 30%.   3. Normal left ventricular internal cavity size.   4. Severely increased LV wall thickness.   5. Left ventricle chordal calcification. Severe global left ventricle hypokinesis.   6. Mildly enlarged right ventricle.   7. Mildly reduced RV systolic function.   8. Severely enlarged left atrium.   9. Right atrial enlargement.  10. Small secundum atrial septal defect with predominantly left to right shunting across the atrial septum.  11. Severe mitral annular calcification.  12. Severe thickening and calcification of the anterior and posterior mitral valve leaflets.  13. Moderate mitral valve regurgitation.  14. Moderate tricuspid regurgitation.  15. The aortic valve leaflets are heavily calcified. Severe/critical aortic valve stenosis (peak velocity 5.2 m/s, mean gradient 64 mmHg, calculated SREEDHAR by continuity equation 0.35 cm^2, dimensionless index 0.11).  16. Mild aortic regurgitation.  17. Mild pulmonic valve regurgitation.  18. There is at least mild pulmonary hypertension (RVSP    44 mmHg).  19. Trivial pericardial effusion.  20. Moderate pleural effusion in both left and right lateral regions.        Catheterization:  Cardiac Cath: 21   CORONARY VESSELS:The coronary circulation is co-dominant.  LM:   --  LM: Normal.  LAD:   --  Mid LAD: There was a tubular 90 % stenosis. The lesion was  eccentric.  CX:   --  OM1: There was a diffuse 85 % stenosis in the proximal third of  the vessel segment. The lesion was irregularly contoured and eccentric.  RCA:   --  Proximal RCA: There was a diffuse 99 % stenosis. The lesion was  irregularly contoured and eccentric.  --  Distal RCA: There was a diffuse 100 % stenosis.  COMPLICATIONS: No complications occurred during the cath lab visit.  DIAGNOSTIC IMPRESSIONS: Moderate Pulmonary Hypertension and elevation of  filling pressures. 2. Critical Aortic Stenosis with a mean PG >40mm Hg and  an SREEDHAR of <0.5cm2. 3. Severe LV Systolic dysfunction by TTE from 2021  with an estimated LVEF of 30%. 4. Triple Vessel CAD.  DIAGNOSTIC RECOMMENDATIONS: GDMT. 2. CTS consultation.  INTERVENTIONAL IMPRESSIONS: Moderate Pulmonary Hypertension and elevation  of filling pressures. 2. Critical Aortic Stenosis with a mean PG >40mm Hg  and an SREEDHAR of <0.5cm2. 3. Severe LV Systolic dysfunction by TTE from  2021 with an estimated LVEF of 30%. 4. Triple Vessel CAD.  INTERVENTIONAL RECOMMENDATIONS: GDMT. 2. CTS consultation.

## 2021-02-25 NOTE — PROGRESS NOTE ADULT - SUBJECTIVE AND OBJECTIVE BOX
INTERVAL HPI/OVERNIGHT EVENTS:  follow up DM    Hypothryoidsm  om PO Synthoird   post op     bs under good control   MEDICATIONS  (STANDING):  aspirin enteric coated 81 milliGRAM(s) Oral daily  atorvastatin 40 milliGRAM(s) Oral at bedtime  cefuroxime  IVPB 1500 milliGRAM(s) IV Intermittent every 8 hours  chlorhexidine 0.12% Liquid 5 milliLiter(s) Oral Mucosa two times a day  chlorhexidine 2% Cloths 1 Application(s) Topical daily  dextrose 40% Gel 15 Gram(s) Oral once  dextrose 5%. 1000 milliLiter(s) (50 mL/Hr) IV Continuous <Continuous>  dextrose 5%. 1000 milliLiter(s) (100 mL/Hr) IV Continuous <Continuous>  dextrose 50% Injectable 50 milliLiter(s) IV Push every 15 minutes  dextrose 50% Injectable 25 milliLiter(s) IV Push every 15 minutes  DOPamine Infusion 2.5 MICROgram(s)/kG/Min (5.74 mL/Hr) IV Continuous <Continuous>  glucagon  Injectable 1 milliGRAM(s) IntraMuscular once  insulin lispro (ADMELOG) corrective regimen sliding scale   SubCutaneous Before meals and at bedtime  levothyroxine 75 MICROGram(s) Oral daily  milrinone Infusion 0.3 MICROgram(s)/kG/Min (5.51 mL/Hr) IV Continuous <Continuous>  norepinephrine Infusion 0.05 MICROgram(s)/kG/Min (5.74 mL/Hr) IV Continuous <Continuous>  pantoprazole    Tablet 40 milliGRAM(s) Oral before breakfast  senna 2 Tablet(s) Oral at bedtime  sodium chloride 0.9%. 1000 milliLiter(s) (10 mL/Hr) IV Continuous <Continuous>  sodium chloride 0.9%. 1000 milliLiter(s) (5 mL/Hr) IV Continuous <Continuous>    MEDICATIONS  (PRN):  polyethylene glycol 3350 17 Gram(s) Oral daily PRN Constipation      Allergies    No Known Allergies    Intolerances        Review of systems: no cp no dyspnea     Vital Signs Last 24 Hrs  T(C): 36.4 (25 Feb 2021 20:00), Max: 36.5 (25 Feb 2021 18:00)  T(F): 97.5 (25 Feb 2021 20:00), Max: 97.7 (25 Feb 2021 18:00)  HR: 69 (25 Feb 2021 23:00) (68 - 80)  BP: --  BP(mean): --  RR: 29 (25 Feb 2021 23:00) (0 - 33)  SpO2: 94% (25 Feb 2021 23:00) (87% - 100%)    PHYSICAL EXAM:      Constitutional: NAD, well-groomed, well-developed  HEENT: PERRLA, EOMI, no exophalmos  Neck: No LAD, No JVD, trachea midline, no thyroid enlargement  Back: Normal spine flexure, No CVA tenderness  Respiratory: CTAB  Cardiovascular: S1 and S2, RRR, no M/G/R    Extremities: legs wrapped  ? mild swellign n toes bilaterally  and  slight bruising ?          LABS:                        8.8    23.91 )-----------( 143      ( 25 Feb 2021 22:44 )             27.2     02-25    143  |  106  |  23.0<H>  ----------------------------<  137<H>  5.2   |  24.0  |  1.41<H>    Ca    9.2      25 Feb 2021 15:53  Phos  3.6     02-25  Mg     2.9     02-25    TPro  4.4<L>  /  Alb  2.9<L>  /  TBili  0.9  /  DBili  x   /  AST  39<H>  /  ALT  10  /  AlkPhos  67  02-24          CAPILLARY BLOOD GLUCOSE  CAPILLARY BLOOD GLUCOSE      POCT Blood Glucose.: 116 mg/dL (25 Feb 2021 22:15)  POCT Blood Glucose.: 118 mg/dL (25 Feb 2021 20:14)  POCT Blood Glucose.: 121 mg/dL (25 Feb 2021 18:11)  POCT Blood Glucose.: 142 mg/dL (25 Feb 2021 15:07)  POCT Blood Glucose.: 145 mg/dL (25 Feb 2021 13:07)  POCT Blood Glucose.: 139 mg/dL (25 Feb 2021 11:03)  POCT Blood Glucose.: 138 mg/dL (25 Feb 2021 10:05)  POCT Blood Glucose.: 132 mg/dL (25 Feb 2021 09:10)  POCT Blood Glucose.: 123 mg/dL (25 Feb 2021 08:15)  POCT Blood Glucose.: 119 mg/dL (25 Feb 2021 07:53)  POCT Blood Glucose.: 151 mg/dL (25 Feb 2021 05:22)  POCT Blood Glucose.: 157 mg/dL (25 Feb 2021 04:23)  POCT Blood Glucose.: 162 mg/dL (25 Feb 2021 03:30)  POCT Blood Glucose.: 182 mg/dL (25 Feb 2021 01:44)  POCT Blood Glucose.: 186 mg/dL (25 Feb 2021 00:41)      RADIOLOGY & ADDITIONAL TESTS:

## 2021-02-25 NOTE — PROGRESS NOTE ADULT - ASSESSMENT
76y Female with PMH anemia, and hypothyroidism.  Pt presented to [facility] a few days ago with fever and SOB.  She was found to have a UTI/sepsis with elevated lactate, + UA, and temp of 102.  She was started on Rocephin.  HGB was 6.  She was transfused 2 units of PRBC.  Per medical record, a year ago she had a GI bleed and was offered an endoscopy and colonoscopy but she refused and then failed to follow up outpatient.  Per medical record she did not want to stay at [place] but agreed once she ruled in for NSTEMI.  She was transferred to [facility] for cardiac cath.     2/16 - s/p LHC showing EF 30%. LM normal, mLAD 90%, OM1 85%, pRCA 99%, dRCA 100%, mod pulm  HTN, SREEDHAR <0.5 consistent with critical AS  2-17- EGD with old blood suspect from pharynx/epistaxis?, poor bowel prep  2/18- plan for repeat colonoscopy  2/19- diverticulosis on colonoscopy   2/24- underwent CABG x3 (LIMA-LAD, SVG-OM1, OM2) and #23 bio-AVR,  2/25- remains intubated as not fully awake for CPAP trial, on milrinone, norepinephrine and dopamine for junctional kunal, currently AV pacing; arousable and following commands         Severe Multivessel CAD s/p CABG x3  - cath LHC mLAD 90%, OM1 85%, pRCA 99%, dRCA 100%  - continue ASA 81, statin, LDL 54  - wean off vent, monitor chest tubes output, monitor underlying rhythm  - PT/OOB when able      HFrEF 30%, ICM  - unable to add GDMT as on pressors/inotropes- wean as tolerated       Critical AS s/p bio-AVR  -continue ASA 81    Acute blood loss anemia  - received x2 PRBC at [place] before transfer, since H/H stable.   - occult blood positive, source unclear, colonoscopy with diverticulosis  - monitor for recurrent bleeding, pAfib          Chet Grimaldo DO, [facility]  Faculty Non-Invasive Cardiologist  [phone]   76y Female with PMH anemia, and hypothyroidism.  Pt presented to [facility] a few days ago with fever and SOB.  She was found to have a UTI/sepsis with elevated lactate, + UA, and temp of 102.  She was started on Rocephin.  HGB was 6.  She was transfused 2 units of PRBC.  Per medical record, a year ago she had a GI bleed and was offered an endoscopy and colonoscopy but she refused and then failed to follow up outpatient.  Per medical record she did not want to stay at [place] but agreed once she ruled in for NSTEMI.  She was transferred to [facility] for cardiac cath.     2/16 - s/p LHC showing EF 30%. LM normal, mLAD 90%, OM1 85%, pRCA 99%, dRCA 100%, mod pulm  HTN, SREEDHAR <0.5 consistent with critical AS  2-17- EGD with old blood suspect from pharynx/epistaxis?, poor bowel prep  2/18- plan for repeat colonoscopy  2/19- diverticulosis on colonoscopy   2/24- underwent CABG x3 (LIMA-LAD, SVG-OM1, OM2) and #23 bio-AVR,  2/25- remains intubated as not fully awake for CPAP trial, on milrinone, norepinephrine and dopamine for junctional kunal, currently AV pacing; arousable and following commands         Severe Multivessel CAD s/p CABG x3  - cath LHC mLAD 90%, OM1 85%, pRCA 99%, dRCA 100%  - continue ASA 81, statin, LDL 54  - wean off vent, monitor chest tubes output, monitor underlying rhythm  - PT/OOB when able      HFrEF 30%, ICM  - unable to add GDMT as on pressors/inotropes- wean as tolerated       Critical AS s/p bio-AVR  -continue ASA 81    Large PFO  -incidental noted on intraop LUIS MIGUEL with predominate L-to-R shunt  -continue to monitor, consider percutaneous closure in the future if episode of CVA or systemic emboli    Acute blood loss anemia  - received x2 PRBC at [place] before transfer, since H/H stable.   - occult blood positive, source unclear, colonoscopy with diverticulosis  - monitor for recurrent bleeding, pAfib          Chet Grimaldo DO, [facility]  Faculty Non-Invasive Cardiologist  [phone]

## 2021-02-25 NOTE — CONSULT NOTE ADULT - ASSESSMENT
A/P A/P:  76 year old Female with a PMH of anemia, hypothyroidism, and GI bleed presented to [facility] with urosepsis, anemia s/p transfusion, NSTEMI, severe AS and ischemic cardiomyopathy (EF 25-30%).  Patient was transfer to [facility] and underwent a cardiac cath on 2/16 which revealed Multivessel CAD and severe AS. Further workup revealed moderate Right ICA stenosis and severe Left ICA stenosis. Vascular surgery was consulted and cleared patient for open heart surgery. Patient also underwent Endoscopy 2/17 and Colonoscopy on 2/19 which revealed a hiatal hernia, extensive diverticulitis, external hemorrhoids, but no active source of bleeding was identified and patient was cleared for OR from GI standpoint. Patient underwent CABG x3 (LIMA-LAD, SVG-OM1, OM2) and bio-AVR (23mm Capps) on 2/24/21 by Dr Cruz. Post procedure, patient required pacing support for junctional bradycardia.     Plan:  A/P:  76 year old Female with a PMH of anemia, hypothyroidism, and GI bleed presented to [facility] with urosepsis, anemia s/p transfusion, NSTEMI, severe AS and ischemic cardiomyopathy (EF 25-30%).  Patient was transfer to [facility] and underwent a cardiac cath on 2/16 which revealed Multivessel CAD and severe AS. Further workup revealed moderate Right ICA stenosis and severe Left ICA stenosis. Vascular surgery was consulted and cleared patient for open heart surgery. Patient also underwent Endoscopy 2/17 and Colonoscopy on 2/19 which revealed a hiatal hernia, extensive diverticulitis, external hemorrhoids, but no active source of bleeding was identified and patient was cleared for OR from GI standpoint. Patient underwent CABG x3 (LIMA-LAD, SVG-OM1, OM2) and bio-AVR (23mm Capps) on 2/24/21 by Dr Cruz. Post procedure, patient required pacing support for junctional bradycardia and currently AV pacing, on milrinone, norepinephrine and dopamine.    Plan:  A/P:  76 year old Female with a PMH of iron-deficiency anemia, hypothyroidism, and GI bleed presented to [facility] with sob and fevers found to have urosepsis, anemia s/p transfusion, NSTEMI, severe AS and ischemic cardiomyopathy (EF 25-30%).  Patient was transfer to [facility] and underwent a cardiac cath on 2/16 which revealed Multivessel CAD and severe AS. Further workup revealed moderate Right ICA stenosis and severe Left ICA stenosis. Vascular surgery was consulted and cleared patient for open heart surgery. Patient also underwent Endoscopy 2/17 and Colonoscopy on 2/19 which revealed a hiatal hernia, extensive diverticulitis, external hemorrhoids, but no active source of bleeding was identified and patient was cleared for OR from GI standpoint. Patient underwent CABG x3 (LIMA-LAD, SVG-OM1, OM2) and bio-AVR (23mm Capps) on 2/24/21 by Dr Cruz. Post procedure, patient required pacing support for junctional bradycardia and currently AV pacing, on milrinone, norepinephrine and dopamine.    Plan:    76 year old Female with a PMH of iron-deficiency anemia, hypothyroidism, and GI bleed presented to [facility] with sob and fevers found to have urosepsis, anemia s/p transfusion, NSTEMI, severe AS and ischemic cardiomyopathy (EF 25-30%).  Patient was transfer to [facility] and underwent a cardiac cath on 2/16 which revealed Multivessel CAD and severe AS. Further workup revealed moderate Right ICA stenosis and severe Left ICA stenosis. Vascular surgery was consulted and cleared patient for open heart surgery. Patient also underwent Endoscopy 2/17 and Colonoscopy on 2/19 which revealed a hiatal hernia, extensive diverticulitis, external hemorrhoids, but no active source of bleeding was identified and patient was cleared for OR from GI standpoint. Patient underwent CABG x3 (LIMA-LAD, SVG-OM1, OM2) and bio-AVR (23mm Capps) on 2/24/21 by Dr Cruz. Post procedure, patient required pacing support for junctional bradycardia and currently AV pacing, on milrinone, norepinephrine and dopamine.    CHB with junctional escape rhythm:   - Continue with epicardial pacing; Ventricular threshold testing 3V  - Plan for CRT-P vs CRT-D on Monday if conduction does not recover  - Repeat TTE on Sunday to re-evaluate LV function    -Full recs to follow    76 year old Female with a PMH of iron-deficiency anemia, hypothyroidism, and GI bleed presented to [facility] with sob and fevers found to have urosepsis, anemia s/p transfusion, NSTEMI, severe AS and ischemic cardiomyopathy (EF 25-30%).  Patient was transfer to [facility] and underwent a cardiac cath on 2/16 which revealed Multivessel CAD and severe AS. Further workup revealed moderate Right ICA stenosis and severe Left ICA stenosis. Vascular surgery was consulted and cleared patient for open heart surgery. Patient also underwent Endoscopy 2/17 and Colonoscopy on 2/19 which revealed a hiatal hernia, extensive diverticulitis, external hemorrhoids, but no active source of bleeding was identified and patient was cleared for OR from GI standpoint. Patient underwent CABG x3 (LIMA-LAD, SVG-OM1, OM2) and bio-AVR (23mm Capps) on 2/24/21 by Dr Cruz. Post procedure, patient required pacing support for junctional bradycardia and currently AV pacing, on milrinone, norepinephrine and dopamine.    CHB with junctional escape rhythm:   - Continue with epicardial pacing; Ventricular threshold testing 3V  - Plan for CRT-P vs CRT-D on Monday if conduction does not recover  - Repeat TTE on Sunday if possible to re-evaluate LV function    -Full recs to follow    76 year old Female with a PMH of iron-deficiency anemia, hypothyroidism, and GI bleed presented to [facility] with sob and fevers found to have urosepsis, anemia s/p transfusion, NSTEMI, severe AS and ischemic cardiomyopathy (EF 25-30%).  Patient was transfer to [facility] and underwent a cardiac cath on 2/16 which revealed Multivessel CAD and severe AS. Further workup revealed moderate Right ICA stenosis and severe Left ICA stenosis. Vascular surgery was consulted and cleared patient for open heart surgery. Patient also underwent Endoscopy 2/17 and Colonoscopy on 2/19 which revealed a hiatal hernia, extensive diverticulitis, external hemorrhoids, but no active source of bleeding was identified and patient was cleared for OR from GI standpoint. Patient underwent CABG x3 (LIMA-LAD, SVG-OM1, OM2) and bio-AVR (23mm Capps) on 2/24/21 by Dr Cruz. Post procedure, patient required pacing support for junctional bradycardia and currently AV pacing, on milrinone, norepinephrine and dopamine.    CHB with junctional escape rhythm:   - Continue with epicardial pacing; Ventricular threshold testing 3V  - Plan for CRT-P vs CRT-D on Monday if conduction does not recover  - Repeat TTE on Sunday to re-evaluate LV function    -Full recs to follow    76 year old Female with a PMH of iron-deficiency anemia, hypothyroidism, and GI bleed presented to [facility] with sob and fevers found to have urosepsis, anemia s/p transfusion, NSTEMI, severe AS and ischemic cardiomyopathy (EF 25-30%).  Patient was transfer to [facility] and underwent a cardiac cath on 2/16 which revealed Multivessel CAD and severe AS. Further workup revealed moderate Right ICA stenosis and severe Left ICA stenosis. Vascular surgery was consulted and cleared patient for open heart surgery. Patient also underwent Endoscopy 2/17 and Colonoscopy on 2/19 which revealed a hiatal hernia, extensive diverticulitis, external hemorrhoids, but no active source of bleeding was identified and patient was cleared for OR from GI standpoint. Patient underwent CABG x3 (LIMA-LAD, SVG-OM1, OM2) and bio-AVR (23mm Capps) on 2/24/21 by Dr Cruz. Post procedure, patient required pacing support for junctional bradycardia and currently AV pacing, on milrinone, norepinephrine and dopamine.    CHB with junctional escape rhythm:   - Continue with epicardial pacing; Ventricular threshold testing 3V  - Plan for CRT-P vs CRT-D on Monday if conduction does not recover  - Repeat TTE on Sunday to re-evaluate LV function    -Full recs to follow

## 2021-02-25 NOTE — PROGRESS NOTE ADULT - PROBLEM SELECTOR PLAN 4
Endocrine following  Recommend continue current synthroid dose as pt was not compliant with it as outpt

## 2021-02-25 NOTE — CONSULT NOTE ADULT - ATTENDING COMMENTS
I have personally seen, examined, and participated in the care of this patient. I have reviewed all pertinent clinical information, including history, physical exam, plan, and the PA/NP's note and agree except as noted below.    76 year old woman with iron deficiency anemia and hypothyroidism who presented to [facility] with dyspnea and fevers found to have sepsis with UTI. She also had anemia with FOBT+ requiring transfusion as well as NSTEMI. She was treated for sepsis and TTE during admission demonstrated a severely depressed LVEF of 25-30% as well as severe aortic stenosis. She was transferred to [facility] and subsequently underwent cardiac catheterization on 2/16/21 revealing multivessel disease (mLAD 90%, diffuse 85% OM1, 99% pRCA, dRCA 100%) as well as moderate pulmonary hypertension and critical AS. She underwent EGD + colonoscopy at [facility] which reveaeld a hiatal hernia, extensive diverticulitis, and external hemorrhoids without active bleeding. On 2/24/21 she underwent 3vCABG (LIMA-LAD, SVG-OM1, OM2) and bio-AVR by Dr. Cruz. Her post-operative periods was notable for complete heart block with junctional escape. She is currently AV paced but there is no atrial capture on the atrial lead.    If she continues to remain in complete heart block after at least 48 hours, then she would benefit with pacing. Given her LVEF is <50% and it is anticipated she will need 100% pacing, she should get some form of cardiac resynchronization (conduction system pacing vs CRT). If her LVEF remains <35%, then I would favor defibrillator implantation to avoid frequent manipulation of the pocket.    Recommendations:  - Continue to monitor rhythm over the weekend  - If patient remains in CHB by Monday (3/1/21), then would favor CRT-D implantation on Monday  - Repeat TTE on Sunday (2/28/21) to assess LVEF  - Check ventricular temporary pacing wire threshold daily (atrial lead does not capture)    Thank you for this consult. We will follow with you.    Derek Shipley MD  Clinical Cardiac Electrophysiology

## 2021-02-25 NOTE — PROGRESS NOTE ADULT - ASSESSMENT
DM- was on inuslin drip    now transitione to sliding scale       Hypothryodi On LT4 0.075 mg  TSH 9     Legs dw ICU team- will dc A-line  and change wrap on legs

## 2021-02-25 NOTE — CHART NOTE - NSCHARTNOTEFT_GEN_A_CORE
Source: Patient [ ]  Family [ ]   other [x ] EMR and staff     Enteral /Parenteral Nutrition:  NPO     Current Weight:   2/25: 86.9kg  2/24: 61.2kg   2/21: 74.3kg  2/17: 79.5kg     % Weight Change: Unsure of accuracy ot wt's secondary to inconsistency; will continue to monitor wt's for trends. (Generalized edema noted)     Pertinent Medications: MEDICATIONS  (STANDING):  aspirin enteric coated 81 milliGRAM(s) Oral daily  atorvastatin 40 milliGRAM(s) Oral at bedtime  cefuroxime  IVPB 1500 milliGRAM(s) IV Intermittent every 8 hours  chlorhexidine 0.12% Liquid 5 milliLiter(s) Oral Mucosa two times a day  chlorhexidine 2% Cloths 1 Application(s) Topical daily  dextrose 50% Injectable 50 milliLiter(s) IV Push every 15 minutes  dextrose 50% Injectable 25 milliLiter(s) IV Push every 15 minutes  DOBUTamine Infusion 2.5 MICROgram(s)/kG/Min (4.59 mL/Hr) IV Continuous <Continuous>  DOPamine Infusion 5 MICROgram(s)/kG/Min (11.5 mL/Hr) IV Continuous <Continuous>  EPINEPHrine    Infusion 0.009 MICROgram(s)/kG/Min (2 mL/Hr) IV Continuous <Continuous>  insulin regular Infusion 2 Unit(s)/Hr (2 mL/Hr) IV Continuous <Continuous>  levothyroxine 75 MICROGram(s) Oral daily  milrinone Infusion 0.3 MICROgram(s)/kG/Min (5.51 mL/Hr) IV Continuous <Continuous>  norepinephrine Infusion 0.05 MICROgram(s)/kG/Min (5.74 mL/Hr) IV Continuous <Continuous>  pantoprazole    Tablet 40 milliGRAM(s) Oral before breakfast  senna 2 Tablet(s) Oral at bedtime  sodium chloride 0.9%. 1000 milliLiter(s) (10 mL/Hr) IV Continuous <Continuous>  sodium chloride 0.9%. 1000 milliLiter(s) (5 mL/Hr) IV Continuous <Continuous>    MEDICATIONS  (PRN):  polyethylene glycol 3350 17 Gram(s) Oral daily PRN Constipation    Pertinent Labs: CBC Full  -  ( 25 Feb 2021 02:45 )  WBC Count : 27.97 K/uL  RBC Count : 3.75 M/uL  Hemoglobin : 10.0 g/dL  Hematocrit : 32.6 %  Platelet Count - Automated : 247 K/uL  Mean Cell Volume : 86.9 fl  Mean Cell Hemoglobin : 26.7 pg  Mean Cell Hemoglobin Concentration : 30.7 gm/dL  Auto Neutrophil # : x  Auto Lymphocyte # : x  Auto Monocyte # : x  Auto Eosinophil # : x  Auto Basophil # : x  Auto Neutrophil % : x  Auto Lymphocyte % : x  Auto Monocyte % : x  Auto Eosinophil % : x  Auto Basophil % : x        Skin: Midsternal incision, R leg surgical site    Nutrition focused physical exam conducted - found signs of malnutrition [ ]absent [x ]present    Subcutaneous fat loss: [x ] Orbital fat pads region, []Buccal fat region, [x ]Triceps region,  [x ]Ribs region    Muscle wasting: [x ]Temples region, [ x]Clavicle region, Moderate [ ]Shoulder region, [ ]Scapula region, [ ]Interosseous region,  [ ]thigh region, [ ]Calf region    Estimated Needs:   [x ] no change since previous assessment  [ ] recalculated:     Current Nutrition Diagnosis:  Pt remains at high nutrition risk secondary to moderate (chronic) protein calorie malnutrition related to inability to meet sufficient protein energy requirements in setting of advanced age, poor appetite with ageusia and Multivessel CAD and Severe AS as evidenced by 30# wt loss over past one year, meeting less then 75% estimated energy requirements > 1 month, physical signs of mild/moderate muscle mass and fat loss and generalized edema. Pt is now s/p CABG/AVR (2/24); remains on vent support at this time. NPO status maintained. Recommendations below:     Recommendations:   1. RX: MVI and Vit. C daily (500mg)   2. Check wt daily to monitor trends   3. Consider initiating enteral feeds of Glucerna 1.5 @ 30ml/hr if unable to extubate.     Monitoring and Evaluation:   [ ] PO intake [ x] Tolerance to diet prescription [X] Weights  [X] Follow up per protocol [X] Labs:

## 2021-02-25 NOTE — PROGRESS NOTE ADULT - PROBLEM SELECTOR PLAN 1
s/p CABG/AVR on 2/24 with Dr. Cruz  Continue inotropic and pressor support  Started Dopamine for slow junctional rhythm   ASA for acute graft patency  Statin for anti inflammatory properties  Weaning towards extubation, currently waking up, appropriate  Will encourage IS hourly while awake once extubated

## 2021-02-25 NOTE — PROGRESS NOTE ADULT - ASSESSMENT
76 F,  PMH of anemia, hypothyroidism, and GI bleed 1 year ago (refused Endo/colonoscopy and never followed up) presented to [facility] originally with fever and SOB.  She was found to have a UTI/sepsis with elevated lactate, + UA, and temp of 102.  She was treated with Rocephin (completed course 2/17). Anemia noted with a Hgb of 6 and 2 units of PRBC transfused.  Patient wanted to sign out AMA from [place], but then ruled in for a NSTEMI, so she was agreeable to transfer to [facility] for cardiac cath. Cardiac cath 2/16 revealed Multivessel CAD and Severe AS. Further workup revealed moderate Right ICA stenosis and Severe Left ICA stenosis on carotid US, which was confirmed on CTA neck. Vascular surgery was consulted and cleared patient for open heart surgery with no plan for CEA at this time. Patient underwent Endoscopy 2/17 as part of her GI anemia workup, which only revealed a hiatal hernia. Colonoscopy 2/19 without bleeding or acute pathology. Cleared for OR from GI standpoint. Pt is now s/p CABG/AVR with Dr. Cruz on 2/24/21.

## 2021-02-25 NOTE — PROGRESS NOTE ADULT - SUBJECTIVE AND OBJECTIVE BOX
Subjective:  77yo F intubated post op, weaning towards extubation, stable.      HPI:  77y/o female never smoker with history of hypothyroid and DIVYA who was brought to [facility] for dyspnea and diarrhea and was found to have melena. She received 2 units of PRBC for a hemoglobin of 6.9. She was found to have a troponin of 656 and 710 and a pro-BNP of 29,809. An echo showed moderately to severely reduced LVSF with moderate diffuse hypokinesis with regional variations, moderate concentric LVH and severe AS. She was also diagnosed with sepsis secondary to a UTI. She admits to GANN (walking up one flight of stairs or < 100 feet). She also states recent black stools, and loss of approximately 30 pounds over 1 year secondary to poor PO intake secondary to ageusia. She denies chest pain, orthopnea, PND, palpitations or syncope/near syncope.    Symptoms:        Angina (Class): N/A       Ischemic Symptoms: GANN (CCs class 3 anginal equivalent)    Heart Failure: ACC/AHA stage C, NYHA functional class 3 HFrEF    Assessment of LVEF:       EF: 25-30%       Assessed by: Echo       Date: 2/13/2021    Prior Cardiac Interventions:       PCI's: N/A       CABG: N/A    Noninvasive Testing:   Stress Test: N/A    Echo: 2/13/2021       LVSF: Moderately to severely reduced LVSF with moderate diffuse hypokinesis with regional variations. Moderate concentric LVH.       EF: 25-30%       RVSF: Poorly visualized, mild to moderate pulmonary hypertension.       LA: Mildly dilated       RA: Normal       Mitral Valve: Severely to moderately calcified leaflets, severe MAC, moderate MR. Possible vegetation       Aortic Valve: Moderately calcified leaflets, mild AR, severe AS (max gradient: 77, mean gradient: 43).       Tricuspid Valve: Poorly visualized, mild TR.       Pulmonic Valve: Poorly visualized, no ME.    XR: Enlarged but stable cardiac silhouette.    Antianginal Therapies:        Beta Blockers: Toprol 25 mg daily       Calcium Channel Blockers: N/A       Long Acting Nitrates: N/A       Ranexa: N/A    Associated Risk Factors:        Cerebrovascular Disease: N/A       Chronic Lung Disease: N/A       Peripheral Arterial Disease: N/A       Chronic Kidney Disease (if yes, what is GFR): N/A       Uncontrolled Diabetes (if yes, what is HgbA1C or FBS): N/A       Poorly Controlled Hypertension (if yes, what is SBP): N/A       Morbid Obesity (if yes, what is BMI): N/A       History of Recent Ventricular Arrhythmia: N/A       Inability to Ambulate Safely: N/A       Need for Therapeutic Anticoagulation: N/A       Antiplatelet or Contrast Allergy: N/A (16 Feb 2021 07:35)          PAST MEDICAL & SURGICAL HISTORY:  Iron deficiency anemia due to chronic blood loss    Hypothyroid    History of tonsillectomy            MEDICATIONS  (STANDING):  aspirin  chewable 81 milliGRAM(s) Oral once  aspirin enteric coated 81 milliGRAM(s) Oral daily  atorvastatin 40 milliGRAM(s) Oral at bedtime  cefuroxime  IVPB 1500 milliGRAM(s) IV Intermittent every 8 hours  chlorhexidine 0.12% Liquid 5 milliLiter(s) Oral Mucosa two times a day  chlorhexidine 2% Cloths 1 Application(s) Topical daily  dextrose 50% Injectable 50 milliLiter(s) IV Push every 15 minutes  dextrose 50% Injectable 25 milliLiter(s) IV Push every 15 minutes  DOBUTamine Infusion 2.5 MICROgram(s)/kG/Min (4.59 mL/Hr) IV Continuous <Continuous>  DOPamine Infusion 5 MICROgram(s)/kG/Min (11.5 mL/Hr) IV Continuous <Continuous>  EPINEPHrine    Infusion 0.009 MICROgram(s)/kG/Min (2 mL/Hr) IV Continuous <Continuous>  insulin regular Infusion 2 Unit(s)/Hr (2 mL/Hr) IV Continuous <Continuous>  levothyroxine 75 MICROGram(s) Oral daily  milrinone Infusion 0.3 MICROgram(s)/kG/Min (5.51 mL/Hr) IV Continuous <Continuous>  norepinephrine Infusion 0.05 MICROgram(s)/kG/Min (5.74 mL/Hr) IV Continuous <Continuous>  pantoprazole    Tablet 40 milliGRAM(s) Oral before breakfast  senna 2 Tablet(s) Oral at bedtime  sodium chloride 0.9%. 1000 milliLiter(s) (10 mL/Hr) IV Continuous <Continuous>  sodium chloride 0.9%. 1000 milliLiter(s) (5 mL/Hr) IV Continuous <Continuous>  vancomycin  IVPB 1000 milliGRAM(s) IV Intermittent every 12 hours    MEDICATIONS  (PRN):  polyethylene glycol 3350 17 Gram(s) Oral daily PRN Constipation          Allergies    No Known Allergies    Intolerances          WEIGHTS:  Daily Height in cm: 157 (24 Feb 2021 12:10)    Daily   Admit Wt: Drug Dosing Weight  Height (cm): 157 (24 Feb 2021 12:10)  Weight (kg): 61.2 (24 Feb 2021 12:10)  BMI (kg/m2): 24.8 (24 Feb 2021 12:10)  BSA (m2): 1.61 (24 Feb 2021 12:10)  I&O's Summary    23 Feb 2021 07:01  -  24 Feb 2021 07:00  --------------------------------------------------------  IN: 120 mL / OUT: 700 mL / NET: -580 mL    24 Feb 2021 07:01  -  25 Feb 2021 04:01  --------------------------------------------------------  IN: 1421.2 mL / OUT: 915 mL / NET: 506.2 mL        VITAL SIGNS:  ICU Vital Signs Last 24 Hrs  T(C): 35.9 (25 Feb 2021 00:20), Max: 37.2 (24 Feb 2021 11:49)  T(F): 96.6 (25 Feb 2021 00:20), Max: 99 (24 Feb 2021 11:49)  HR: 80 (25 Feb 2021 03:15) (66 - 80)  BP: 108/56 (24 Feb 2021 12:10) (93/55 - 115/74)  BP(mean): --  ABP: 112/44 (25 Feb 2021 03:15) (34/40 - 179/50)  ABP(mean): 60 (25 Feb 2021 03:15) (41 - 76)  RR: 10 (25 Feb 2021 03:15) (0 - 30)  SpO2: 98% (25 Feb 2021 03:15) (93% - 100%)        All laboratory results, radiology and medications reviewed.    LABS:  02-25    147<H>  |  108<H>  |  17.0  ----------------------------<  180<H>  4.1   |  23.0  |  1.02    Ca    8.2<L>      25 Feb 2021 02:45  Phos  3.6     02-25  Mg     2.9     02-25    TPro  4.4<L>  /  Alb  2.9<L>  /  TBili  0.9  /  DBili  x   /  AST  39<H>  /  ALT  10  /  AlkPhos  67  02-24                                 10.0   27.97 )-----------( 247      ( 25 Feb 2021 02:45 )             32.6          PT/INR - ( 25 Feb 2021 02:45 )   PT: 15.1 sec;   INR: 1.32 ratio         PTT - ( 25 Feb 2021 02:45 )  PTT:43.7 sec  Bilirubin Total, Serum: 0.9 mg/dL (02-24 @ 20:27)    ABG - ( 25 Feb 2021 02:06 )  pH, Arterial: 7.22  pH, Blood: x     /  pCO2: 63    /  pO2: 83    / HCO3: 22    / Base Excess: -2.5  /  SaO2: 95                    CAPILLARY BLOOD GLUCOSE      POCT Blood Glucose.: 162 mg/dL (25 Feb 2021 03:30)  POCT Blood Glucose.: 182 mg/dL (25 Feb 2021 01:44)  POCT Blood Glucose.: 186 mg/dL (25 Feb 2021 00:41)  POCT Blood Glucose.: 197 mg/dL (24 Feb 2021 23:08)  POCT Blood Glucose.: 196 mg/dL (24 Feb 2021 22:09)             PHYSICAL EXAM:  General:  well nourished, no acute distress  Neurology:  intubated, sedated, unable to fully assess  Respiratory:  clear to auscultation bilaterally  CV:  paced with slow junctional underlying  Abdomen:  soft, nontender, nondistended, positive bowel sounds  Extremities:  warm, well perfused, no edema +DP pulses  Incisions:  midline sternal incision, c/d/i, sternum stable

## 2021-02-25 NOTE — PROGRESS NOTE ADULT - SUBJECTIVE AND OBJECTIVE BOX
[place] [facility] Faculty Practice                                                               Office: [address] [address]                                                              Telephone: [phone]. Fax:[phone]                                                                             PROGRESS NOTE  Reason for follow up: CAD/ICM, HFrEF, AS  Overnight: remains intubated  Update: underwent CABG x3 (LIMA-LAD, SVG-OM1, OM2) and bio-AVR yesterday, remains intubated as not fully awake for CPAP trial, on milrinone, norepinephrine and dopamine for junctional kunal, currently AV pacing; arousable and following commands      Review of symptoms:   unable to obtain    Past medical history: No updates.   	  Vital Signs Last 24 Hrs  T(C): 35.9 (02-25-21 @ 07:15), Max: 37.2 (02-24-21 @ 11:49)  T(F): 96.6 (02-25-21 @ 07:15), Max: 99 (02-24-21 @ 11:49)  HR: 79 (02-25-21 @ 09:30) (69 - 80)  BP: 108/56 (02-24-21 @ 12:10) (93/55 - 108/56)  BP(mean): --  RR: 12 (02-25-21 @ 09:30) (0 - 30)  SpO2: 100% (02-25-21 @ 09:30) (93% - 100%)  I&O's Summary    24 Feb 2021 07:01  -  25 Feb 2021 07:00  --------------------------------------------------------  IN: 1998.6 mL / OUT: 1275 mL / NET: 723.6 mL    25 Feb 2021 07:01  -  25 Feb 2021 09:40  --------------------------------------------------------  IN: 42.1 mL / OUT: 165 mL / NET: -122.9 mL          PHYSICAL EXAM:  Appearance: intubated on vent  HEENT:  +ET tube, OG tube  Neurologic: Awake, alert, following commands  Cardiovascular: Normal S1 S2, No murmur, rubs/gallops. R-IJ [place], mid-line dressing, +chest tubes  Respiratory: Lungs clear to auscultation anteriorly   Gastrointestinal:  Soft, Non-tender, + BS, +briscoe  Lower Extremities: No edema, +ACE wrap  Psychiatry: Patient is calm. No agitation.   Skin: No rashes/ecchymoses/cyanosis/ulcers visualized on the face, hands or feet.      CURRENT MEDICATIONS:  MEDICATIONS  (STANDING):  aspirin enteric coated 81 milliGRAM(s) Oral daily  atorvastatin 40 milliGRAM(s) Oral at bedtime  cefuroxime  IVPB 1500 milliGRAM(s) IV Intermittent every 8 hours  chlorhexidine 0.12% Liquid 5 milliLiter(s) Oral Mucosa two times a day  chlorhexidine 2% Cloths 1 Application(s) Topical daily  dextrose 50% Injectable 50 milliLiter(s) IV Push every 15 minutes  dextrose 50% Injectable 25 milliLiter(s) IV Push every 15 minutes  DOBUTamine Infusion 2.5 MICROgram(s)/kG/Min (4.59 mL/Hr) IV Continuous <Continuous>  DOPamine Infusion 5 MICROgram(s)/kG/Min (11.5 mL/Hr) IV Continuous <Continuous>  EPINEPHrine    Infusion 0.009 MICROgram(s)/kG/Min (2 mL/Hr) IV Continuous <Continuous>  insulin regular Infusion 2 Unit(s)/Hr (2 mL/Hr) IV Continuous <Continuous>  levothyroxine 75 MICROGram(s) Oral daily  milrinone Infusion 0.3 MICROgram(s)/kG/Min (5.51 mL/Hr) IV Continuous <Continuous>  norepinephrine Infusion 0.05 MICROgram(s)/kG/Min (5.74 mL/Hr) IV Continuous <Continuous>  pantoprazole    Tablet 40 milliGRAM(s) Oral before breakfast  senna 2 Tablet(s) Oral at bedtime  sodium chloride 0.9%. 1000 milliLiter(s) (10 mL/Hr) IV Continuous <Continuous>  sodium chloride 0.9%. 1000 milliLiter(s) (5 mL/Hr) IV Continuous <Continuous>    MEDICATIONS  (PRN):  polyethylene glycol 3350 17 Gram(s) Oral daily PRN Constipation      DIAGNOSTIC TESTING:  [ ] Echocardiogram:   < from: TTE Echo Complete w/ Contrast w/ Doppler (02.17.21 @ 14:43) >    Summary:   1. Severely decreased global left ventricular systolic function.   2. Left ventricular ejection fraction, by visual estimation, is 25 to 30%.   3. Normal left ventricular internal cavity size.   4. Severely increased LV wall thickness.   5. Left ventricle chordal calcification. Severe global left ventricle hypokinesis.   6. Mildly enlarged right ventricle.   7. Mildly reduced RV systolic function.   8. Severely enlarged left atrium.   9. Right atrial enlargement.  10. Small secundum atrial septal defect with predominantly left to right shunting across the atrial septum.  11. Severe mitral annular calcification.  12. Severe thickening and calcification of the anterior and posterior mitral valve leaflets.  13. Moderate mitral valve regurgitation.  14. Moderate tricuspid regurgitation.  15. The aortic valve leaflets are heavily calcified. Severe/critical aortic valve stenosis (peak velocity 5.2 m/s, mean gradient 64 mmHg, calculated SREEDHAR by continuity equation 0.35 cm^2, dimensionless index 0.11).  16. Mild aortic regurgitation.  17. Mild pulmonic valve regurgitation.  18. There is at least mild pulmonary hypertension (RVSP    44 mmHg).  19. Trivial pericardial effusion.  20. Moderate pleural effusion in both left and right lateral regions.    < end of copied text >    [ ]  Catheterization:  < from: Cardiac Cath Lab - Adult (02.16.21 @ 08:33) >  CORONARY VESSELS:The coronary circulation is co-dominant.  LM:   --  LM: Normal.  LAD:   --  Mid LAD: There was a tubular 90 % stenosis. The lesion was  eccentric.  CX:   --  OM1: There was a diffuse 85 % stenosis in the proximal third of  the vessel segment. The lesion was irregularly contoured and eccentric.  RCA:   --  Proximal RCA: There was a diffuse 99 % stenosis. The lesion was  irregularly contoured and eccentric.  --  Distal RCA: There was a diffuse 100 % stenosis.  COMPLICATIONS: No complications occurred during the cath lab visit.  DIAGNOSTIC IMPRESSIONS: Moderate Pulmonary Hypertension and elevation of  filling pressures. 2. Critical Aortic Stenosis with a mean PG >40mm Hg and  an SREEDHAR of <0.5cm2. 3. Severe LV Systolic dysfunction by TTE from 2/13/2021  with an estimated LVEF of 30%. 4. Triple Vessel CAD.  DIAGNOSTIC RECOMMENDATIONS: GDMT. 2. CTS consultation.  INTERVENTIONAL IMPRESSIONS: Moderate Pulmonary Hypertension and elevation  of filling pressures. 2. Critical Aortic Stenosis with a mean PG >40mm Hg  and an SREEDHAR of <0.5cm2. 3. Severe LV Systolic dysfunction by TTE from  2/13/2021 with an estimated LVEF of 30%. 4. Triple Vessel CAD.  INTERVENTIONAL RECOMMENDATIONS: GDMT. 2. CTS consultation.   Labs:    Labs:                        10.0   27.97 )-----------( 247      ( 25 Feb 2021 02:45 )             32.6     02-25    147<H>  |  108<H>  |  17.0  ----------------------------<  180<H>  4.1   |  23.0  |  1.02    Ca    8.2<L>      25 Feb 2021 02:45  Phos  3.6     02-25  Mg     2.9     02-25    TPro  4.4<L>  /  Alb  2.9<L>  /  TBili  0.9  /  DBili  x   /  AST  39<H>  /  ALT  10  /  AlkPhos  67  02-24 17 Feb 2021 11:36 Troponin 1.35 ng/mL / Creatine Kinase x     /  CKMB x     / CPK Mass Assay % x       17 Feb 2021 01:45 Troponin 1.78 ng/mL / Creatine Kinase x     /  CKMB x     / CPK Mass Assay % x       16 Feb 2021 23:27 Troponin 1.83 ng/mL / Creatine Kinase 49 U/L /  CKMB x     / CPK Mass Assay % x          Serum Pro-Brain Natriuretic Peptide: 32209 pg/mL (02-21-21 @ 06:04)  Serum Pro-Brain Natriuretic Peptide: 02792 pg/mL (02-20-21 @ 06:31)  Serum Pro-Brain Natriuretic Peptide: 80772 pg/mL (02-16-21 @ 12:38)    Cholesterol 105 mg/dL; Direct LDL --; HDL Cholesterol, Serum 39 mg/dL; HDL/ Total Cholesterol Ratio Measurement --; Total Cholesterol/ HDL Ratio Measurement --; Triglycerides, Serum 62 mg/dL  A1C with Estimated Average Glucose Result: 5.2 % (02-16-21 @ 12:38)      TELEMETRY: AV pacing 80s, PVCs, accelerated junctional

## 2021-02-26 LAB
ALBUMIN SERPL ELPH-MCNC: 3.8 G/DL — SIGNIFICANT CHANGE UP (ref 3.3–5.2)
ALP SERPL-CCNC: 68 U/L — SIGNIFICANT CHANGE UP (ref 40–120)
ALT FLD-CCNC: 13 U/L — SIGNIFICANT CHANGE UP
ANION GAP SERPL CALC-SCNC: 15 MMOL/L — SIGNIFICANT CHANGE UP (ref 5–17)
ANION GAP SERPL CALC-SCNC: 16 MMOL/L — SIGNIFICANT CHANGE UP (ref 5–17)
ANION GAP SERPL CALC-SCNC: 16 MMOL/L — SIGNIFICANT CHANGE UP (ref 5–17)
AST SERPL-CCNC: 29 U/L — SIGNIFICANT CHANGE UP
BASE EXCESS BLDV CALC-SCNC: -5.3 MMOL/L — LOW (ref -2–2)
BILIRUB SERPL-MCNC: 1 MG/DL — SIGNIFICANT CHANGE UP (ref 0.4–2)
BUN SERPL-MCNC: 27 MG/DL — HIGH (ref 8–20)
BUN SERPL-MCNC: 29 MG/DL — HIGH (ref 8–20)
BUN SERPL-MCNC: 32 MG/DL — HIGH (ref 8–20)
CALCIUM SERPL-MCNC: 9.1 MG/DL — SIGNIFICANT CHANGE UP (ref 8.6–10.2)
CALCIUM SERPL-MCNC: 9.1 MG/DL — SIGNIFICANT CHANGE UP (ref 8.6–10.2)
CALCIUM SERPL-MCNC: 9.2 MG/DL — SIGNIFICANT CHANGE UP (ref 8.6–10.2)
CHLORIDE SERPL-SCNC: 102 MMOL/L — SIGNIFICANT CHANGE UP (ref 98–107)
CHLORIDE SERPL-SCNC: 105 MMOL/L — SIGNIFICANT CHANGE UP (ref 98–107)
CHLORIDE SERPL-SCNC: 106 MMOL/L — SIGNIFICANT CHANGE UP (ref 98–107)
CO2 SERPL-SCNC: 19 MMOL/L — LOW (ref 22–29)
CO2 SERPL-SCNC: 20 MMOL/L — LOW (ref 22–29)
CO2 SERPL-SCNC: 21 MMOL/L — LOW (ref 22–29)
CREAT SERPL-MCNC: 1.78 MG/DL — HIGH (ref 0.5–1.3)
CREAT SERPL-MCNC: 1.98 MG/DL — HIGH (ref 0.5–1.3)
CREAT SERPL-MCNC: 2.16 MG/DL — HIGH (ref 0.5–1.3)
GAS PNL BLDA: SIGNIFICANT CHANGE UP
GAS PNL BLDV: SIGNIFICANT CHANGE UP
GLUCOSE BLDC GLUCOMTR-MCNC: 157 MG/DL — HIGH (ref 70–99)
GLUCOSE BLDC GLUCOMTR-MCNC: 159 MG/DL — HIGH (ref 70–99)
GLUCOSE BLDC GLUCOMTR-MCNC: 166 MG/DL — HIGH (ref 70–99)
GLUCOSE SERPL-MCNC: 140 MG/DL — HIGH (ref 70–99)
GLUCOSE SERPL-MCNC: 145 MG/DL — HIGH (ref 70–99)
GLUCOSE SERPL-MCNC: 149 MG/DL — HIGH (ref 70–99)
HCO3 BLDV-SCNC: 20 MMOL/L — LOW (ref 21–29)
HCT VFR BLD CALC: 31.3 % — LOW (ref 34.5–45)
HGB BLD-MCNC: 10 G/DL — LOW (ref 11.5–15.5)
MAGNESIUM SERPL-MCNC: 2.8 MG/DL — HIGH (ref 1.6–2.6)
MCHC RBC-ENTMCNC: 27.5 PG — SIGNIFICANT CHANGE UP (ref 27–34)
MCHC RBC-ENTMCNC: 31.9 GM/DL — LOW (ref 32–36)
MCV RBC AUTO: 86.2 FL — SIGNIFICANT CHANGE UP (ref 80–100)
PCO2 BLDV: 36 MMHG — SIGNIFICANT CHANGE UP (ref 35–50)
PH BLDV: 7.34 — SIGNIFICANT CHANGE UP (ref 7.32–7.43)
PLATELET # BLD AUTO: 129 K/UL — LOW (ref 150–400)
PO2 BLDV: 43 MMHG — SIGNIFICANT CHANGE UP (ref 25–45)
POTASSIUM SERPL-MCNC: 4.7 MMOL/L — SIGNIFICANT CHANGE UP (ref 3.5–5.3)
POTASSIUM SERPL-MCNC: 5 MMOL/L — SIGNIFICANT CHANGE UP (ref 3.5–5.3)
POTASSIUM SERPL-MCNC: 5.3 MMOL/L — SIGNIFICANT CHANGE UP (ref 3.5–5.3)
POTASSIUM SERPL-SCNC: 4.7 MMOL/L — SIGNIFICANT CHANGE UP (ref 3.5–5.3)
POTASSIUM SERPL-SCNC: 5 MMOL/L — SIGNIFICANT CHANGE UP (ref 3.5–5.3)
POTASSIUM SERPL-SCNC: 5.3 MMOL/L — SIGNIFICANT CHANGE UP (ref 3.5–5.3)
PROT SERPL-MCNC: 5.5 G/DL — LOW (ref 6.6–8.7)
RBC # BLD: 3.63 M/UL — LOW (ref 3.8–5.2)
RBC # FLD: 18.6 % — HIGH (ref 10.3–14.5)
SAO2 % BLDV: 77 % — SIGNIFICANT CHANGE UP
SODIUM SERPL-SCNC: 136 MMOL/L — SIGNIFICANT CHANGE UP (ref 135–145)
SODIUM SERPL-SCNC: 141 MMOL/L — SIGNIFICANT CHANGE UP (ref 135–145)
SODIUM SERPL-SCNC: 142 MMOL/L — SIGNIFICANT CHANGE UP (ref 135–145)
WBC # BLD: 23.07 K/UL — HIGH (ref 3.8–10.5)
WBC # FLD AUTO: 23.07 K/UL — HIGH (ref 3.8–10.5)

## 2021-02-26 PROCEDURE — 71045 X-RAY EXAM CHEST 1 VIEW: CPT | Mod: 26

## 2021-02-26 PROCEDURE — 93306 TTE W/DOPPLER COMPLETE: CPT | Mod: 26

## 2021-02-26 PROCEDURE — 93010 ELECTROCARDIOGRAM REPORT: CPT

## 2021-02-26 PROCEDURE — 99223 1ST HOSP IP/OBS HIGH 75: CPT

## 2021-02-26 PROCEDURE — 99233 SBSQ HOSP IP/OBS HIGH 50: CPT

## 2021-02-26 RX ORDER — ACETAMINOPHEN 500 MG
1000 TABLET ORAL ONCE
Refills: 0 | Status: COMPLETED | OUTPATIENT
Start: 2021-02-26 | End: 2021-02-26

## 2021-02-26 RX ORDER — HYDROMORPHONE HYDROCHLORIDE 2 MG/ML
0.25 INJECTION INTRAMUSCULAR; INTRAVENOUS; SUBCUTANEOUS ONCE
Refills: 0 | Status: DISCONTINUED | OUTPATIENT
Start: 2021-02-26 | End: 2021-02-26

## 2021-02-26 RX ORDER — FUROSEMIDE 40 MG
20 TABLET ORAL
Qty: 500 | Refills: 0 | Status: DISCONTINUED | OUTPATIENT
Start: 2021-02-26 | End: 2021-02-26

## 2021-02-26 RX ORDER — SODIUM ZIRCONIUM CYCLOSILICATE 10 G/10G
10 POWDER, FOR SUSPENSION ORAL DAILY
Refills: 0 | Status: DISCONTINUED | OUTPATIENT
Start: 2021-02-27 | End: 2021-02-27

## 2021-02-26 RX ORDER — ALBUMIN HUMAN 25 %
250 VIAL (ML) INTRAVENOUS ONCE
Refills: 0 | Status: COMPLETED | OUTPATIENT
Start: 2021-02-26 | End: 2021-02-26

## 2021-02-26 RX ORDER — DOBUTAMINE HCL 250MG/20ML
1.25 VIAL (ML) INTRAVENOUS
Qty: 500 | Refills: 0 | Status: DISCONTINUED | OUTPATIENT
Start: 2021-02-26 | End: 2021-03-01

## 2021-02-26 RX ORDER — SODIUM ZIRCONIUM CYCLOSILICATE 10 G/10G
10 POWDER, FOR SUSPENSION ORAL THREE TIMES A DAY
Refills: 0 | Status: DISCONTINUED | OUTPATIENT
Start: 2021-02-26 | End: 2021-02-26

## 2021-02-26 RX ORDER — VASOPRESSIN 20 [USP'U]/ML
0.04 INJECTION INTRAVENOUS
Qty: 50 | Refills: 0 | Status: DISCONTINUED | OUTPATIENT
Start: 2021-02-26 | End: 2021-02-27

## 2021-02-26 RX ORDER — BUMETANIDE 0.25 MG/ML
0.5 INJECTION INTRAMUSCULAR; INTRAVENOUS
Qty: 20 | Refills: 0 | Status: DISCONTINUED | OUTPATIENT
Start: 2021-02-26 | End: 2021-02-27

## 2021-02-26 RX ADMIN — CHLORHEXIDINE GLUCONATE 1 APPLICATION(S): 213 SOLUTION TOPICAL at 13:01

## 2021-02-26 RX ADMIN — HYDROMORPHONE HYDROCHLORIDE 0.25 MILLIGRAM(S): 2 INJECTION INTRAMUSCULAR; INTRAVENOUS; SUBCUTANEOUS at 18:14

## 2021-02-26 RX ADMIN — Medication 100 MILLIGRAM(S): at 14:09

## 2021-02-26 RX ADMIN — Medication 125 MILLILITER(S): at 14:10

## 2021-02-26 RX ADMIN — Medication 100 MILLIGRAM(S): at 06:12

## 2021-02-26 RX ADMIN — BUMETANIDE 10 MG/HR: 0.25 INJECTION INTRAMUSCULAR; INTRAVENOUS at 09:29

## 2021-02-26 RX ADMIN — HEPARIN SODIUM 5000 UNIT(S): 5000 INJECTION INTRAVENOUS; SUBCUTANEOUS at 13:01

## 2021-02-26 RX ADMIN — Medication 400 MILLIGRAM(S): at 03:38

## 2021-02-26 RX ADMIN — Medication 10 MG/HR: at 02:37

## 2021-02-26 RX ADMIN — Medication 81 MILLIGRAM(S): at 13:01

## 2021-02-26 RX ADMIN — Medication 250 MILLIGRAM(S): at 02:34

## 2021-02-26 RX ADMIN — Medication 75 MICROGRAM(S): at 06:11

## 2021-02-26 RX ADMIN — BUMETANIDE 10 MG/HR: 0.25 INJECTION INTRAMUSCULAR; INTRAVENOUS at 22:08

## 2021-02-26 RX ADMIN — PANTOPRAZOLE SODIUM 40 MILLIGRAM(S): 20 TABLET, DELAYED RELEASE ORAL at 06:11

## 2021-02-26 RX ADMIN — HEPARIN SODIUM 5000 UNIT(S): 5000 INJECTION INTRAVENOUS; SUBCUTANEOUS at 06:12

## 2021-02-26 RX ADMIN — Medication 1: at 13:00

## 2021-02-26 RX ADMIN — Medication 5.74 MICROGRAM(S)/KG/MIN: at 02:16

## 2021-02-26 RX ADMIN — HYDROMORPHONE HYDROCHLORIDE 0.25 MILLIGRAM(S): 2 INJECTION INTRAMUSCULAR; INTRAVENOUS; SUBCUTANEOUS at 09:00

## 2021-02-26 RX ADMIN — SENNA PLUS 2 TABLET(S): 8.6 TABLET ORAL at 22:08

## 2021-02-26 RX ADMIN — ATORVASTATIN CALCIUM 40 MILLIGRAM(S): 80 TABLET, FILM COATED ORAL at 22:08

## 2021-02-26 RX ADMIN — Medication 125 MILLILITER(S): at 08:22

## 2021-02-26 RX ADMIN — Medication 1: at 22:10

## 2021-02-26 RX ADMIN — HYDROMORPHONE HYDROCHLORIDE 0.25 MILLIGRAM(S): 2 INJECTION INTRAMUSCULAR; INTRAVENOUS; SUBCUTANEOUS at 04:30

## 2021-02-26 RX ADMIN — Medication 400 MILLIGRAM(S): at 14:27

## 2021-02-26 RX ADMIN — HEPARIN SODIUM 5000 UNIT(S): 5000 INJECTION INTRAVENOUS; SUBCUTANEOUS at 22:08

## 2021-02-26 RX ADMIN — Medication 1: at 17:18

## 2021-02-26 RX ADMIN — Medication 9.18 MICROGRAM(S)/KG/MIN: at 06:53

## 2021-02-26 NOTE — PHYSICAL THERAPY INITIAL EVALUATION ADULT - GENERAL OBSERVATIONS, REHAB EVAL
Pt. OOB; +monitor, Right IJ central, [place], Radial a-line, chest tubes, briscoe, pacer box Pt. OOB; +monitor, high flow O2,  Right IJ central, [place]-day[place], Radial a-line, chest tubes, briscoe, pacer box

## 2021-02-26 NOTE — PHYSICAL THERAPY INITIAL EVALUATION ADULT - CRITERIA FOR SKILLED THERAPEUTIC INTERVENTIONS
impairments found/rehab potential/anticipated discharge recommendation
Home with RW (pending re-eval post op)/anticipated discharge recommendation

## 2021-02-26 NOTE — PROGRESS NOTE ADULT - ASSESSMENT
76 year old Female with a PMH of iron-deficiency anemia, hypothyroidism, and GI bleed presented to [facility] with sob and fevers found to have urosepsis, anemia s/p transfusion, NSTEMI, severe AS and ischemic cardiomyopathy (EF 25-30%).  Patient was transfer to [facility] and underwent a cardiac cath on 2/16 which revealed Multivessel CAD and severe AS. Further workup revealed moderate Right ICA stenosis and severe Left ICA stenosis. Vascular surgery was consulted and cleared patient for open heart surgery. Patient also underwent Endoscopy 2/17 and Colonoscopy on 2/19 which revealed a hiatal hernia, extensive diverticulitis, external hemorrhoids, but no active source of bleeding was identified and patient was cleared for OR from GI standpoint. Now POD #2 s/p CABG x3 (LIMA-LAD, SVG-OM1, OM2) and bio-AVR (23mm Capps) complicated by CHB with junctional escape requiring continuous pacing support.     Recommendations:   Pt continues to requiring substantial inotropic support. TTE this AM with no effusion/tamponade and EF improved to 50 - 55%.   Pt will likely require long term pacing support. Will reassess    76 year old Female with a PMH of iron-deficiency anemia, hypothyroidism, and GI bleed presented to [facility] with sob and fevers found to have urosepsis, anemia s/p transfusion, NSTEMI, severe AS and ischemic cardiomyopathy (EF 25-30%).  Patient was transfer to [facility] and underwent a cardiac cath on 2/16 which revealed Multivessel CAD and severe AS. Further workup revealed moderate Right ICA stenosis and severe Left ICA stenosis. Vascular surgery was consulted and cleared patient for open heart surgery. Patient also underwent Endoscopy 2/17 and Colonoscopy on 2/19 which revealed a hiatal hernia, extensive diverticulitis, external hemorrhoids, but no active source of bleeding was identified and patient was cleared for OR from GI standpoint. Now POD #2 s/p CABG x3 (LIMA-LAD, SVG-OM1, OM2) and bio-AVR (23mm C[place]) complicated by CHB with junctional escape requiring continuous pacing support.     Recommendations:   TTE this AM with no effusion/tamponade and EF improved to 50 - 55% with pt substantial inotropic support. Will reassess EF w/ limited TTE 2/28/21.  Pt will likely require long term pacing support and is anticipated to have a significant RV pacing burden (certainly >40%).   Pending EF off inotropes, and clinical course, anticipate CRT pacemaker vs ICD implant 3/1/21.   Will d/w Dr. Shipley; further recs to follow.

## 2021-02-26 NOTE — PROGRESS NOTE ADULT - SUBJECTIVE AND OBJECTIVE BOX
Pt w/ decreased urine output overnight, maintained on milrinone/dobutamine/norepinepherine. Still requiring pacing w/ underlying sinus rhythm and complete heart block w/ narrow escape (~120ms) at 40s bpm.     MEDICATIONS  (STANDING):  albumin human  5% IVPB 250 milliLiter(s) IV Intermittent once  aspirin enteric coated 81 milliGRAM(s) Oral daily  atorvastatin 40 milliGRAM(s) Oral at bedtime  buMETAnide Infusion 2 mG/Hr (10 mL/Hr) IV Continuous <Continuous>  cefuroxime  IVPB 1500 milliGRAM(s) IV Intermittent every 8 hours  chlorhexidine 0.12% Liquid 5 milliLiter(s) Oral Mucosa two times a day  chlorhexidine 2% Cloths 1 Application(s) Topical daily  dextrose 40% Gel 15 Gram(s) Oral once  dextrose 5%. 1000 milliLiter(s) (50 mL/Hr) IV Continuous <Continuous>  dextrose 5%. 1000 milliLiter(s) (100 mL/Hr) IV Continuous <Continuous>  dextrose 50% Injectable 50 milliLiter(s) IV Push every 15 minutes  dextrose 50% Injectable 25 milliLiter(s) IV Push every 15 minutes  DOBUTamine Infusion 5 MICROgram(s)/kG/Min (9.18 mL/Hr) IV Continuous <Continuous>  glucagon  Injectable 1 milliGRAM(s) IntraMuscular once  heparin   Injectable 5000 Unit(s) SubCutaneous every 8 hours  insulin lispro (ADMELOG) corrective regimen sliding scale   SubCutaneous Before meals and at bedtime  levothyroxine 75 MICROGram(s) Oral daily  milrinone Infusion 0.125 MICROgram(s)/kG/Min (2.3 mL/Hr) IV Continuous <Continuous>  norepinephrine Infusion 0.05 MICROgram(s)/kG/Min (5.74 mL/Hr) IV Continuous <Continuous>  pantoprazole    Tablet 40 milliGRAM(s) Oral before breakfast  senna 2 Tablet(s) Oral at bedtime  sodium chloride 0.9%. 1000 milliLiter(s) (10 mL/Hr) IV Continuous <Continuous>  sodium chloride 0.9%. 1000 milliLiter(s) (5 mL/Hr) IV Continuous <Continuous>  vasopressin Infusion 0.04 Unit(s)/Min (2.4 mL/Hr) IV Continuous <Continuous>    MEDICATIONS  (PRN):  polyethylene glycol 3350 17 Gram(s) Oral daily PRN Constipation      Allergies  No Known Allergies    Vital Signs Last 24 Hrs  T(C): 36.3 (26 Feb 2021 12:00), Max: 36.6 (26 Feb 2021 04:00)  T(F): 97.3 (26 Feb 2021 12:00), Max: 97.9 (26 Feb 2021 04:00)  HR: 75 (26 Feb 2021 12:00) (68 - 79)  BP: 133/76 (26 Feb 2021 12:00) (89/44 - 134/62)  BP(mean): 100 (26 Feb 2021 12:00) (64 - 100)  RR: 15 (26 Feb 2021 12:00) (10 - 33)  SpO2: 100% (26 Feb 2021 12:00) (87% - 100%)    Physical Exam:  Constitutional: NAD, AAOx3  Cardiovascular: +S1S2 RRR, MSI C/D/I, Chest tubes in place  Pulmonary: CTA b/l, unlabored  GI: soft NTND +BS  Extremities: RLE w/ ace wrap, no edema b/l LEs, +distal pulses b/l  Neuro: non focal, YATES x4    LABS:                        10.0   23.07 )-----------( 129      ( 26 Feb 2021 04:05 )             31.3     02-26    141  |  105  |  29.0<H>  ----------------------------<  149<H>  5.0   |  20.0<L>  |  1.98<H>    Ca    9.1      26 Feb 2021 08:13  Phos  3.6     02-25  Mg     2.8     02-26    TPro  4.4<L>  /  Alb  2.9<L>  /  TBili  0.9  /  DBili  x   /  AST  39<H>  /  ALT  10  /  AlkPhos  67  02-24    PT/INR - ( 25 Feb 2021 02:45 )   PT: 15.1 sec;   INR: 1.32 ratio         PTT - ( 25 Feb 2021 02:45 )  PTT:43.7 sec      RADIOLOGY & ADDITIONAL TESTS:  < from: TTE Echo Complete w/o Contrast w/ Doppler (02.26.21 @ 08:16) >  Summary:   1. Left ventricular ejection fraction, by visual estimation, is 25 to 30%.   2. Severely decreased global left ventricular systolic function.   3. Severely increased LV wall thickness.   4. Abnormal septal motion consistent with post-operative status.   5. Severely enlarged left atrium.   6. Severe mitral annular calcification.   7. Moderate to severe thickening of the anterior andposterior mitral valve leaflets.   8. Mild mitral valve regurgitation.   9. Mild tricuspid regurgitation.  10. Bioprosthesis in the aortic position. Mild aortic valve regurgitation is seen, appears paravalvular in origin.  11. Trivial pericardial effusion.  12. Compared with prior LUIS MIGUEL dated 2/24/21, there has been interval placement of a bioprosthetic valve in the aortic position.    CORTEZ Dorsey. Electronically signed on 2/26/2021 at 10:19:06 AM  *** Final (Amended) ***  There has been interval improvement of LVEF to 50-55%. LVEF in report above was mistakenly entered.  Amanda Perez  Electronically Amended 2/26/2021, 10:25 AM    < end of copied text >

## 2021-02-26 NOTE — PROGRESS NOTE ADULT - PROBLEM SELECTOR PLAN 9
SCDs, HSQ for DVT prophylaxis.  Pantoprazole for GI prophylaxis.    Plan for OR Wednesday 2/24/21.   Plan to be discussed further with CT Surgery attending / team in AM rounds.

## 2021-02-26 NOTE — PROGRESS NOTE ADULT - SUBJECTIVE AND OBJECTIVE BOX
[facility]                                                               Office: [address] [address]                                                              Telephone: [phone]. Fax:[phone]                                                                             PROGRESS NOTE  Reason for follow up: CAD/ICM/HFrEF, AS  Overnight: extubated yesterday, remains on pressors and inotropes, low urine output with Lasix drip and KIMMY, given albumin overnight  Update: switching to Bumex drip, KIMMY worsening with low urine output, remains on milrinone/dobutamine, norepinephrine; still AV-pacing, off pacer with kunal 40s likely in CHB; complaining of chest wall pain, discomfort      Review of symptoms:   Cardiac:  +chest pain. No dyspnea. No palpitations.  Respiratory: No cough. No dyspnea  Gastrointestinal: No diarrhea. No abdominal pain. No bleeding.     Past medical history: No updates.   	  Vital Signs Last 24 Hrs  T(C): 36 (02-26-21 @ 09:00), Max: 36.6 (02-26-21 @ 04:00)  T(F): 96.8 (02-26-21 @ 09:00), Max: 97.9 (02-26-21 @ 04:00)  HR: 69 (02-26-21 @ 09:00) (68 - 79)  BP: 101/50 (02-26-21 @ 08:51) (89/44 - 134/62)  BP(mean): 72 (02-26-21 @ 08:51) (64 - 89)  RR: 10 (02-26-21 @ 09:00) (9 - 33)  SpO2: 100% (02-26-21 @ 09:00) (87% - 100%)  I&O's Summary    25 Feb 2021 07:01  -  26 Feb 2021 07:00  --------------------------------------------------------  IN: 2734.3 mL / OUT: 2101 mL / NET: 633.3 mL    26 Feb 2021 07:01  -  26 Feb 2021 09:27  --------------------------------------------------------  IN: 574.6 mL / OUT: 165 mL / NET: 409.6 mL          PHYSICAL EXAM:  Appearance: Comfortable. Mild distress due to pain  HEENT:  Head and neck: Atraumatic. Normocephalic.  Normal oral mucosa, PERRL,   Neurologic: A&Ox 3, no focal deficits. EOMI, Cranial nerves are intact.  Lymphatic: No cervical lymphadenopathy  Cardiovascular: Normal S1 S2, No murmur, rubs/gallops. R-IJ Holmes, midline dressing, +chest tubes  Respiratory: Lungs clear to auscultation  Gastrointestinal:  Soft, Non-tender, + BS  Lower Extremities: No edema, ACE bandaged R-leg  Psychiatry: Patient is calm. No agitation. Mood & affect appropriate  Skin: No rashes/ecchymoses/cyanosis/ulcers visualized on the face, hands or feet.      CURRENT MEDICATIONS:  MEDICATIONS  (STANDING):  aspirin enteric coated 81 milliGRAM(s) Oral daily  atorvastatin 40 milliGRAM(s) Oral at bedtime  buMETAnide Infusion 2 mG/Hr (10 mL/Hr) IV Continuous <Continuous>  cefuroxime  IVPB 1500 milliGRAM(s) IV Intermittent every 8 hours  chlorhexidine 0.12% Liquid 5 milliLiter(s) Oral Mucosa two times a day  chlorhexidine 2% Cloths 1 Application(s) Topical daily  dextrose 40% Gel 15 Gram(s) Oral once  dextrose 5%. 1000 milliLiter(s) (50 mL/Hr) IV Continuous <Continuous>  dextrose 5%. 1000 milliLiter(s) (100 mL/Hr) IV Continuous <Continuous>  dextrose 50% Injectable 50 milliLiter(s) IV Push every 15 minutes  dextrose 50% Injectable 25 milliLiter(s) IV Push every 15 minutes  DOBUTamine Infusion 5 MICROgram(s)/kG/Min (9.18 mL/Hr) IV Continuous <Continuous>  DOPamine Infusion 2.5 MICROgram(s)/kG/Min (5.74 mL/Hr) IV Continuous <Continuous>  glucagon  Injectable 1 milliGRAM(s) IntraMuscular once  heparin   Injectable 5000 Unit(s) SubCutaneous every 8 hours  insulin lispro (ADMELOG) corrective regimen sliding scale   SubCutaneous Before meals and at bedtime  levothyroxine 75 MICROGram(s) Oral daily  milrinone Infusion 0.3 MICROgram(s)/kG/Min (5.51 mL/Hr) IV Continuous <Continuous>  norepinephrine Infusion 0.05 MICROgram(s)/kG/Min (5.74 mL/Hr) IV Continuous <Continuous>  pantoprazole    Tablet 40 milliGRAM(s) Oral before breakfast  senna 2 Tablet(s) Oral at bedtime  sodium chloride 0.9%. 1000 milliLiter(s) (10 mL/Hr) IV Continuous <Continuous>  sodium chloride 0.9%. 1000 milliLiter(s) (5 mL/Hr) IV Continuous <Continuous>  vasopressin Infusion 0.04 Unit(s)/Min (2.4 mL/Hr) IV Continuous <Continuous>    MEDICATIONS  (PRN):  polyethylene glycol 3350 17 Gram(s) Oral daily PRN Constipation      DIAGNOSTIC TESTING:  [ ] Echocardiogram:   t< from: LUIS MIGUEL Echo Doppler (02.24.21 @ 13:27) >  Summary:   1. Left ventricular ejection fraction, by visual estimation, is 25 to 30%.   2. Severely decreased global left ventricular systolic function.   3. Mildly enlarged right ventricle with mildly reduced systolic function, inadequate estimation of RVSP. Catheter present in the RV.   4. Moderate mitral valve regurgitation.   5. Moderate to severe posterior mitral annular calcification.   6. Moderate thickening and calcification of the posterior mitral valve leaflet.   7. Mitral valve mean gradient is 2 mmHg (at HR of 60s bpm).   8. Severely enlarged left atrium.   9. Prominent left atrial appendage, no left atrial appendage thrombus and decreased left atrial appendage velocities.  10. Large patent foramen ovale, with predominantly left to right shunting across the atrial septum.  11. Severe calcific aortic valve stenosis by visual assessment, transgastric gradient not able to be obtained.  12. Mild aortic regurgitation.  13. Postop image was not obtained.    < end of copied text >    [ ]  Catheterization:  < from: Cardiac Cath Lab - Adult (02.16.21 @ 08:33) >  VENTRICLES: EF by echo was 30 %.  VALVES: AORTIC VALVE: There was critical aortic stenosis.  CORONARY VESSELS:The coronary circulation is co-dominant.  LM:   --  LM: Normal.  LAD:   --  Mid LAD: There was a tubular 90 % stenosis. The lesion was  eccentric.  CX:   --  OM1: There was a diffuse 85 % stenosis in the proximal third of  the vessel segment. The lesion was irregularly contoured and eccentric.  RCA:   --  Proximal RCA: There was a diffuse 99 % stenosis. The lesion was  irregularly contoured and eccentric.  --  Distal RCA: There was a diffuse 100 % stenosis.  COMPLICATIONS: No complications occurred during the cath lab visit.  DIAGNOSTIC IMPRESSIONS: Moderate Pulmonary Hypertension and elevation of  filling pressures. 2. Critical Aortic Stenosis with a mean PG >40mm Hg and  an SREEDHAR of <0.5cm2. 3. Severe LV Systolic dysfunction by TTE from 2/13/2021  with an estimated LVEF of 30%. 4. Triple Vessel CAD.  DIAGNOSTIC RECOMMENDATIONS: GDMT. 2. CTS consultation.  INTERVENTIONAL IMPRESSIONS: Moderate Pulmonary Hypertension and elevation  of filling pressures. 2. Critical Aortic Stenosis with a mean PG >40mm Hg  and an SREEDHAR of <0.5cm2. 3. Severe LV Systolic dysfunction by TTE from  2/13/2021 with an estimated LVEF of 30%. 4. Triple Vessel CAD.    < end of copied text >    [ ] Stress Test:      Labs:                        10.0   23.07 )-----------( 129      ( 26 Feb 2021 04:05 )             31.3     02-26    141  |  105  |  29.0<H>  ----------------------------<  149<H>  5.0   |  20.0<L>  |  1.98<H>    Ca    9.1      26 Feb 2021 08:13  Phos  3.6     02-25  Mg     2.8     02-26    TPro  4.4<L>  /  Alb  2.9<L>  /  TBili  0.9  /  DBili  x   /  AST  39<H>  /  ALT  10  /  AlkPhos  67  02-24 17 Feb 2021 11:36 Troponin 1.35 ng/mL / Creatine Kinase x     /  CKMB x     / CPK Mass Assay % x       17 Feb 2021 01:45 Troponin 1.78 ng/mL / Creatine Kinase x     /  CKMB x     / CPK Mass Assay % x       16 Feb 2021 23:27 Troponin 1.83 ng/mL / Creatine Kinase 49 U/L /  CKMB x     / CPK Mass Assay % x          Serum Pro-Brain Natriuretic Peptide: 76747 pg/mL (02-21-21 @ 06:04)  Serum Pro-Brain Natriuretic Peptide: 34513 pg/mL (02-20-21 @ 06:31)  Serum Pro-Brain Natriuretic Peptide: 34567 pg/mL (02-16-21 @ 12:38)    Cholesterol 105 mg/dL; Direct LDL --; HDL Cholesterol, Serum 39 mg/dL; HDL/ Total Cholesterol Ratio Measurement --; Total Cholesterol/ HDL Ratio Measurement --; Triglycerides, Serum 62 mg/dL  A1C with Estimated Average Glucose Result: 5.2 % (02-16-21 @ 12:38)      TELEMETRY: AV pacing 70s, off pacer kunal 40-50s, CHB

## 2021-02-26 NOTE — CONSULT NOTE ADULT - SUBJECTIVE AND OBJECTIVE BOX
[facility] DIVISION OF KIDNEY DISEASES AND HYPERTENSION -- INITIAL CONSULT NOTE  --------------------------------------------------------------------------------  HPI:  76 year old Female with a PMH of iron-deficiency anemia, hypothyroidism, and GI bleed 1 year ago (refused Endo/colonoscopy at that time) presented to [facility] originally with fever and SOB.  She was found to have urosepsis and was treated with Rocephin (completed course 2/17). Anemia noted with a Hgb of 6 and 2 units of PRBC transfused.  Patient wanted to sign out AMA from [place], but then ruled in for a NSTEMI. LUIS MIGUEL revealed "Moderately to severely reduced LVSF with moderate diffuse hypokinesis with regional variations. Severe Aortic stenosis. EF 25-30%".  Patient agreed to be transfer to [facility] for further workup and treatement. She underwent a cardiac cath on 2/16 which revealed Multivessel CAD and severe AS. Further workup revealed moderate Right ICA stenosis and severe Left ICA stenosis on carotid US, which was confirmed on CTA neck. Vascular surgery was consulted and cleared patient for open heart surgery with no plan for CEA at this time. Patient underwent Endoscopy 2/17 and Colonoscopy on 2/19 which revealed a hiatal hernia, extensive diverticulitis and external hemorrhoids. No source of bleeding was identified and  patient was cleared for OR from GI standpoint. Patient underwent CABG x3 (LIMA-LAD, SVG-OM1, OM2) and bio-AVR (23mm Capps) on 2/24/21 by Dr Cruz. Post procedure, patient required pacing support.   Pt seen/examined; on bumex drip 2mg / hr        PAST HISTORY  --------------------------------------------------------------------------------  PAST MEDICAL & SURGICAL HISTORY:  Iron deficiency anemia due to chronic blood loss    Hypothyroid    History of tonsillectomy      FAMILY HISTORY:  Family history of cardiac disorder (Father)    Family history of diabetes mellitus (Father)      PAST SOCIAL HISTORY:    ALLERGIES & MEDICATIONS  --------------------------------------------------------------------------------  Allergies    No Known Allergies    Intolerances      Standing Inpatient Medications  albumin human  5% IVPB 250 milliLiter(s) IV Intermittent once  aspirin enteric coated 81 milliGRAM(s) Oral daily  atorvastatin 40 milliGRAM(s) Oral at bedtime  buMETAnide Infusion 2 mG/Hr IV Continuous <Continuous>  cefuroxime  IVPB 1500 milliGRAM(s) IV Intermittent every 8 hours  chlorhexidine 0.12% Liquid 5 milliLiter(s) Oral Mucosa two times a day  chlorhexidine 2% Cloths 1 Application(s) Topical daily  dextrose 40% Gel 15 Gram(s) Oral once  dextrose 5%. 1000 milliLiter(s) IV Continuous <Continuous>  dextrose 5%. 1000 milliLiter(s) IV Continuous <Continuous>  dextrose 50% Injectable 50 milliLiter(s) IV Push every 15 minutes  dextrose 50% Injectable 25 milliLiter(s) IV Push every 15 minutes  DOBUTamine Infusion 5 MICROgram(s)/kG/Min IV Continuous <Continuous>  glucagon  Injectable 1 milliGRAM(s) IntraMuscular once  heparin   Injectable 5000 Unit(s) SubCutaneous every 8 hours  insulin lispro (ADMELOG) corrective regimen sliding scale   SubCutaneous Before meals and at bedtime  levothyroxine 75 MICROGram(s) Oral daily  milrinone Infusion 0.125 MICROgram(s)/kG/Min IV Continuous <Continuous>  norepinephrine Infusion 0.05 MICROgram(s)/kG/Min IV Continuous <Continuous>  pantoprazole    Tablet 40 milliGRAM(s) Oral before breakfast  senna 2 Tablet(s) Oral at bedtime  sodium chloride 0.9%. 1000 milliLiter(s) IV Continuous <Continuous>  sodium chloride 0.9%. 1000 milliLiter(s) IV Continuous <Continuous>  vasopressin Infusion 0.04 Unit(s)/Min IV Continuous <Continuous>    PRN Inpatient Medications  polyethylene glycol 3350 17 Gram(s) Oral daily PRN      REVIEW OF SYSTEMS  --------------------------------------------------------------------------------  Gen: No weight changes, fatigue, fevers/chills, weakness  Skin: No rashes  Head/Eyes/Ears/Mouth: No headache; Normal hearing; Normal vision w/o blurriness; No sinus pain/discomfort, sore throat  Respiratory: No dyspnea, cough, wheezing, hemoptysis  CV: No chest pain, PND, orthopnea  GI: No abdominal pain, diarrhea, constipation, nausea, vomiting, melena, hematochezia  : No increased frequency, dysuria, hematuria, nocturia  MSK: No joint pain/swelling; no back pain; no edema  Neuro: No dizziness/lightheadedness, weakness, seizures, numbness, tingling  Heme: No easy bruising or bleeding  Endo: No heat/cold intolerance  Psych: No significant nervousness, anxiety, stress, depression    All other systems were reviewed and are negative, except as noted.    VITALS/PHYSICAL EXAM  --------------------------------------------------------------------------------  T(C): 36.3 (02-26-21 @ 12:00), Max: 36.6 (02-26-21 @ 04:00)  HR: 75 (02-26-21 @ 12:00) (68 - 79)  BP: 133/76 (02-26-21 @ 12:00) (89/44 - 134/62)  RR: 15 (02-26-21 @ 12:00) (10 - 33)  SpO2: 100% (02-26-21 @ 12:00) (87% - 100%)  Wt(kg): --        02-25-21 @ 07:01  -  02-26-21 @ 07:00  --------------------------------------------------------  IN: 2734.3 mL / OUT: 2101 mL / NET: 633.3 mL    02-26-21 @ 07:01  -  02-26-21 @ 12:48  --------------------------------------------------------  IN: 711 mL / OUT: 585 mL / NET: 126 mL      Physical Exam:  	Gen: NAD   	HEENT: PERRL, supple neck, clear oropharynx  	Pulm: CTA B/L  	CV: RRR, S1S2; no rub  	Back: No spinal or CVA tenderness; no sacral edema  	Abd: +BS, soft, nontender/nondistended  	: No suprapubic tenderness  	UE: Warm, FROM, no clubbing, intact strength; no edema; no asterixis  	LE: Warm, FROM, no clubbing, intact strength; no edema  	Neuro: No focal deficits, intact gait  	Psych: Normal affect and mood  	Skin: Warm, without rashes  	Vascular access:    LABS/STUDIES  --------------------------------------------------------------------------------              10.0   23.07 >-----------<  129      [02-26-21 @ 04:05]              31.3     141  |  105  |  29.0  ----------------------------<  149      [02-26-21 @ 08:13]  5.0   |  20.0  |  1.98        Ca     9.1     [02-26-21 @ 08:13]      Mg     2.8     [02-26-21 @ 02:34]      Phos  3.6     [02-25-21 @ 02:45]    TPro  4.4  /  Alb  2.9  /  TBili  0.9  /  DBili  x   /  AST  39  /  ALT  10  /  AlkPhos  67  [02-24-21 @ 20:27]    PT/INR: PT 15.1 , INR 1.32       [02-25-21 @ 02:45]  PTT: 43.7       [02-25-21 @ 02:45]      Creatinine Trend:  SCr 1.98 [02-26 @ 08:13]  SCr 1.78 [02-26 @ 02:34]  SCr 1.41 [02-25 @ 15:53]  SCr 1.02 [02-25 @ 02:45]  SCr 0.52 [02-24 @ 20:27]    Urinalysis - [02-16-21 @ 22:38]      Color Yellow / Appearance Clear / SG 1.020 / pH 5.0      Gluc Negative / Ketone Negative  / Bili Negative / Urobili Negative       Blood Negative / Protein 30 / Leuk Est Trace / Nitrite Negative      RBC Negative / WBC 3-5 / Hyaline  / Gran  / Sq Epi  / Non Sq Epi Moderate / Bacteria       TSH 9.90      [02-24-21 @ 05:35]  Lipid: chol 105, TG 62, HDL 39, LDL --      [02-17-21 @ 07:04]

## 2021-02-26 NOTE — PHYSICAL THERAPY INITIAL EVALUATION ADULT - ADDITIONAL COMMENTS
Pt lives in a private home with her spouse (recently admitted to [facility], Unknown condition) Pt also has a supportive son. 5-6 steps to enter with handrails, no steps inside. Pt was independent PTA without assist device. Pt owns "everything" with regard to DME (confirmed multiple RWs, SAC, Showerchair and commode)
History from pre-op PT eval.  Pt. lives in a private home with 5-6 steps to enter with handrail.

## 2021-02-26 NOTE — PROGRESS NOTE ADULT - ASSESSMENT
76 F,  PMH of anemia, hypothyroidism, and GI bleed 1 year ago (refused Endo/colonoscopy and never followed up) presented to [facility] originally with fever and SOB.  She was found to have a UTI/sepsis with elevated lactate, + UA, and temp of 102.  She was treated with Rocephin (completed course 2/17). Anemia noted with a Hgb of 6 and 2 units of PRBC transfused.  Patient wanted to sign out AMA from [place], but then ruled in for a NSTEMI, so she was agreeable to transfer to [facility] for cardiac cath. Cardiac cath 2/16 revealed Multivessel CAD and Severe AS. Further workup revealed moderate Right ICA stenosis and Severe Left ICA stenosis on carotid US, which was confirmed on CTA neck. Vascular surgery was consulted and cleared patient for open heart surgery with no plan for CEA at this time. Patient underwent Endoscopy 2/17 as part of her GI anemia workup, which only revealed a hiatal hernia. Colonoscopy 2/19 without bleeding or acute pathology. Cleared for OR from GI standpoint. Pt is now s/p CABG/AVR with Dr. Cruz on 2/24/21.

## 2021-02-26 NOTE — PROGRESS NOTE ADULT - SUBJECTIVE AND OBJECTIVE BOX
Subjective:  75yo F extubated, resting comfortable, c/o mild pain.      HPI:  75y/o female never smoker with history of hypothyroid and DIVYA who was brought to [facility] for dyspnea and diarrhea and was found to have melena. She received 2 units of PRBC for a hemoglobin of 6.9. She was found to have a troponin of 656 and 710 and a pro-BNP of 29,809. An echo showed moderately to severely reduced LVSF with moderate diffuse hypokinesis with regional variations, moderate concentric LVH and severe AS. She was also diagnosed with sepsis secondary to a UTI. She admits to GANN (walking up one flight of stairs or < 100 feet). She also states recent black stools, and loss of approximately 30 pounds over 1 year secondary to poor PO intake secondary to ageusia. She denies chest pain, orthopnea, PND, palpitations or syncope/near syncope.    Symptoms:        Angina (Class): N/A       Ischemic Symptoms: GANN (CCs class 3 anginal equivalent)    Heart Failure: ACC/AHA stage C, NYHA functional class 3 HFrEF    Assessment of LVEF:       EF: 25-30%       Assessed by: Echo       Date: 2021    Prior Cardiac Interventions:       PCI's: N/A       CABG: N/A    Noninvasive Testing:   Stress Test: N/A    Echo: 2021       LVSF: Moderately to severely reduced LVSF with moderate diffuse hypokinesis with regional variations. Moderate concentric LVH.       EF: 25-30%       RVSF: Poorly visualized, mild to moderate pulmonary hypertension.       LA: Mildly dilated       RA: Normal       Mitral Valve: Severely to moderately calcified leaflets, severe MAC, moderate MR. Possible vegetation       Aortic Valve: Moderately calcified leaflets, mild AR, severe AS (max gradient: 77, mean gradient: 43).       Tricuspid Valve: Poorly visualized, mild TR.       Pulmonic Valve: Poorly visualized, no AZ.    XR: Enlarged but stable cardiac silhouette.    Antianginal Therapies:        Beta Blockers: Toprol 25 mg daily       Calcium Channel Blockers: N/A       Long Acting Nitrates: N/A       Ranexa: N/A    Associated Risk Factors:        Cerebrovascular Disease: N/A       Chronic Lung Disease: N/A       Peripheral Arterial Disease: N/A       Chronic Kidney Disease (if yes, what is GFR): N/A       Uncontrolled Diabetes (if yes, what is HgbA1C or FBS): N/A       Poorly Controlled Hypertension (if yes, what is SBP): N/A       Morbid Obesity (if yes, what is BMI): N/A       History of Recent Ventricular Arrhythmia: N/A       Inability to Ambulate Safely: N/A       Need for Therapeutic Anticoagulation: N/A       Antiplatelet or Contrast Allergy: N/A (2021 07:35)          PAST MEDICAL & SURGICAL HISTORY:  Iron deficiency anemia due to chronic blood loss    Hypothyroid    History of tonsillectomy            MEDICATIONS  (STANDING):  aspirin enteric coated 81 milliGRAM(s) Oral daily  atorvastatin 40 milliGRAM(s) Oral at bedtime  cefuroxime  IVPB 1500 milliGRAM(s) IV Intermittent every 8 hours  chlorhexidine 0.12% Liquid 5 milliLiter(s) Oral Mucosa two times a day  chlorhexidine 2% Cloths 1 Application(s) Topical daily  dextrose 40% Gel 15 Gram(s) Oral once  dextrose 5%. 1000 milliLiter(s) (50 mL/Hr) IV Continuous <Continuous>  dextrose 5%. 1000 milliLiter(s) (100 mL/Hr) IV Continuous <Continuous>  dextrose 50% Injectable 50 milliLiter(s) IV Push every 15 minutes  dextrose 50% Injectable 25 milliLiter(s) IV Push every 15 minutes  DOPamine Infusion 2.5 MICROgram(s)/kG/Min (5.74 mL/Hr) IV Continuous <Continuous>  furosemide Infusion 20 mG/Hr (10 mL/Hr) IV Continuous <Continuous>  glucagon  Injectable 1 milliGRAM(s) IntraMuscular once  heparin   Injectable 5000 Unit(s) SubCutaneous every 8 hours  insulin lispro (ADMELOG) corrective regimen sliding scale   SubCutaneous Before meals and at bedtime  levothyroxine 75 MICROGram(s) Oral daily  milrinone Infusion 0.3 MICROgram(s)/kG/Min (5.51 mL/Hr) IV Continuous <Continuous>  norepinephrine Infusion 0.05 MICROgram(s)/kG/Min (5.74 mL/Hr) IV Continuous <Continuous>  pantoprazole    Tablet 40 milliGRAM(s) Oral before breakfast  senna 2 Tablet(s) Oral at bedtime  sodium chloride 0.9%. 1000 milliLiter(s) (10 mL/Hr) IV Continuous <Continuous>  sodium chloride 0.9%. 1000 milliLiter(s) (5 mL/Hr) IV Continuous <Continuous>    MEDICATIONS  (PRN):  polyethylene glycol 3350 17 Gram(s) Oral daily PRN Constipation          Allergies    No Known Allergies    Intolerances          WEIGHTS:  Daily     Daily Weight in k.9 (2021 07:30)  Admit Wt: Drug Dosing Weight  Height (cm): 157 (2021 12:10)  Weight (kg): 61.2 (2021 12:10)  BMI (kg/m2): 24.8 (2021 12:10)  BSA (m2): 1.61 (2021 12:10)  I&O's Summary    2021 07:01  -  2021 07:00  --------------------------------------------------------  IN: 1998.6 mL / OUT: 1275 mL / NET: 723.6 mL    2021 07:01  -  2021 03:08  --------------------------------------------------------  IN: 2166.7 mL / OUT: 1756 mL / NET: 410.7 mL        VITAL SIGNS:  ICU Vital Signs Last 24 Hrs  T(C): 36.4 (2021 00:00), Max: 36.5 (2021 18:00)  T(F): 97.5 (2021 00:00), Max: 97.7 (2021 18:00)  HR: 68 (2021 03:00) (68 - 80)  BP: --  BP(mean): --  ABP: 148/51 (2021 03:00) (96/40 - 170/44)  ABP(mean): 78 (2021 03:00) (53 - 79)  RR: 28 (2021 03:00) (0 - 33)  SpO2: 97% (2021 03:00) (87% - 100%)        All laboratory results, radiology and medications reviewed.    LABS:      142  |  106  |  27.0<H>  ----------------------------<  140<H>  5.3   |  21.0<L>  |  1.78<H>    Ca    9.2      2021 02:34  Phos  3.6       Mg     2.8         TPro  4.4<L>  /  Alb  2.9<L>  /  TBili  0.9  /  DBili  x   /  AST  39<H>  /  ALT  10  /  AlkPhos  67                                   8.8    23.91 )-----------( 143      ( 2021 22:44 )             27.2          PT/INR - ( 2021 02:45 )   PT: 15.1 sec;   INR: 1.32 ratio         PTT - ( 2021 02:45 )  PTT:43.7 sec    ABG - ( 2021 15:46 )  pH, Arterial: 7.46  pH, Blood: x     /  pCO2: 37    /  pO2: 222   / HCO3: 26    / Base Excess: 2.3   /  SaO2: >100                  CAPILLARY BLOOD GLUCOSE      POCT Blood Glucose.: 116 mg/dL (2021 22:15)  POCT Blood Glucose.: 118 mg/dL (2021 20:14)  POCT Blood Glucose.: 121 mg/dL (2021 18:11)  POCT Blood Glucose.: 142 mg/dL (2021 15:07)  POCT Blood Glucose.: 145 mg/dL (2021 13:07)  POCT Blood Glucose.: 139 mg/dL (2021 11:03)  POCT Blood Glucose.: 138 mg/dL (2021 10:05)  POCT Blood Glucose.: 132 mg/dL (2021 09:10)  POCT Blood Glucose.: 123 mg/dL (2021 08:15)  POCT Blood Glucose.: 119 mg/dL (2021 07:53)  POCT Blood Glucose.: 151 mg/dL (2021 05:22)  POCT Blood Glucose.: 157 mg/dL (2021 04:23)  POCT Blood Glucose.: 162 mg/dL (2021 03:30)             PHYSICAL EXAM:  General:  well nourished, no acute distress  Neurology:  alert and oriented X 4, nonfocal, no gross deficits  Respiratory:  clear to auscultation bilaterally  CV:  paced with temporary wires, underlying CHB with ventricular excape, rate 40s  Abdomen:  soft, nontender, nondistended, positive bowel sounds  Extremities:  warm, perfused, no edema +DP pulses  Incisions:  midline sternal incision, c/d/i, sternum stable

## 2021-02-26 NOTE — PROGRESS NOTE ADULT - ASSESSMENT
76y Female with PMH anemia, and hypothyroidism.  Pt presented to [facility] a few days ago with fever and SOB.  She was found to have a UTI/sepsis with elevated lactate, + UA, and temp of 102.  She was started on Rocephin.  HGB was 6.  She was transfused 2 units of PRBC.  Per medical record, a year ago she had a GI bleed and was offered an endoscopy and colonoscopy but she refused and then failed to follow up outpatient.  Per medical record she did not want to stay at [place] but agreed once she ruled in for NSTEMI.  She was transferred to [facility] for cardiac cath.     2/16 - s/p LHC showing EF 30%. LM normal, mLAD 90%, OM1 85%, pRCA 99%, dRCA 100%, mod pulm  HTN, SREEDHAR <0.5 consistent with critical AS  2-17- EGD with old blood suspect from pharynx/epistaxis?, poor bowel prep  2/18- plan for repeat colonoscopy  2/19- diverticulosis on colonoscopy   2/24- underwent CABG x3 (LIMA-LAD, SVG-OM1, OM2) and #23 bio-AVR,  2/25- remains intubated as not fully awake for CPAP trial, on milrinone, norepinephrine and dopamine for junctional kunal, CHB, currently AV pacing; arousable and following commands  2/26- extubated yesterday, remains on inotropes and vasopressors, KIMMY on Lasix gtt--> Bumex, albumin, [place] with PAD ~14,        HFEF/ICM, Cardiogenic shock- postop  - on milrinone/dobutamine, norepinephrine, vasopressin  - repeat bedside TTE prelim LV EF ~25%, trace apical effusion, mild aortic regurgitation paravalvular? normal RV size  - check [place] numbers for cardiac output, SVR and PCWP to determine stroke volume and filling status as PAD 14 not suggestive of elevated left sided filling pressure  - not able to add GDMT for now    KIMMY  - monitor urine output with diuretic, check [place] for PCWP reading, consider fluid challenge if low  - nephrology eavl.     CHB- postop  - epicardial AV pacing  - continue to monitor underlying rhythm, EPS following, may need CRT-D    Severe Multivessel CAD s/p CABG x3  - cath LHC mLAD 90%, OM1 85%, pRCA 99%, dRCA 100%  - continue ASA 81, statin, LDL 54  - monitor chest tubes output  - PT/OOB when able      Critical AS s/p bio-AVR  -continue ASA 81    Large PFO  -incidental noted on intraop LUIS MIGUEL with predominate L-to-R shunt  -continue to monitor, consider percutaneous closure in the future if episode of CVA or systemic emboli    Acute blood loss anemia  - received x2 PRBC at [place] before transfer, since H/H stable.   - occult blood positive, source unclear, colonoscopy with diverticulosis  - monitor for recurrent bleeding, pAfib          Chet Grimalod DO, [facility]  Faculty Non-Invasive Cardiologist  [phone]   76y Female with PMH anemia, and hypothyroidism.  Pt presented to [facility] a few days ago with fever and SOB.  She was found to have a UTI/sepsis with elevated lactate, + UA, and temp of 102.  She was started on Rocephin.  HGB was 6.  She was transfused 2 units of PRBC.  Per medical record, a year ago she had a GI bleed and was offered an endoscopy and colonoscopy but she refused and then failed to follow up outpatient.  Per medical record she did not want to stay at [place] but agreed once she ruled in for NSTEMI.  She was transferred to [facility] for cardiac cath.     2/16 - s/p LHC showing EF 30%. LM normal, mLAD 90%, OM1 85%, pRCA 99%, dRCA 100%, mod pulm  HTN, SREEDHAR <0.5 consistent with critical AS  2-17- EGD with old blood suspect from pharynx/epistaxis?, poor bowel prep  2/18- plan for repeat colonoscopy  2/19- diverticulosis on colonoscopy   2/24- underwent CABG x3 (LIMA-LAD, SVG-OM1, OM2) and #23 bio-AVR,  2/25- remains intubated as not fully awake for CPAP trial, on milrinone, norepinephrine and dopamine for junctional kunal, CHB, currently AV pacing; arousable and following commands  2/26- extubated yesterday, remains on inotropes and vasopressors, KIMMY on Lasix gtt--> Bumex, albumin, [place] with PAD ~14,        HFEF/ICM, Cardiogenic shock- postop  - on milrinone/dobutamine, norepinephrine, vasopressin  - repeat bedside TTE prelim LV EF ~25%, trace apical effusion, mild aortic regurgitation paravalvular? normal RV size  - check [place] numbers for cardiac output, SVR and PCWP to determine stroke volume and filling status as PAD 14 not suggestive of elevated left sided filling pressure  - not able to add GDMT for now    KIMMY  - monitor urine output with diuretic, check [place] for PCWP reading, consider fluid challenge if low  - nephrology eavl.     CHB- postop  - epicardial AV pacing  - continue to monitor underlying rhythm, EPS following, may need CRT-D    Severe Multivessel CAD s/p CABG x3  - cath LHC mLAD 90%, OM1 85%, pRCA 99%, dRCA 100%  - continue ASA 81, statin, LDL 54  - monitor chest tubes output  - PT/OOB when able      Critical AS s/p bio-AVR  -continue ASA 81    Large PFO  -incidental noted on intraop LUIS MIGUEL with predominate L-to-R shunt  -continue to monitor, consider percutaneous closure in the future if episode of CVA or systemic emboli    Acute blood loss anemia  - received x2 PRBC at [place] before transfer, 2 PRBC overnight- monitor H/H   - occult blood positive, source unclear, colonoscopy with diverticulosis  - monitor for recurrent bleeding, pAfib          Chet Grimaldo DO, [facility]  Faculty Non-Invasive Cardiologist  [phone]

## 2021-02-26 NOTE — CONSULT NOTE ADULT - ASSESSMENT
1) ATN  2) Oliguria  3) Hypotension  4) CHF    On bumex drip 2mg/hr  Decrease to 1mg/hr  Good urine output  Strict IO  Trend SCr    dw CTS

## 2021-02-27 LAB
ANION GAP SERPL CALC-SCNC: 18 MMOL/L — HIGH (ref 5–17)
BUN SERPL-MCNC: 33 MG/DL — HIGH (ref 8–20)
CALCIUM SERPL-MCNC: 9.1 MG/DL — SIGNIFICANT CHANGE UP (ref 8.6–10.2)
CHLORIDE SERPL-SCNC: 100 MMOL/L — SIGNIFICANT CHANGE UP (ref 98–107)
CO2 SERPL-SCNC: 20 MMOL/L — LOW (ref 22–29)
CREAT SERPL-MCNC: 2.19 MG/DL — HIGH (ref 0.5–1.3)
GLUCOSE BLDC GLUCOMTR-MCNC: 125 MG/DL — HIGH (ref 70–99)
GLUCOSE BLDC GLUCOMTR-MCNC: 132 MG/DL — HIGH (ref 70–99)
GLUCOSE BLDC GLUCOMTR-MCNC: 135 MG/DL — HIGH (ref 70–99)
GLUCOSE BLDC GLUCOMTR-MCNC: 184 MG/DL — HIGH (ref 70–99)
GLUCOSE SERPL-MCNC: 134 MG/DL — HIGH (ref 70–99)
HCT VFR BLD CALC: 29.3 % — LOW (ref 34.5–45)
HGB BLD-MCNC: 9.3 G/DL — LOW (ref 11.5–15.5)
MAGNESIUM SERPL-MCNC: 2.5 MG/DL — SIGNIFICANT CHANGE UP (ref 1.6–2.6)
MCHC RBC-ENTMCNC: 27.5 PG — SIGNIFICANT CHANGE UP (ref 27–34)
MCHC RBC-ENTMCNC: 31.7 GM/DL — LOW (ref 32–36)
MCV RBC AUTO: 86.7 FL — SIGNIFICANT CHANGE UP (ref 80–100)
PLATELET # BLD AUTO: 99 K/UL — LOW (ref 150–400)
POTASSIUM SERPL-MCNC: 4.5 MMOL/L — SIGNIFICANT CHANGE UP (ref 3.5–5.3)
POTASSIUM SERPL-SCNC: 4.5 MMOL/L — SIGNIFICANT CHANGE UP (ref 3.5–5.3)
RBC # BLD: 3.38 M/UL — LOW (ref 3.8–5.2)
RBC # FLD: 19.3 % — HIGH (ref 10.3–14.5)
SODIUM SERPL-SCNC: 138 MMOL/L — SIGNIFICANT CHANGE UP (ref 135–145)
WBC # BLD: 15.22 K/UL — HIGH (ref 3.8–10.5)
WBC # FLD AUTO: 15.22 K/UL — HIGH (ref 3.8–10.5)

## 2021-02-27 PROCEDURE — 99233 SBSQ HOSP IP/OBS HIGH 50: CPT

## 2021-02-27 PROCEDURE — 93010 ELECTROCARDIOGRAM REPORT: CPT

## 2021-02-27 PROCEDURE — 71045 X-RAY EXAM CHEST 1 VIEW: CPT | Mod: 26

## 2021-02-27 PROCEDURE — 99222 1ST HOSP IP/OBS MODERATE 55: CPT | Mod: 24

## 2021-02-27 RX ORDER — SODIUM CHLORIDE 9 MG/ML
500 INJECTION, SOLUTION INTRAVENOUS ONCE
Refills: 0 | Status: COMPLETED | OUTPATIENT
Start: 2021-02-27 | End: 2021-02-27

## 2021-02-27 RX ORDER — ALBUMIN HUMAN 25 %
250 VIAL (ML) INTRAVENOUS
Refills: 0 | Status: COMPLETED | OUTPATIENT
Start: 2021-02-27 | End: 2021-02-27

## 2021-02-27 RX ORDER — HYDROMORPHONE HYDROCHLORIDE 2 MG/ML
0.25 INJECTION INTRAMUSCULAR; INTRAVENOUS; SUBCUTANEOUS ONCE
Refills: 0 | Status: DISCONTINUED | OUTPATIENT
Start: 2021-02-27 | End: 2021-02-27

## 2021-02-27 RX ORDER — ALBUMIN HUMAN 25 %
250 VIAL (ML) INTRAVENOUS ONCE
Refills: 0 | Status: COMPLETED | OUTPATIENT
Start: 2021-02-27 | End: 2021-02-27

## 2021-02-27 RX ORDER — BUMETANIDE 0.25 MG/ML
1 INJECTION INTRAMUSCULAR; INTRAVENOUS EVERY 12 HOURS
Refills: 0 | Status: DISCONTINUED | OUTPATIENT
Start: 2021-02-27 | End: 2021-02-28

## 2021-02-27 RX ORDER — FAMOTIDINE 10 MG/ML
20 INJECTION INTRAVENOUS DAILY
Refills: 0 | Status: DISCONTINUED | OUTPATIENT
Start: 2021-02-27 | End: 2021-03-12

## 2021-02-27 RX ORDER — MAGNESIUM SULFATE 500 MG/ML
2 VIAL (ML) INJECTION ONCE
Refills: 0 | Status: COMPLETED | OUTPATIENT
Start: 2021-02-27 | End: 2021-02-27

## 2021-02-27 RX ORDER — BUMETANIDE 0.25 MG/ML
2 INJECTION INTRAMUSCULAR; INTRAVENOUS EVERY 12 HOURS
Refills: 0 | Status: DISCONTINUED | OUTPATIENT
Start: 2021-02-27 | End: 2021-02-27

## 2021-02-27 RX ADMIN — CHLORHEXIDINE GLUCONATE 5 MILLILITER(S): 213 SOLUTION TOPICAL at 05:44

## 2021-02-27 RX ADMIN — HEPARIN SODIUM 5000 UNIT(S): 5000 INJECTION INTRAVENOUS; SUBCUTANEOUS at 21:28

## 2021-02-27 RX ADMIN — VASOPRESSIN 2.4 UNIT(S)/MIN: 20 INJECTION INTRAVENOUS at 03:53

## 2021-02-27 RX ADMIN — HYDROMORPHONE HYDROCHLORIDE 0.25 MILLIGRAM(S): 2 INJECTION INTRAMUSCULAR; INTRAVENOUS; SUBCUTANEOUS at 01:03

## 2021-02-27 RX ADMIN — SODIUM CHLORIDE 500 MILLILITER(S): 9 INJECTION, SOLUTION INTRAVENOUS at 10:31

## 2021-02-27 RX ADMIN — PANTOPRAZOLE SODIUM 40 MILLIGRAM(S): 20 TABLET, DELAYED RELEASE ORAL at 05:44

## 2021-02-27 RX ADMIN — BUMETANIDE 1 MILLIGRAM(S): 0.25 INJECTION INTRAMUSCULAR; INTRAVENOUS at 17:38

## 2021-02-27 RX ADMIN — Medication 9.18 MICROGRAM(S)/KG/MIN: at 03:53

## 2021-02-27 RX ADMIN — Medication 1: at 21:37

## 2021-02-27 RX ADMIN — HEPARIN SODIUM 5000 UNIT(S): 5000 INJECTION INTRAVENOUS; SUBCUTANEOUS at 14:26

## 2021-02-27 RX ADMIN — Medication 50 GRAM(S): at 03:53

## 2021-02-27 RX ADMIN — CHLORHEXIDINE GLUCONATE 1 APPLICATION(S): 213 SOLUTION TOPICAL at 14:26

## 2021-02-27 RX ADMIN — HEPARIN SODIUM 5000 UNIT(S): 5000 INJECTION INTRAVENOUS; SUBCUTANEOUS at 05:44

## 2021-02-27 RX ADMIN — Medication 250 MILLILITER(S): at 18:26

## 2021-02-27 RX ADMIN — FAMOTIDINE 20 MILLIGRAM(S): 10 INJECTION INTRAVENOUS at 21:29

## 2021-02-27 RX ADMIN — Medication 125 MILLILITER(S): at 02:21

## 2021-02-27 RX ADMIN — Medication 5.74 MICROGRAM(S)/KG/MIN: at 02:22

## 2021-02-27 RX ADMIN — Medication 125 MILLILITER(S): at 00:00

## 2021-02-27 RX ADMIN — Medication 250 MILLILITER(S): at 17:38

## 2021-02-27 RX ADMIN — Medication 81 MILLIGRAM(S): at 14:26

## 2021-02-27 RX ADMIN — Medication 75 MICROGRAM(S): at 05:44

## 2021-02-27 RX ADMIN — ATORVASTATIN CALCIUM 40 MILLIGRAM(S): 80 TABLET, FILM COATED ORAL at 21:29

## 2021-02-27 RX ADMIN — SENNA PLUS 2 TABLET(S): 8.6 TABLET ORAL at 21:29

## 2021-02-27 RX ADMIN — Medication 125 MILLILITER(S): at 19:20

## 2021-02-27 NOTE — PROGRESS NOTE ADULT - SUBJECTIVE AND OBJECTIVE BOX
Brief Hospital Course:     Significant recent/past 24 hr events:    Subjective: no c/o incisional pain at this time. Denies CP, SOB, palpitations, N/V, other c/o.    Review of Systems    Unable to obtain due to: intubated & sedated altered mental status _______________    ROS negative x 10 systems except as noted above      Patient is a 76y old  Female who presents with a chief complaint of ATN (26 Feb 2021 12:46)    HPI:  75y/o female never smoker with history of hypothyroid and DIVYA who was brought to [facility] for dyspnea and diarrhea and was found to have melena. She received 2 units of PRBC for a hemoglobin of 6.9. She was found to have a troponin of 656 and 710 and a pro-BNP of 29,809. An echo showed moderately to severely reduced LVSF with moderate diffuse hypokinesis with regional variations, moderate concentric LVH and severe AS. She was also diagnosed with sepsis secondary to a UTI. She admits to GANN (walking up one flight of stairs or < 100 feet). She also states recent black stools, and loss of approximately 30 pounds over 1 year secondary to poor PO intake secondary to ageusia. She denies chest pain, orthopnea, PND, palpitations or syncope/near syncope.    Symptoms:        Angina (Class): N/A       Ischemic Symptoms: GANN (CCs class 3 anginal equivalent)    Heart Failure: ACC/AHA stage C, NYHA functional class 3 HFrEF    Assessment of LVEF:       EF: 25-30%       Assessed by: Echo       Date: 2/13/2021    Prior Cardiac Interventions:       PCI's: N/A       CABG: N/A    Noninvasive Testing:   Stress Test: N/A    Echo: 2/13/2021       LVSF: Moderately to severely reduced LVSF with moderate diffuse hypokinesis with regional variations. Moderate concentric LVH.       EF: 25-30%       RVSF: Poorly visualized, mild to moderate pulmonary hypertension.       LA: Mildly dilated       RA: Normal       Mitral Valve: Severely to moderately calcified leaflets, severe MAC, moderate MR. Possible vegetation       Aortic Valve: Moderately calcified leaflets, mild AR, severe AS (max gradient: 77, mean gradient: 43).       Tricuspid Valve: Poorly visualized, mild TR.       Pulmonic Valve: Poorly visualized, no VA.    XR: Enlarged but stable cardiac silhouette.    Antianginal Therapies:        Beta Blockers: Toprol 25 mg daily       Calcium Channel Blockers: N/A       Long Acting Nitrates: N/A       Ranexa: N/A    Associated Risk Factors:        Cerebrovascular Disease: N/A       Chronic Lung Disease: N/A       Peripheral Arterial Disease: N/A       Chronic Kidney Disease (if yes, what is GFR): N/A       Uncontrolled Diabetes (if yes, what is HgbA1C or FBS): N/A       Poorly Controlled Hypertension (if yes, what is SBP): N/A       Morbid Obesity (if yes, what is BMI): N/A       History of Recent Ventricular Arrhythmia: N/A       Inability to Ambulate Safely: N/A       Need for Therapeutic Anticoagulation: N/A       Antiplatelet or Contrast Allergy: N/A (16 Feb 2021 07:35)    PAST MEDICAL & SURGICAL HISTORY:  Iron deficiency anemia due to chronic blood loss    Hypothyroid    History of tonsillectomy      FAMILY HISTORY:  Family history of cardiac disorder (Father)    Family history of diabetes mellitus (Father)        Vitals   ICU Vital Signs Last 24 Hrs  T(C): 36 (27 Feb 2021 00:13), Max: 36.7 (26 Feb 2021 16:23)  T(F): 96.8 (27 Feb 2021 00:13), Max: 98.1 (26 Feb 2021 16:23)  HR: 80 (27 Feb 2021 00:45) (61 - 80)  BP: 120/61 (27 Feb 2021 00:30) (85/50 - 144/63)  BP(mean): 83 (27 Feb 2021 00:30) (64 - 100)  ABP: 158/50 (27 Feb 2021 00:45) (29/27 - 166/54)  ABP(mean): 74 (27 Feb 2021 00:45) (32 - 88)  RR: 20 (27 Feb 2021 00:45) (10 - 31)  SpO2: 100% (27 Feb 2021 00:45) (95% - 100%)      VENT SETTINGS       I&O's Detail    25 Feb 2021 07:01  -  26 Feb 2021 07:00  --------------------------------------------------------  IN:    Albumin 5%  - 250 mL: 500 mL    DOBUTamine: 9.2 mL    DOPamine Infusion: 34.2 mL    DOPamine Infusion: 34.5 mL    Furosemide: 50 mL    Insulin: 28 mL    IV PiggyBack: 300 mL    IV PiggyBack: 150 mL    Milrinone: 110.4 mL    Norepinephrine: 199 mL    Oral Fluid: 340 mL    PRBCs (Packed Red Blood Cells): 319 mL    PRBCs (Packed Red Blood Cells): 300 mL    sodium chloride 0.9%: 240 mL    sodium chloride 0.9%: 120 mL  Total IN: 2734.3 mL    OUT:    Chest Tube (mL): 30 mL    Chest Tube (mL): 180 mL    Chest Tube (mL): 430 mL    Chest Tube (mL): 930 mL    EPINEPHrine: 0 mL    Indwelling Catheter - Urethral (mL): 530 mL    Voided (mL): 1 mL  Total OUT: 2101 mL    Total NET: 633.3 mL      26 Feb 2021 07:01  -  27 Feb 2021 01:26  --------------------------------------------------------  IN:    Albumin 5%  - 250 mL: 500 mL    Bumetanide: 90 mL    DOBUTamine: 156.4 mL    IV PiggyBack: 100 mL    IV PiggyBack: 50 mL    Milrinone: 6.9 mL    Milrinone: 9.2 mL    Milrinone: 13.8 mL    Norepinephrine: 130 mL    Oral Fluid: 480 mL    sodium chloride 0.9%: 170 mL    sodium chloride 0.9%: 85 mL    Vasopressin: 33 mL  Total IN: 1824.3 mL    OUT:    Chest Tube (mL): 50 mL    Chest Tube (mL): 360 mL    Chest Tube (mL): 230 mL    DOPamine Infusion: 0 mL    Indwelling Catheter - Urethral (mL): 1205 mL  Total OUT: 1845 mL    Total NET: -20.7 mL          LABS                        10.0   23.07 )-----------( 129      ( 26 Feb 2021 04:05 )             31.3     02-26    136  |  102  |  32.0<H>  ----------------------------<  145<H>  4.7   |  19.0<L>  |  2.16<H>    Ca    9.1      26 Feb 2021 22:34  Phos  3.6     02-25  Mg     2.8     02-26    TPro  5.5<L>  /  Alb  3.8  /  TBili  1.0  /  DBili  x   /  AST  29  /  ALT  13  /  AlkPhos  68  02-26    PT/INR - ( 25 Feb 2021 02:45 )   PT: 15.1 sec;   INR: 1.32 ratio         PTT - ( 25 Feb 2021 02:45 )  PTT:43.7 sec      ABG - ( 26 Feb 2021 08:09 )  pH, Arterial: 7.39  pH, Blood: x     /  pCO2: 37    /  pO2: 99    / HCO3: 22    / Base Excess: -2.5  /  SaO2: 98                  POCT Blood Glucose.: 159 mg/dL (02-26-21 @ 22:10)  POCT Blood Glucose.: 157 mg/dL (02-26-21 @ 16:30)  POCT Blood Glucose.: 166 mg/dL (02-26-21 @ 12:12)        MEDICATIONS  (STANDING):  aspirin enteric coated 81 milliGRAM(s) Oral daily  atorvastatin 40 milliGRAM(s) Oral at bedtime  buMETAnide Infusion 2 mG/Hr (10 mL/Hr) IV Continuous <Continuous>  chlorhexidine 0.12% Liquid 5 milliLiter(s) Oral Mucosa two times a day  chlorhexidine 2% Cloths 1 Application(s) Topical daily  DOBUTamine Infusion 5 MICROgram(s)/kG/Min (9.18 mL/Hr) IV Continuous <Continuous>  glucagon  Injectable 1 milliGRAM(s) IntraMuscular once  heparin   Injectable 5000 Unit(s) SubCutaneous every 8 hours  insulin lispro (ADMELOG) corrective regimen sliding scale   SubCutaneous Before meals and at bedtime  levothyroxine 75 MICROGram(s) Oral daily  milrinone Infusion 0.125 MICROgram(s)/kG/Min (2.3 mL/Hr) IV Continuous <Continuous>  norepinephrine Infusion 0.05 MICROgram(s)/kG/Min (5.74 mL/Hr) IV Continuous <Continuous>  pantoprazole    Tablet 40 milliGRAM(s) Oral before breakfast  senna 2 Tablet(s) Oral at bedtime  sodium chloride 0.9%. 1000 milliLiter(s) (10 mL/Hr) IV Continuous <Continuous>  sodium chloride 0.9%. 1000 milliLiter(s) (5 mL/Hr) IV Continuous <Continuous>  sodium zirconium cyclosilicate 10 Gram(s) Oral daily  vasopressin Infusion 0.04 Unit(s)/Min (2.4 mL/Hr) IV Continuous <Continuous>    MEDICATIONS  (PRN):  polyethylene glycol 3350 17 Gram(s) Oral daily PRN Constipation    Allergies:  No Known Allergies      Physical Exam:     Neuro: A+O x 3, non-focal, speech clear and intact  HEENT:  NCAT, PERRL, EOMI. No conjuctival edema or icterus, no thrush.  No ETT or NGT/OGT  Neck:  RIJ introducer with site C/D/I. supple, trachea midline, no tracheostomy  Pulm: CTA, good air entry, equal bilaterally, no rales/rhonchi/wheezing, no accessory muscle use noted  Chest:        mediastinal CT and left pleural CT all with dressings intact and no air leak, no subQ emphysema       +PW (settings: VVI, rate: , mA: , threshold: , sensitivity: 2.0 0.8)  CV: regular rate,  rhythm    , +S1S2, no murmur or rub noted  Abd: soft, NT, ND, + BS      Ext: YATES x 4, no edema, no cyanosis or clubbing, distal motor/neuro/circ intact  Skin: warm, dry, well perfused  Incisions: midsternal C/D/I/stable w/ dressing, LLE C/D/I w/ dressing and Ace wrap     Brief Hospital Course:   2/24 AVR,C3L  2/25 KIMMY, bumex gtt started  2/26 albumin x  2 overnight, AV paced at 80, underneath CHB 40s and 50s    Significant recent/past 24 hr events: patient given 2 albumin overnight diuresing 100/hr on bumex gtt, filling pressures on lower side     Subjective: no c/o incisional pain at this time. Denies CP, SOB, palpitations, N/V, other c/o.    Review of Systems    Patient is a 76y old  Female who presents with a chief complaint of ATN (26 Feb 2021 12:46)    HPI:  75y/o female never smoker with history of hypothyroid and DIVYA who was brought to [facility] for dyspnea and diarrhea and was found to have melena. She received 2 units of PRBC for a hemoglobin of 6.9. She was found to have a troponin of 656 and 710 and a pro-BNP of 29,809. An echo showed moderately to severely reduced LVSF with moderate diffuse hypokinesis with regional variations, moderate concentric LVH and severe AS. She was also diagnosed with sepsis secondary to a UTI. She admits to GANN (walking up one flight of stairs or < 100 feet). She also states recent black stools, and loss of approximately 30 pounds over 1 year secondary to poor PO intake secondary to ageusia. She denies chest pain, orthopnea, PND, palpitations or syncope/near syncope.    Symptoms:        Angina (Class): N/A       Ischemic Symptoms: GANN (CCs class 3 anginal equivalent)    Heart Failure: ACC/AHA stage C, NYHA functional class 3 HFrEF    Assessment of LVEF:       EF: 25-30%       Assessed by: Echo       Date: 2/13/2021    Prior Cardiac Interventions:       PCI's: N/A       CABG: N/A    Noninvasive Testing:   Stress Test: N/A    Echo: 2/13/2021       LVSF: Moderately to severely reduced LVSF with moderate diffuse hypokinesis with regional variations. Moderate concentric LVH.       EF: 25-30%       RVSF: Poorly visualized, mild to moderate pulmonary hypertension.       LA: Mildly dilated       RA: Normal       Mitral Valve: Severely to moderately calcified leaflets, severe MAC, moderate MR. Possible vegetation       Aortic Valve: Moderately calcified leaflets, mild AR, severe AS (max gradient: 77, mean gradient: 43).       Tricuspid Valve: Poorly visualized, mild TR.       Pulmonic Valve: Poorly visualized, no CO.    XR: Enlarged but stable cardiac silhouette.    Antianginal Therapies:        Beta Blockers: Toprol 25 mg daily       Calcium Channel Blockers: N/A       Long Acting Nitrates: N/A       Ranexa: N/A    Associated Risk Factors:        Cerebrovascular Disease: N/A       Chronic Lung Disease: N/A       Peripheral Arterial Disease: N/A       Chronic Kidney Disease (if yes, what is GFR): N/A       Uncontrolled Diabetes (if yes, what is HgbA1C or FBS): N/A       Poorly Controlled Hypertension (if yes, what is SBP): N/A       Morbid Obesity (if yes, what is BMI): N/A       History of Recent Ventricular Arrhythmia: N/A       Inability to Ambulate Safely: N/A       Need for Therapeutic Anticoagulation: N/A       Antiplatelet or Contrast Allergy: N/A (16 Feb 2021 07:35)    PAST MEDICAL & SURGICAL HISTORY:  Iron deficiency anemia due to chronic blood loss    Hypothyroid    History of tonsillectomy      FAMILY HISTORY:  Family history of cardiac disorder (Father)    Family history of diabetes mellitus (Father)        Vitals   ICU Vital Signs Last 24 Hrs  T(C): 36 (27 Feb 2021 00:13), Max: 36.7 (26 Feb 2021 16:23)  T(F): 96.8 (27 Feb 2021 00:13), Max: 98.1 (26 Feb 2021 16:23)  HR: 80 (27 Feb 2021 00:45) (61 - 80)  BP: 120/61 (27 Feb 2021 00:30) (85/50 - 144/63)  BP(mean): 83 (27 Feb 2021 00:30) (64 - 100)  ABP: 158/50 (27 Feb 2021 00:45) (29/27 - 166/54)  ABP(mean): 74 (27 Feb 2021 00:45) (32 - 88)  RR: 20 (27 Feb 2021 00:45) (10 - 31)  SpO2: 100% (27 Feb 2021 00:45) (95% - 100%)      VENT SETTINGS       I&O's Detail    25 Feb 2021 07:01  -  26 Feb 2021 07:00  --------------------------------------------------------  IN:    Albumin 5%  - 250 mL: 500 mL    DOBUTamine: 9.2 mL    DOPamine Infusion: 34.2 mL    DOPamine Infusion: 34.5 mL    Furosemide: 50 mL    Insulin: 28 mL    IV PiggyBack: 300 mL    IV PiggyBack: 150 mL    Milrinone: 110.4 mL    Norepinephrine: 199 mL    Oral Fluid: 340 mL    PRBCs (Packed Red Blood Cells): 319 mL    PRBCs (Packed Red Blood Cells): 300 mL    sodium chloride 0.9%: 240 mL    sodium chloride 0.9%: 120 mL  Total IN: 2734.3 mL    OUT:    Chest Tube (mL): 30 mL    Chest Tube (mL): 180 mL    Chest Tube (mL): 430 mL    Chest Tube (mL): 930 mL    EPINEPHrine: 0 mL    Indwelling Catheter - Urethral (mL): 530 mL    Voided (mL): 1 mL  Total OUT: 2101 mL    Total NET: 633.3 mL      26 Feb 2021 07:01  -  27 Feb 2021 01:26  --------------------------------------------------------  IN:    Albumin 5%  - 250 mL: 500 mL    Bumetanide: 90 mL    DOBUTamine: 156.4 mL    IV PiggyBack: 100 mL    IV PiggyBack: 50 mL    Milrinone: 6.9 mL    Milrinone: 9.2 mL    Milrinone: 13.8 mL    Norepinephrine: 130 mL    Oral Fluid: 480 mL    sodium chloride 0.9%: 170 mL    sodium chloride 0.9%: 85 mL    Vasopressin: 33 mL  Total IN: 1824.3 mL    OUT:    Chest Tube (mL): 50 mL    Chest Tube (mL): 360 mL    Chest Tube (mL): 230 mL    DOPamine Infusion: 0 mL    Indwelling Catheter - Urethral (mL): 1205 mL  Total OUT: 1845 mL    Total NET: -20.7 mL          LABS                        10.0   23.07 )-----------( 129      ( 26 Feb 2021 04:05 )             31.3     02-26    136  |  102  |  32.0<H>  ----------------------------<  145<H>  4.7   |  19.0<L>  |  2.16<H>    Ca    9.1      26 Feb 2021 22:34  Phos  3.6     02-25  Mg     2.8     02-26    TPro  5.5<L>  /  Alb  3.8  /  TBili  1.0  /  DBili  x   /  AST  29  /  ALT  13  /  AlkPhos  68  02-26    PT/INR - ( 25 Feb 2021 02:45 )   PT: 15.1 sec;   INR: 1.32 ratio         PTT - ( 25 Feb 2021 02:45 )  PTT:43.7 sec      ABG - ( 26 Feb 2021 08:09 )  pH, Arterial: 7.39  pH, Blood: x     /  pCO2: 37    /  pO2: 99    / HCO3: 22    / Base Excess: -2.5  /  SaO2: 98                  POCT Blood Glucose.: 159 mg/dL (02-26-21 @ 22:10)  POCT Blood Glucose.: 157 mg/dL (02-26-21 @ 16:30)  POCT Blood Glucose.: 166 mg/dL (02-26-21 @ 12:12)        MEDICATIONS  (STANDING):  aspirin enteric coated 81 milliGRAM(s) Oral daily  atorvastatin 40 milliGRAM(s) Oral at bedtime  buMETAnide Infusion 2 mG/Hr (10 mL/Hr) IV Continuous <Continuous>  chlorhexidine 0.12% Liquid 5 milliLiter(s) Oral Mucosa two times a day  chlorhexidine 2% Cloths 1 Application(s) Topical daily  DOBUTamine Infusion 5 MICROgram(s)/kG/Min (9.18 mL/Hr) IV Continuous <Continuous>  glucagon  Injectable 1 milliGRAM(s) IntraMuscular once  heparin   Injectable 5000 Unit(s) SubCutaneous every 8 hours  insulin lispro (ADMELOG) corrective regimen sliding scale   SubCutaneous Before meals and at bedtime  levothyroxine 75 MICROGram(s) Oral daily  milrinone Infusion 0.125 MICROgram(s)/kG/Min (2.3 mL/Hr) IV Continuous <Continuous>  norepinephrine Infusion 0.05 MICROgram(s)/kG/Min (5.74 mL/Hr) IV Continuous <Continuous>  pantoprazole    Tablet 40 milliGRAM(s) Oral before breakfast  senna 2 Tablet(s) Oral at bedtime  sodium chloride 0.9%. 1000 milliLiter(s) (10 mL/Hr) IV Continuous <Continuous>  sodium chloride 0.9%. 1000 milliLiter(s) (5 mL/Hr) IV Continuous <Continuous>  sodium zirconium cyclosilicate 10 Gram(s) Oral daily  vasopressin Infusion 0.04 Unit(s)/Min (2.4 mL/Hr) IV Continuous <Continuous>    MEDICATIONS  (PRN):  polyethylene glycol 3350 17 Gram(s) Oral daily PRN Constipation    Allergies:  No Known Allergies      Physical Exam:   PHYSICAL EXAM:  General:  well nourished, no acute distress  Neurology:  alert and oriented X 4, nonfocal, no gross deficits  Respiratory:  clear to auscultation bilaterally  CV:  paced with temporary wires, underlying CHB with ventricular excape, rate 40s  Abdomen:  soft, nontender, nondistended, positive bowel sounds  Extremities:  warm, perfused, no edema +DP pulses  Incisions:  midline sternal incision, c/d/i, sternum stable

## 2021-02-27 NOTE — PROGRESS NOTE ADULT - SUBJECTIVE AND OBJECTIVE BOX
[facility]                                                                                  [facility]                                                                                   [address] [place] [address]                                                                         (p) [phone]     (f) [phone]                                                                             Electrophysiology Note    HPI:   76 year old Female with a PMH of iron-deficiency anemia, hypothyroidism, and GI bleed 1 year ago (refused Endo/colonoscopy at that time) presented to [facility] originally with fever and SOB.  She was found to have urosepsis and was treated with Rocephin (completed course 2/17). Anemia noted with a Hgb of 6 and 2 units of PRBC transfused.  Patient wanted to sign out AMA from [place], but then ruled in for a NSTEMI. LUIS MIGUEL revealed "Moderately to severely reduced LVSF with moderate diffuse hypokinesis with regional variations. Severe Aortic stenosis. EF 25-30%".  Patient agreed to be transfer to [facility] for further workup and treatement. She underwent a cardiac cath on 2/16 which revealed Multivessel CAD and severe AS. Further workup revealed moderate Right ICA stenosis and severe Left ICA stenosis on carotid US, which was confirmed on CTA neck. Vascular surgery was consulted and cleared patient for open heart surgery with no plan for CEA at this time. Patient underwent Endoscopy 2/17 and Colonoscopy on 2/19 which revealed a hiatal hernia, extensive diverticulitis and external hemorrhoids. No source of bleeding was identified and  patient was cleared for OR from GI standpoint. Patient underwent CABG x3 (LIMA-LAD, SVG-OM1, OM2) and bio-AVR (23mm Capps) on 2/24/21 by Dr Cruz. Post procedure, patient required pacing support. She remains intubated, on milrinone, norepinephrine and dopamine this morning for junctional kunal, currently AV pacing; EP called for consult.     Interval history:   Pt now off milrinone, remains on low dose dobutamine.    TELE: AV pacing, underlying sinus rhythm and complete heart block w/narrow escape    MEDICATIONS  (STANDING):  aspirin enteric coated 81 milliGRAM(s) Oral daily  atorvastatin 40 milliGRAM(s) Oral at bedtime  buMETAnide Injectable 2 milliGRAM(s) IV Push every 12 hours  chlorhexidine 2% Cloths 1 Application(s) Topical daily  DOBUTamine Infusion 5 MICROgram(s)/kG/Min (9.18 mL/Hr) IV Continuous <Continuous>  famotidine    Tablet 20 milliGRAM(s) Oral daily  heparin   Injectable 5000 Unit(s) SubCutaneous every 8 hours  insulin lispro (ADMELOG) corrective regimen sliding scale   SubCutaneous Before meals and at bedtime  levothyroxine 75 MICROGram(s) Oral daily  norepinephrine Infusion 0.05 MICROgram(s)/kG/Min (5.74 mL/Hr) IV Continuous <Continuous>  senna 2 Tablet(s) Oral at bedtime  sodium chloride 0.9%. 1000 milliLiter(s) (10 mL/Hr) IV Continuous <Continuous>  sodium chloride 0.9%. 1000 milliLiter(s) (5 mL/Hr) IV Continuous <Continuous>    MEDICATIONS  (PRN):  polyethylene glycol 3350 17 Gram(s) Oral daily PRN Constipation    Allergies  No Known Allergies    PAST MEDICAL & SURGICAL HISTORY:  Iron deficiency anemia due to chronic blood loss  Hypothyroid  History of tonsillectomy    Vital Signs Last 24 Hrs  T(C): 36.7 (27 Feb 2021 12:00), Max: 36.7 (27 Feb 2021 08:00)  T(F): 98.1 (27 Feb 2021 12:00), Max: 98.1 (27 Feb 2021 08:00)  HR: 80 (27 Feb 2021 14:00) (79 - 82)  BP: 90/54 (27 Feb 2021 14:00) (86/43 - 144/63)  BP(mean): 66 (27 Feb 2021 14:00) (62 - 90)  RR: 26 (27 Feb 2021 14:00) (12 - 30)  SpO2: 100% (27 Feb 2021 14:00) (89% - 100%)    LABS:                        9.3    15.22 )-----------( 99       ( 27 Feb 2021 03:20 )             29.3     02-27    138  |  100  |  33.0<H>  ----------------------------<  134<H>  4.5   |  20.0<L>  |  2.19<H>    Ca    9.1      27 Feb 2021 03:20  Mg     2.5     02-27    TPro  5.5<L>  /  Alb  3.8  /  TBili  1.0  /  DBili  x   /  AST  29  /  ALT  13  /  AlkPhos  68  02-26    RADIOLOGY & ADDITIONAL TESTS:  < from: TTE Echo Complete w/o Contrast w/ Doppler (02.26.21 @ 08:16) >  Summary:   1. Left ventricular ejection fraction, by visual estimation, is 25 to 30%.   2. Severely decreased global left ventricular systolic function.   3. Severely increased LV wall thickness.   4. Abnormal septal motion consistent with post-operative status.   5. Severely enlarged left atrium.   6. Severe mitral annular calcification.   7. Moderate to severe thickening of the anterior andposterior mitral valve leaflets.   8. Mild mitral valve regurgitation.   9. Mild tricuspid regurgitation.  10. Bioprosthesis in the aortic position. Mild aortic valve regurgitation is seen, appears paravalvular in origin.  11. Trivial pericardial effusion.  12. Compared with prior LUIS MIGUEL dated 2/24/21, there has been interval placement of a bioprosthetic valve in the aortic position.    HERNAN Dorsey Electronically signed on 2/26/2021 at 10:19:06 AM  *** Final (Amended) ***  There has been interval improvement of LVEF to 50-55%. LVEF in report above was mistakenly entered.  Amanda Perez  Electronically Amended 2/26/2021, 10:25 AM    Assessment and Plan:   76 year old Female with a PMH of iron-deficiency anemia, hypothyroidism, and GI bleed presented to [facility] with sob and fevers found to have urosepsis, anemia s/p transfusion, NSTEMI, severe AS and ischemic cardiomyopathy (EF 25-30%).  Patient was transfered to [facility] and underwent a cardiac cath on 2/16 which revealed Multivessel CAD and severe AS.  Further workup revealed moderate Right ICA stenosis and severe Left ICA stenosis.  Vascular surgery was consulted and cleared patient for open heart surgery.  Patient also underwent Endoscopy 2/17 and Colonoscopy on 2/19 which revealed a hiatal hernia, extensive diverticulitis, external hemorrhoids, but no active source of bleeding was identified and patient was cleared for OR from GI standpoint.  Now POD #3 s/p CABG x3 (LIMA-LAD, SVG-OM1, OM2) and bio-AVR (23mm Capps) complicated by CHB with junctional escape requiring continuous pacing support.     Recommendations:   TTE revealed EF improved to 50-55%, however pt was on inotropes  Repeat limited TTE on 2/28/21 to assess EF as pt is weaned off meds   Plan for CRT system on 3/1, possibly defibrillator pending EF measurement off of inotropes  Keep NPO after midnight 2/28   Discussed w/attending                                                                               [facility]                                                                                  [facility]                                                                                   [address] [place] [address]                                                                         (p) [phone]     (f) [phone]                                                                             Electrophysiology Note    HPI:   76 year old Female with a PMH of iron-deficiency anemia, hypothyroidism, and GI bleed 1 year ago (refused Endo/colonoscopy at that time) presented to [facility] originally with fever and SOB.  She was found to have urosepsis and was treated with Rocephin (completed course 2/17). Anemia noted with a Hgb of 6 and 2 units of PRBC transfused.  Patient wanted to sign out AMA from [place], but then ruled in for a NSTEMI. LUIS MIGUEL revealed "Moderately to severely reduced LVSF with moderate diffuse hypokinesis with regional variations. Severe Aortic stenosis. EF 25-30%".  Patient agreed to be transfer to [facility] for further workup and treatement. She underwent a cardiac cath on 2/16 which revealed Multivessel CAD and severe AS. Further workup revealed moderate Right ICA stenosis and severe Left ICA stenosis on carotid US, which was confirmed on CTA neck. Vascular surgery was consulted and cleared patient for open heart surgery with no plan for CEA at this time. Patient underwent Endoscopy 2/17 and Colonoscopy on 2/19 which revealed a hiatal hernia, extensive diverticulitis and external hemorrhoids. No source of bleeding was identified and  patient was cleared for OR from GI standpoint. Patient underwent CABG x3 (LIMA-LAD, SVG-OM1, OM2) and bio-AVR (23mm Capps) on 2/24/21 by Dr Cruz. Post procedure, patient required pacing support. She remains intubated, on milrinone, norepinephrine and dopamine this morning for junctional kunal, currently AV pacing; EP called for consult.     Interval history:   Pt now off milrinone, remains on low dose dobutamine and levophid.   TELE: AV pacing, underlying sinus rhythm and complete heart block w/narrow escape. One episode of NSVT earlier today at 170 bpm for around 6 seconds.     MEDICATIONS  (STANDING):  aspirin enteric coated 81 milliGRAM(s) Oral daily  atorvastatin 40 milliGRAM(s) Oral at bedtime  buMETAnide Injectable 2 milliGRAM(s) IV Push every 12 hours  chlorhexidine 2% Cloths 1 Application(s) Topical daily  DOBUTamine Infusion 5 MICROgram(s)/kG/Min (9.18 mL/Hr) IV Continuous <Continuous>  famotidine    Tablet 20 milliGRAM(s) Oral daily  heparin   Injectable 5000 Unit(s) SubCutaneous every 8 hours  insulin lispro (ADMELOG) corrective regimen sliding scale   SubCutaneous Before meals and at bedtime  levothyroxine 75 MICROGram(s) Oral daily  norepinephrine Infusion 0.05 MICROgram(s)/kG/Min (5.74 mL/Hr) IV Continuous <Continuous>  senna 2 Tablet(s) Oral at bedtime  sodium chloride 0.9%. 1000 milliLiter(s) (10 mL/Hr) IV Continuous <Continuous>  sodium chloride 0.9%. 1000 milliLiter(s) (5 mL/Hr) IV Continuous <Continuous>    MEDICATIONS  (PRN):  polyethylene glycol 3350 17 Gram(s) Oral daily PRN Constipation    Allergies  No Known Allergies    PAST MEDICAL & SURGICAL HISTORY:  Iron deficiency anemia due to chronic blood loss  Hypothyroid  History of tonsillectomy    Vital Signs Last 24 Hrs  T(C): 36.7 (27 Feb 2021 12:00), Max: 36.7 (27 Feb 2021 08:00)  T(F): 98.1 (27 Feb 2021 12:00), Max: 98.1 (27 Feb 2021 08:00)  HR: 80 (27 Feb 2021 14:00) (79 - 82)  BP: 90/54 (27 Feb 2021 14:00) (86/43 - 144/63)  BP(mean): 66 (27 Feb 2021 14:00) (62 - 90)  RR: 26 (27 Feb 2021 14:00) (12 - 30)  SpO2: 100% (27 Feb 2021 14:00) (89% - 100%)    LABS:                        9.3    15.22 )-----------( 99       ( 27 Feb 2021 03:20 )             29.3     02-27    138  |  100  |  33.0<H>  ----------------------------<  134<H>  4.5   |  20.0<L>  |  2.19<H>    Ca    9.1      27 Feb 2021 03:20  Mg     2.5     02-27    TPro  5.5<L>  /  Alb  3.8  /  TBili  1.0  /  DBili  x   /  AST  29  /  ALT  13  /  AlkPhos  68  02-26    RADIOLOGY & ADDITIONAL TESTS:  < from: TTE Echo Complete w/o Contrast w/ Doppler (02.26.21 @ 08:16) >  Summary:   1. Left ventricular ejection fraction, by visual estimation, is 25 to 30%.   2. Severely decreased global left ventricular systolic function.   3. Severely increased LV wall thickness.   4. Abnormal septal motion consistent with post-operative status.   5. Severely enlarged left atrium.   6. Severe mitral annular calcification.   7. Moderate to severe thickening of the anterior andposterior mitral valve leaflets.   8. Mild mitral valve regurgitation.   9. Mild tricuspid regurgitation.  10. Bioprosthesis in the aortic position. Mild aortic valve regurgitation is seen, appears paravalvular in origin.  11. Trivial pericardial effusion.  12. Compared with prior LUIS MIGUEL dated 2/24/21, there has been interval placement of a bioprosthetic valve in the aortic position.    HERNAN Dorsey Electronically signed on 2/26/2021 at 10:19:06 AM  *** Final (Amended) ***  There has been interval improvement of LVEF to 50-55%. LVEF in report above was mistakenly entered.  Amanda Perez  Electronically Amended 2/26/2021, 10:25 AM    Assessment and Plan:   76 year old Female with a PMH of iron-deficiency anemia, hypothyroidism, and GI bleed presented to [facility] with sob and fevers found to have urosepsis, anemia s/p transfusion, NSTEMI, severe AS and ischemic cardiomyopathy (EF 25-30%).  Patient was transfered to [facility] and underwent a cardiac cath on 2/16 which revealed Multivessel CAD and severe AS.  Further workup revealed moderate Right ICA stenosis and severe Left ICA stenosis.  Vascular surgery was consulted and cleared patient for open heart surgery.  Patient also underwent Endoscopy 2/17 and Colonoscopy on 2/19 which revealed a hiatal hernia, extensive diverticulitis, external hemorrhoids, but no active source of bleeding was identified and patient was cleared for OR from GI standpoint.  Now POD #3 s/p CABG x3 (LIMA-LAD, SVG-OM1, OM2) and bio-AVR (23mm Capps) complicated by CHB with junctional escape requiring continuous pacing support.     Recommendations:   TTE revealed EF improved to 50-55%, however pt was on inotropes  Repeat limited TTE on 2/28/21 to assess EF as pt is weaned off meds   Plan for possible CRT system on 3/1, possibly defibrillator pending EF measurement off of inotropes,  Keep NPO after midnight 2/28   Discussed w/attending

## 2021-02-27 NOTE — PROGRESS NOTE ADULT - PROBLEM SELECTOR PLAN 8
b/l pleural effusions, LE edema and elevated BNP pre op  Attempting diuresis with Lasix gtt b/l pleural effusions, LE edema and elevated BNP pre op  Attempting diuresis with bumex gtt

## 2021-02-27 NOTE — PROGRESS NOTE ADULT - ASSESSMENT
1) ATN  2) CAD s/p CABG, AS s/p AVR on 02/24  3) Acute cystitis   4)Acute  CHFrEF  5) Anemia      On bumex drip 0.5 mg/hr- Scr up trending  Recommend changing to 2 mg q12h  D/c PPI; change to pepcid  Monitor Scr, lytes, UOP    dw CTS

## 2021-02-27 NOTE — PROGRESS NOTE ADULT - SUBJECTIVE AND OBJECTIVE BOX
[facility] DIVISION OF KIDNEY DISEASES AND HYPERTENSION -- FOLLOW UP NOTE  --------------------------------------------------------------------------------  Chief Complaint: ramesh    24 hour events/subjective:  on bumex drip  pt seen and examied; no acute complainy        PAST HISTORY  --------------------------------------------------------------------------------  No significant changes to PMH, PSH, FHx, SHx, unless otherwise noted    ALLERGIES & MEDICATIONS  --------------------------------------------------------------------------------  Allergies    No Known Allergies    Intolerances      Standing Inpatient Medications  aspirin enteric coated 81 milliGRAM(s) Oral daily  atorvastatin 40 milliGRAM(s) Oral at bedtime  buMETAnide Injectable 2 milliGRAM(s) IV Push every 12 hours  chlorhexidine 2% Cloths 1 Application(s) Topical daily  DOBUTamine Infusion 5 MICROgram(s)/kG/Min IV Continuous <Continuous>  heparin   Injectable 5000 Unit(s) SubCutaneous every 8 hours  insulin lispro (ADMELOG) corrective regimen sliding scale   SubCutaneous Before meals and at bedtime  levothyroxine 75 MICROGram(s) Oral daily  norepinephrine Infusion 0.05 MICROgram(s)/kG/Min IV Continuous <Continuous>  pantoprazole    Tablet 40 milliGRAM(s) Oral before breakfast  senna 2 Tablet(s) Oral at bedtime  sodium chloride 0.9%. 1000 milliLiter(s) IV Continuous <Continuous>  sodium chloride 0.9%. 1000 milliLiter(s) IV Continuous <Continuous>    PRN Inpatient Medications  polyethylene glycol 3350 17 Gram(s) Oral daily PRN      REVIEW OF SYSTEMS  --------------------------------------------------------------------------------  Gen: No weight changes, fatigue, fevers/chills, weakness  Skin: No rashes  Head/Eyes/Ears/Mouth: No headache; Normal hearing; Normal vision w/o blurrines  Respiratory: No dyspnea, cough, wheezing, hemoptysis  CV: No chest pain, PND, orthopnea  GI: No abdominal pain, diarrhea, constipation, nausea, vomiting, melena, hematochezia  : No increased frequency, dysuria, hematuria, nocturia  MSK: No joint pain/swelling; no back pain; edema  Neuro: No dizziness/lightheadedness, weakness  ,Heme: No easy bruising or bleeding  Endo: No heat/cold intolerance  Psych: No significant nervousness, anxiety, stress, depression    All other systems were reviewed and are negative, except as noted.    VITALS/PHYSICAL EXAM  --------------------------------------------------------------------------------  T(C): 36.7 (02-27-21 @ 08:00), Max: 36.7 (02-26-21 @ 16:23)  HR: 80 (02-27-21 @ 10:00) (61 - 82)  BP: 105/53 (02-27-21 @ 10:00) (85/50 - 144/63)  RR: 23 (02-27-21 @ 10:00) (12 - 28)  SpO2: 98% (02-27-21 @ 10:00) (95% - 100%)  Wt(kg): --        02-26-21 @ 07:01  -  02-27-21 @ 07:00  --------------------------------------------------------  IN: 2571.7 mL / OUT: 2575 mL / NET: -3.3 mL    02-27-21 @ 07:01  -  02-27-21 @ 12:06  --------------------------------------------------------  IN: 858 mL / OUT: 595 mL / NET: 263 mL      Physical Exam:  	Gen: NAD,  	HEENT: supple neck  	Pulm: CTA B/L, chest tube+  	CV: RRR, S1S2; no rub  	Back: No spinal or CVA tenderness; no sacral edema  	Abd: +BS, soft, nontender/nondistended  	: No suprapubic tenderness  	UE: Warm,  no edema;   	LE: Warm, edema++  	Neuro: No focal deficits  	Psych: Normal affect and mood  	Skin: Warm	  LABS/STUDIES  --------------------------------------------------------------------------------              9.3    15.22 >-----------<  99       [02-27-21 @ 03:20]              29.3     138  |  100  |  33.0  ----------------------------<  134      [02-27-21 @ 03:20]  4.5   |  20.0  |  2.19        Ca     9.1     [02-27-21 @ 03:20]      Mg     2.5     [02-27-21 @ 03:20]    TPro  5.5  /  Alb  3.8  /  TBili  1.0  /  DBili  x   /  AST  29  /  ALT  13  /  AlkPhos  68  [02-26-21 @ 22:34]          Creatinine Trend:  SCr 2.19 [02-27 @ 03:20]  SCr 2.16 [02-26 @ 22:34]  SCr 1.98 [02-26 @ 08:13]  SCr 1.78 [02-26 @ 02:34]  SCr 1.41 [02-25 @ 15:53]    Urinalysis - [02-16-21 @ 22:38]      Color Yellow / Appearance Clear / SG 1.020 / pH 5.0      Gluc Negative / Ketone Negative  / Bili Negative / Urobili Negative       Blood Negative / Protein 30 / Leuk Est Trace / Nitrite Negative      RBC Negative / WBC 3-5 / Hyaline  / Gran  / Sq Epi  / Non Sq Epi Moderate / Bacteria       TSH 9.90      [02-24-21 @ 05:35]  Lipid: chol 105, TG 62, HDL 39, LDL --      [02-17-21 @ 07:04]

## 2021-02-27 NOTE — PROGRESS NOTE ADULT - PROBLEM SELECTOR PLAN 4
Endocrine following  Recommend continue current synthroid dose as pt was not compliant with it as outpt Endocrine following  Recommend continue current synthroid dose as pt was not compliant with it as outpatient

## 2021-02-27 NOTE — PROGRESS NOTE ADULT - PROBLEM SELECTOR PLAN 1
s/p CABG/AVR on 2/24 with Dr. Cruz  Continue inotropic support, wean pressor support as tolerated, maintain MAP >65  ASA for acute graft patency  Statin for anti inflammatory properties  Encourage IS hourly while awake   Ambulate with PT assist later today

## 2021-02-27 NOTE — PROGRESS NOTE ADULT - ASSESSMENT
76 F,  PMH of anemia, hypothyroidism, and GI bleed 1 year ago (refused Endo/colonoscopy and never followed up) presented to [facility] originally with fever and SOB.  She was found to have a UTI/sepsis with elevated lactate, + UA, and temp of 102.  She was treated with Rocephin (completed course 2/17). Anemia noted with a Hgb of 6 and 2 units of PRBC transfused.  Patient wanted to sign out AMA from [place], but then ruled in for a NSTEMI, so she was agreeable to transfer to [facility] for cardiac cath. Cardiac cath 2/16 revealed Multivessel CAD and Severe AS. Further workup revealed moderate Right ICA stenosis and Severe Left ICA stenosis on carotid US, which was confirmed on CTA neck. Vascular surgery was consulted and cleared patient for open heart surgery with no plan for CEA at this time. Patient underwent Endoscopy 2/17 as part of her GI anemia workup, which only revealed a hiatal hernia. Colonoscopy 2/19 without bleeding or acute pathology. Cleared for OR from GI standpoint. Pt is now s/p CABG/AVR with Dr. Cruz on 2/24/21.

## 2021-02-28 LAB
ANION GAP SERPL CALC-SCNC: 16 MMOL/L — SIGNIFICANT CHANGE UP (ref 5–17)
BUN SERPL-MCNC: 34 MG/DL — HIGH (ref 8–20)
CALCIUM SERPL-MCNC: 8.9 MG/DL — SIGNIFICANT CHANGE UP (ref 8.6–10.2)
CHLORIDE SERPL-SCNC: 99 MMOL/L — SIGNIFICANT CHANGE UP (ref 98–107)
CO2 SERPL-SCNC: 21 MMOL/L — LOW (ref 22–29)
CREAT SERPL-MCNC: 2.23 MG/DL — HIGH (ref 0.5–1.3)
GLUCOSE BLDC GLUCOMTR-MCNC: 106 MG/DL — HIGH (ref 70–99)
GLUCOSE BLDC GLUCOMTR-MCNC: 107 MG/DL — HIGH (ref 70–99)
GLUCOSE BLDC GLUCOMTR-MCNC: 117 MG/DL — HIGH (ref 70–99)
GLUCOSE BLDC GLUCOMTR-MCNC: 121 MG/DL — HIGH (ref 70–99)
GLUCOSE SERPL-MCNC: 121 MG/DL — HIGH (ref 70–99)
HCT VFR BLD CALC: 28.2 % — LOW (ref 34.5–45)
HGB BLD-MCNC: 9.1 G/DL — LOW (ref 11.5–15.5)
MAGNESIUM SERPL-MCNC: 2.6 MG/DL — SIGNIFICANT CHANGE UP (ref 1.6–2.6)
MCHC RBC-ENTMCNC: 27.3 PG — SIGNIFICANT CHANGE UP (ref 27–34)
MCHC RBC-ENTMCNC: 32.3 GM/DL — SIGNIFICANT CHANGE UP (ref 32–36)
MCV RBC AUTO: 84.7 FL — SIGNIFICANT CHANGE UP (ref 80–100)
PLATELET # BLD AUTO: 90 K/UL — LOW (ref 150–400)
POTASSIUM SERPL-MCNC: 3.9 MMOL/L — SIGNIFICANT CHANGE UP (ref 3.5–5.3)
POTASSIUM SERPL-SCNC: 3.9 MMOL/L — SIGNIFICANT CHANGE UP (ref 3.5–5.3)
RBC # BLD: 3.33 M/UL — LOW (ref 3.8–5.2)
RBC # FLD: 19.2 % — HIGH (ref 10.3–14.5)
SODIUM SERPL-SCNC: 136 MMOL/L — SIGNIFICANT CHANGE UP (ref 135–145)
WBC # BLD: 12.05 K/UL — HIGH (ref 3.8–10.5)
WBC # FLD AUTO: 12.05 K/UL — HIGH (ref 3.8–10.5)

## 2021-02-28 PROCEDURE — 93308 TTE F-UP OR LMTD: CPT | Mod: 26

## 2021-02-28 PROCEDURE — 99233 SBSQ HOSP IP/OBS HIGH 50: CPT

## 2021-02-28 PROCEDURE — 99232 SBSQ HOSP IP/OBS MODERATE 35: CPT | Mod: 24

## 2021-02-28 PROCEDURE — 71045 X-RAY EXAM CHEST 1 VIEW: CPT | Mod: 26

## 2021-02-28 PROCEDURE — 99232 SBSQ HOSP IP/OBS MODERATE 35: CPT

## 2021-02-28 RX ORDER — BUMETANIDE 0.25 MG/ML
0.5 INJECTION INTRAMUSCULAR; INTRAVENOUS EVERY 12 HOURS
Refills: 0 | Status: DISCONTINUED | OUTPATIENT
Start: 2021-02-28 | End: 2021-03-01

## 2021-02-28 RX ORDER — POTASSIUM CHLORIDE 20 MEQ
10 PACKET (EA) ORAL
Refills: 0 | Status: COMPLETED | OUTPATIENT
Start: 2021-02-28 | End: 2021-02-28

## 2021-02-28 RX ORDER — SODIUM CHLORIDE 9 MG/ML
500 INJECTION, SOLUTION INTRAVENOUS ONCE
Refills: 0 | Status: COMPLETED | OUTPATIENT
Start: 2021-02-28 | End: 2021-02-28

## 2021-02-28 RX ORDER — ALBUMIN HUMAN 25 %
250 VIAL (ML) INTRAVENOUS ONCE
Refills: 0 | Status: COMPLETED | OUTPATIENT
Start: 2021-02-28 | End: 2021-02-28

## 2021-02-28 RX ORDER — ACETAMINOPHEN 500 MG
1000 TABLET ORAL ONCE
Refills: 0 | Status: COMPLETED | OUTPATIENT
Start: 2021-02-28 | End: 2021-02-28

## 2021-02-28 RX ORDER — BUMETANIDE 0.25 MG/ML
1 INJECTION INTRAMUSCULAR; INTRAVENOUS EVERY 12 HOURS
Refills: 0 | Status: DISCONTINUED | OUTPATIENT
Start: 2021-02-28 | End: 2021-02-28

## 2021-02-28 RX ADMIN — HEPARIN SODIUM 5000 UNIT(S): 5000 INJECTION INTRAVENOUS; SUBCUTANEOUS at 21:14

## 2021-02-28 RX ADMIN — Medication 125 MILLILITER(S): at 00:55

## 2021-02-28 RX ADMIN — Medication 4.59 MICROGRAM(S)/KG/MIN: at 12:24

## 2021-02-28 RX ADMIN — SODIUM CHLORIDE 500 MILLILITER(S): 9 INJECTION, SOLUTION INTRAVENOUS at 03:40

## 2021-02-28 RX ADMIN — Medication 400 MILLIGRAM(S): at 01:00

## 2021-02-28 RX ADMIN — FAMOTIDINE 20 MILLIGRAM(S): 10 INJECTION INTRAVENOUS at 12:24

## 2021-02-28 RX ADMIN — BUMETANIDE 1 MILLIGRAM(S): 0.25 INJECTION INTRAMUSCULAR; INTRAVENOUS at 06:03

## 2021-02-28 RX ADMIN — SENNA PLUS 2 TABLET(S): 8.6 TABLET ORAL at 21:14

## 2021-02-28 RX ADMIN — Medication 125 MILLILITER(S): at 03:40

## 2021-02-28 RX ADMIN — POLYETHYLENE GLYCOL 3350 17 GRAM(S): 17 POWDER, FOR SOLUTION ORAL at 12:25

## 2021-02-28 RX ADMIN — HEPARIN SODIUM 5000 UNIT(S): 5000 INJECTION INTRAVENOUS; SUBCUTANEOUS at 14:34

## 2021-02-28 RX ADMIN — HEPARIN SODIUM 5000 UNIT(S): 5000 INJECTION INTRAVENOUS; SUBCUTANEOUS at 06:03

## 2021-02-28 RX ADMIN — Medication 125 MILLILITER(S): at 06:02

## 2021-02-28 RX ADMIN — ATORVASTATIN CALCIUM 40 MILLIGRAM(S): 80 TABLET, FILM COATED ORAL at 21:14

## 2021-02-28 RX ADMIN — Medication 100 MILLIEQUIVALENT(S): at 05:29

## 2021-02-28 RX ADMIN — Medication 75 MICROGRAM(S): at 06:03

## 2021-02-28 RX ADMIN — Medication 81 MILLIGRAM(S): at 12:24

## 2021-02-28 RX ADMIN — CHLORHEXIDINE GLUCONATE 1 APPLICATION(S): 213 SOLUTION TOPICAL at 14:34

## 2021-02-28 RX ADMIN — Medication 100 MILLIEQUIVALENT(S): at 06:03

## 2021-02-28 NOTE — PROGRESS NOTE ADULT - SUBJECTIVE AND OBJECTIVE BOX
[facility]                                                                                  [facility]                                                                                   [address] [place] [address]                                                                         (p) [phone]     (f) [phone]                                                                             Electrophysiology Note    HPI:   76 year old Female with a PMH of iron-deficiency anemia, hypothyroidism, and GI bleed 1 year ago (refused Endo/colonoscopy at that time) presented to [facility] originally with fever and SOB.  She was found to have urosepsis and was treated with Rocephin (completed course 2/17). Anemia noted with a Hgb of 6 and 2 units of PRBC transfused.  Patient wanted to sign out AMA from [place], but then ruled in for a NSTEMI. LUIS MIGUEL revealed "Moderately to severely reduced LVSF with moderate diffuse hypokinesis with regional variations. Severe Aortic stenosis. EF 25-30%".  Patient agreed to be transfer to [facility] for further workup and treatement. She underwent a cardiac cath on 2/16 which revealed Multivessel CAD and severe AS. Further workup revealed moderate Right ICA stenosis and severe Left ICA stenosis on carotid US, which was confirmed on CTA neck. Vascular surgery was consulted and cleared patient for open heart surgery with no plan for CEA at this time. Patient underwent Endoscopy 2/17 and Colonoscopy on 2/19 which revealed a hiatal hernia, extensive diverticulitis and external hemorrhoids. No source of bleeding was identified and  patient was cleared for OR from GI standpoint. Patient underwent CABG x3 (LIMA-LAD, SVG-OM1, OM2) and bio-AVR (23mm Capps) on 2/24/21 by Dr Cruz. Post procedure, patient required pacing support. She remains intubated, on milrinone, norepinephrine and dopamine this morning for junctional kunal, currently AV pacing; EP called for consult.     Interval history 2/26-27:   Pt now off milrinone, remains on low dose dobutamine and levophid.   TELE: AV pacing, underlying sinus rhythm and complete heart block w/narrow escape. One episode of NSVT earlier today at 170 bpm for around 6 seconds.       Interval Hx 2/27-28:  PAC is out, but still has central line and chest tubes. Still on low dose dobutamine. Rhythm is sinus with CHB and bifascicular block. No more NSVT. Feels weak. Repeat TTE, EF ? 25-30% on low dose dobutamine     MEDICATIONS  (STANDING):  aspirin enteric coated 81 milliGRAM(s) Oral daily  atorvastatin 40 milliGRAM(s) Oral at bedtime  buMETAnide Injectable 0.5 milliGRAM(s) IV Push every 12 hours  chlorhexidine 2% Cloths 1 Application(s) Topical daily  DOBUTamine Infusion 2.5 MICROgram(s)/kG/Min (4.59 mL/Hr) IV Continuous <Continuous>  famotidine    Tablet 20 milliGRAM(s) Oral daily  heparin   Injectable 5000 Unit(s) SubCutaneous every 8 hours  insulin lispro (ADMELOG) corrective regimen sliding scale   SubCutaneous Before meals and at bedtime  levothyroxine 75 MICROGram(s) Oral daily  norepinephrine Infusion 0.05 MICROgram(s)/kG/Min (5.74 mL/Hr) IV Continuous <Continuous>  senna 2 Tablet(s) Oral at bedtime  sodium chloride 0.9%. 1000 milliLiter(s) (10 mL/Hr) IV Continuous <Continuous>  sodium chloride 0.9%. 1000 milliLiter(s) (5 mL/Hr) IV Continuous <Continuous>    MEDICATIONS  (PRN):  polyethylene glycol 3350 17 Gram(s) Oral daily PRN Constipation      Allergies  No Known Allergies    PAST MEDICAL & SURGICAL HISTORY:  Iron deficiency anemia due to chronic blood loss  Hypothyroid  History of tonsillectomy    ICU Vital Signs Last 24 Hrs  T(C): 36.4 (28 Feb 2021 06:00), Max: 36.5 (27 Feb 2021 20:00)  T(F): 97.5 (28 Feb 2021 06:00), Max: 97.7 (27 Feb 2021 20:00)  HR: 79 (28 Feb 2021 14:00) (54 - 80)  BP: 116/56 (28 Feb 2021 14:00) (90/54 - 124/58)  BP(mean): 78 (28 Feb 2021 14:00) (66 - 85)  ABP: 161/50 (28 Feb 2021 14:00) (99/72 - 168/51)  ABP(mean): 72 (28 Feb 2021 14:00) (50 - 83)  RR: 17 (28 Feb 2021 14:00) (15 - 32)  SpO2: 100% (28 Feb 2021 14:00) (85% - 100%)    LABS:                                   9.1    12.05 )-----------( 90       ( 28 Feb 2021 02:59 )             28.2   02-28    136  |  99  |  34.0<H>  ----------------------------<  121<H>  3.9   |  21.0<L>  |  2.23<H>    Ca    8.9      28 Feb 2021 02:59  Mg     2.6     02-28    TPro  5.5<L>  /  Alb  3.8  /  TBili  1.0  /  DBili  x   /  AST  29  /  ALT  13  /  AlkPhos  68  02-26        RADIOLOGY & ADDITIONAL TESTS:  < from: TTE Echo Complete w/o Contrast w/ Doppler (02.26.21 @ 08:16) >  Summary:   1. Left ventricular ejection fraction, by visual estimation, is 25 to 30%.   2. Severely decreased global left ventricular systolic function.   3. Severely increased LV wall thickness.   4. Abnormal septal motion consistent with post-operative status.   5. Severely enlarged left atrium.   6. Severe mitral annular calcification.   7. Moderate to severe thickening of the anterior andposterior mitral valve leaflets.   8. Mild mitral valve regurgitation.   9. Mild tricuspid regurgitation.  10. Bioprosthesis in the aortic position. Mild aortic valve regurgitation is seen, appears paravalvular in origin.  11. Trivial pericardial effusion.  12. Compared with prior LUIS MIGUEL dated 2/24/21, there has been interval placement of a bioprosthetic valve in the aortic position.    HERNAN Dorsey Electronically signed on 2/26/2021 at 10:19:06 AM  *** Final (Amended) ***  There has been interval improvement of LVEF to 50-55%. LVEF in report above was mistakenly entered.  Amanda Perez  Electronically Amended 2/26/2021, 10:25 AM    < from: TTE Echo Complete w/ Contrast w/ Doppler (02.28.21 @ 09:39) >  Summary:   1. Left ventricular ejection fraction, by visual estimation, is 25 to 30%.   2. Severely decreased global left ventricular systolic function.   3. Abnormal septal motion consistent with post-operative status.   4. Severely increased LV wall thickness.   5. Severely enlarged left atrium.   6. Mild mitral valve regurgitation.   7. Severe mitral annular calcification.   8. Moderateto severe thickening of the anterior and posterior mitral valve leaflets.   9. Mitral valve mean gradient is 2.0 mmHg (at HR of 79 bpm).  10. Mild tricuspid regurgitation.  11. Mild pulmonic valve regurgitation.  12. Bioprosthesis in the aortic position, with mild aortic regurgitation, which is paravalvular in origin.  13. There is no evidence of pericardial effusion.  14. Endocardial visualization was enhanced with intravenous echo contrast.  15. Compared to TTE done on 2/26/2021, left ventricular function is unchanged from prior.    Katja Bautista D.O., Electronically signed on 2/28/2021 at 12:41:31 PM    < end of copied text >

## 2021-02-28 NOTE — PROGRESS NOTE ADULT - PROBLEM SELECTOR PLAN 4
Endocrine following  Recommend continue current synthroid dose as pt was not compliant with it as outpatient

## 2021-02-28 NOTE — PROGRESS NOTE ADULT - SUBJECTIVE AND OBJECTIVE BOX
Brief Hospital Course:   2/24 AVR,C3L  2/25 KIMMY, bumex gtt started  2/26 albumin x  2 overnight, AV paced at 80, underneath CHB 40s and 50s  2/27 off bumex gtt, KIMMY resolving. Switched to IV bumex. Diuresing alot, fluid given back. Vpaced at 80, does not tolerated lower rate.     Significant recent/past 24 hr events: Patient diuresing 150/hr of urine. Given fluid back with have to lower again. Coming off the levophed slowly     Subjective: no c/o incisional pain at this time. Denies CP, SOB, palpitations, N/V, other c/o.    Review of Systems  no complaints overnight     Patient is a 76y old  Female who presents with a chief complaint of ATN (26 Feb 2021 12:46)    HPI:  77y/o female never smoker with history of hypothyroid and DIVYA who was brought to [facility] for dyspnea and diarrhea and was found to have melena. She received 2 units of PRBC for a hemoglobin of 6.9. She was found to have a troponin of 656 and 710 and a pro-BNP of 29,809. An echo showed moderately to severely reduced LVSF with moderate diffuse hypokinesis with regional variations, moderate concentric LVH and severe AS. She was also diagnosed with sepsis secondary to a UTI. She admits to GANN (walking up one flight of stairs or < 100 feet). She also states recent black stools, and loss of approximately 30 pounds over 1 year secondary to poor PO intake secondary to ageusia. She denies chest pain, orthopnea, PND, palpitations or syncope/near syncope.    Symptoms:        Angina (Class): N/A       Ischemic Symptoms: GANN (CCs class 3 anginal equivalent)    Heart Failure: ACC/AHA stage C, NYHA functional class 3 HFrEF    Assessment of LVEF:       EF: 25-30%       Assessed by: Echo       Date: 2/13/2021    Prior Cardiac Interventions:       PCI's: N/A       CABG: N/A    Noninvasive Testing:   Stress Test: N/A    Echo: 2/13/2021       LVSF: Moderately to severely reduced LVSF with moderate diffuse hypokinesis with regional variations. Moderate concentric LVH.       EF: 25-30%       RVSF: Poorly visualized, mild to moderate pulmonary hypertension.       LA: Mildly dilated       RA: Normal       Mitral Valve: Severely to moderately calcified leaflets, severe MAC, moderate MR. Possible vegetation       Aortic Valve: Moderately calcified leaflets, mild AR, severe AS (max gradient: 77, mean gradient: 43).       Tricuspid Valve: Poorly visualized, mild TR.       Pulmonic Valve: Poorly visualized, no NJ.    XR: Enlarged but stable cardiac silhouette.    Antianginal Therapies:        Beta Blockers: Toprol 25 mg daily       Calcium Channel Blockers: N/A       Long Acting Nitrates: N/A       Ranexa: N/A    Associated Risk Factors:        Cerebrovascular Disease: N/A       Chronic Lung Disease: N/A       Peripheral Arterial Disease: N/A       Chronic Kidney Disease (if yes, what is GFR): N/A       Uncontrolled Diabetes (if yes, what is HgbA1C or FBS): N/A       Poorly Controlled Hypertension (if yes, what is SBP): N/A       Morbid Obesity (if yes, what is BMI): N/A       History of Recent Ventricular Arrhythmia: N/A       Inability to Ambulate Safely: N/A       Need for Therapeutic Anticoagulation: N/A       Antiplatelet or Contrast Allergy: N/A (16 Feb 2021 07:35)    PAST MEDICAL & SURGICAL HISTORY:  Iron deficiency anemia due to chronic blood loss    Hypothyroid    History of tonsillectomy      FAMILY HISTORY:  Family history of cardiac disorder (Father)    Family history of diabetes mellitus (Father)        Vitals   ICU Vital Signs Last 24 Hrs  T(C): 36.5 (27 Feb 2021 20:00), Max: 36.7 (27 Feb 2021 08:00)  T(F): 97.7 (27 Feb 2021 20:00), Max: 98.1 (27 Feb 2021 08:00)  HR: 80 (28 Feb 2021 00:15) (54 - 82)  BP: 90/54 (28 Feb 2021 00:00) (86/43 - 132/62)  BP(mean): 66 (28 Feb 2021 00:00) (62 - 87)  ABP: 144/53 (28 Feb 2021 00:15) (97/44 - 179/51)  ABP(mean): 75 (28 Feb 2021 00:15) (50 - 86)  RR: 21 (28 Feb 2021 00:15) (12 - 32)  SpO2: 100% (28 Feb 2021 00:15) (85% - 100%)      VENT SETTINGS       I&O's Detail    26 Feb 2021 07:01  -  27 Feb 2021 07:00  --------------------------------------------------------  IN:    Albumin 5%  - 250 mL: 1000 mL    Bumetanide: 125 mL    DOBUTamine: 220.8 mL    IV PiggyBack: 100 mL    IV PiggyBack: 50 mL    Milrinone: 6.9 mL    Milrinone: 9.2 mL    Milrinone: 13.8 mL    Norepinephrine: 159 mL    Oral Fluid: 480 mL    sodium chloride 0.9%: 240 mL    sodium chloride 0.9%: 120 mL    Vasopressin: 47 mL  Total IN: 2571.7 mL    OUT:    Chest Tube (mL): 50 mL    Chest Tube (mL): 280 mL    Chest Tube (mL): 490 mL    DOPamine Infusion: 0 mL    Indwelling Catheter - Urethral (mL): 1755 mL  Total OUT: 2575 mL    Total NET: -3.3 mL      27 Feb 2021 07:01  -  28 Feb 2021 00:36  --------------------------------------------------------  IN:    Albumin 5%  - 250 mL: 750 mL    Bumetanide: 10 mL    DOBUTamine: 149 mL    Lactated Ringers Bolus: 500 mL    Norepinephrine: 105 mL    Oral Fluid: 1020 mL    sodium chloride 0.9%: 85 mL    sodium chloride 0.9%: 170 mL    Vasopressin: 6 mL  Total IN: 2795 mL    OUT:    Chest Tube (mL): 280 mL    Chest Tube (mL): 40 mL    Chest Tube (mL): 300 mL    Indwelling Catheter - Urethral (mL): 1780 mL  Total OUT: 2400 mL    Total NET: 395 mL          LABS                        9.3    15.22 )-----------( 99       ( 27 Feb 2021 03:20 )             29.3     02-27    138  |  100  |  33.0<H>  ----------------------------<  134<H>  4.5   |  20.0<L>  |  2.19<H>    Ca    9.1      27 Feb 2021 03:20  Mg     2.5     02-27    TPro  5.5<L>  /  Alb  3.8  /  TBili  1.0  /  DBili  x   /  AST  29  /  ALT  13  /  AlkPhos  68  02-26          ABG - ( 26 Feb 2021 08:09 )  pH, Arterial: 7.39  pH, Blood: x     /  pCO2: 37    /  pO2: 99    / HCO3: 22    / Base Excess: -2.5  /  SaO2: 98                  POCT Blood Glucose.: 184 mg/dL (02-27-21 @ 21:34)  POCT Blood Glucose.: 125 mg/dL (02-27-21 @ 17:36)  POCT Blood Glucose.: 132 mg/dL (02-27-21 @ 08:02)  POCT Blood Glucose.: 135 mg/dL (02-27-21 @ 05:54)        MEDICATIONS  (STANDING):  albumin human  5% IVPB 250 milliLiter(s) IV Intermittent once  aspirin enteric coated 81 milliGRAM(s) Oral daily  atorvastatin 40 milliGRAM(s) Oral at bedtime  buMETAnide Injectable 1 milliGRAM(s) IV Push every 12 hours  chlorhexidine 2% Cloths 1 Application(s) Topical daily  DOBUTamine Infusion 5 MICROgram(s)/kG/Min (9.18 mL/Hr) IV Continuous <Continuous>  famotidine    Tablet 20 milliGRAM(s) Oral daily  heparin   Injectable 5000 Unit(s) SubCutaneous every 8 hours  insulin lispro (ADMELOG) corrective regimen sliding scale   SubCutaneous Before meals and at bedtime  levothyroxine 75 MICROGram(s) Oral daily  norepinephrine Infusion 0.05 MICROgram(s)/kG/Min (5.74 mL/Hr) IV Continuous <Continuous>  senna 2 Tablet(s) Oral at bedtime  sodium chloride 0.9%. 1000 milliLiter(s) (10 mL/Hr) IV Continuous <Continuous>  sodium chloride 0.9%. 1000 milliLiter(s) (5 mL/Hr) IV Continuous <Continuous>    MEDICATIONS  (PRN):  polyethylene glycol 3350 17 Gram(s) Oral daily PRN Constipation    Allergies:  No Known Allergies      PHYSICAL EXAM:  General:  well nourished, no acute distress  Neurology:  alert and oriented X 4, nonfocal, no gross deficits  Respiratory:  clear to auscultation bilaterally  CV:  paced with temporary wires, underlying CHB  Abdomen:  soft, nontender, nondistended, positive bowel sounds  Extremities:  warm, perfused, no edema +DP pulses  Incisions:  midline sternal incision, c/d/i, sternum stable

## 2021-02-28 NOTE — PROGRESS NOTE ADULT - SUBJECTIVE AND OBJECTIVE BOX
[facility] DIVISION OF KIDNEY DISEASES AND HYPERTENSION -- FOLLOW UP NOTE  --------------------------------------------------------------------------------  Chief Complaint: ramesh    24 hour events/subjective:  off bumex drip  UOP~2.9 L        PAST HISTORY  --------------------------------------------------------------------------------  No significant changes to PMH, PSH, FHx, SHx, unless otherwise noted    ALLERGIES & MEDICATIONS  --------------------------------------------------------------------------------  Allergies    No Known Allergies    Intolerances      Standing Inpatient Medications  aspirin enteric coated 81 milliGRAM(s) Oral daily  atorvastatin 40 milliGRAM(s) Oral at bedtime  buMETAnide Injectable 0.5 milliGRAM(s) IV Push every 12 hours  chlorhexidine 2% Cloths 1 Application(s) Topical daily  DOBUTamine Infusion 2.5 MICROgram(s)/kG/Min IV Continuous <Continuous>  famotidine    Tablet 20 milliGRAM(s) Oral daily  heparin   Injectable 5000 Unit(s) SubCutaneous every 8 hours  insulin lispro (ADMELOG) corrective regimen sliding scale   SubCutaneous Before meals and at bedtime  levothyroxine 75 MICROGram(s) Oral daily  norepinephrine Infusion 0.05 MICROgram(s)/kG/Min IV Continuous <Continuous>  senna 2 Tablet(s) Oral at bedtime  sodium chloride 0.9%. 1000 milliLiter(s) IV Continuous <Continuous>  sodium chloride 0.9%. 1000 milliLiter(s) IV Continuous <Continuous>    PRN Inpatient Medications  polyethylene glycol 3350 17 Gram(s) Oral daily PRN      REVIEW OF SYSTEMS  --------------------------------------------------------------------------------  Gen: No weight changes, fatigue, fevers/chills, weakness  Skin: No rashes  Head/Eyes/Ears/Mouth: No headache; Normal hearing; Normal vision w/o blurriness; No sinus pain/discomfort, sore throat  Respiratory: No dyspnea, cough, wheezing, hemoptysis  CV: No chest pain, PND, orthopnea  GI: No abdominal pain, diarrhea, constipation, nausea, vomiting, melena, hematochezia  : No increased frequency, dysuria, hematuria, nocturia  MSK: No joint pain/swelling; no back pain; no edema  Neuro: No dizziness/lightheadedness, weakness, seizures, numbness, tingling  Heme: No easy bruising or bleeding  Endo: No heat/cold intolerance  Psych: No significant nervousness, anxiety, stress, depression    All other systems were reviewed and are negative, except as noted.    VITALS/PHYSICAL EXAM  --------------------------------------------------------------------------------  T(C): 36.4 (02-28-21 @ 06:00), Max: 36.5 (02-27-21 @ 20:00)  HR: 80 (02-28-21 @ 16:00) (54 - 80)  BP: 115/58 (02-28-21 @ 16:00) (90/54 - 124/58)  RR: 19 (02-28-21 @ 16:00) (15 - 32)  SpO2: 100% (02-28-21 @ 16:00) (85% - 100%)  Wt(kg): --        02-27-21 @ 07:01  -  02-28-21 @ 07:00  --------------------------------------------------------  IN: 4458.7 mL / OUT: 3905 mL / NET: 553.7 mL    02-28-21 @ 07:01  -  02-28-21 @ 16:16  --------------------------------------------------------  IN: 778 mL / OUT: 2055 mL / NET: -1277 mL      Physical Exam:  	Gen: NAD,  	HEENT: supple neck  	Pulm: CTA B/L, chest tube+  	CV: RRR, S1S2; no rub  	Back: No spinal or CVA tenderness; no sacral edema  	Abd: +BS, soft, nontender/nondistended  	: No suprapubic tenderness  	UE: Warm,  no edema;   	LE: Warm, edema++  	Neuro: No focal deficits  	Psych: Normal affect and mood  	Skin: Warm  LABS/STUDIES  --------------------------------------------------------------------------------              9.1    12.05 >-----------<  90       [02-28-21 @ 02:59]              28.2     136  |  99  |  34.0  ----------------------------<  121      [02-28-21 @ 02:59]  3.9   |  21.0  |  2.23        Ca     8.9     [02-28-21 @ 02:59]      Mg     2.6     [02-28-21 @ 02:59]    TPro  5.5  /  Alb  3.8  /  TBili  1.0  /  DBili  x   /  AST  29  /  ALT  13  /  AlkPhos  68  [02-26-21 @ 22:34]          Creatinine Trend:  SCr 2.23 [02-28 @ 02:59]  SCr 2.19 [02-27 @ 03:20]  SCr 2.16 [02-26 @ 22:34]  SCr 1.98 [02-26 @ 08:13]  SCr 1.78 [02-26 @ 02:34]    Urinalysis - [02-16-21 @ 22:38]      Color Yellow / Appearance Clear / SG 1.020 / pH 5.0      Gluc Negative / Ketone Negative  / Bili Negative / Urobili Negative       Blood Negative / Protein 30 / Leuk Est Trace / Nitrite Negative      RBC Negative / WBC 3-5 / Hyaline  / Gran  / Sq Epi  / Non Sq Epi Moderate / Bacteria       TSH 9.90      [02-24-21 @ 05:35]  Lipid: chol 105, TG 62, HDL 39, LDL --      [02-17-21 @ 07:04]

## 2021-02-28 NOTE — PROGRESS NOTE ADULT - ASSESSMENT
76 F,  PMH of anemia, hypothyroidism, and GI bleed 1 year ago (refused Endo/colonoscopy and never followed up) presented to [facility] originally with fever and SOB.  She was found to have a UTI/sepsis with elevated lactate, + UA, and temp of 102.  She was treated with Rocephin (completed course 2/17). Anemia noted with a Hgb of 6 and 2 units of PRBC transfused.  Patient wanted to sign out AMA from [place], but then ruled in for a NSTEMI, so she was agreeable to transfer to [facility] for cardiac cath. Cardiac cath 2/16 revealed Multivessel CAD and Severe AS. Further workup revealed moderate Right ICA stenosis and Severe Left ICA stenosis on carotid US, which was confirmed on CTA neck. Vascular surgery was consulted and cleared patient for open heart surgery with no plan for CEA at this time. Patient underwent Endoscopy 2/17 as part of her GI anemia workup, which only revealed a hiatal hernia. Colonoscopy 2/19 without bleeding or acute pathology. Cleared for OR from GI standpoint. Pt is now s/p CABG/AVR with Dr. Cruz on 2/24/21.

## 2021-02-28 NOTE — PROGRESS NOTE ADULT - ASSESSMENT
76 year old Female with a PMH of iron-deficiency anemia, hypothyroidism, and GI bleed presented to [facility] with sob and fevers found to have urosepsis, anemia s/p transfusion, NSTEMI, severe AS and ischemic cardiomyopathy (EF 25-30%).  Patient was transfered to [facility] and underwent a cardiac cath on 2/16 which revealed Multivessel CAD and severe AS.  Further workup revealed moderate Right ICA stenosis and severe Left ICA stenosis.  Vascular surgery was consulted and cleared patient for open heart surgery.  Patient also underwent Endoscopy 2/17 and Colonoscopy on 2/19 which revealed a hiatal hernia, extensive diverticulitis, external hemorrhoids, but no active source of bleeding was identified and patient was cleared for OR from GI standpoint.  Now POD #3 s/p CABG x3 (LIMA-LAD, SVG-OM1, OM2) and bio-AVR (23mm Capps) complicated by CHB with junctional escape requiring continuous pacing support.     Interval history 2/26-27:   Pt now off milrinone, remains on low dose dobutamine and levophid.   TELE: AV pacing, underlying sinus rhythm and complete heart block w/narrow escape. One episode of NSVT earlier today at 170 bpm for around 6 seconds.       Interval Hx 2/27-28:  PAC is out, but still has central line and chest tubes. Still on low dose dobutamine. Rhythm is sinus with CHB and bifascicular block. No more NSVT. Feels weak. Repeat TTE, EF ? 25-30% on low dose dobutamine   Being paced at 80 bpm, underlying sinus with CHB and bifascicular block in the high 50s.  EKG sinus with CHB and bifascicular block, qrs 125 ms     - She is not ready for CRTD. Will follow up and decide about timing tomorrow       Will follow up  above d/w patient and CTS team,

## 2021-02-28 NOTE — PROGRESS NOTE ADULT - ASSESSMENT
1) ATN  2) CAD s/p CABG, AS s/p AVR on 02/24  3) Acute cystitis   4)Acute  CHFrEF  5) Anemia    Non oliguric; Scr stable  On dobutamine gtt; Bumex decrease to 0.5 mg q12h  Monitor Scr, lytes, UOP

## 2021-03-01 DIAGNOSIS — N17.9 ACUTE KIDNEY FAILURE, UNSPECIFIED: ICD-10-CM

## 2021-03-01 DIAGNOSIS — I45.9 CONDUCTION DISORDER, UNSPECIFIED: ICD-10-CM

## 2021-03-01 LAB
ANION GAP SERPL CALC-SCNC: 14 MMOL/L — SIGNIFICANT CHANGE UP (ref 5–17)
ANION GAP SERPL CALC-SCNC: 14 MMOL/L — SIGNIFICANT CHANGE UP (ref 5–17)
BLD GP AB SCN SERPL QL: SIGNIFICANT CHANGE UP
BUN SERPL-MCNC: 34 MG/DL — HIGH (ref 8–20)
BUN SERPL-MCNC: 35 MG/DL — HIGH (ref 8–20)
CALCIUM SERPL-MCNC: 8.9 MG/DL — SIGNIFICANT CHANGE UP (ref 8.6–10.2)
CALCIUM SERPL-MCNC: 8.9 MG/DL — SIGNIFICANT CHANGE UP (ref 8.6–10.2)
CHLORIDE SERPL-SCNC: 100 MMOL/L — SIGNIFICANT CHANGE UP (ref 98–107)
CHLORIDE SERPL-SCNC: 101 MMOL/L — SIGNIFICANT CHANGE UP (ref 98–107)
CO2 SERPL-SCNC: 22 MMOL/L — SIGNIFICANT CHANGE UP (ref 22–29)
CO2 SERPL-SCNC: 23 MMOL/L — SIGNIFICANT CHANGE UP (ref 22–29)
CREAT SERPL-MCNC: 1.73 MG/DL — HIGH (ref 0.5–1.3)
CREAT SERPL-MCNC: 1.86 MG/DL — HIGH (ref 0.5–1.3)
GLUCOSE SERPL-MCNC: 114 MG/DL — HIGH (ref 70–99)
GLUCOSE SERPL-MCNC: 123 MG/DL — HIGH (ref 70–99)
HCT VFR BLD CALC: 28.7 % — LOW (ref 34.5–45)
HGB BLD-MCNC: 9.4 G/DL — LOW (ref 11.5–15.5)
MAGNESIUM SERPL-MCNC: 2.1 MG/DL — SIGNIFICANT CHANGE UP (ref 1.6–2.6)
MCHC RBC-ENTMCNC: 27.7 PG — SIGNIFICANT CHANGE UP (ref 27–34)
MCHC RBC-ENTMCNC: 32.8 GM/DL — SIGNIFICANT CHANGE UP (ref 32–36)
MCV RBC AUTO: 84.7 FL — SIGNIFICANT CHANGE UP (ref 80–100)
PLATELET # BLD AUTO: 85 K/UL — LOW (ref 150–400)
POTASSIUM SERPL-MCNC: 3.2 MMOL/L — LOW (ref 3.5–5.3)
POTASSIUM SERPL-MCNC: 4.3 MMOL/L — SIGNIFICANT CHANGE UP (ref 3.5–5.3)
POTASSIUM SERPL-SCNC: 3.2 MMOL/L — LOW (ref 3.5–5.3)
POTASSIUM SERPL-SCNC: 4.3 MMOL/L — SIGNIFICANT CHANGE UP (ref 3.5–5.3)
RBC # BLD: 3.39 M/UL — LOW (ref 3.8–5.2)
RBC # FLD: 19.6 % — HIGH (ref 10.3–14.5)
SODIUM SERPL-SCNC: 136 MMOL/L — SIGNIFICANT CHANGE UP (ref 135–145)
SODIUM SERPL-SCNC: 137 MMOL/L — SIGNIFICANT CHANGE UP (ref 135–145)
WBC # BLD: 10.83 K/UL — HIGH (ref 3.8–10.5)
WBC # FLD AUTO: 10.83 K/UL — HIGH (ref 3.8–10.5)

## 2021-03-01 PROCEDURE — 71045 X-RAY EXAM CHEST 1 VIEW: CPT | Mod: 26

## 2021-03-01 PROCEDURE — 99232 SBSQ HOSP IP/OBS MODERATE 35: CPT

## 2021-03-01 PROCEDURE — 99233 SBSQ HOSP IP/OBS HIGH 50: CPT

## 2021-03-01 PROCEDURE — 99223 1ST HOSP IP/OBS HIGH 75: CPT

## 2021-03-01 RX ORDER — POTASSIUM CHLORIDE 20 MEQ
10 PACKET (EA) ORAL
Refills: 0 | Status: COMPLETED | OUTPATIENT
Start: 2021-03-01 | End: 2021-03-01

## 2021-03-01 RX ORDER — POTASSIUM CHLORIDE 20 MEQ
40 PACKET (EA) ORAL ONCE
Refills: 0 | Status: COMPLETED | OUTPATIENT
Start: 2021-03-01 | End: 2021-03-01

## 2021-03-01 RX ORDER — ACETAMINOPHEN 500 MG
650 TABLET ORAL EVERY 6 HOURS
Refills: 0 | Status: DISCONTINUED | OUTPATIENT
Start: 2021-03-01 | End: 2021-03-12

## 2021-03-01 RX ADMIN — Medication 75 MICROGRAM(S): at 05:40

## 2021-03-01 RX ADMIN — Medication 100 MILLIEQUIVALENT(S): at 06:02

## 2021-03-01 RX ADMIN — HEPARIN SODIUM 5000 UNIT(S): 5000 INJECTION INTRAVENOUS; SUBCUTANEOUS at 05:41

## 2021-03-01 RX ADMIN — Medication 650 MILLIGRAM(S): at 21:31

## 2021-03-01 RX ADMIN — Medication 40 MILLIGRAM(S): at 09:38

## 2021-03-01 RX ADMIN — Medication 40 MILLIEQUIVALENT(S): at 06:02

## 2021-03-01 RX ADMIN — FAMOTIDINE 20 MILLIGRAM(S): 10 INJECTION INTRAVENOUS at 13:15

## 2021-03-01 RX ADMIN — SENNA PLUS 2 TABLET(S): 8.6 TABLET ORAL at 21:30

## 2021-03-01 RX ADMIN — Medication 100 MILLIEQUIVALENT(S): at 07:11

## 2021-03-01 RX ADMIN — ATORVASTATIN CALCIUM 40 MILLIGRAM(S): 80 TABLET, FILM COATED ORAL at 21:30

## 2021-03-01 RX ADMIN — Medication 81 MILLIGRAM(S): at 13:15

## 2021-03-01 RX ADMIN — POLYETHYLENE GLYCOL 3350 17 GRAM(S): 17 POWDER, FOR SOLUTION ORAL at 06:03

## 2021-03-01 RX ADMIN — Medication 100 MILLIEQUIVALENT(S): at 07:00

## 2021-03-01 NOTE — PROGRESS NOTE ADULT - SUBJECTIVE AND OBJECTIVE BOX
Electrophysiology Attending Follow Up Note    Subjective: Pt feels well, denies chest pain/pressure, palpitations, dizziness, dyspnea.    TELE: CHB with bifascicular block escape.    MEDICATIONS  (STANDING):  aspirin enteric coated 81 milliGRAM(s) Oral daily  atorvastatin 40 milliGRAM(s) Oral at bedtime  famotidine    Tablet 20 milliGRAM(s) Oral daily  levothyroxine 75 MICROGram(s) Oral daily  senna 2 Tablet(s) Oral at bedtime  sodium chloride 0.9%. 1000 milliLiter(s) (5 mL/Hr) IV Continuous <Continuous>  torsemide 40 milliGRAM(s) Oral daily    MEDICATIONS  (PRN):  acetaminophen   Tablet .. 650 milliGRAM(s) Oral every 6 hours PRN Moderate Pain (4 - 6)  polyethylene glycol 3350 17 Gram(s) Oral daily PRN Constipation      Allergies    No Known Allergies    Intolerances        Vital Signs Last 24 Hrs  T(C): 36.8 (01 Mar 2021 17:00), Max: 36.9 (01 Mar 2021 12:00)  T(F): 98.3 (01 Mar 2021 17:00), Max: 98.4 (01 Mar 2021 12:00)  HR: 80 (01 Mar 2021 22:00) (80 - 80)  BP: 110/57 (01 Mar 2021 22:00) (101/58 - 136/60)  BP(mean): 76 (01 Mar 2021 22:00) (70 - 88)  RR: 22 (01 Mar 2021 22:00) (17 - 31)  SpO2: 99% (01 Mar 2021 22:00) (93% - 99%)    Physical Exam:  Constitutional: NAD, AAOx3, RIJ line in place; multiple chest tubes  Pulmonary: Unlabored  GI: soft NTN  Extremities: no pedal edema, warm and well perfused  Neuro: non focal, YATES x4    LABS:             9.4    10.83 )-----------( 85       ( 01 Mar 2021 02:39 )             28.7     03-01    136  |  101  |  34.0<H>  ----------------------------<  114<H>  4.3   |  22.0  |  1.73<H>    Ca    8.9      01 Mar 2021 09:19  Mg     2.1     03-01

## 2021-03-01 NOTE — PROGRESS NOTE ADULT - ASSESSMENT
76F PMH of anemia, hypothyroidism, and GI bleed 1 year ago (refused Endo/colonoscopy and never followed up) presented to [facility] originally with fever and SOB found to have urosepsis s/p course of rocephin (completed 2/17), anemia requiring 2 PRBC (no GI work up because pt wanted to sign out AMA), ruled in for NSTEMI, transferred to [facility] 2/16 and underwent cardiac cath which showed multivessel dz and severe AS, preop carotid US with b/l carotid stenosis confirmed by CTA, seen by vascular and cleared for OR (outpt follow up), s/p endoscopy 2/17 and colonoscopy 2/19 without evidence of acute bleed, noted to have hiatal hernia, now s/p CABG x3 with LIMA, AVR 2/24 with Dr. Cruz, postop course notable for KIMMY, CHB with junctional escape requiring pacing via EPM, slow inotrope wean;

## 2021-03-01 NOTE — CONSULT NOTE ADULT - PROVIDER SPECIALTY LIST ADULT
Infectious Disease
Rehab Medicine
Electrophysiology
Gastroenterology
Vascular Surgery
Endocrinology
Nephrology
CT Surgery

## 2021-03-01 NOTE — PROGRESS NOTE ADULT - PROBLEM SELECTOR PLAN 4
CHB with junctional escape, overnight 50-60s requiring V pacing via EPM  EP following  plan for device this week, may require CRT device, multiple TTE with reduced EF

## 2021-03-01 NOTE — PROGRESS NOTE ADULT - ASSESSMENT
76y Female with PMH anemia, and hypothyroidism.  Pt presented to [facility] a few days ago with fever and SOB.  She was found to have a UTI/sepsis with elevated lactate, + UA, and temp of 102.  She was started on Rocephin.  HGB was 6.  She was transfused 2 units of PRBC.  Per medical record, a year ago she had a GI bleed and was offered an endoscopy and colonoscopy but she refused and then failed to follow up outpatient.  Per medical record she did not want to stay at [place] but agreed once she ruled in for NSTEMI.  She was transferred to [facility] for cardiac cath.     2/16 - s/p LHC showing EF 30%. LM normal, mLAD 90%, OM1 85%, pRCA 99%, dRCA 100%, mod pulm  HTN, SREEDHAR <0.5 consistent with critical AS  2-17- EGD with old blood suspect from pharynx/epistaxis?, poor bowel prep  2/18- plan for repeat colonoscopy  2/19- diverticulosis on colonoscopy   2/24- underwent CABG x3 (LIMA-LAD, SVG-OM1, OM2) and #23 bio-AVR,  2/25- remains intubated as not fully awake for CPAP trial, on milrinone, norepinephrine and dopamine for junctional kunal, CHB, currently AV pacing; arousable and following commands  2/26- extubated yesterday, remains on inotropes and vasopressors, KIMMY on Lasix gtt--> Bumex, albumin, [place] with PAD ~14  2/27-2/28- weaned off vasopressors on low dose dobutamine gtt, nonoligouric switched to PO torsemide, repeat TTE LV EF still 25-30%, remains in CHB off epicardial pacing  3/1- weaned off dobutamine gtt, pending CRT-D       HFEF/ICM, Cardiogenic shock- postop  -weaned off inotrope, continue torsemide to maintain euvolemia  - repeat bedside TTE prelim LV EF ~25%-30%   - not able to add GDMT for now with KIMMY and CHB, consider low dose hydralazine/isordil in 24hrs    KIMMY postop  - improving with diuretic, nephrology following     CHB- postop  - epicardial AV pacing- await CRT-D today      Severe Multivessel CAD s/p CABG x3  - cath LHC mLAD 90%, OM1 85%, pRCA 99%, dRCA 100%  - continue ASA 81, statin, LDL 54  - monitor chest tubes output  - PT/OOB when able      Critical AS s/p bio-AVR  -continue ASA 81  -TTE with mild paravalvular regurgitation    Large PFO  -incidental noted on intraop LUIS MIGUEL with predominate L-to-R shunt  -continue to monitor, consider percutaneous closure in the future if episode of CVA or systemic emboli    Acute blood loss anemia  - received x2 PRBC at [place] before transfer, 2 PRBC overnight- monitor H/H   - occult blood positive, source unclear, colonoscopy with diverticulosis  - monitor for recurrent bleeding, pAfib          Chet Grimaldo DO, [facility]  Faculty Non-Invasive Cardiologist  [phone]

## 2021-03-01 NOTE — PROGRESS NOTE ADULT - PROBLEM SELECTOR PLAN 1
s/p CABG/AVR on 2/24 with Dr. Nancy garces, ambulate as tolerated, PT rec acute rehab vs home pending progress  wean NC, encourage Incentive Spirometry, chest PT, deep breathing and coughing,  cont asa and lipitor for graft patency   beta blocker contraindicated while on dobutamine and also heart block requiring pacing with EPM  tolerating diet, cont bowel regimen  cont supportive ICU care, likely deintensify today  d/w team in AM rounds s/p CABG/AVR on 2/24 with Dr. Nancy garces, ambulate as tolerated, PT rec acute rehab vs home pending progress  wean NC, encourage Incentive Spirometry, chest PT, deep breathing and coughing,  cont asa and lipitor for graft patency   beta blocker contraindicated while on dobutamine and also heart block requiring pacing with EPM  tolerating diet, cont bowel regimen  d/c mepilex from sternal incision and RLE EVH site   cont supportive ICU care, likely deintensify today  d/w team in AM rounds

## 2021-03-01 NOTE — CONSULT NOTE ADULT - CONSULT REQUESTED DATE/TIME
16-Feb-2021 17:03
17-Feb-2021
25-Feb-2021 10:06
26-Feb-2021 12:46
01-Mar-2021 10:01
16-Feb-2021 15:19
16-Feb-2021 15:24
16-Feb-2021 12:07

## 2021-03-01 NOTE — PROGRESS NOTE ADULT - SUBJECTIVE AND OBJECTIVE BOX
POD 5 s/p AVR/C3L, postop course notable for KIMMY, complete heart block with junctional escape requiring pacing with epicardial wires    Subjective: c/o mild L sided shoulder/rib pain, denies CP, palpitations, SOB, cough, fever, chills, itchiness/rash, diaphoresis, vision changes, HA, dizziness/lightheadedness, numbness/tingling, abd pain, N/V     T(C): 36.6 (02-28-21 @ 15:52), Max: 36.7 (02-28-21 @ 12:00)  HR: 80 (03-01-21 @ 02:00) (74 - 80)  BP: 112/55 (03-01-21 @ 02:00) (95/52 - 124/58)  ABP: 139/43 (03-01-21 @ 02:00) (99/72 - 168/51)  ABP(mean): 65 (03-01-21 @ 02:00) (57 - 79)  RR: 20 (03-01-21 @ 02:00) (15 - 31)  SpO2: 97% (03-01-21 @ 02:00) (94% - 100%)  CVP(mm Hg): 7 (03-01-21 @ 02:00) (7 - 22)  CO: 3.7 (02-28-21 @ 12:00) (3.1 - 4.3)  CI: 2.2 (02-28-21 @ 12:00) (1.9 - 2.7)  PA: 96/96 (02-28-21 @ 13:00) (26/16 - 96/96)      03-01    137  |  100  |  35.0<H>  ----------------------------<  123<H>  3.2<L>   |  23.0  |  1.86<H>    Ca    8.9      01 Mar 2021 02:39  Mg     2.1     03-01                                 9.4    10.83 )-----------( 85       ( 01 Mar 2021 02:39 )             28.7          CAPILLARY BLOOD GLUCOSE  POCT Blood Glucose.: 121 mg/dL (28 Feb 2021 21:21)  POCT Blood Glucose.: 107 mg/dL (28 Feb 2021 16:25)  POCT Blood Glucose.: 117 mg/dL (28 Feb 2021 12:03)  POCT Blood Glucose.: 106 mg/dL (28 Feb 2021 08:06)    I&O's Detail    27 Feb 2021 07:01  -  28 Feb 2021 07:00  --------------------------------------------------------  IN:    Albumin 5%  - 250 mL: 1250 mL    Bumetanide: 10 mL    DOBUTamine: 213.4 mL    IV PiggyBack: 200 mL    Lactated Ringers Bolus: 1000 mL    Norepinephrine: 149.3 mL    Oral Fluid: 1270 mL    sodium chloride 0.9%: 120 mL    sodium chloride 0.9%: 240 mL    Vasopressin: 6 mL  Total IN: 4458.7 mL    OUT:    Chest Tube (mL): 40 mL    Chest Tube (mL): 540 mL    Chest Tube (mL): 420 mL    Indwelling Catheter - Urethral (mL): 2905 mL  Total OUT: 3905 mL  Total NET: 553.7 mL    28 Feb 2021 07:01  -  01 Mar 2021 03:25  --------------------------------------------------------  IN:    DOBUTamine: 4.5 mL    DOBUTamine: 95.2 mL    IV PiggyBack: 100 mL    Norepinephrine: 9 mL    Oral Fluid: 720 mL    sodium chloride 0.9%: 190 mL    sodium chloride 0.9%: 45 mL  Total IN: 1163.7 mL    OUT:    Chest Tube (mL): 20 mL    Chest Tube (mL): 280 mL    Chest Tube (mL): 370 mL    Indwelling Catheter - Urethral (mL): 2875 mL  Total OUT: 3545 mL  Total NET: -2381.3 mL    Drug Dosing Weight  Height (cm): 157 (24 Feb 2021 12:10)  Weight (kg): 61.2 (24 Feb 2021 12:10)  BMI (kg/m2): 24.8 (24 Feb 2021 12:10)  BSA (m2): 1.61 (24 Feb 2021 12:10)    MEDICATIONS  (STANDING):  aspirin enteric coated 81 milliGRAM(s) Oral daily  atorvastatin 40 milliGRAM(s) Oral at bedtime  buMETAnide Injectable 0.5 milliGRAM(s) IV Push every 12 hours  chlorhexidine 2% Cloths 1 Application(s) Topical daily  DOBUTamine Infusion 2.5 MICROgram(s)/kG/Min (4.59 mL/Hr) IV Continuous <Continuous>  famotidine    Tablet 20 milliGRAM(s) Oral daily  heparin   Injectable 5000 Unit(s) SubCutaneous every 8 hours  insulin lispro (ADMELOG) corrective regimen sliding scale   SubCutaneous Before meals and at bedtime  levothyroxine 75 MICROGram(s) Oral daily  senna 2 Tablet(s) Oral at bedtime  sodium chloride 0.9%. 1000 milliLiter(s) (10 mL/Hr) IV Continuous <Continuous>  sodium chloride 0.9%. 1000 milliLiter(s) (5 mL/Hr) IV Continuous <Continuous>    MEDICATIONS  (PRN):  polyethylene glycol 3350 17 Gram(s) Oral daily PRN Constipation    Physical Exam  Constitutional: NAD, well developed, well nourished  Neuro: A+O x 3, however thinks tv remote is a telephone, non-focal, speech clear and intact  Neck: supple, no JVD  CV: S1S2 RRR, no murmurs  Pulm/chest: decreased breath sounds b/l with crackles, no wheezing  Abd: +BS soft NT ND  Ext: YATES x 4, 1+ b/l LE edema, chronic dusky toes, R groin soft (prior sheath site)  Skin: warm, well perfused  vascular: 2+ radial pulse b/l, dopperable DP pulses b/l  Psych: calm, appropriate affect   inc: mid sternal dsg C/D/I, RLE SVG site dsg C/D/I  lines/tubes: RIJ intro, R radial selene, briscoe,b/l pleural CT and pericardial radha, all to suction, no air leaks

## 2021-03-01 NOTE — PROGRESS NOTE ADULT - SUBJECTIVE AND OBJECTIVE BOX
CC:  follow up hypothyroidism    Interval HPI/ overnight Events:  Patient denies any CP or Dyspnea.    Going for PPM.  On Dobutamine.        MEDICATIONS  (STANDING):  aspirin enteric coated 81 milliGRAM(s) Oral daily  atorvastatin 40 milliGRAM(s) Oral at bedtime  DOBUTamine Infusion 1.25 MICROgram(s)/kG/Min (2.3 mL/Hr) IV Continuous <Continuous>  famotidine    Tablet 20 milliGRAM(s) Oral daily  levothyroxine 75 MICROGram(s) Oral daily  senna 2 Tablet(s) Oral at bedtime  sodium chloride 0.9%. 1000 milliLiter(s) (5 mL/Hr) IV Continuous <Continuous>  torsemide 40 milliGRAM(s) Oral daily    Vital Signs Last 24 Hrs  T(C): 36.9 (01 Mar 2021 12:00), Max: 36.9 (01 Mar 2021 12:00)  T(F): 98.4 (01 Mar 2021 12:00), Max: 98.4 (01 Mar 2021 12:00)  HR: 80 (01 Mar 2021 11:00) (74 - 80)  BP: 122/56 (01 Mar 2021 11:00) (95/52 - 122/56)  BP(mean): 80 (01 Mar 2021 11:00) (70 - 84)  RR: 19 (01 Mar 2021 11:00) (16 - 31)  SpO2: 95% (01 Mar 2021 11:00) (93% - 100%)    PE:  Gen: elderly female,  NAD  HEENT:  sclera anicteric, MMM  Neck:  no thyromegaly, no LAD  CV:  nl S1 + S2, RRR, no m/r/g  Resp:  nl respiratory effort, CTA b/l  GI/ Abd: soft, NT/ ND, BS +  MS:  no c/c/e, nl muscle tone  Skin:  no acanthosis  Psych:  affect appropriate    LABS:  Thyroid Stimulating Hormone, Serum: 9.90 uIU/mL (02.24.21 @ 05:35)  T4, Serum: 4.9 ug/dL (02.21.21 @ 06:04)    03-01    136  |  101  |  34.0<H>  ----------------------------<  114<H>  4.3   |  22.0  |  1.73<H>    Ca    8.9      01 Mar 2021 09:19  Mg     2.1     03-01                          9.4    10.83 )-----------( 85       ( 01 Mar 2021 02:39 )             28.7

## 2021-03-01 NOTE — CONSULT NOTE ADULT - SUBJECTIVE AND OBJECTIVE BOX
76yF was admitted on 02-16 from [place] with SOB, urosepsis, anemia s/p 2 PRBC and +NSTEMI. She is s/p cardiac cath which showed multivessel disease and severe ASShe is now s/p CABG x3 with LIMA and AVR on 2/24. Post-op notable for KIMMY, CHB with junctional escape requiring pacing via EPM, slow inotrope wean.    Imaging performed:  TTE 2/28 - Summary:   1. Left ventricular ejection fraction, by visual estimation, is 25 to 30%.   2. Severely decreased global left ventricular systolic function.   3. Abnormal septal motion consistent with post-operative status.   4. Severely increased LV wall thickness.   5. Severely enlarged left atrium.   6. Mild mitral valve regurgitation.   7. Severe mitral annular calcification.   8. Moderate to severe thickening of the anterior and posterior mitral valve leaflets.   9. Mitral valve mean gradient is 2.0 mmHg (at HR of 79 bpm).  10. Mild tricuspid regurgitation.  11. Mild pulmonic valve regurgitation.  12. Bioprosthesis in the aortic position, with mild aortic regurgitation, which is paravalvular in origin.  13. There is no evidence of pericardial effusion.  14. Endocardial visualization was enhanced with intravenous echo contrast.  15. Compared to TTE done on 2/26/2021, left ventricular function is unchanged from prior.    CXR 2/28 - Reduction of pleural effusions.    Patient is fatigued and confused.  Feels better overall having been able to walk a bit more today.  Reports no pain.  Legs are weak due to swelling.     REVIEW OF SYSTEMS  Constitutional - No fever, No weight loss, +fatigue  HEENT - No eye pain, No visual disturbances, No difficulty hearing, No tinnitus, No vertigo, No neck pain  Respiratory - No cough, No wheezing, No shortness of breath  Cardiovascular - No chest pain, No palpitations  Gastrointestinal - No abdominal pain, No nausea, No vomiting, No diarrhea, No constipation  Genitourinary - No dysuria, No frequency, No hematuria, No incontinence  Neurological - No headaches, +memory loss, +loss of strength, No numbness, No tremors  Skin - No itching, No rashes, +lesions   Endocrine - No temperature intolerance  Musculoskeletal - No joint pain, +joint swelling, No muscle pain  Psychiatric - No depression, No anxiety    VITALS  T(C): 36.5 (03-01-21 @ 07:00), Max: 36.6 (02-28-21 @ 15:52)  HR: 80 (03-01-21 @ 11:00) (74 - 80)  BP: 122/56 (03-01-21 @ 11:00) (95/52 - 122/56)  RR: 19 (03-01-21 @ 11:00) (16 - 31)  SpO2: 95% (03-01-21 @ 11:00) (93% - 100%)  Wt(kg): --    PAST MEDICAL & SURGICAL HISTORY  Iron deficiency anemia due to chronic blood loss    Iron deficiency anemia    Hypothyroid    History of tonsillectomy        SOCIAL HISTORY - as per documentation/history  Smoking - None  EtOH - ++  Drugs - None    FUNCTIONAL HISTORY  Lives with spouse, 6 RHODA  Independent with RW    CURRENT FUNCTIONAL STATUS  3/1  Sit-Stand Transfer Training  Sit-to-Stand Transfer Training Rehab Potential: good, to achieve stated therapy goals  Transfer Training Sit-to-Stand Transfer: moderate assist (50% patient effort);  1 person assist;  verbal cues;  rolling walker  Transfer Training Stand-to-Sit Transfer: moderate assist (50% patient effort);  1 person assist;  verbal cues;  rolling walker  Sit-to-Stand Transfer Training Transfer Safety Analysis: decreased balance;  impaired balance;  decreased strength;  rolling walker    Gait Training  Gait Training Rehab Potential: good, to achieve stated therapy goals  Gait Training: moderate assist (50% patient effort);  1 person + 1 person to manage equipment;  rolling walker;  5 feet  Gait Analysis: shuffling;  impaired balance;  decreased strength;  5 feet;  rolling walker    FAMILY HISTORY   Family history of cardiac disorder (Father)    Family history of diabetes mellitus (Father)        RECENT LABS - Reviewed  CBC Full  -  ( 01 Mar 2021 02:39 )  WBC Count : 10.83 K/uL  RBC Count : 3.39 M/uL  Hemoglobin : 9.4 g/dL  Hematocrit : 28.7 %  Platelet Count - Automated : 85 K/uL  Mean Cell Volume : 84.7 fl  Mean Cell Hemoglobin : 27.7 pg  Mean Cell Hemoglobin Concentration : 32.8 gm/dL  Auto Neutrophil # : x  Auto Lymphocyte # : x  Auto Monocyte # : x  Auto Eosinophil # : x  Auto Basophil # : x  Auto Neutrophil % : x  Auto Lymphocyte % : x  Auto Monocyte % : x  Auto Eosinophil % : x  Auto Basophil % : x    03-01    136  |  101  |  34.0<H>  ----------------------------<  114<H>  4.3   |  22.0  |  1.73<H>    Ca    8.9      01 Mar 2021 09:19  Mg     2.1     03-01          ALLERGIES  No Known Allergies      MEDICATIONS   acetaminophen   Tablet .. 650 milliGRAM(s) Oral every 6 hours PRN  aspirin enteric coated 81 milliGRAM(s) Oral daily  atorvastatin 40 milliGRAM(s) Oral at bedtime  DOBUTamine Infusion 1.25 MICROgram(s)/kG/Min IV Continuous <Continuous>  famotidine    Tablet 20 milliGRAM(s) Oral daily  levothyroxine 75 MICROGram(s) Oral daily  polyethylene glycol 3350 17 Gram(s) Oral daily PRN  senna 2 Tablet(s) Oral at bedtime  sodium chloride 0.9%. 1000 milliLiter(s) IV Continuous <Continuous>  torsemide 40 milliGRAM(s) Oral daily      ----------------------------------------------------------------------------------------  PHYSICAL EXAM  Constitutional - NAD, Comfortable  HEENT - NCAT, EOMI  Neck - Supple, No limited ROM  Chest - Increased work of breathing, No wheezing, +3 chest tubes  Cardiovascular - S1S2   Abdomen - Soft   Extremities - BLE edema  Neurologic Exam -                    Cognitive - AAO to self, [place] after answering [place], NOT date      Communication - Fluent, No dysarthria     Cranial Nerves - CN 2-12 intact     Motor -                      LEFT    UE - ShAB 4/5, EF 5/5, EE 5/5, WE 5/5,  5/5                    RIGHT UE - ShAB 4/5, EF 5/5, EE 5/5, WE 5/5,  5/5                    LEFT    LE - HF 1/5, KE 2/5, DF 5/5, PF 5/5                    RIGHT LE - HF 2/5, KE 1/5, DF 5/5, PF 5/5        Sensory - Intact to LT  Psychiatric - Mood stable, Affect Flat  ----------------------------------------------------------------------------------------  ASSESSMENT/PLAN  76yFemale with functional deficits after +NSTEMI/CAD/Severe AS  CAD s/p CABG x3 - ASA, Lipitor  Severe AS s/p AVR -   Acute CHF - Demadex  Heart Block - ?CRT  Pleural effusions - Chest tubes x3  Pain - Tylenol  DVT PPX - SCDs  Rehab - Medically being optimized. Patient is quite confused and may be related to acute condition. Will continue to follow. Recommend CM/SW to identify DC HOME plans and support available, given spouse was recently admitted to hospital as well. Could benefit from AR pending home assist.     Will continue to follow. Functional progress will determine ongoing rehab dispo recommendations, which may change.    Continue bedside therapy as well as OOB throughout the day with mobilization by staff to maintain cardiopulmonary function and prevention of secondary complications related to debility.     Discussed with rehab team.

## 2021-03-01 NOTE — PROGRESS NOTE ADULT - ASSESSMENT
1) ATN  2) CAD s/p CABG, AS s/p AVR on 02/24  3) Acute cystitis   4)Acute  CHFrEF  5) Anemia    Non oliguric; Scr stable  On dobutamine gtt; going for PPM; on oral torsemide  SCr improving  Monitor Scr, lytes, UOP  signing off  Please recall if needed    dw CTS

## 2021-03-01 NOTE — PROGRESS NOTE ADULT - ASSESSMENT
76 year old Female with a PMH of iron-deficiency anemia, hypothyroidism, and GI bleed presented to [facility] with sob and fevers found to have urosepsis, anemia s/p transfusion, NSTEMI, severe AS and ischemic cardiomyopathy (EF 25-30%) who was transferred to [facility] found to have mutivessel CAD and severe AS. 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 Recent TTE on Sunday showed depressed EF of 25-30%. She is therefore planned for CRT-D implantation. Unfortunately could not be performed today. Plan for implantation tomorrow. RIJ line removed earlier today. All risks, benefits and alternatives discussed with the patient, she agrees to proceed.    Recommendations:  - NPO after MN for CRT-D implantation tomorrow  - Hold Heparin products (including SQH)  - Continue with GDMT for HFrEF    Derek Shipley MD  Clinical Cardiac Electrophysiology

## 2021-03-01 NOTE — PROGRESS NOTE ADULT - ASSESSMENT
76 year old female with PMH of hypothyroidism transferred from [place] with NSTEMI now s/p CABG and AVR.   She was found to have an elevated TSH, likely due to noncompliance with Synthroid.  Her postoperative course was complicated by heart block.      1.  Hypothyroidism-  continue current dose of LT4.  Would repeat TSH and Free T4 this week.    2.  CAD s/p CABG-  management as per CT surgery team.  On ASA, statin.  Unable to take beta blocker due to heart block.    3.  heart block-  awaiting PPM.

## 2021-03-01 NOTE — PROGRESS NOTE ADULT - SUBJECTIVE AND OBJECTIVE BOX
[place] [facility] [place] [facility] Faculty Practice                                                               Office: [address] [address]                                                              Telephone: [phone]. Fax:[phone]                                                                             PROGRESS NOTE  Reason for follow up: CAD/ICM/HFrEF, severe AS- s/p CABG, bio-AVR, cardiogenic shock  Overnight: No new events.   Update: weaning off low dose dobutamine gtt, urine output >3 liters yesterday, Cr. downtrending; chest tubes remain in place; still in CHB 40s off epicardial pacing, planning for CRT-D today.       Review of symptoms:   Cardiac:  No chest pain. No dyspnea. No palpitations.  Respiratory: No cough. No dyspnea  Gastrointestinal: No diarrhea. No abdominal pain. No bleeding.     Past medical history: No updates.   	  Vital Signs Last 24 Hrs  T(C): 36.8 (03-01-21 @ 17:00), Max: 36.9 (03-01-21 @ 12:00)  T(F): 98.3 (03-01-21 @ 17:00), Max: 98.4 (03-01-21 @ 12:00)  HR: 80 (03-01-21 @ 16:00) (74 - 80)  BP: 124/68 (03-01-21 @ 16:00) (95/52 - 126/66)  BP(mean): 84 (03-01-21 @ 16:00) (70 - 88)  RR: 22 (03-01-21 @ 16:00) (17 - 31)  SpO2: 98% (03-01-21 @ 16:00) (93% - 100%)  I&O's Summary    28 Feb 2021 07:01  -  01 Mar 2021 07:00  --------------------------------------------------------  IN: 1776.7 mL / OUT: 4240 mL / NET: -2463.3 mL    01 Mar 2021 07:01  -  01 Mar 2021 17:47  --------------------------------------------------------  IN: 368.4 mL / OUT: 1320 mL / NET: -951.6 mL          PHYSICAL EXAM:  Appearance: Comfortable, laying in bed  HEENT:  Head and neck: Atraumatic. Normocephalic.  Normal oral mucosa, PERRL,   Neurologic: A&Ox 3, no focal deficits. EOMI, Cranial nerves are intact.  Lymphatic: No cervical lymphadenopathy  Cardiovascular: Normal S1 S2, No murmur, rubs/gallops. Midline staples intact, +chest tubes  Respiratory: Lungs clear to auscultation  Gastrointestinal:  Soft, Non-tender, + BS, +briscoe  Lower Extremities: No edema, ACE bandaged R-leg  Psychiatry: Patient is calm. No agitation. Mood & affect appropriate  Skin: No rashes/ecchymoses/cyanosis/ulcers visualized on the face, hands or feet.      CURRENT MEDICATIONS:  MEDICATIONS  (STANDING):  aspirin enteric coated 81 milliGRAM(s) Oral daily  atorvastatin 40 milliGRAM(s) Oral at bedtime  famotidine    Tablet 20 milliGRAM(s) Oral daily  levothyroxine 75 MICROGram(s) Oral daily  senna 2 Tablet(s) Oral at bedtime  sodium chloride 0.9%. 1000 milliLiter(s) (5 mL/Hr) IV Continuous <Continuous>  torsemide 40 milliGRAM(s) Oral daily    MEDICATIONS  (PRN):  acetaminophen   Tablet .. 650 milliGRAM(s) Oral every 6 hours PRN Moderate Pain (4 - 6)  polyethylene glycol 3350 17 Gram(s) Oral daily PRN Constipation      DIAGNOSTIC TESTING:  -latest repeat Echo:  < from: TTE Echo Complete w/ Contrast w/ Doppler (02.28.21 @ 09:39) >  Summary:   1. Left ventricular ejection fraction, by visual estimation, is 25 to 30%.   2. Severely decreased global left ventricular systolic function.   3. Abnormal septal motion consistent with post-operative status.   4. Severely increased LV wall thickness.   5. Severely enlarged left atrium.   6. Mild mitral valve regurgitation.   7. Severe mitral annular calcification.   8. Moderateto severe thickening of the anterior and posterior mitral valve leaflets.   9. Mitral valve mean gradient is 2.0 mmHg (at HR of 79 bpm).  10. Mild tricuspid regurgitation.  11. Mild pulmonic valve regurgitation.  12. Bioprosthesis in the aortic position, with mild aortic regurgitation, which is paravalvular in origin.  13. There is no evidence of pericardial effusion.  14. Endocardial visualization was enhanced with intravenous echo contrast.  15. Compared to TTE done on 2/26/2021, left ventricular function is unchanged from prior.    < end of copied text >    [ ]  Catheterization:  < from: Cardiac Cath Lab - Adult (02.16.21 @ 08:33) >  VENTRICLES: EF by echo was 30 %.  VALVES: AORTIC VALVE: There was critical aortic stenosis.  CORONARY VESSELS:The coronary circulation is co-dominant.  LM:   --  LM: Normal.  LAD:   --  Mid LAD: There was a tubular 90 % stenosis. The lesion was  eccentric.  CX:   --  OM1: There was a diffuse 85 % stenosis in the proximal third of  the vessel segment. The lesion was irregularly contoured and eccentric.  RCA:   --  Proximal RCA: There was a diffuse 99 % stenosis. The lesion was  irregularly contoured and eccentric.  --  Distal RCA: There was a diffuse 100 % stenosis.  COMPLICATIONS: No complications occurred during the cath lab visit.  DIAGNOSTIC IMPRESSIONS: Moderate Pulmonary Hypertension and elevation of  filling pressures. 2. Critical Aortic Stenosis with a mean PG >40mm Hg and  an SREEDHAR of <0.5cm2. 3. Severe LV Systolic dysfunction by TTE from 2/13/2021  with an estimated LVEF of 30%. 4. Triple Vessel CAD.  DIAGNOSTIC RECOMMENDATIONS: GDMT. 2. CTS consultation.  INTERVENTIONAL IMPRESSIONS: Moderate Pulmonary Hypertension and elevation  of filling pressures. 2. Critical Aortic Stenosis with a mean PG >40mm Hg  and an SREEDHAR of <0.5cm2. 3. Severe LV Systolic dysfunction by TTE from  2/13/2021 with an estimated LVEF of 30%. 4. Triple Vessel CAD.    Labs:                        9.4    10.83 )-----------( 85       ( 01 Mar 2021 02:39 )             28.7     03-01    136  |  101  |  34.0<H>  ----------------------------<  114<H>  4.3   |  22.0  |  1.73<H>    Ca    8.9      01 Mar 2021 09:19  Mg     2.1     03-01 17 Feb 2021 11:36 Troponin 1.35 ng/mL / Creatine Kinase x     /  CKMB x     / CPK Mass Assay % x       17 Feb 2021 01:45 Troponin 1.78 ng/mL / Creatine Kinase x     /  CKMB x     / CPK Mass Assay % x       16 Feb 2021 23:27 Troponin 1.83 ng/mL / Creatine Kinase 49 U/L /  CKMB x     / CPK Mass Assay % x          Serum Pro-Brain Natriuretic Peptide: 78123 pg/mL (02-21-21 @ 06:04)  Serum Pro-Brain Natriuretic Peptide: 34234 pg/mL (02-20-21 @ 06:31)  Serum Pro-Brain Natriuretic Peptide: 37528 pg/mL (02-16-21 @ 12:38)    Cholesterol 105 mg/dL; Direct LDL --; HDL Cholesterol, Serum 39 mg/dL; HDL/ Total Cholesterol Ratio Measurement --; Total Cholesterol/ HDL Ratio Measurement --; Triglycerides, Serum 62 mg/dL  A1C with Estimated Average Glucose Result: 5.2 % (02-16-21 @ 12:38)      TELEMETRY: AV pacing 70s; CHB 40s off epicardial  pacing

## 2021-03-01 NOTE — PROGRESS NOTE ADULT - SUBJECTIVE AND OBJECTIVE BOX
[facility] DIVISION OF KIDNEY DISEASES AND HYPERTENSION -- FOLLOW UP NOTE  --------------------------------------------------------------------------------  Chief Complaint:   ramesh    24 hour events/subjective:  off bumex drip  On torsemide;    PAST HISTORY  --------------------------------------------------------------------------------  No significant changes to PMH, PSH, FHx, SHx, unless otherwise noted    ALLERGIES & MEDICATIONS  --------------------------------------------------------------------------------  Allergies    No Known Allergies    Intolerances      Standing Inpatient Medications  aspirin enteric coated 81 milliGRAM(s) Oral daily  atorvastatin 40 milliGRAM(s) Oral at bedtime  DOBUTamine Infusion 1.25 MICROgram(s)/kG/Min IV Continuous <Continuous>  famotidine    Tablet 20 milliGRAM(s) Oral daily  levothyroxine 75 MICROGram(s) Oral daily  senna 2 Tablet(s) Oral at bedtime  sodium chloride 0.9%. 1000 milliLiter(s) IV Continuous <Continuous>  torsemide 40 milliGRAM(s) Oral daily    PRN Inpatient Medications  acetaminophen   Tablet .. 650 milliGRAM(s) Oral every 6 hours PRN  polyethylene glycol 3350 17 Gram(s) Oral daily PRN      REVIEW OF SYSTEMS  --------------------------------------------------------------------------------  Gen: No weight changes, fatigue, fevers/chills, weakness  Skin: No rashes  Head/Eyes/Ears/Mouth: No headache; Normal hearing; Normal vision w/o blurriness; No sinus pain/discomfort, sore throat  Respiratory: No dyspnea, cough, wheezing, hemoptysis  CV: No chest pain, PND, orthopnea  GI: No abdominal pain, diarrhea, constipation, nausea, vomiting, melena, hematochezia  : No increased frequency, dysuria, hematuria, nocturia  MSK: No joint pain/swelling; no back pain; no edema  Neuro: No dizziness/lightheadedness, weakness, seizures, numbness, tingling  Heme: No easy bruising or bleeding  Endo: No heat/cold intolerance  Psych: No significant nervousness, anxiety, stress, depression    All other systems were reviewed and are negative, except as noted.    VITALS/PHYSICAL EXAM  --------------------------------------------------------------------------------  T(C): 36.9 (03-01-21 @ 12:00), Max: 36.9 (03-01-21 @ 12:00)  HR: 80 (03-01-21 @ 11:00) (74 - 80)  BP: 122/56 (03-01-21 @ 11:00) (95/52 - 122/56)  RR: 19 (03-01-21 @ 11:00) (16 - 31)  SpO2: 95% (03-01-21 @ 11:00) (93% - 100%)  Wt(kg): --        02-28-21 @ 07:01  -  03-01-21 @ 07:00  --------------------------------------------------------  IN: 1776.7 mL / OUT: 4240 mL / NET: -2463.3 mL    03-01-21 @ 07:01  -  03-01-21 @ 12:38  --------------------------------------------------------  IN: 24.2 mL / OUT: 480 mL / NET: -455.8 mL      Physical Exam:  	Gen: NAD,  	HEENT: supple neck  	Pulm: CTA B/L, chest tube+  	CV: RRR, S1S2; no rub  	Back: No spinal or CVA tenderness; no sacral edema  	Abd: +BS, soft, nontender/nondistended  	: No suprapubic tenderness  	UE: Warm,  no edema;   	LE: Warm, edema++  	Neuro: No focal deficits  	Psych: Normal affect and mood  	Skin: Warm    LABS/STUDIES  --------------------------------------------------------------------------------              9.4    10.83 >-----------<  85       [03-01-21 @ 02:39]              28.7     136  |  101  |  34.0  ----------------------------<  114      [03-01-21 @ 09:19]  4.3   |  22.0  |  1.73        Ca     8.9     [03-01-21 @ 09:19]      Mg     2.1     [03-01-21 @ 02:39]            Creatinine Trend:  SCr 1.73 [03-01 @ 09:19]  SCr 1.86 [03-01 @ 02:39]  SCr 2.23 [02-28 @ 02:59]  SCr 2.19 [02-27 @ 03:20]  SCr 2.16 [02-26 @ 22:34]    Urinalysis - [02-16-21 @ 22:38]      Color Yellow / Appearance Clear / SG 1.020 / pH 5.0      Gluc Negative / Ketone Negative  / Bili Negative / Urobili Negative       Blood Negative / Protein 30 / Leuk Est Trace / Nitrite Negative      RBC Negative / WBC 3-5 / Hyaline  / Gran  / Sq Epi  / Non Sq Epi Moderate / Bacteria       TSH 9.90      [02-24-21 @ 05:35]  Lipid: chol 105, TG 62, HDL 39, LDL --      [02-17-21 @ 07:04]

## 2021-03-02 ENCOUNTER — NON-APPOINTMENT (OUTPATIENT)
Age: 76
End: 2021-03-02

## 2021-03-02 LAB
ANION GAP SERPL CALC-SCNC: 11 MMOL/L — SIGNIFICANT CHANGE UP (ref 5–17)
BUN SERPL-MCNC: 36 MG/DL — HIGH (ref 8–20)
CALCIUM SERPL-MCNC: 9 MG/DL — SIGNIFICANT CHANGE UP (ref 8.6–10.2)
CHLORIDE SERPL-SCNC: 100 MMOL/L — SIGNIFICANT CHANGE UP (ref 98–107)
CO2 SERPL-SCNC: 25 MMOL/L — SIGNIFICANT CHANGE UP (ref 22–29)
CREAT SERPL-MCNC: 1.58 MG/DL — HIGH (ref 0.5–1.3)
GLUCOSE SERPL-MCNC: 93 MG/DL — SIGNIFICANT CHANGE UP (ref 70–99)
HCT VFR BLD CALC: 30.5 % — LOW (ref 34.5–45)
HGB BLD-MCNC: 9.9 G/DL — LOW (ref 11.5–15.5)
MAGNESIUM SERPL-MCNC: 1.9 MG/DL — SIGNIFICANT CHANGE UP (ref 1.6–2.6)
MCHC RBC-ENTMCNC: 27.6 PG — SIGNIFICANT CHANGE UP (ref 27–34)
MCHC RBC-ENTMCNC: 32.5 GM/DL — SIGNIFICANT CHANGE UP (ref 32–36)
MCV RBC AUTO: 85 FL — SIGNIFICANT CHANGE UP (ref 80–100)
PLATELET # BLD AUTO: 118 K/UL — LOW (ref 150–400)
POTASSIUM SERPL-MCNC: 3.4 MMOL/L — LOW (ref 3.5–5.3)
POTASSIUM SERPL-MCNC: 3.9 MMOL/L — SIGNIFICANT CHANGE UP (ref 3.5–5.3)
POTASSIUM SERPL-SCNC: 3.4 MMOL/L — LOW (ref 3.5–5.3)
POTASSIUM SERPL-SCNC: 3.9 MMOL/L — SIGNIFICANT CHANGE UP (ref 3.5–5.3)
RBC # BLD: 3.59 M/UL — LOW (ref 3.8–5.2)
RBC # FLD: 19.8 % — HIGH (ref 10.3–14.5)
SARS-COV-2 RNA SPEC QL NAA+PROBE: SIGNIFICANT CHANGE UP
SODIUM SERPL-SCNC: 136 MMOL/L — SIGNIFICANT CHANGE UP (ref 135–145)
WBC # BLD: 10.58 K/UL — HIGH (ref 3.8–10.5)
WBC # FLD AUTO: 10.58 K/UL — HIGH (ref 3.8–10.5)

## 2021-03-02 PROCEDURE — 33249 INSJ/RPLCMT DEFIB W/LEAD(S): CPT | Mod: 59

## 2021-03-02 PROCEDURE — 33225 L VENTRIC PACING LEAD ADD-ON: CPT

## 2021-03-02 PROCEDURE — 99233 SBSQ HOSP IP/OBS HIGH 50: CPT | Mod: 25

## 2021-03-02 PROCEDURE — 99232 SBSQ HOSP IP/OBS MODERATE 35: CPT | Mod: 24

## 2021-03-02 PROCEDURE — 71045 X-RAY EXAM CHEST 1 VIEW: CPT | Mod: 26

## 2021-03-02 PROCEDURE — 93010 ELECTROCARDIOGRAM REPORT: CPT | Mod: 76

## 2021-03-02 RX ORDER — SODIUM CHLORIDE 9 MG/ML
3 INJECTION INTRAMUSCULAR; INTRAVENOUS; SUBCUTANEOUS EVERY 8 HOURS
Refills: 0 | Status: DISCONTINUED | OUTPATIENT
Start: 2021-03-02 | End: 2021-03-12

## 2021-03-02 RX ORDER — METOPROLOL TARTRATE 50 MG
25 TABLET ORAL
Refills: 0 | Status: DISCONTINUED | OUTPATIENT
Start: 2021-03-02 | End: 2021-03-09

## 2021-03-02 RX ORDER — MAGNESIUM SULFATE 500 MG/ML
2 VIAL (ML) INJECTION ONCE
Refills: 0 | Status: DISCONTINUED | OUTPATIENT
Start: 2021-03-02 | End: 2021-03-04

## 2021-03-02 RX ORDER — MAGNESIUM SULFATE 500 MG/ML
2 VIAL (ML) INJECTION ONCE
Refills: 0 | Status: COMPLETED | OUTPATIENT
Start: 2021-03-02 | End: 2021-03-02

## 2021-03-02 RX ORDER — CEFAZOLIN SODIUM 1 G
2000 VIAL (EA) INJECTION EVERY 8 HOURS
Refills: 0 | Status: COMPLETED | OUTPATIENT
Start: 2021-03-02 | End: 2021-03-03

## 2021-03-02 RX ORDER — POTASSIUM CHLORIDE 20 MEQ
10 PACKET (EA) ORAL
Refills: 0 | Status: COMPLETED | OUTPATIENT
Start: 2021-03-02 | End: 2021-03-02

## 2021-03-02 RX ADMIN — Medication 100 MILLIEQUIVALENT(S): at 04:33

## 2021-03-02 RX ADMIN — Medication 100 MILLIEQUIVALENT(S): at 06:20

## 2021-03-02 RX ADMIN — Medication 81 MILLIGRAM(S): at 11:12

## 2021-03-02 RX ADMIN — Medication 100 MILLIEQUIVALENT(S): at 08:43

## 2021-03-02 RX ADMIN — ATORVASTATIN CALCIUM 40 MILLIGRAM(S): 80 TABLET, FILM COATED ORAL at 21:00

## 2021-03-02 RX ADMIN — Medication 40 MILLIGRAM(S): at 05:41

## 2021-03-02 RX ADMIN — FAMOTIDINE 20 MILLIGRAM(S): 10 INJECTION INTRAVENOUS at 11:13

## 2021-03-02 RX ADMIN — Medication 100 MILLIGRAM(S): at 22:25

## 2021-03-02 RX ADMIN — Medication 50 GRAM(S): at 11:09

## 2021-03-02 RX ADMIN — SENNA PLUS 2 TABLET(S): 8.6 TABLET ORAL at 21:00

## 2021-03-02 RX ADMIN — SODIUM CHLORIDE 3 MILLILITER(S): 9 INJECTION INTRAMUSCULAR; INTRAVENOUS; SUBCUTANEOUS at 21:05

## 2021-03-02 RX ADMIN — Medication 75 MICROGRAM(S): at 05:41

## 2021-03-02 NOTE — PROGRESS NOTE ADULT - SUBJECTIVE AND OBJECTIVE BOX
Pt presents for BiV ICD implant for pacing support in the setting of persistent post op CHB and primary prevention of sudden cardiac death in the setting of LVEF 25-30%.     RIJ removed yesterday. Site C/D/I - no bleeding/hematoma/erythema/edema  B/l pleural CTs and pericardial radha drain remain in place.     Plan:   Informed consent obtained.   Pt NPO.   Chest prep & abx per protocol.

## 2021-03-02 NOTE — PROGRESS NOTE ADULT - PROBLEM SELECTOR PLAN 1
s/p CABG/AVR on 2/24 with Dr. Nancy garces, ambulate as tolerated, PT rec acute rehab vs home pending progress  wean NC, encourage Incentive Spirometry, chest PT, deep breathing and coughing,  cont asa and lipitor for graft patency   consider lopressor after PPM   pacing with EPM  tolerating diet, cont bowel regimen  d/c mepilex from sternal incision and RLE EVH site   cont supportive ICU care, likely deintensify today  d/w team in AM rounds

## 2021-03-02 NOTE — PROGRESS NOTE ADULT - SUBJECTIVE AND OBJECTIVE BOX
PROCEDURE(S): Biventricular ICD Implant    ELECTROPHYSIOLOGIST(S): Kristy Ortiz MD    COMPLICATIONS:  none          DISPOSITION:  Observation Unit           CONDITION: Stable    Pt doing well s/p [employer] biventricular ICD implant to [facility].  Pt denies complaint post procedure.    Device setting: DDD  bpm, right atrial output 3.5V @ 0.4ms, right ventricular output 5V @ 0.4ms, LV 5V @ 0.5ms  Zone VF Rate 230 Therapies ATP during charging, 40J X 6  Zone VT Rate 188 iATP X 5 and 40J X 5  Zone VT Rate 150 Monitor     VS:   T(C): 36.8 (03-02-21 @ 12:55), Max: 37.1 (03-02-21 @ 12:00)  HR: 80 (03-02-21 @ 16:30) (53 - 83)  BP: 121/59 (03-02-21 @ 16:46) (103/56 - 147/50)  RR: 15 (03-02-21 @ 16:46) (15 - 28)  SpO2: 99% (03-02-21 @ 16:46) (93% - 100%)  Post-procedure VS: /56 HR 82 O2 sat 100% RR 18     Physical exam:  NAD, A&O x 3  Incision: Dressing C/D/I; no bleeding, hematoma, erythema or edema  Card: S1/S2, RRR, no m/g/r  Resp: lungs CTA b/l  Abd: S/NT/ND  Ext: no edema, distal pulses intact    EKG: A sensed, biventricular pacing 80 bpm     Assessment:   76 year old Female with a PMH of iron-deficiency anemia, hypothyroidism, and GI bleed presented to [facility] with sob and fevers found to have sepsis due to UTI, anemia s/p transfusion, NSTEMI, severe AS and ischemic cardiomyopathy (EF 25-30%) who was transferred to [facility] found to have mutivessel CAD and severe AS.  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  Recent TTE on 2/28 showed depressed EF of 25-30%.  Now status post uncomplicated CRT-D implant to [facility].      Plan:   Bedrest x 4 hours post implant, then OOB w/ assist & progress as tolerated.    Continued observation on telemetry overnight.   Cont Ancef 2gm IV q 8 hours x 2 additional doses to complete 24 hour course.   Pain control with PO analgesia PRN.   NO HEPARIN OR LOVENOX, INCLUDING PROPHYLACTIC/SUBCUT DOSING, UNTIL OTHERWISE ADVISED BY EP.   Resume home medications.   GDMT: unable to start ACE/ARB due to KIMMY; consider beta blocker when safe to do so   No lifting of the left arm above shoulder level for 6 weeks.    Keep the incision site dry for 3 days.   EKG, Labs, PA/Lat CXR and device check in AM.   Outpt follow up with Dr. Ortiz in 2 weeks at [facility].

## 2021-03-02 NOTE — PROGRESS NOTE ADULT - ASSESSMENT
76F PMH of anemia, hypothyroidism, and GI bleed 1 year ago (refused Endo/colonoscopy and never followed up) presented to [facility] originally with fever and SOB found to have urosepsis s/p course of rocephin (completed ), anemia requiring 2 PRBC (no GI work up because pt wanted to sign out AMA), ruled in for NSTEMI, transferred to [facility]  and underwent cardiac cath which showed multivessel dz and severe AS, preop carotid US with b/l carotid stenosis confirmed by CTA, seen by vascular and cleared for OR (outpt follow up), s/p endoscopy  and colonoscopy  without evidence of acute bleed, noted to have hiatal hernia, now s/p CABG x3 with LIMA, AVR  with Dr. Cruz, postop course notable for KIMMY, CHB with junctional escape requiring pacing via EPM, slow inotrope wean;  3/1  off  3 plan for ppm in AM

## 2021-03-02 NOTE — PROGRESS NOTE ADULT - SUBJECTIVE AND OBJECTIVE BOX
Subjective: no c/o incisional pain at this time. Denies CP, SOB, palpitations, N/V, other c/o.    T(C): 36.5 (03-02-21 @ 00:14), Max: 36.9 (03-01-21 @ 12:00)  HR: 79 (03-02-21 @ 02:00) (79 - 80)  BP: 103/57 (03-02-21 @ 02:00) (103/57 - 136/60)  ABP: 138/41 (03-01-21 @ 09:00) (121/42 - 138/46)  ABP(mean): 62 (03-01-21 @ 09:00) (60 - 68)  RR: 20 (03-02-21 @ 02:00) (17 - 31)  SpO2: 100% (03-02-21 @ 02:00) (93% - 100%)  Wt(kg): --  CVP(mm Hg): 14 (03-01-21 @ 07:00) (7 - 14)  CO: --  CI: --  PA: --       I&O's Detail    28 Feb 2021 07:01  -  01 Mar 2021 07:00  --------------------------------------------------------  IN:    DOBUTamine: 118.2 mL    DOBUTamine: 4.5 mL    IV PiggyBack: 100 mL    IV PiggyBack: 300 mL    Norepinephrine: 9 mL    Oral Fluid: 960 mL    sodium chloride 0.9%: 45 mL    sodium chloride 0.9%: 240 mL  Total IN: 1776.7 mL    OUT:    Chest Tube (mL): 500 mL    Chest Tube (mL): 370 mL    Chest Tube (mL): 20 mL    Indwelling Catheter - Urethral (mL): 3350 mL  Total OUT: 4240 mL    Total NET: -2463.3 mL      01 Mar 2021 07:01  -  02 Mar 2021 03:08  --------------------------------------------------------  IN:    DOBUTamine: 18.4 mL    Oral Fluid: 740 mL    sodium chloride 0.9%: 60 mL  Total IN: 818.4 mL    OUT:    Chest Tube (mL): 140 mL    Chest Tube (mL): 410 mL    Chest Tube (mL): 30 mL    Indwelling Catheter - Urethral (mL): 1130 mL    Voided (mL): 900 mL  Total OUT: 2610 mL    Total NET: -1791.6 mL          LABS: All Lab data reviewed and analyzed                        9.4    10.83 )-----------( 85       ( 01 Mar 2021 02:39 )             28.7     03-01    136  |  101  |  34.0<H>  ----------------------------<  114<H>  4.3   |  22.0  |  1.73<H>    Ca    8.9      01 Mar 2021 09:19  Mg     2.1     03-01              CAPILLARY BLOOD GLUCOSE               RADIOLOGY: - Reviewed and analyzed   CXR: pending    HOSPITAL MEDICATIONS: All medications reviewed and analyzed  MEDICATIONS  (STANDING):  aspirin enteric coated 81 milliGRAM(s) Oral daily  atorvastatin 40 milliGRAM(s) Oral at bedtime  famotidine    Tablet 20 milliGRAM(s) Oral daily  levothyroxine 75 MICROGram(s) Oral daily  senna 2 Tablet(s) Oral at bedtime  sodium chloride 0.9%. 1000 milliLiter(s) (5 mL/Hr) IV Continuous <Continuous>  torsemide 40 milliGRAM(s) Oral daily    MEDICATIONS  (PRN):  acetaminophen   Tablet .. 650 milliGRAM(s) Oral every 6 hours PRN Moderate Pain (4 - 6)  polyethylene glycol 3350 17 Gram(s) Oral daily PRN Constipation              Physical Exam  Neuro: A+O x 3, non-focal, speech clear and intact  HEENT:  NCAT, PERRL, EOMI. No conjuctival edema or iIcterus, no thrush.  No ETT or NGT/OGT  Neck:  ROM intact, no JVD, no nodes or masses palpable, trachea midline, no tracheostomy  Pulm: CTA, good air entry, equal bilaterally, no rales/rhonchi/wheezing, no accessory muscle use noted  Chest:        mediastinal CT right, and left pleural CT all with dressings intact and no air leak, no subQ emphysema       +PW attatched to pacing box  CV: RRR, S1, S2. No murmurs, rubs, or gallops noted.  Abd: +BSx4. Soft, nontender, nondistended.  : briscoe in situ to bedside drainage  Ext: YATES x 4, no edema, no cyanosis or clubbing, distal motor/neuro/circ intact  Skin: warm, dry, no rashes  Incisions: midsternal C/D/I/stable w/ dressing        Case including assessment/plan of care discussed with   CT surgery attending.  Critical Care time:  35    minutes of noncontinuos critical care time spent evaluating/treating, reviewing imaging, labs, discussing case with multidisciplinary team, discussing plans/goals of care with patient/family to prevent further life threatening depreciation of the patient's condition. Non-inclusive is procedure time.       76yFemale with PMH     CABG, with aortic valve replacement and LUIS MIGUEL    Stool occult blood screen    Simple control of epistaxis    Colonoscopy, diagnostic, flexible    Sigmoidoscopy, flexible, with anesthesia    EGD with biopsy        PAST MEDICAL & SURGICAL HISTORY:  Iron deficiency anemia due to chronic blood loss    Hypothyroid    History of tonsillectomy

## 2021-03-02 NOTE — CHART NOTE - NSCHARTNOTEFT_GEN_A_CORE
Source: Patient [x ]  Family [ ]   other [x ] EMR and staff     Current Diet: Diet, NPO after Midnight:      NPO Start Date: 01-Mar-2021,   NPO Start Time: 23:59 (03-01-21 @ 18:23)  Diet, Regular:   Consistent Carbohydrate {No Snacks} (CSTCHO)  DASH/TLC {Sodium & Cholesterol Restricted} (DASH) (03-01-21 @ 15:32)      PO intake:  < 50% [ ]   50-75%  [x ]   %  [ ]  other :    Source for PO intake [x ] Patient [ ] family [ ] chart [ ] staff [ ] other    Current Weight:   3/2: 85kg   2/25: 86.9kg  2/24: 61.2kg   2/21: 74.3kg  2/17: 79.5kg   Unsure of accuracy of wt's secondary to inconsistency, will continue to monitor wt's for trends. (2-3+ Edema in B/L legs)     Pertinent Medications: MEDICATIONS  (STANDING):  aspirin enteric coated 81 milliGRAM(s) Oral daily  atorvastatin 40 milliGRAM(s) Oral at bedtime  famotidine    Tablet 20 milliGRAM(s) Oral daily  levothyroxine 75 MICROGram(s) Oral daily  magnesium sulfate  IVPB 2 Gram(s) IV Intermittent once  senna 2 Tablet(s) Oral at bedtime  sodium chloride 0.9%. 1000 milliLiter(s) (5 mL/Hr) IV Continuous <Continuous>  torsemide 40 milliGRAM(s) Oral daily    MEDICATIONS  (PRN):  acetaminophen   Tablet .. 650 milliGRAM(s) Oral every 6 hours PRN Moderate Pain (4 - 6)  polyethylene glycol 3350 17 Gram(s) Oral daily PRN Constipation    Pertinent Labs: CBC Full  -  ( 02 Mar 2021 03:15 )  WBC Count : 10.58 K/uL  RBC Count : 3.59 M/uL  Hemoglobin : 9.9 g/dL  Hematocrit : 30.5 %  Platelet Count - Automated : 118 K/uL  Mean Cell Volume : 85.0 fl  Mean Cell Hemoglobin : 27.6 pg  Mean Cell Hemoglobin Concentration : 32.5 gm/dL  Auto Neutrophil # : x  Auto Lymphocyte # : x  Auto Monocyte # : x  Auto Eosinophil # : x  Auto Basophil # : x  Auto Neutrophil % : x  Auto Lymphocyte % : x  Auto Monocyte % : x  Auto Eosinophil % : x  Auto Basophil % : x        Skin: Midsternal incision, R leg SVG site.     Nutrition focused physical exam conducted - found signs of malnutrition [ ]absent [x ]present    Subcutaneous fat loss: [x ] Orbital fat pads region, []Buccal fat region, [x ]Triceps region,  [x ]Ribs region    Muscle wasting: [x ]Temples region, [ x]Clavicle region, Moderate [ ]Shoulder region, [ ]Scapula region, [ ]Interosseous region,  [ ]thigh region, [ ]Calf region    Estimated Needs:   x[ ] no change since previous assessment  [ ] recalculated:     Current Nutrition Diagnosis:  Pt remains at high nutrition risk secondary to moderate (chronic) protein calorie malnutrition related to inability to meet sufficient protein energy requirements in setting of advanced age, poor appetite with ageusia and Multivessel CAD and Severe AS as evidenced by 30# wt loss over past one year, meeting less then 75% estimated energy requirements > 1 month, physical signs of mild/moderate muscle mass and fat loss and generalized edema. Pt is now s/p CABG/AVR (2/24), pt extubated, currently NPO pending PPM later today noted. Pt reports appetite is fair to good. Encouraged HBV protein foods. Pt agreeable to receiving Ensure supplements. Recommendations below:     Recommendations:   1. RX: MVI and Vit. C daily (500mg)   2. Check wt daily to monitor trends   3. Assistance with meals as needed   4. Ensure Enlive TID (as/when diet resumed)     Monitoring and Evaluation:   [x ] PO intake [ x] Tolerance to diet prescription [X] Weights  [X] Follow up per protocol [X] Labs:

## 2021-03-03 LAB
ANION GAP SERPL CALC-SCNC: 13 MMOL/L — SIGNIFICANT CHANGE UP (ref 5–17)
BUN SERPL-MCNC: 36 MG/DL — HIGH (ref 8–20)
CALCIUM SERPL-MCNC: 8.7 MG/DL — SIGNIFICANT CHANGE UP (ref 8.6–10.2)
CHLORIDE SERPL-SCNC: 97 MMOL/L — LOW (ref 98–107)
CO2 SERPL-SCNC: 24 MMOL/L — SIGNIFICANT CHANGE UP (ref 22–29)
CREAT SERPL-MCNC: 1.43 MG/DL — HIGH (ref 0.5–1.3)
GLUCOSE SERPL-MCNC: 160 MG/DL — HIGH (ref 70–99)
HCT VFR BLD CALC: 28.2 % — LOW (ref 34.5–45)
HGB BLD-MCNC: 8.7 G/DL — LOW (ref 11.5–15.5)
MAGNESIUM SERPL-MCNC: 1.9 MG/DL — SIGNIFICANT CHANGE UP (ref 1.6–2.6)
MCHC RBC-ENTMCNC: 27.3 PG — SIGNIFICANT CHANGE UP (ref 27–34)
MCHC RBC-ENTMCNC: 30.9 GM/DL — LOW (ref 32–36)
MCV RBC AUTO: 88.4 FL — SIGNIFICANT CHANGE UP (ref 80–100)
PLATELET # BLD AUTO: 145 K/UL — LOW (ref 150–400)
POTASSIUM SERPL-MCNC: 3.5 MMOL/L — SIGNIFICANT CHANGE UP (ref 3.5–5.3)
POTASSIUM SERPL-SCNC: 3.5 MMOL/L — SIGNIFICANT CHANGE UP (ref 3.5–5.3)
RBC # BLD: 3.19 M/UL — LOW (ref 3.8–5.2)
RBC # FLD: 19.5 % — HIGH (ref 10.3–14.5)
SODIUM SERPL-SCNC: 134 MMOL/L — LOW (ref 135–145)
WBC # BLD: 14.79 K/UL — HIGH (ref 3.8–10.5)
WBC # FLD AUTO: 14.79 K/UL — HIGH (ref 3.8–10.5)

## 2021-03-03 PROCEDURE — 99233 SBSQ HOSP IP/OBS HIGH 50: CPT | Mod: 24

## 2021-03-03 PROCEDURE — 71046 X-RAY EXAM CHEST 2 VIEWS: CPT | Mod: 26

## 2021-03-03 PROCEDURE — 99232 SBSQ HOSP IP/OBS MODERATE 35: CPT

## 2021-03-03 PROCEDURE — 99233 SBSQ HOSP IP/OBS HIGH 50: CPT

## 2021-03-03 PROCEDURE — 93010 ELECTROCARDIOGRAM REPORT: CPT

## 2021-03-03 PROCEDURE — 99232 SBSQ HOSP IP/OBS MODERATE 35: CPT | Mod: 24

## 2021-03-03 RX ORDER — ENOXAPARIN SODIUM 100 MG/ML
30 INJECTION SUBCUTANEOUS DAILY
Refills: 0 | Status: DISCONTINUED | OUTPATIENT
Start: 2021-03-03 | End: 2021-03-12

## 2021-03-03 RX ORDER — MAGNESIUM SULFATE 500 MG/ML
2 VIAL (ML) INJECTION ONCE
Refills: 0 | Status: COMPLETED | OUTPATIENT
Start: 2021-03-03 | End: 2021-03-03

## 2021-03-03 RX ORDER — POTASSIUM CHLORIDE 20 MEQ
40 PACKET (EA) ORAL EVERY 4 HOURS
Refills: 0 | Status: COMPLETED | OUTPATIENT
Start: 2021-03-03 | End: 2021-03-03

## 2021-03-03 RX ADMIN — SODIUM CHLORIDE 3 MILLILITER(S): 9 INJECTION INTRAMUSCULAR; INTRAVENOUS; SUBCUTANEOUS at 06:52

## 2021-03-03 RX ADMIN — SENNA PLUS 2 TABLET(S): 8.6 TABLET ORAL at 21:22

## 2021-03-03 RX ADMIN — Medication 100 MILLIGRAM(S): at 06:51

## 2021-03-03 RX ADMIN — SODIUM CHLORIDE 3 MILLILITER(S): 9 INJECTION INTRAMUSCULAR; INTRAVENOUS; SUBCUTANEOUS at 21:23

## 2021-03-03 RX ADMIN — Medication 50 GRAM(S): at 08:07

## 2021-03-03 RX ADMIN — Medication 25 MILLIGRAM(S): at 17:21

## 2021-03-03 RX ADMIN — FAMOTIDINE 20 MILLIGRAM(S): 10 INJECTION INTRAVENOUS at 12:39

## 2021-03-03 RX ADMIN — Medication 40 MILLIEQUIVALENT(S): at 18:02

## 2021-03-03 RX ADMIN — SODIUM CHLORIDE 3 MILLILITER(S): 9 INJECTION INTRAMUSCULAR; INTRAVENOUS; SUBCUTANEOUS at 12:44

## 2021-03-03 RX ADMIN — ENOXAPARIN SODIUM 30 MILLIGRAM(S): 100 INJECTION SUBCUTANEOUS at 21:22

## 2021-03-03 RX ADMIN — Medication 40 MILLIGRAM(S): at 06:51

## 2021-03-03 RX ADMIN — ATORVASTATIN CALCIUM 40 MILLIGRAM(S): 80 TABLET, FILM COATED ORAL at 21:22

## 2021-03-03 RX ADMIN — Medication 40 MILLIEQUIVALENT(S): at 12:39

## 2021-03-03 RX ADMIN — Medication 75 MICROGRAM(S): at 06:51

## 2021-03-03 RX ADMIN — Medication 25 MILLIGRAM(S): at 06:52

## 2021-03-03 RX ADMIN — Medication 81 MILLIGRAM(S): at 12:39

## 2021-03-03 NOTE — PROGRESS NOTE ADULT - PROBLEM SELECTOR PLAN 4
CHB with junctional escape, overnight 50-60s requiring V pacing via EPM  EP following  Now s/p BIV AICD yesterday.  PA and Lateral CXR done this AM.   Device interrogated this AM.   No Afib seen.  Will remove pacing wires per Dr. Cruz.

## 2021-03-03 NOTE — PROGRESS NOTE ADULT - SUBJECTIVE AND OBJECTIVE BOX
Patient confused.  Feels fatigued.   Reports no pain.     REVIEW OF SYSTEMS  Constitutional - No fever,  +fatigue  Neurological - +memory loss, +loss of strength    FUNCTIONAL PROGRESS  3/2  Sit-Stand Transfer Training  Sit-to-Stand Transfer Training Rehab Potential: good, to achieve stated therapy goals  Transfer Training Sit-to-Stand Transfer: moderate assist (50% patient effort);  1 person assist;  verbal cues  Transfer Training Stand-to-Sit Transfer: moderate assist (50% patient effort);  1 person assist;  verbal cues  Sit-to-Stand Transfer Training Transfer Safety Analysis: decreased balance;  decreased strength;  impaired balance    Gait Training  Gait Training Rehab Potential: good, to achieve stated therapy goals  Gait Training: minimum assist (75% patient effort);  1 person assist;  verbal cues;  rolling walker;  10 feet  Gait Analysis: 3-point gait   decreased velocity of limb motion;  decreased step length;  impaired balance;  decreased strength;  10 feet;  rolling walker  Gait Number of Times:: x 2  Type of Rest Type of Rest: sitting  Duration of Rest Duration of Rest: 3 min         VITALS  T(C): 36.8 (03-03-21 @ 07:32), Max: 37.1 (03-02-21 @ 12:00)  HR: 77 (03-03-21 @ 07:32) (53 - 83)  BP: 110/61 (03-03-21 @ 07:32) (104/55 - 147/50)  RR: 20 (03-03-21 @ 07:32) (15 - 28)  SpO2: 99% (03-03-21 @ 07:32) (94% - 100%)  Wt(kg): --    MEDICATIONS   acetaminophen   Tablet .. 650 milliGRAM(s) every 6 hours PRN  aspirin enteric coated 81 milliGRAM(s) daily  atorvastatin 40 milliGRAM(s) at bedtime  famotidine    Tablet 20 milliGRAM(s) daily  levothyroxine 75 MICROGram(s) daily  magnesium sulfate  IVPB 2 Gram(s) once  metoprolol tartrate 25 milliGRAM(s) two times a day  polyethylene glycol 3350 17 Gram(s) daily PRN  potassium chloride    Tablet ER 40 milliEquivalent(s) every 4 hours  senna 2 Tablet(s) at bedtime  sodium chloride 0.9% lock flush 3 milliLiter(s) every 8 hours  sodium chloride 0.9%. 1000 milliLiter(s) <Continuous>  torsemide 40 milliGRAM(s) daily      RECENT LABS/IMAGING                          8.7    14.79 )-----------( 145      ( 03 Mar 2021 07:04 )             28.2     03-03    134<L>  |  97<L>  |  36.0<H>  ----------------------------<  160<H>  3.5   |  24.0  |  1.43<H>    Ca    8.7      03 Mar 2021 06:11  Mg     1.9     03-03                 Patient confused.  Feels fatigued.   Reports no pain.     REVIEW OF SYSTEMS  Constitutional - No fever,  +fatigue  Neurological - +memory loss, +loss of strength    FUNCTIONAL PROGRESS  3/2  Sit-Stand Transfer Training  Sit-to-Stand Transfer Training Rehab Potential: good, to achieve stated therapy goals  Transfer Training Sit-to-Stand Transfer: moderate assist (50% patient effort);  1 person assist;  verbal cues  Transfer Training Stand-to-Sit Transfer: moderate assist (50% patient effort);  1 person assist;  verbal cues  Sit-to-Stand Transfer Training Transfer Safety Analysis: decreased balance;  decreased strength;  impaired balance    Gait Training  Gait Training Rehab Potential: good, to achieve stated therapy goals  Gait Training: minimum assist (75% patient effort);  1 person assist;  verbal cues;  rolling walker;  10 feet  Gait Analysis: 3-point gait   decreased velocity of limb motion;  decreased step length;  impaired balance;  decreased strength;  10 feet;  rolling walker  Gait Number of Times:: x 2  Type of Rest Type of Rest: sitting  Duration of Rest Duration of Rest: 3 min       VITALS  T(C): 36.8 (03-03-21 @ 07:32), Max: 37.1 (03-02-21 @ 12:00)  HR: 77 (03-03-21 @ 07:32) (53 - 83)  BP: 110/61 (03-03-21 @ 07:32) (104/55 - 147/50)  RR: 20 (03-03-21 @ 07:32) (15 - 28)  SpO2: 99% (03-03-21 @ 07:32) (94% - 100%)  Wt(kg): --    MEDICATIONS   acetaminophen   Tablet .. 650 milliGRAM(s) every 6 hours PRN  aspirin enteric coated 81 milliGRAM(s) daily  atorvastatin 40 milliGRAM(s) at bedtime  famotidine    Tablet 20 milliGRAM(s) daily  levothyroxine 75 MICROGram(s) daily  magnesium sulfate  IVPB 2 Gram(s) once  metoprolol tartrate 25 milliGRAM(s) two times a day  polyethylene glycol 3350 17 Gram(s) daily PRN  potassium chloride    Tablet ER 40 milliEquivalent(s) every 4 hours  senna 2 Tablet(s) at bedtime  sodium chloride 0.9% lock flush 3 milliLiter(s) every 8 hours  sodium chloride 0.9%. 1000 milliLiter(s) <Continuous>  torsemide 40 milliGRAM(s) daily      RECENT LABS/IMAGING                          8.7    14.79 )-----------( 145      ( 03 Mar 2021 07:04 )             28.2     03-03    134<L>  |  97<L>  |  36.0<H>  ----------------------------<  160<H>  3.5   |  24.0  |  1.43<H>    Ca    8.7      03 Mar 2021 06:11  Mg     1.9     03-03          TTE 2/28 - Summary:   1. Left ventricular ejection fraction, by visual estimation, is 25 to 30%.   2. Severely decreased global left ventricular systolic function.   3. Abnormal septal motion consistent with post-operative status.   4. Severely increased LV wall thickness.   5. Severely enlarged left atrium.   6. Mild mitral valve regurgitation.   7. Severe mitral annular calcification.   8. Moderate to severe thickening of the anterior and posterior mitral valve leaflets.   9. Mitral valve mean gradient is 2.0 mmHg (at HR of 79 bpm).  10. Mild tricuspid regurgitation.  11. Mild pulmonic valve regurgitation.  12. Bioprosthesis in the aortic position, with mild aortic regurgitation, which is paravalvular in origin.  13. There is no evidence of pericardial effusion.  14. Endocardial visualization was enhanced with intravenous echo contrast.  15. Compared to TTE done on 2/26/2021, left ventricular function is unchanged from prior.    CXR 2/28 - Reduction of pleural effusions.      ----------------------------------------------------------------------------------------  PHYSICAL EXAM  Constitutional - NAD, Comfortable  Chest - Increased work of breathing, No wheezing, +2 chest tubes  Extremities - BLE edema  Neurologic Exam -                    Cognitive - AAO to self ONLY, Slowed Processing      Cranial Nerves - CN 2-12 intact     Motor -                      LEFT    UE - ShAB 1/5,  4/5                    RIGHT UE - ShAB 1/5,  4/5                    LEFT    LE - HF 1/5, KE 2/5                     RIGHT LE - HF 1/5, KE 1/5      Sensory - Intact to LT  Psychiatric - Mood stable, Affect Flat  ----------------------------------------------------------------------------------------  ASSESSMENT/PLAN  76yFemale with functional deficits after +NSTEMI/CAD/Severe AS  CAD s/p CABG x3 - ASA, Lipitor  Severe AS s/p AVR & Acute CHF - Demadex  HTN - Lopressor  Pleural effusions - Chest tubes x2  Pain - Tylenol  DVT PPX - SCDs  Rehab - Medically being optimized. Patient continues to be confused. Unclear what the potential support at home is as this will be relevant for the potential rehab recommendations and considering spouse was recently admitted to hospital as well. Although patient would benefit from AR, may need AYAN if does not have the support.     Will continue to follow. Functional progress will determine ongoing rehab dispo recommendations, which may change.    Continue bedside therapy as well as OOB throughout the day with mobilization by staff to maintain cardiopulmonary function and prevention of secondary complications related to debility.     Discussed with rehab team.

## 2021-03-03 NOTE — PROGRESS NOTE ADULT - PROBLEM SELECTOR PLAN 8
SCDs, HSQ for DVT prophylaxis.  Pantoprazole for GI prophylaxis.    Dispo: PT recommending AR VS AYAN.  Evaluated by Acute rehab and recommendation pending discussion regarding social situation at home.  Per social work, Son does not want pt to go to a rehab.  Dispo to be determined.  Discussed with Dr. Cruz. SCDs for DVT prophylaxis.  Lovenox also started for DVT  prophylaxis per Dr. Cruz.  Pantoprazole for GI prophylaxis.    Dispo: PT recommending AR VS AYAN.  Evaluated by Acute rehab and recommendation pending discussion regarding social situation at home.  Per social work, Son does not want pt to go to a rehab.  Dispo to be determined.  Discussed with Dr. Cruz.

## 2021-03-03 NOTE — OCCUPATIONAL THERAPY INITIAL EVALUATION ADULT - GENERAL OBSERVATIONS, REHAB EVAL
Received pt seated in bedside chair, +IV lock, +telemetry, +Primafit, +chest tubes. Pt agrees to OT.

## 2021-03-03 NOTE — PROGRESS NOTE ADULT - ASSESSMENT
76y Female with PMH anemia, and hypothyroidism.  Pt presented to [facility] a few days ago with fever and SOB.  She was found to have a UTI/sepsis with elevated lactate, + UA, and temp of 102.  She was started on Rocephin.  HGB was 6.  She was transfused 2 units of PRBC.  Per medical record, a year ago she had a GI bleed and was offered an endoscopy and colonoscopy but she refused and then failed to follow up outpatient.  Per medical record she did not want to stay at [facility] but agreed once she ruled in for NSTEMI.  She was transferred to [facility] for cardiac cath.     2/16 - s/p LHC showing EF 30%. LM normal, mLAD 90%, OM1 85%, pRCA 99%, dRCA 100%, mod pulm  HTN, SREEDHAR <0.5 consistent with critical AS  2-17- EGD with old blood suspect from pharynx/epistaxis?, poor bowel prep  2/18- plan for repeat colonoscopy  2/19- diverticulosis on colonoscopy   2/24- underwent CABG x3 (LIMA-LAD, SVG-OM1, OM2) and #23 bio-AVR,  2/25- remains intubated as not fully awake for CPAP trial, on milrinone, norepinephrine and dopamine for junctional kunal, CHB, currently AV pacing; arousable and following commands  2/26- extubated yesterday, remains on inotropes and vasopressors, KIMMY on Lasix gtt--> Bumex, albumin, [place] with PAD ~14  2/27-2/28- weaned off vasopressors on low dose dobutamine gtt, nonoligouric switched to PO torsemide, repeat TTE LV EF still 25-30%, remains in CHB off epicardial pacing  3/1- weaned off dobutamine gtt, pending CRT-D  3/2- s/p CRT-D       HFEF/ICM, Cardiogenic shock- postop, resolved  -weaned off inotrope, continue torsemide to maintain euvolemia  - repeat TTE LV EF ~25%-30%   - change to metoprolol succinate on discharge, no ACE/ARB/ARNI for now given KIMMY, consider addition of Dapagliflozin for HFrEF  - mild delirium- constant reorientation    KIMMY postop  - improving with diuretic, nephrology following     CHB- postop  - s/p CRT-D, bi-V pacing on tele, EPS follow up      Severe Multivessel CAD s/p CABG x3  - cath [facility] mLAD 90%, OM1 85%, pRCA 99%, dRCA 100%  - continue ASA 81, statin, LDL 54  - monitor chest tubes output  - PT/OOB       Critical AS s/p bio-AVR  -continue ASA 81  -TTE with mild paravalvular regurgitation    Large PFO  -incidental noted on intraop LUIS MIGUEL with predominate L-to-R shunt  -continue to monitor, consider percutaneous closure in the future if episode of CVA or systemic emboli    Acute blood loss anemia  - received x2 PRBC at [place] before transfer, 2 PRBC postop- monitor H/H   - occult blood positive, source unclear, colonoscopy with diverticulosis  - monitor for recurrent bleeding, pAfib          Chet Grimaldo DO, [facility]  Faculty Non-Invasive Cardiologist  [phone]

## 2021-03-03 NOTE — PROGRESS NOTE ADULT - PROBLEM SELECTOR PLAN 1
S/p AVR/C3L  Continue ASA and Lipitor.  Continue Lopressor.  Potassium repleted.  Magnesium repleted.  DASH/TLC diet.  Physical therapy.  Tylenol PRN for pain control.  Maintain Left and Right CT for drainage.  Place to waterseal today.  AM labs and xray.  Will remove pacing wires today.

## 2021-03-03 NOTE — OCCUPATIONAL THERAPY INITIAL EVALUATION ADULT - MANUAL MUSCLE TESTING RESULTS, REHAB EVAL
bilateral shoulder flexion grossly assessed with partial AROM against gravity 2/5, bilateral elbow flexion/extension grossly assessed with AROM against gravity 3/5, bilateral gross grasp 5/5

## 2021-03-03 NOTE — OCCUPATIONAL THERAPY INITIAL EVALUATION ADULT - PLANNED THERAPY INTERVENTIONS, OT EVAL
toilet/ADL retraining/balance training/bed mobility training/neuromuscular re-education/parent/caregiver training.../strengthening/transfer training

## 2021-03-03 NOTE — OCCUPATIONAL THERAPY INITIAL EVALUATION ADULT - RANGE OF MOTION EXAMINATION, UPPER EXTREMITY
bilateral shoulder flexion assessed to 90 degrees only due to sternal precautions, bilateral elbows AROM WFL, bilateral gross grasp and digit extension AROM WFL

## 2021-03-03 NOTE — PROGRESS NOTE ADULT - SUBJECTIVE AND OBJECTIVE BOX
Subjective: Pt sitting up in chair.  Denies CP or SOB.  NAD noted.      Tele:  Vpaced.                        T(F): 98.4 (21 @ 09:44), Max: 98.5 (21 @ 22:26)  HR: 73 (21 @ 09:44) (73 - 83)  BP: 100/60 (21 @ 09:44) (100/60 - 147/50)  RR: 20 (21 @ 09:44) (15 - 28)  SpO2: 100% (21 @ 09:44) (94% - 100%) on room air.    Daily     Daily Weight in k.8 (03 Mar 2021 06:51)    LV EF: 40%    No Known Allergies          134<L>  |  97<L>  |  36.0<H>  ----------------------------<  160<H>  3.5   |  24.0  |  1.43<H>    Ca    8.7      03 Mar 2021 06:11  Mg     1.9                                      8.7    14.79 )-----------( 145      ( 03 Mar 2021 07:04 )             28.2          CXR: < from: Xray Chest 2 Views PA/Lat (21 @ 12:10) >   EXAM:  XR CHEST PA LAT 2V                          PROCEDURE DATE:  2021      INTERPRETATION:  Chest 2 views    HISTORY: Postop    COMPARISON: 3/1/2021    Frontal and lateral views of the chest were obtained with suboptimal inspiration. New cardiac defibrillator is present with three leads. Bilateral chest tubes and sternal wires are again noted.    Heart is similarly enlarged in size and the lungs show mild left atelectasis with small effusions. There is no evidence of pneumothorax.    IMPRESSION: New left defibrillator. No pneumothorax.    Thank you for this referral.    TAMIKA GALARZA MD; Attending Interventional Radiologist  This document has been electronically signed. Mar  3 2021  1:17PM    < end of copied text >      I&O's Detail    02 Mar 2021 07:01  -  03 Mar 2021 07:00  --------------------------------------------------------  IN:    IV PiggyBack: 150 mL    IV PiggyBack: 50 mL    Oral Fluid: 240 mL  Total IN: 440 mL    OUT:    Chest Tube (mL): 178 mL    Chest Tube (mL): 125 mL    Chest Tube (mL): 0 mL    Voided (mL): 2150 mL  Total OUT: 2453 mL    Total NET: - mL      03 Mar 2021 07:  -  03 Mar 2021 13:31  --------------------------------------------------------  IN:    Oral Fluid: 220 mL  Total IN: 220 mL    OUT:    Chest Tube (mL): 190 mL    Chest Tube (mL): 124 mL    Voided (mL): 800 mL  Total OUT: 1114 mL    Total NET: -894 mL      CHEST TUBE:  [x ] YES [ ] NO  OUTPUT:  Left 48ml overnight and 178 ml over the last 24 hours. No airleak.                                                            Right 55ml overnight and 125ml over the last 24 hours.  No airleak.      TALIA DRAIN:   [ ] YES [ x] NO  OUTPUT:     per 24 hours    EPICARDIAL WIRES:  [x ] YES [ ] NO      BOWEL MOVEMENT:  [x ] YES [ ] NO        Active Medications:  acetaminophen   Tablet .. 650 milliGRAM(s) Oral every 6 hours PRN  aspirin enteric coated 81 milliGRAM(s) Oral daily  atorvastatin 40 milliGRAM(s) Oral at bedtime  enoxaparin Injectable 30 milliGRAM(s) SubCutaneous daily  famotidine    Tablet 20 milliGRAM(s) Oral daily  levothyroxine 75 MICROGram(s) Oral daily  magnesium sulfate  IVPB 2 Gram(s) IV Intermittent once  metoprolol tartrate 25 milliGRAM(s) Oral two times a day  polyethylene glycol 3350 17 Gram(s) Oral daily PRN  potassium chloride    Tablet ER 40 milliEquivalent(s) Oral every 4 hours  senna 2 Tablet(s) Oral at bedtime  sodium chloride 0.9% lock flush 3 milliLiter(s) IV Push every 8 hours  sodium chloride 0.9%. 1000 milliLiter(s) IV Continuous <Continuous>  torsemide 40 milliGRAM(s) Oral daily      Physical Exam:    Neuro: Awake and alert.  Confused at times.    Pulm: Diminished BLL.    CV: RRR.  +S1+S2.    Abd: Soft/NT/ND.  +BS.     Extremities: Mild edema BLE.  +pp.    Incision(s): MSI ANGIE.  No erythema or drainage.  Sternum stable.                     PAST MEDICAL & SURGICAL HISTORY:    Iron deficiency anemia due to chronic blood loss    Hypothyroid    History of tonsillectomy

## 2021-03-03 NOTE — PROGRESS NOTE ADULT - ATTENDING COMMENTS
Seen and examined, instructions provided.   CXR, ECG, device check and device site were WNL. No arrhythmia on tele or device check  the pacing with no QRS on tele from 2:55 AM is c/w LV capture management (normal device function and testing).    - lowered AT/AF detection to 154 for more sensitive AT/AF/SVT monitoring  - advised not to drive at least till follow up  - changed LV pacing to LV 3-2 as this had the lowest threshold. can consider LV to RV coil pacing at 3 months  - paced qrs < 130 ms  - turned LV capture management OFF for now to avoid any confusion while on tele, will turn ON in the 2 weeks follow up    will sign off, call us if needed
agree with above, can start beta blocker for HFrEF when safe to do so by primary team
76F presents with dyspnea/diarrhea with melenic stool with severe anemia required PRBC transfusion, NSTEMI with severely elevated pBNP 29K, TTE with severely reduced LV EF 30% and severe AS, LHC found triple vessels CAD now pending CABG/AVR, s/p EGD today found with old blood suspect from epistaxis? poor preop in the colon now need Golytely for repeat bowl prep for colonoscopy tomorrow, noted bilateral pleural effusions on repeat TTE here, CXR confirmed pleural effusions  -given borderline SBP stop hydralazine, as need for bowel prep would defer diuresis to avoid drop in preload with severe AS  -IVP Lasix 20mg if dyspneic  -avoid intraprocedural hypotension with vasopressor support as needed  -continue metoprolol succinate and ASA 81mg, unable to add other GDMT yet      Chet Grimaldo DO, [facility]  Faculty Non-Invasive Cardiologist  [phone]
I have personally seen, examined, and participated in the care of this patient. I have reviewed all pertinent clinical information, including history, physical exam, plan, and the PA/NP's note and agree except as noted below.    EF improved but on inotropes. Recommend repeating TTE when off of inotropes, and with echocontrast (Definity) to enhance endocardial borders for better assessment. Atrial lead not capturing, temporary wire reprogrammed to VVI 80 bpm and output increased to 15 mA because over 11 mA she has a more narrow QRS suggestive of better interventricular resynchrony. Will plan for CRT system on Monday, possibly defibrillator depending on EF measurement off of inotropes.    Derek Shipley MD  Clinical Cardiac Electrophysiology
Seen and examined, still in critical condition needing inotropic and pressor supports, with many lines and tubes in place. Was paced at 80 bpm. Lowered the pacing to 50 bpm (and adjusted V sensitivity threshold to 1). Now in narrow rhythm (can not confirm rhythm on tele, ECG pending) in the high 50s.  pacing threshold checked by CTS team earlier and noted to be < 3 mA.    - Keep temp pacing at VVI 50 if tolerate it.  - If frequent, or prolonged NSVT or VT, AND pacing threshold remained < 8 mA, then start IV amiodarone  - Will r/a tomorrow. Unclear if she will be ready for a CRT procedure this Monday.  - Please get limited TTE when off levophid and on low dose or no dobutamine support   - If repeat ECG or tele confirmed improvement in conduction then will need to challenge her with beta blocker before determining that she no longer need pacing support    Will follow up  above d/w patient and CTS team,
Chart reviewed.  Patient seen an examined consent obtained  Reviewed risks, benefits and alternative as well with the son by telephone in detail  Consent obtained   No changes to report

## 2021-03-03 NOTE — PROGRESS NOTE ADULT - SUBJECTIVE AND OBJECTIVE BOX
CC:  follow up hypothyroidism    Interval HPI/ Overnight Events:  Had PPM placed yesterday.  Denies any CP or dyspnea.    MEDICATIONS  (STANDING):  aspirin enteric coated 81 milliGRAM(s) Oral daily  atorvastatin 40 milliGRAM(s) Oral at bedtime  enoxaparin Injectable 30 milliGRAM(s) SubCutaneous daily  famotidine    Tablet 20 milliGRAM(s) Oral daily  levothyroxine 75 MICROGram(s) Oral daily  magnesium sulfate  IVPB 2 Gram(s) IV Intermittent once  metoprolol tartrate 25 milliGRAM(s) Oral two times a day  potassium chloride    Tablet ER 40 milliEquivalent(s) Oral every 4 hours  senna 2 Tablet(s) Oral at bedtime  sodium chloride 0.9% lock flush 3 milliLiter(s) IV Push every 8 hours  sodium chloride 0.9%. 1000 milliLiter(s) (5 mL/Hr) IV Continuous <Continuous>  torsemide 40 milliGRAM(s) Oral daily    Vital Signs Last 24 Hrs  T(C): 36.9 (03 Mar 2021 09:44), Max: 36.9 (02 Mar 2021 22:26)  T(F): 98.4 (03 Mar 2021 09:44), Max: 98.5 (02 Mar 2021 22:26)  HR: 92 (03 Mar 2021 14:24) (73 - 92)  BP: 136/68 (03 Mar 2021 14:24) (100/60 - 147/50)  BP(mean): 77 (02 Mar 2021 21:00) (77 - 91)  RR: 20 (03 Mar 2021 14:24) (15 - 28)  SpO2: 98% (03 Mar 2021 14:24) (94% - 100%)    PE:  Gen: AAO, NAD  HEENT:  sclera anicteric, MMM  Neck:  no thyromegaly, no LAD  CV:  nl S1 + S2, RRR, no m/r/g  Resp:  nl respiratory effort, CTA b/l  GI/ Abd: soft, NT/ ND, BS +  Neuro:  No tremor  MS:  no c/c/e  Skin:  no acanthosis  Psych:  affect appropriate    LABS:  03-03    134<L>  |  97<L>  |  36.0<H>  ----------------------------<  160<H>  3.5   |  24.0  |  1.43<H>    Ca    8.7      03 Mar 2021 06:11  Mg     1.9     03-03                          8.7    14.79 )-----------( 145      ( 03 Mar 2021 07:04 )             28.2

## 2021-03-03 NOTE — OCCUPATIONAL THERAPY INITIAL EVALUATION ADULT - LEVEL OF INDEPENDENCE: SIT/STAND, REHAB EVAL
Addended by: Catherine Doshi on: 7/24/2017 11:40 AM     Modules accepted: Jamila
moderate assist (50% patients effort)

## 2021-03-03 NOTE — PROGRESS NOTE ADULT - SUBJECTIVE AND OBJECTIVE BOX
Pt doing well POD #1 s/p biventricular ICD implant.  No overnight events noted, pt denies complaint today.   Pressure dressing removed without difficulty, pt tolerated well.      EKG: biventricular pacing   TELE: biventricular pacing, A-V pacing   Device interrogation: measurements appropriate and stable; parameters confirmed  CXR: pending     MEDICATIONS  (STANDING):  aspirin enteric coated 81 milliGRAM(s) Oral daily  atorvastatin 40 milliGRAM(s) Oral at bedtime  famotidine    Tablet 20 milliGRAM(s) Oral daily  levothyroxine 75 MICROGram(s) Oral daily  magnesium sulfate  IVPB 2 Gram(s) IV Intermittent once  metoprolol tartrate 25 milliGRAM(s) Oral two times a day  potassium chloride    Tablet ER 40 milliEquivalent(s) Oral every 4 hours  senna 2 Tablet(s) Oral at bedtime  sodium chloride 0.9% lock flush 3 milliLiter(s) IV Push every 8 hours  sodium chloride 0.9%. 1000 milliLiter(s) (5 mL/Hr) IV Continuous <Continuous>  torsemide 40 milliGRAM(s) Oral daily    MEDICATIONS  (PRN):  acetaminophen   Tablet .. 650 milliGRAM(s) Oral every 6 hours PRN Moderate Pain (4 - 6)  polyethylene glycol 3350 17 Gram(s) Oral daily PRN Constipation    Allergies:  No Known Allergies    PAST MEDICAL & SURGICAL HISTORY:  Iron deficiency anemia due to chronic blood loss  Hypothyroid  History of tonsillectomy    Vital Signs Last 24 Hrs  T(C): 36.8 (03 Mar 2021 07:32), Max: 37.1 (02 Mar 2021 12:00)  T(F): 98.3 (03 Mar 2021 07:32), Max: 98.8 (02 Mar 2021 12:00)  HR: 77 (03 Mar 2021 07:32) (53 - 83)  BP: 110/61 (03 Mar 2021 07:32) (104/55 - 147/50)  BP(mean): 77 (02 Mar 2021 21:00) (76 - 91)  RR: 20 (03 Mar 2021 07:32) (15 - 28)  SpO2: 99% (03 Mar 2021 07:32) (94% - 100%)    Physical Exam:  Constitutional: awake, alert, no distress   Cardiovascular: +S1S2 RRR  Chest: LACW device site steristrips small amount dried heme; no hematoma, bleeding or swelling noted   small skin tear medial to incision site  Pulmonary: CTA b/l, unlabored  Abd: soft NTND +BS  Extremities: no pedal edema, +distal pulses b/l  Neuro: non focal, YATES x4    LABS:                        8.7    14.79 )-----------( 145      ( 03 Mar 2021 07:04 )             28.2     03-03    134<L>  |  97<L>  |  36.0<H>  ----------------------------<  160<H>  3.5   |  24.0  |  1.43<H>    Ca    8.7      03 Mar 2021 06:11  Mg     1.9     03-03    Assessment:   76 year old Female with a PMH of iron-deficiency anemia, hypothyroidism, and GI bleed presented to 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  Recent TTE on 2/28 showed depressed EF of 25-30%.  Now POD#1 s/p uncomplicated CRT-D implant to [facility].      Plan:   Pain control with PO analgesia PRN.   NO HEPARIN OR LOVENOX, INCLUDING PROPHYLACTIC/SUBCUT DOSING, UNTIL OTHERWISE ADVISED BY EP.   GDMT: unable to start ACE/ARB due to KIMMY; consider beta blocker when safe to do so   Keep the incision site dry for 3 days.   Pt instructed as to ROM of affected arm - no lifting/pushing/pulling >10 lbs & shoulder ROM limited to 90deg & below x 6 weeks.   Pt instructed as to incision care and f/up.   Pending final read of CXR, no barriers to d/c from EP service perspective.   Will await final CXR read, but otherwise sign off as to EP.  Please call EP service with questions, concerns or further arrhythmia issues.   Outpt f/up in 2-4 weeks.   Dispo per primary team.

## 2021-03-03 NOTE — PROGRESS NOTE ADULT - ASSESSMENT
76F PMH of anemia, hypothyroidism, and GI bleed 1 year ago (refused Endo/colonoscopy and never followed up) presented to [facility] originally with fever and SOB found to have urosepsis s/p course of rocephin (completed ), anemia requiring 2 PRBC (no GI work up because pt wanted to sign out AMA), ruled in for NSTEMI, transferred to [facility]  and underwent cardiac cath which showed multivessel dz and severe AS, preop carotid US with b/l carotid stenosis confirmed by CTA, seen by vascular and cleared for OR (outpt follow up), s/p endoscopy  and colonoscopy  without evidence of acute bleed, noted to have hiatal hernia, now s/p CABG x3 with LIMA, AVR  with Dr. Cruz, postop course notable for KIMMY, CHB with junctional escape requiring pacing via EPM, slow inotrope wean;  3/1  off  3/2 S/P BIV AICD placement.

## 2021-03-03 NOTE — PROGRESS NOTE ADULT - ASSESSMENT
76 year old female with PMH of hypothyroidism transferred from [place] with NSTEMI now s/p CABG and AVR.   She was found to have an elevated TSH, likely due to noncompliance with Synthroid.  Her postoperative course was complicated by heart block s/p PPM.      1.  Hypothyroidism-  continue current dose of LT4.   Check T4 and TSH in AM.    2.  CAD s/p CABG-  management as per CT surgery team.  On ASA, statin and metoprolol  3.  heart block-  s/p PPM

## 2021-03-03 NOTE — OCCUPATIONAL THERAPY INITIAL EVALUATION ADULT - ADDITIONAL COMMENTS
Pt lives in house with 4 RHODA and no steps inside; bedroom and bathroom are on main level. Bathroom has bathtub with doors. Pt does not own any DME. Pt is right handed. Pt drives. Pt's  is currently hospitalized as well. Information should be verified as pt questionable historian.

## 2021-03-03 NOTE — PROGRESS NOTE ADULT - SUBJECTIVE AND OBJECTIVE BOX
[place] [facility]                                                               Office: [address] [address]                                                              Telephone: [phone]. Fax:[phone]                                                                             PROGRESS NOTE  Reason for follow up: CAD/CABG, ICM/HFrEF, severe AS s/p bio-AVR, CHB s/p CRT-D  Overnight: No new events.   Update: mildly confused today reports her grandson visited her last evening, otherwise denies any complains, chest tubes remain place still with high output drainage, s/p bi-V ICD yesterday AV pacing on telemetry, Cr. continues to improve, tolerating low dose metoprolol, -110s      Review of symptoms:   Cardiac:  No chest pain. No dyspnea. No palpitations.  Respiratory: No cough. No dyspnea  Gastrointestinal: No diarrhea. No abdominal pain. No bleeding.     Past medical history: No updates.   	  Vital Signs Last 24 Hrs  T(C): 36.9 (03-03-21 @ 09:44), Max: 36.9 (03-02-21 @ 22:26)  T(F): 98.4 (03-03-21 @ 09:44), Max: 98.5 (03-02-21 @ 22:26)  HR: 73 (03-03-21 @ 09:44) (53 - 83)  BP: 100/60 (03-03-21 @ 09:44) (100/60 - 147/50)  BP(mean): 77 (03-02-21 @ 21:00) (77 - 91)  RR: 20 (03-03-21 @ 09:44) (15 - 28)  SpO2: 100% (03-03-21 @ 09:44) (94% - 100%)  I&O's Summary    02 Mar 2021 07:01  -  03 Mar 2021 07:00  --------------------------------------------------------  IN: 440 mL / OUT: 2453 mL / NET: -2013 mL    03 Mar 2021 07:01  -  03 Mar 2021 12:40  --------------------------------------------------------  IN: 220 mL / OUT: 1114 mL / NET: -894 mL          PHYSICAL EXAM:  Appearance: Comfortable. No acute distress  HEENT:  Head and neck: Atraumatic. Normocephalic.  Normal oral mucosa, PERRL, Neck is supple.   Neurologic: A&Ox 2, no focal deficits. EOMI, Cranial nerves are intact.  Lymphatic: No cervical lymphadenopathy  Cardiovascular: Normal S1 S2, No murmur, rubs/gallops. No JVD; midline sternotomy staples, L-AICD site intact no swelling, +chest tubes  Respiratory: Lungs clear to auscultation  Gastrointestinal:  Soft, Non-tender, + BS, +briscoe  Lower Extremities: trace edema  Psychiatry: Patient is calm. No agitation. Mood & affect appropriate  Skin: mild ecchymoses bilateral legs.      CURRENT MEDICATIONS:  MEDICATIONS  (STANDING):  aspirin enteric coated 81 milliGRAM(s) Oral daily  atorvastatin 40 milliGRAM(s) Oral at bedtime  enoxaparin Injectable 30 milliGRAM(s) SubCutaneous daily  famotidine    Tablet 20 milliGRAM(s) Oral daily  levothyroxine 75 MICROGram(s) Oral daily  magnesium sulfate  IVPB 2 Gram(s) IV Intermittent once  metoprolol tartrate 25 milliGRAM(s) Oral two times a day  potassium chloride    Tablet ER 40 milliEquivalent(s) Oral every 4 hours  senna 2 Tablet(s) Oral at bedtime  sodium chloride 0.9% lock flush 3 milliLiter(s) IV Push every 8 hours  sodium chloride 0.9%. 1000 milliLiter(s) (5 mL/Hr) IV Continuous <Continuous>  torsemide 40 milliGRAM(s) Oral daily    MEDICATIONS  (PRN):  acetaminophen   Tablet .. 650 milliGRAM(s) Oral every 6 hours PRN Moderate Pain (4 - 6)  polyethylene glycol 3350 17 Gram(s) Oral daily PRN Constipation      DIAGNOSTIC TESTING:  [ ] Echocardiogram:   < from: TTE Echo Complete w/ Contrast w/ Doppler (02.28.21 @ 09:39) >    Summary:   1. Left ventricular ejection fraction, by visual estimation, is 25 to 30%.   2. Severely decreased global left ventricular systolic function.   3. Abnormal septal motion consistent with post-operative status.   4. Severely increased LV wall thickness.   5. Severely enlarged left atrium.   6. Mild mitral valve regurgitation.   7. Severe mitral annular calcification.   8. Moderateto severe thickening of the anterior and posterior mitral valve leaflets.   9. Mitral valve mean gradient is 2.0 mmHg (at HR of 79 bpm).  10. Mild tricuspid regurgitation.  11. Mild pulmonic valve regurgitation.  12. Bioprosthesis in the aortic position, with mild aortic regurgitation, which is paravalvular in origin.  13. There is no evidence of pericardial effusion.  14. Endocardial visualization was enhanced with intravenous echo contrast.  15. Compared to TTE done on 2/26/2021, left ventricular function is unchanged from prior.    < end of copied text >    [ ]  Catheterization:  < from: Cardiac Cath Lab - Adult (02.16.21 @ 08:33) >  VENTRICLES: EF by echo was 30 %.  VALVES: AORTIC VALVE: There was critical aortic stenosis.  CORONARY VESSELS:The coronary circulation is co-dominant.  LM:   --  LM: Normal.  LAD:   --  Mid LAD: There was a tubular 90 % stenosis. The lesion was  eccentric.  CX:   --  OM1: There was a diffuse 85 % stenosis in the proximal third of  the vessel segment. The lesion was irregularly contoured and eccentric.  RCA:   --  Proximal RCA: There was a diffuse 99 % stenosis. The lesion was  irregularly contoured and eccentric.  --  Distal RCA: There was a diffuse 100 % stenosis.  COMPLICATIONS: No complications occurred during the cath lab visit.  DIAGNOSTIC IMPRESSIONS: Moderate Pulmonary Hypertension and elevation of  filling pressures. 2. Critical Aortic Stenosis with a mean PG >40mm Hg and  an SREEDHAR of <0.5cm2. 3. Severe LV Systolic dysfunction by TTE from 2/13/2021  with an estimated LVEF of 30%. 4. Triple Vessel CAD.  DIAGNOSTIC RECOMMENDATIONS: GDMT. 2. CTS consultation.  INTERVENTIONAL IMPRESSIONS: Moderate Pulmonary Hypertension and elevation  of filling pressures. 2. Critical Aortic Stenosis with a mean PG >40mm Hg  and an SREEDHAR of <0.5cm2. 3. Severe LV Systolic dysfunction by TTE from  2/13/2021 with an estimated LVEF of 30%. 4. Triple Vessel CAD.    < end of copied text >    [ ] Stress Test:      Labs:                        8.7    14.79 )-----------( 145      ( 03 Mar 2021 07:04 )             28.2     03-03    134<L>  |  97<L>  |  36.0<H>  ----------------------------<  160<H>  3.5   |  24.0  |  1.43<H>    Ca    8.7      03 Mar 2021 06:11  Mg     1.9     03-03 17 Feb 2021 11:36 Troponin 1.35 ng/mL / Creatine Kinase x     /  CKMB x     / CPK Mass Assay % x       17 Feb 2021 01:45 Troponin 1.78 ng/mL / Creatine Kinase x     /  CKMB x     / CPK Mass Assay % x       16 Feb 2021 23:27 Troponin 1.83 ng/mL / Creatine Kinase 49 U/L /  CKMB x     / CPK Mass Assay % x          Serum Pro-Brain Natriuretic Peptide: 38480 pg/mL (02-21-21 @ 06:04)  Serum Pro-Brain Natriuretic Peptide: 00921 pg/mL (02-20-21 @ 06:31)  Serum Pro-Brain Natriuretic Peptide: 22456 pg/mL (02-16-21 @ 12:38)    Cholesterol 105 mg/dL; Direct LDL --; HDL Cholesterol, Serum 39 mg/dL; HDL/ Total Cholesterol Ratio Measurement --; Total Cholesterol/ HDL Ratio Measurement --; Triglycerides, Serum 62 mg/dL  A1C with Estimated Average Glucose Result: 5.2 % (02-16-21 @ 12:38)      TELEMETRY: Reviewed    ECG:  Reviewed by me.

## 2021-03-04 DIAGNOSIS — R41.0 DISORIENTATION, UNSPECIFIED: ICD-10-CM

## 2021-03-04 LAB
ALBUMIN SERPL ELPH-MCNC: 3.4 G/DL — SIGNIFICANT CHANGE UP (ref 3.3–5.2)
ALP SERPL-CCNC: 166 U/L — HIGH (ref 40–120)
ALT FLD-CCNC: 11 U/L — SIGNIFICANT CHANGE UP
ANION GAP SERPL CALC-SCNC: 11 MMOL/L — SIGNIFICANT CHANGE UP (ref 5–17)
AST SERPL-CCNC: 30 U/L — SIGNIFICANT CHANGE UP
BILIRUB SERPL-MCNC: 0.8 MG/DL — SIGNIFICANT CHANGE UP (ref 0.4–2)
BUN SERPL-MCNC: 35 MG/DL — HIGH (ref 8–20)
CALCIUM SERPL-MCNC: 9.1 MG/DL — SIGNIFICANT CHANGE UP (ref 8.6–10.2)
CHLORIDE SERPL-SCNC: 99 MMOL/L — SIGNIFICANT CHANGE UP (ref 98–107)
CO2 SERPL-SCNC: 30 MMOL/L — HIGH (ref 22–29)
CREAT SERPL-MCNC: 0.99 MG/DL — SIGNIFICANT CHANGE UP (ref 0.5–1.3)
GLUCOSE SERPL-MCNC: 89 MG/DL — SIGNIFICANT CHANGE UP (ref 70–99)
HCT VFR BLD CALC: 30.3 % — LOW (ref 34.5–45)
HGB BLD-MCNC: 9.4 G/DL — LOW (ref 11.5–15.5)
MAGNESIUM SERPL-MCNC: 1.9 MG/DL — SIGNIFICANT CHANGE UP (ref 1.6–2.6)
MCHC RBC-ENTMCNC: 27.6 PG — SIGNIFICANT CHANGE UP (ref 27–34)
MCHC RBC-ENTMCNC: 31 GM/DL — LOW (ref 32–36)
MCV RBC AUTO: 89.1 FL — SIGNIFICANT CHANGE UP (ref 80–100)
PLATELET # BLD AUTO: 201 K/UL — SIGNIFICANT CHANGE UP (ref 150–400)
POTASSIUM SERPL-MCNC: 4.2 MMOL/L — SIGNIFICANT CHANGE UP (ref 3.5–5.3)
POTASSIUM SERPL-SCNC: 4.2 MMOL/L — SIGNIFICANT CHANGE UP (ref 3.5–5.3)
PROT SERPL-MCNC: 5.5 G/DL — LOW (ref 6.6–8.7)
RBC # BLD: 3.4 M/UL — LOW (ref 3.8–5.2)
RBC # FLD: 19.8 % — HIGH (ref 10.3–14.5)
SODIUM SERPL-SCNC: 140 MMOL/L — SIGNIFICANT CHANGE UP (ref 135–145)
T4 AB SER-ACNC: 4.3 UG/DL — LOW (ref 4.5–12)
T4 FREE SERPL-MCNC: 1 NG/DL — SIGNIFICANT CHANGE UP (ref 0.9–1.8)
TSH SERPL-MCNC: 12.56 UIU/ML — HIGH (ref 0.27–4.2)
WBC # BLD: 15.43 K/UL — HIGH (ref 3.8–10.5)
WBC # FLD AUTO: 15.43 K/UL — HIGH (ref 3.8–10.5)

## 2021-03-04 PROCEDURE — 71045 X-RAY EXAM CHEST 1 VIEW: CPT | Mod: 26,77

## 2021-03-04 PROCEDURE — 99233 SBSQ HOSP IP/OBS HIGH 50: CPT

## 2021-03-04 PROCEDURE — 71045 X-RAY EXAM CHEST 1 VIEW: CPT | Mod: 26

## 2021-03-04 PROCEDURE — 99232 SBSQ HOSP IP/OBS MODERATE 35: CPT

## 2021-03-04 RX ORDER — LEVOTHYROXINE SODIUM 125 MCG
100 TABLET ORAL DAILY
Refills: 0 | Status: DISCONTINUED | OUTPATIENT
Start: 2021-03-04 | End: 2021-03-12

## 2021-03-04 RX ORDER — MAGNESIUM OXIDE 400 MG ORAL TABLET 241.3 MG
400 TABLET ORAL
Refills: 0 | Status: DISCONTINUED | OUTPATIENT
Start: 2021-03-04 | End: 2021-03-12

## 2021-03-04 RX ADMIN — Medication 40 MILLIGRAM(S): at 05:22

## 2021-03-04 RX ADMIN — FAMOTIDINE 20 MILLIGRAM(S): 10 INJECTION INTRAVENOUS at 12:30

## 2021-03-04 RX ADMIN — SODIUM CHLORIDE 3 MILLILITER(S): 9 INJECTION INTRAMUSCULAR; INTRAVENOUS; SUBCUTANEOUS at 05:15

## 2021-03-04 RX ADMIN — Medication 75 MICROGRAM(S): at 05:22

## 2021-03-04 RX ADMIN — ENOXAPARIN SODIUM 30 MILLIGRAM(S): 100 INJECTION SUBCUTANEOUS at 21:31

## 2021-03-04 RX ADMIN — MAGNESIUM OXIDE 400 MG ORAL TABLET 400 MILLIGRAM(S): 241.3 TABLET ORAL at 12:30

## 2021-03-04 RX ADMIN — SODIUM CHLORIDE 3 MILLILITER(S): 9 INJECTION INTRAMUSCULAR; INTRAVENOUS; SUBCUTANEOUS at 12:34

## 2021-03-04 RX ADMIN — Medication 25 MILLIGRAM(S): at 17:25

## 2021-03-04 RX ADMIN — SODIUM CHLORIDE 3 MILLILITER(S): 9 INJECTION INTRAMUSCULAR; INTRAVENOUS; SUBCUTANEOUS at 20:56

## 2021-03-04 RX ADMIN — ATORVASTATIN CALCIUM 40 MILLIGRAM(S): 80 TABLET, FILM COATED ORAL at 21:31

## 2021-03-04 RX ADMIN — SENNA PLUS 2 TABLET(S): 8.6 TABLET ORAL at 21:31

## 2021-03-04 RX ADMIN — Medication 25 MILLIGRAM(S): at 05:22

## 2021-03-04 RX ADMIN — Medication 81 MILLIGRAM(S): at 12:30

## 2021-03-04 RX ADMIN — MAGNESIUM OXIDE 400 MG ORAL TABLET 400 MILLIGRAM(S): 241.3 TABLET ORAL at 17:25

## 2021-03-04 NOTE — PROGRESS NOTE ADULT - PROBLEM SELECTOR PLAN 8
SCDs for DVT prophylaxis.  Lovenox also started for DVT  prophylaxis per Dr. Cruz.  Pantoprazole for GI prophylaxis.    Dispo: PT recommending [facility] VS [facility].  Evaluated by Acute rehab and recommendation pending discussion regarding social situation at home.  Per social work, Son does not want pt to go to a rehab.  Dispo to be determined.  Discussed with Dr. Cruz.

## 2021-03-04 NOTE — PROGRESS NOTE ADULT - SUBJECTIVE AND OBJECTIVE BOX
[place] [facility]                                                               Office: [address] [address]                                                              Telephone: [phone]. Fax:[phone]                                                                             PROGRESS NOTE  Reason for follow up: CAD/CABG, ICM/HFrEF, severe AS s/p bio-AVR, CHB s/p CRT-D  Overnight: No new events.   Update: sitting up eating breakfast, denies any discomfort or shortness of breath, diuresing well on PO Torsemide and bilateral chest tubes still draining 130-250ml straw color fluids; still a bit confused thinking her family was here last night but knows she is in the hospital and the day.       Review of symptoms:   Cardiac:  No chest pain. No dyspnea. No palpitations.  Respiratory: No cough. No dyspnea  Gastrointestinal: No diarrhea. No abdominal pain. No bleeding.     Past medical history: No updates.   	  Vital Signs Last 24 Hrs  T(C): 36.9 (03-04-21 @ 05:13), Max: 36.9 (03-03-21 @ 09:44)  T(F): 98.4 (03-04-21 @ 05:13), Max: 98.5 (03-03-21 @ 15:30)  HR: 90 (03-04-21 @ 05:13) (71 - 93)  BP: 105/74 (03-04-21 @ 05:13) (100/60 - 136/68)  BP(mean): --  RR: 18 (03-04-21 @ 05:13) (18 - 20)  SpO2: 92% (03-04-21 @ 05:13) (92% - 100%)  I&O's Summary    03 Mar 2021 07:01  -  04 Mar 2021 07:00  --------------------------------------------------------  IN: 880 mL / OUT: 3984 mL / NET: -3104 mL          PHYSICAL EXAM:  Appearance: Comfortable. No acute distress  HEENT:  Head and neck: Atraumatic. Normocephalic.  Normal oral mucosa, PERRL, Neck is supple.   Neurologic: A&Ox 2, no focal deficits. EOMI, Cranial nerves are intact.  Lymphatic: No cervical lymphadenopathy  Cardiovascular: Normal S1 S2, No murmur, rubs/gallops. No JVD; midline sternotomy staples, L-AICD site intact no swelling, +chest tubes  Respiratory: Lungs clear to auscultation  Gastrointestinal:  Soft, Non-tender, + BS, +briscoe  Lower Extremities: trace edema  Psychiatry: Patient is calm. No agitation. Mood & affect appropriate  Skin: mild ecchymoses bilateral legs.      CURRENT MEDICATIONS:  MEDICATIONS  (STANDING):  aspirin enteric coated 81 milliGRAM(s) Oral daily  atorvastatin 40 milliGRAM(s) Oral at bedtime  enoxaparin Injectable 30 milliGRAM(s) SubCutaneous daily  famotidine    Tablet 20 milliGRAM(s) Oral daily  levothyroxine 75 MICROGram(s) Oral daily  magnesium sulfate  IVPB 2 Gram(s) IV Intermittent once  metoprolol tartrate 25 milliGRAM(s) Oral two times a day  senna 2 Tablet(s) Oral at bedtime  sodium chloride 0.9% lock flush 3 milliLiter(s) IV Push every 8 hours  sodium chloride 0.9%. 1000 milliLiter(s) (5 mL/Hr) IV Continuous <Continuous>  torsemide 40 milliGRAM(s) Oral daily    MEDICATIONS  (PRN):  acetaminophen   Tablet .. 650 milliGRAM(s) Oral every 6 hours PRN Moderate Pain (4 - 6)  polyethylene glycol 3350 17 Gram(s) Oral daily PRN Constipation      DIAGNOSTIC TESTING:  [ ] Echocardiogram:   < from: TTE Echo Complete w/ Contrast w/ Doppler (02.28.21 @ 09:39) >    Summary:   1. Left ventricular ejection fraction, by visual estimation, is 25 to 30%.   2. Severely decreased global left ventricular systolic function.   3. Abnormal septal motion consistent with post-operative status.   4. Severely increased LV wall thickness.   5. Severely enlarged left atrium.   6. Mild mitral valve regurgitation.   7. Severe mitral annular calcification.   8. Moderateto severe thickening of the anterior and posterior mitral valve leaflets.   9. Mitral valve mean gradient is 2.0 mmHg (at HR of 79 bpm).  10. Mild tricuspid regurgitation.  11. Mild pulmonic valve regurgitation.  12. Bioprosthesis in the aortic position, with mild aortic regurgitation, which is paravalvular in origin.  13. There is no evidence of pericardial effusion.  14. Endocardial visualization was enhanced with intravenous echo contrast.  15. Compared to TTE done on 2/26/2021, left ventricular function is unchanged from prior.    < end of copied text >    [ ]  Catheterization:  < from: Cardiac Cath Lab - Adult (02.16.21 @ 08:33) >  VENTRICLES: EF by echo was 30 %.  VALVES: AORTIC VALVE: There was critical aortic stenosis.  CORONARY VESSELS:The coronary circulation is co-dominant.  LM:   --  LM: Normal.  LAD:   --  Mid LAD: There was a tubular 90 % stenosis. The lesion was  eccentric.  CX:   --  OM1: There was a diffuse 85 % stenosis in the proximal third of  the vessel segment. The lesion was irregularly contoured and eccentric.  RCA:   --  Proximal RCA: There was a diffuse 99 % stenosis. The lesion was  irregularly contoured and eccentric.  --  Distal RCA: There was a diffuse 100 % stenosis.  COMPLICATIONS: No complications occurred during the cath lab visit.  DIAGNOSTIC IMPRESSIONS: Moderate Pulmonary Hypertension and elevation of  filling pressures. 2. Critical Aortic Stenosis with a mean PG >40mm Hg and  an SREEDHAR of <0.5cm2. 3. Severe LV Systolic dysfunction by TTE from 2/13/2021  with an estimated LVEF of 30%. 4. Triple Vessel CAD.  DIAGNOSTIC RECOMMENDATIONS: GDMT. 2. CTS consultation.  INTERVENTIONAL IMPRESSIONS: Moderate Pulmonary Hypertension and elevation  of filling pressures. 2. Critical Aortic Stenosis with a mean PG >40mm Hg  and an SREEDHAR of <0.5cm2. 3. Severe LV Systolic dysfunction by TTE from  2/13/2021 with an estimated LVEF of 30%. 4. Triple Vessel CAD.    Labs:                        9.4    15.43 )-----------( 201      ( 04 Mar 2021 06:28 )             30.3     03-04    140  |  99  |  35.0<H>  ----------------------------<  89  4.2   |  30.0<H>  |  0.99    Ca    9.1      04 Mar 2021 06:28  Mg     1.9     03-04    TPro  5.5<L>  /  Alb  3.4  /  TBili  0.8  /  DBili  x   /  AST  30  /  ALT  11  /  AlkPhos  166<H>  03-04     17 Feb 2021 11:36 Troponin 1.35 ng/mL / Creatine Kinase x     /  CKMB x     / CPK Mass Assay % x       17 Feb 2021 01:45 Troponin 1.78 ng/mL / Creatine Kinase x     /  CKMB x     / CPK Mass Assay % x       16 Feb 2021 23:27 Troponin 1.83 ng/mL / Creatine Kinase 49 U/L /  CKMB x     / CPK Mass Assay % x          Serum Pro-Brain Natriuretic Peptide: 93639 pg/mL (02-21-21 @ 06:04)  Serum Pro-Brain Natriuretic Peptide: 19426 pg/mL (02-20-21 @ 06:31)  Serum Pro-Brain Natriuretic Peptide: 42876 pg/mL (02-16-21 @ 12:38)    Cholesterol 105 mg/dL; Direct LDL --; HDL Cholesterol, Serum 39 mg/dL; HDL/ Total Cholesterol Ratio Measurement --; Total Cholesterol/ HDL Ratio Measurement --; Triglycerides, Serum 62 mg/dL  A1C with Estimated Average Glucose Result: 5.2 % (02-16-21 @ 12:38)      TELEMETRY: Bi-V pacing 90s

## 2021-03-04 NOTE — PROGRESS NOTE ADULT - PROBLEM SELECTOR PLAN 1
S/p AVR/C3L  Continue ASA and Lipitor.  Continue Lopressor.  DASH/TLC diet.  Physical therapy.  Tylenol PRN for pain control.  Maintain Right CT for drainage.  Place to [facility] today.  AM labs and xray.

## 2021-03-04 NOTE — PROGRESS NOTE ADULT - SUBJECTIVE AND OBJECTIVE BOX
Patient feels uncomfortable in the chair.  Reports she slept better.   Appears less confused today.  Reports no pain.     REVIEW OF SYSTEMS  Constitutional - No fever,  +fatigue  HEENT - No vertigo, No neck pain  Neurological - No headaches, +memory loss, +loss of strength, No numbness, No tremors  Musculoskeletal - No joint pain, No joint swelling, No muscle pain  Psychiatric - No depression, No anxiety    FUNCTIONAL PROGRESS  3/3  Sit-Stand Transfer Training  Sit-to-Stand Transfer Training Rehab Potential: good, to achieve stated therapy goals  Transfer Training Sit-to-Stand Transfer: moderate assist (50% patient effort);  1 person assist;  verbal cues;  rolling walker  Transfer Training Stand-to-Sit Transfer: moderate assist (50% patient effort);  1 person assist;  verbal cues;  rolling walker    Gait Training  Gait Training Rehab Potential: good, to achieve stated therapy goals  Gait Training: moderate assist (50% patient effort);  1 person assist;  verbal cues;  rolling walker;  5 feet  Gait Analysis: 3-point gait   decreased hip/knee flexion;  decreased step length;  impaired balance;  decreased strength;  5 feet;  rolling walker  Gait Number of Times:: x 2  Type of Rest Type of Rest: sitting  Duration of Rest Duration of Rest: 4 min       VITALS  T(C): 36.4 (03-04-21 @ 09:33), Max: 36.9 (03-03-21 @ 15:30)  HR: 90 (03-04-21 @ 09:33) (71 - 93)  BP: 101/63 (03-04-21 @ 09:33) (101/63 - 136/68)  RR: 18 (03-04-21 @ 09:33) (18 - 20)  SpO2: 98% (03-04-21 @ 09:33) (92% - 100%)  Wt(kg): --    MEDICATIONS   acetaminophen   Tablet .. 650 milliGRAM(s) every 6 hours PRN  aspirin enteric coated 81 milliGRAM(s) daily  atorvastatin 40 milliGRAM(s) at bedtime  enoxaparin Injectable 30 milliGRAM(s) daily  famotidine    Tablet 20 milliGRAM(s) daily  levothyroxine 100 MICROGram(s) daily  magnesium oxide 400 milliGRAM(s) three times a day with meals  metoprolol tartrate 25 milliGRAM(s) two times a day  polyethylene glycol 3350 17 Gram(s) daily PRN  senna 2 Tablet(s) at bedtime  sodium chloride 0.9% lock flush 3 milliLiter(s) every 8 hours  torsemide 40 milliGRAM(s) daily      RECENT LABS/IMAGING                          9.4    15.43 )-----------( 201      ( 04 Mar 2021 06:28 )             30.3     03-04    140  |  99  |  35.0<H>  ----------------------------<  89  4.2   |  30.0<H>  |  0.99    Ca    9.1      04 Mar 2021 06:28  Mg     1.9     03-04    TPro  5.5<L>  /  Alb  3.4  /  TBili  0.8  /  DBili  x   /  AST  30  /  ALT  11  /  AlkPhos  166<H>  03-04                  TTE 2/28 - Summary:   1. Left ventricular ejection fraction, by visual estimation, is 25 to 30%.   2. Severely decreased global left ventricular systolic function.   3. Abnormal septal motion consistent with post-operative status.   4. Severely increased LV wall thickness.   5. Severely enlarged left atrium.   6. Mild mitral valve regurgitation.   7. Severe mitral annular calcification.   8. Moderate to severe thickening of the anterior and posterior mitral valve leaflets.   9. Mitral valve mean gradient is 2.0 mmHg (at HR of 79 bpm).  10. Mild tricuspid regurgitation.  11. Mild pulmonic valve regurgitation.  12. Bioprosthesis in the aortic position, with mild aortic regurgitation, which is paravalvular in origin.  13. There is no evidence of pericardial effusion.  14. Endocardial visualization was enhanced with intravenous echo contrast.  15. Compared to TTE done on 2/26/2021, left ventricular function is unchanged from prior.    CXR 2/28 - Reduction of pleural effusions.    CXR 3/4 - Bilateral chest tubes in place. Residual lung a bibasilar pleural effusions and/or airspace disease as seen on prior 3/3/2021 chest radiograph    ----------------------------------------------------------------------------------------  PHYSICAL EXAM  Constitutional - NAD, Comfortable  Chest - Increased work of breathing, No wheezing, +2 chest tubes  Extremities - BLE edema  Neurologic Exam -                    Cognitive - AAO to self, PART of situation, Slowed Processing      Motor -                      LEFT    UE - ShAB 1/5,  4/5                    RIGHT UE - ShAB 1/5,  4/5                    LEFT    LE - HF 1/5, KE 2/5                     RIGHT LE - HF 1/5, KE 1/5      Sensory - Intact to LT  Psychiatric - Mood stable, Affect Flat  ----------------------------------------------------------------------------------------  ASSESSMENT/PLAN  76yFemale with functional deficits after +NSTEMI/CAD/Severe AS  CAD s/p CABG x3 - ASA, Lipitor  Severe AS s/p AVR & Acute CHF - Demadex  HTN - Lopressor  Pleural effusions - Chest tubes x2  Pain - Tylenol  DVT PPX - SCDs, Lovenox  Rehab - Discussed with rehab liaison and reviewed notes from CM. Patient will have support upon DC. Currently medically being optimized. Recommend ACUTE inpatient rehabilitation for the functional deficits consisting of 3 hours of therapy/day & 24 hour RN/daily PMR physician for comorbid medical management. Will continue to follow for ongoing rehab needs and recommendations. Patient will be able to tolerate 3 hours a day.    Continue bedside therapy as well as OOB throughout the day with mobilization throughout the day with staff to maintain cardiopulmonary function and prevention of secondary complications related to debility.     Discussed with rehab clinical team.

## 2021-03-04 NOTE — PROGRESS NOTE ADULT - ASSESSMENT
76y Female with PMH anemia, and hypothyroidism.  Pt presented to [facility] a few days ago with fever and SOB.  She was found to have a UTI/sepsis with elevated lactate, + UA, and temp of 102.  She was started on Rocephin.  HGB was 6.  She was transfused 2 units of PRBC.  Per medical record, a year ago she had a GI bleed and was offered an endoscopy and colonoscopy but she refused and then failed to follow up outpatient.  Per medical record she did not want to stay at [place] but agreed once she ruled in for NSTEMI.  She was transferred to [facility] for cardiac cath.     2/16 - s/p LHC showing EF 30%. LM normal, mLAD 90%, OM1 85%, pRCA 99%, dRCA 100%, mod pulm  HTN, SREEDHAR <0.5 consistent with critical AS  2-17- EGD with old blood suspect from pharynx/epistaxis?, poor bowel prep  2/18- plan for repeat colonoscopy  2/19- diverticulosis on colonoscopy   2/24- underwent CABG x3 (LIMA-LAD, SVG-OM1, OM2) and #23 bio-AVR,  2/25- remains intubated as not fully awake for CPAP trial, on milrinone, norepinephrine and dopamine for junctional kunal, CHB, currently AV pacing; arousable and following commands  2/26- extubated yesterday, remains on inotropes and vasopressors, KIMMY on Lasix gtt--> Bumex, albumin, [place] with PAD ~14  2/27-2/28- weaned off vasopressors on low dose dobutamine gtt, nonoligouric switched to PO torsemide, repeat TTE LV EF still 25-30%, remains in CHB off epicardial pacing  3/1- weaned off dobutamine gtt, pending CRT-D  3/2- s/p CRT-D  3/3- Cr. continues to improve, chest tubes still high output, sundowning at night thinking her family was here         HFEF/ICM, Cardiogenic shock- postop, resolved  -weaned off inotrope, continue torsemide to maintain euvolemia  - repeat TTE LV EF ~25%-30%   - change to metoprolol succinate on discharge, Cr. normalized but low BP not able to use ARNI, add spironolactone 12.5mg tomorrow if Cr/K remain stable then eventual ACEi, consider addition of Dapagliflozin for HFrEF as well.   - mild delirium- constant reorientation    KIMMY postop  - resolved and diuresing well, nephrology following     CHB- postop  - s/p CRT-D, bi-V pacing on tele, EPS follow up      Severe Multivessel CAD s/p CABG x3  - cath LHC mLAD 90%, OM1 85%, pRCA 99%, dRCA 100%  - continue ASA 81, statin, LDL 54  - monitor chest tubes output for ongoing pleural effusions  - PT/OOB       Critical AS s/p bio-AVR  -continue ASA 81  -TTE with mild paravalvular regurgitation    Large PFO  -incidental noted on intraop LUIS MIGUEL with predominate L-to-R shunt  -continue to monitor, consider percutaneous closure in the future if episode of CVA or systemic emboli    Acute blood loss anemia  - received x2 PRBC at [place] before transfer, 2 PRBC postop- monitor H/H   - occult blood positive, source unclear, colonoscopy with diverticulosis  - monitor for recurrent bleeding, pAfib          Chet Grimaldo DO, [facility]  Faculty Non-Invasive Cardiologist  [phone]

## 2021-03-04 NOTE — PROGRESS NOTE ADULT - SUBJECTIVE AND OBJECTIVE BOX
Subjective: "Is there a dog over there? What is that?" NAD seated in chair but mildly confused. Pt easily reoriented earlier today. Chest tube removed without difficulty. Denies SOB, CP.    Pertinent events of the past 24 hours: Uneventful, recovering as expected    VITAL SIGNS  Vital Signs Last 24 Hrs  T(C): 36.7 (21 @ 16:23), Max: 36.9 (21 @ 21:19)  T(F): 98 (21 @ 16:23), Max: 98.4 (21 @ 21:19)  HR: 103 (21 @ 17:15) (90 - 103)  BP: 123/70 (21 @ 17:15) (101/63 - 123/70)  RR: 18 (21 @ 17:15) (18 - 19)  SpO2: 96% (21 @ 17:15) (92% - 98%)  on (O2)              Telemetry/Alarms:    LVEF:     MEDICATIONS  acetaminophen   Tablet .. 650 milliGRAM(s) Oral every 6 hours PRN  aspirin enteric coated 81 milliGRAM(s) Oral daily  atorvastatin 40 milliGRAM(s) Oral at bedtime  enoxaparin Injectable 30 milliGRAM(s) SubCutaneous daily  famotidine    Tablet 20 milliGRAM(s) Oral daily  levothyroxine 100 MICROGram(s) Oral daily  magnesium oxide 400 milliGRAM(s) Oral three times a day with meals  metoprolol tartrate 25 milliGRAM(s) Oral two times a day  polyethylene glycol 3350 17 Gram(s) Oral daily PRN  senna 2 Tablet(s) Oral at bedtime  sodium chloride 0.9% lock flush 3 milliLiter(s) IV Push every 8 hours  torsemide 40 milliGRAM(s) Oral daily      PHYSICAL EXAM  General: well nourished, well developed, no acute distress  Neurology: alert and oriented x 3, nonfocal, no gross deficits  Respiratory: clear to auscultation bilaterally  CV: regular rate and rhythm, normal S1, S2  Abdomen: soft, nontender, nondistended, positive bowel sounds, last bowel movement   Extremities: warm, well perfused. no edema. + DP pulses  Incisions: midline sternal incision, + mepilex, c/d/i. sternum stable.  Chest tubes:   Epicardial Wires:    > EPM (settings) / isolated       @ 07:01  -   @ 07:00  --------------------------------------------------------  IN: 880 mL / OUT: 3984 mL / NET: -3104 mL     @ 07:  -   @ 18:26  --------------------------------------------------------  IN: 660 mL / OUT: 700 mL / NET: -40 mL        Weights:  Daily     Daily Weight in k.1 (04 Mar 2021 05:57)  Admit Wt: Drug Dosing Weight  Height (cm): 157 (2021 12:10)  Weight (kg): 61.2 (2021 12:10)  BMI (kg/m2): 24.8 (2021 12:10)  BSA (m2): 1.61 (2021 12:10)    All laboratory results, radiology and medications reviewed.    LABS      140  |  99  |  35.0<H>  ----------------------------<  89  4.2   |  30.0<H>  |  0.99    Ca    9.1      04 Mar 2021 06:28  Mg     1.9         TPro  5.5<L>  /  Alb  3.4  /  TBili  0.8  /  DBili  x   /  AST  30  /  ALT  11  /  AlkPhos  166<H>                                   9.4    15.43 )-----------( 201      ( 04 Mar 2021 06:28 )             30.3            Bilirubin Total, Serum: 0.8 mg/dL ( @ 06:28)    CAPILLARY BLOOD GLUCOSE               Last CXR:    Last EKG:    PAST MEDICAL & SURGICAL HISTORY:  Iron deficiency anemia due to chronic blood loss    Hypothyroid    History of tonsillectomy        Subjective: "Is there a dog over there? What is that?" NAD seated in chair but mildly confused. Pt easily reoriented earlier today. Chest tube removed without difficulty. Denies SOB, CP.    Pertinent events of the past 24 hours: Uneventful, recovering as expected    VITAL SIGNS  Vital Signs Last 24 Hrs  T(C): 36.7 (21 @ 16:23), Max: 36.9 (21 @ 21:19)  T(F): 98 (21 @ 16:23), Max: 98.4 (21 @ 21:19)  HR: 103 (21 @ 17:15) (90 - 103)  BP: 123/70 (21 @ 17:15) (101/63 - 123/70)  RR: 18 (21 @ 17:15) (18 - 19)  SpO2: 96% (21 @ 17:15) (92% - 98%)  on RA              Telemetry/Alarms:  SR /   LVEF: 25-40    MEDICATIONS  acetaminophen   Tablet .. 650 milliGRAM(s) Oral every 6 hours PRN  aspirin enteric coated 81 milliGRAM(s) Oral daily  atorvastatin 40 milliGRAM(s) Oral at bedtime  enoxaparin Injectable 30 milliGRAM(s) SubCutaneous daily  famotidine    Tablet 20 milliGRAM(s) Oral daily  levothyroxine 100 MICROGram(s) Oral daily  magnesium oxide 400 milliGRAM(s) Oral three times a day with meals  metoprolol tartrate 25 milliGRAM(s) Oral two times a day  polyethylene glycol 3350 17 Gram(s) Oral daily PRN  senna 2 Tablet(s) Oral at bedtime  sodium chloride 0.9% lock flush 3 milliLiter(s) IV Push every 8 hours  torsemide 40 milliGRAM(s) Oral daily      PHYSICAL EXAM  General: well nourished, well developed, no acute distress  Neurology: alert and oriented x 3, nonfocal, no gross deficits  Respiratory: diminished L > R  CV: regular rate and rhythm, normal S1, S2  Abdomen: soft, nontender, nondistended, positive bowel sounds, last BM 3  Extremities: warm, well perfused. +1 edema. + DP pulses  Incisions: midline sternal incision, + staples, c/d/i. sternum stable.  L ACW + steris c/d/i  Chest tubes: B/L chest tubes no air leak, minimal drainage on L (labeled in computer as meds) and moderate drainage on R (250cc/24 h)       @ 07:01  -   @ 07:00  --------------------------------------------------------  IN: 880 mL / OUT: 3984 mL / NET: -3104 mL     @ 07:01  -   @ 18:26  --------------------------------------------------------  IN: 660 mL / OUT: 700 mL / NET: -40 mL        Weights:  Daily     Daily Weight in k.1 (04 Mar 2021 05:57)  Admit Wt: Drug Dosing Weight  Height (cm): 157 (2021 12:10)  Weight (kg): 61.2 (2021 12:10)  BMI (kg/m2): 24.8 (2021 12:10)  BSA (m2): 1.61 (2021 12:10)    All laboratory results, radiology and medications reviewed.    LABS      140  |  99  |  35.0<H>  ----------------------------<  89  4.2   |  30.0<H>  |  0.99    Ca    9.1      04 Mar 2021 06:28  Mg     1.9         TPro  5.5<L>  /  Alb  3.4  /  TBili  0.8  /  DBili  x   /  AST  30  /  ALT  11  /  AlkPhos  166<H>  04                                 9.4    15.43 )-----------( 201      ( 04 Mar 2021 06:28 )             30.3            Bilirubin Total, Serum: 0.8 mg/dL ( @ 06:28)    CAPILLARY BLOOD GLUCOSE    I have personally reviewed all laboratory results, recent radiological exams and current medications.     PAST MEDICAL & SURGICAL HISTORY:  Iron deficiency anemia due to chronic blood loss    Hypothyroid    History of tonsillectomy

## 2021-03-04 NOTE — PROGRESS NOTE ADULT - ASSESSMENT
76F PMH of anemia, hypothyroidism, and GI bleed 1 year ago (refused Endo/colonoscopy and never followed up) presented to [facility] originally with fever and SOB found to have urosepsis s/p course of rocephin (completed ), anemia requiring 2 PRBC (no GI work up because pt wanted to sign out AMA), ruled in for NSTEMI, transferred to [facility]  and underwent cardiac cath which showed multivessel dz and severe AS, preop carotid US with b/l carotid stenosis confirmed by CTA, seen by vascular and cleared for OR (outpt follow up), s/p endoscopy  and colonoscopy  without evidence of acute bleed, noted to have hiatal hernia, now s/p CABG x3 with LIMA, AVR  with Dr. Cruz, postop course notable for KIMMY, CHB with junctional escape requiring pacing via EPM, slow inotrope wean;  3/  off  3/2 S/P BIV AICD placement. 3/3 started lopressor 3/4 L chest tube removed

## 2021-03-04 NOTE — PROGRESS NOTE ADULT - ASSESSMENT
76 year old female with PMH of hypothyroidism transferred from [place] with NSTEMI now s/p CABG and AVR.   She was found to have an elevated TSH, likely due to noncompliance with Synthroid.  Her postoperative course was complicated by heart block s/p PPM.      1.  Hypothyroidism-   Agree with increase in LT4 to 100 mcg daily due to worsened TFTs.    2.  CAD s/p CABG-  management as per CT surgery team.  On ASA, statin and metoprolol  3.  heart block-  s/p PPM

## 2021-03-04 NOTE — PROGRESS NOTE ADULT - PROBLEM SELECTOR PLAN 4
CHB with junctional escape postop  EP following  Now s/p BIV AICD yesterday.  Device interrogated  No Afib seen.

## 2021-03-04 NOTE — PROGRESS NOTE ADULT - SUBJECTIVE AND OBJECTIVE BOX
normal/no cyanosis/no clubbing CC:  follow up hypothyroidism    Interval HPI/ Overnight Events:  Patient denies any CP.  Dose of LT4 was increased to 100 mcg daily today.    MEDICATIONS  (STANDING):  aspirin enteric coated 81 milliGRAM(s) Oral daily  atorvastatin 40 milliGRAM(s) Oral at bedtime  enoxaparin Injectable 30 milliGRAM(s) SubCutaneous daily  famotidine    Tablet 20 milliGRAM(s) Oral daily  levothyroxine 100 MICROGram(s) Oral daily  magnesium oxide 400 milliGRAM(s) Oral three times a day with meals  metoprolol tartrate 25 milliGRAM(s) Oral two times a day  senna 2 Tablet(s) Oral at bedtime  sodium chloride 0.9% lock flush 3 milliLiter(s) IV Push every 8 hours  torsemide 40 milliGRAM(s) Oral daily    Vital Signs Last 24 Hrs  T(C): 36.7 (04 Mar 2021 16:23), Max: 36.9 (03 Mar 2021 21:19)  T(F): 98 (04 Mar 2021 16:23), Max: 98.4 (03 Mar 2021 21:19)  HR: 103 (04 Mar 2021 17:15) (90 - 103)  BP: 123/70 (04 Mar 2021 17:15) (101/63 - 123/70)  RR: 18 (04 Mar 2021 17:15) (18 - 19)  SpO2: 96% (04 Mar 2021 17:15) (92% - 98%)    PE:  Gen: AAO, NAD  HEENT:  sclera anicteric, MMM  Neck:  no thyromegaly, no LAD  CV:  nl S1 + S2, RRR, no m/r/g  Resp:  nl respiratory effort, CTA b/l  GI/ Abd: soft, NT/ ND, BS +  Neuro:  No tremor   MS:  nl muscle tone  Skin:  no acanthosis  Psych: affect appropriate    LABS:  Free Thyroxine, Serum: 1.0 ng/dL (03.04.21 @ 12:23)  Thyroid Stimulating Hormone, Serum: 12.56 uIU/mL (03.04.21 @ 06:28)  T4, Serum: 4.3 ug/dL (03.04.21 @ 06:28)    03-04    140  |  99  |  35.0<H>  ----------------------------<  89  4.2   |  30.0<H>  |  0.99    Ca    9.1      04 Mar 2021 06:28  Mg     1.9     03-04    TPro  5.5<L>  /  Alb  3.4  /  TBili  0.8  /  DBili  x   /  AST  30  /  ALT  11  /  AlkPhos  166<H>  03-04                          9.4    15.43 )-----------( 201      ( 04 Mar 2021 06:28 )             30.3

## 2021-03-05 LAB
ANION GAP SERPL CALC-SCNC: 10 MMOL/L — SIGNIFICANT CHANGE UP (ref 5–17)
BUN SERPL-MCNC: 35 MG/DL — HIGH (ref 8–20)
CALCIUM SERPL-MCNC: 9.1 MG/DL — SIGNIFICANT CHANGE UP (ref 8.6–10.2)
CHLORIDE SERPL-SCNC: 100 MMOL/L — SIGNIFICANT CHANGE UP (ref 98–107)
CO2 SERPL-SCNC: 30 MMOL/L — HIGH (ref 22–29)
CREAT SERPL-MCNC: 0.8 MG/DL — SIGNIFICANT CHANGE UP (ref 0.5–1.3)
GLUCOSE SERPL-MCNC: 108 MG/DL — HIGH (ref 70–99)
HCT VFR BLD CALC: 29.6 % — LOW (ref 34.5–45)
HGB BLD-MCNC: 9.3 G/DL — LOW (ref 11.5–15.5)
MAGNESIUM SERPL-MCNC: 1.7 MG/DL — LOW (ref 1.8–2.6)
MCHC RBC-ENTMCNC: 27.7 PG — SIGNIFICANT CHANGE UP (ref 27–34)
MCHC RBC-ENTMCNC: 31.4 GM/DL — LOW (ref 32–36)
MCV RBC AUTO: 88.1 FL — SIGNIFICANT CHANGE UP (ref 80–100)
PLATELET # BLD AUTO: 199 K/UL — SIGNIFICANT CHANGE UP (ref 150–400)
POTASSIUM SERPL-MCNC: 4 MMOL/L — SIGNIFICANT CHANGE UP (ref 3.5–5.3)
POTASSIUM SERPL-SCNC: 4 MMOL/L — SIGNIFICANT CHANGE UP (ref 3.5–5.3)
RBC # BLD: 3.36 M/UL — LOW (ref 3.8–5.2)
RBC # FLD: 20 % — HIGH (ref 10.3–14.5)
SODIUM SERPL-SCNC: 140 MMOL/L — SIGNIFICANT CHANGE UP (ref 135–145)
WBC # BLD: 13.71 K/UL — HIGH (ref 3.8–10.5)
WBC # FLD AUTO: 13.71 K/UL — HIGH (ref 3.8–10.5)

## 2021-03-05 PROCEDURE — 99232 SBSQ HOSP IP/OBS MODERATE 35: CPT

## 2021-03-05 PROCEDURE — 71045 X-RAY EXAM CHEST 1 VIEW: CPT | Mod: 26,76

## 2021-03-05 PROCEDURE — 99233 SBSQ HOSP IP/OBS HIGH 50: CPT

## 2021-03-05 RX ORDER — MAGNESIUM SULFATE 500 MG/ML
2 VIAL (ML) INJECTION ONCE
Refills: 0 | Status: COMPLETED | OUTPATIENT
Start: 2021-03-05 | End: 2021-03-05

## 2021-03-05 RX ADMIN — FAMOTIDINE 20 MILLIGRAM(S): 10 INJECTION INTRAVENOUS at 11:36

## 2021-03-05 RX ADMIN — Medication 25 MILLIGRAM(S): at 17:11

## 2021-03-05 RX ADMIN — MAGNESIUM OXIDE 400 MG ORAL TABLET 400 MILLIGRAM(S): 241.3 TABLET ORAL at 08:52

## 2021-03-05 RX ADMIN — Medication 100 MICROGRAM(S): at 05:40

## 2021-03-05 RX ADMIN — Medication 25 MILLIGRAM(S): at 05:40

## 2021-03-05 RX ADMIN — ENOXAPARIN SODIUM 30 MILLIGRAM(S): 100 INJECTION SUBCUTANEOUS at 20:28

## 2021-03-05 RX ADMIN — Medication 50 GRAM(S): at 08:52

## 2021-03-05 RX ADMIN — MAGNESIUM OXIDE 400 MG ORAL TABLET 400 MILLIGRAM(S): 241.3 TABLET ORAL at 11:36

## 2021-03-05 RX ADMIN — Medication 40 MILLIGRAM(S): at 05:40

## 2021-03-05 RX ADMIN — ATORVASTATIN CALCIUM 40 MILLIGRAM(S): 80 TABLET, FILM COATED ORAL at 20:28

## 2021-03-05 RX ADMIN — SODIUM CHLORIDE 3 MILLILITER(S): 9 INJECTION INTRAMUSCULAR; INTRAVENOUS; SUBCUTANEOUS at 13:08

## 2021-03-05 RX ADMIN — Medication 81 MILLIGRAM(S): at 11:36

## 2021-03-05 RX ADMIN — MAGNESIUM OXIDE 400 MG ORAL TABLET 400 MILLIGRAM(S): 241.3 TABLET ORAL at 17:11

## 2021-03-05 RX ADMIN — SODIUM CHLORIDE 3 MILLILITER(S): 9 INJECTION INTRAMUSCULAR; INTRAVENOUS; SUBCUTANEOUS at 20:26

## 2021-03-05 RX ADMIN — SODIUM CHLORIDE 3 MILLILITER(S): 9 INJECTION INTRAMUSCULAR; INTRAVENOUS; SUBCUTANEOUS at 05:40

## 2021-03-05 RX ADMIN — SENNA PLUS 2 TABLET(S): 8.6 TABLET ORAL at 20:28

## 2021-03-05 NOTE — PROGRESS NOTE ADULT - SUBJECTIVE AND OBJECTIVE BOX
[place] [facility]                                                               Office: [address] [address]                                                              Telephone: [phone]. Fax:[phone]                                                                             PROGRESS NOTE  Reason for follow up: CAD/CABG, ICM/HFrEF, severe AS s/p bio-AVR, CHB s/p CRT-D   Overnight: No new events.   Update: denies complains, eating breakfast in bed, still with 1 chest tube in place, Cr. remains normal, uptrending bicarb level on reduced torsemide dose, SBP 100s      Review of symptoms:   Cardiac:  No chest pain. No dyspnea. No palpitations.  Respiratory: No cough. No dyspnea  Gastrointestinal: No diarrhea. No abdominal pain. No bleeding.     Past medical history: No updates.   	  Vital Signs Last 24 Hrs  T(C): 36.8 (03-05-21 @ 09:00), Max: 37.3 (03-04-21 @ 20:50)  T(F): 98.2 (03-05-21 @ 09:00), Max: 99.1 (03-04-21 @ 20:50)  HR: 73 (03-05-21 @ 09:00) (73 - 103)  BP: 112/52 (03-05-21 @ 09:00) (103/60 - 123/70)  BP(mean): --  RR: 19 (03-05-21 @ 09:00) (18 - 19)  SpO2: 94% (03-05-21 @ 09:00) (94% - 96%)  I&O's Summary    04 Mar 2021 07:01  -  05 Mar 2021 07:00  --------------------------------------------------------  IN: 1050 mL / OUT: 1457 mL / NET: -407 mL          PHYSICAL EXAM:  Appearance: Comfortable. No acute distress  HEENT:  Head and neck: Atraumatic. Normocephalic.  Normal oral mucosa, PERRL, Neck is supple.   Neurologic: A&Ox 2, no focal deficits. EOMI, Cranial nerves are intact.  Lymphatic: No cervical lymphadenopathy  Cardiovascular: Normal S1 S2, No murmur, rubs/gallops. No JVD; midline sternotomy staples, L-AICD site intact no swelling, +chest tube  Respiratory: Lungs clear to auscultation  Gastrointestinal:  Soft, Non-tender, + BS  Lower Extremities: trace edema  Psychiatry: Patient is calm. No agitation. Mood & affect appropriate  Skin: mild ecchymoses bilateral legs.      CURRENT MEDICATIONS:  MEDICATIONS  (STANDING):  aspirin enteric coated 81 milliGRAM(s) Oral daily  atorvastatin 40 milliGRAM(s) Oral at bedtime  enoxaparin Injectable 30 milliGRAM(s) SubCutaneous daily  famotidine    Tablet 20 milliGRAM(s) Oral daily  levothyroxine 100 MICROGram(s) Oral daily  magnesium oxide 400 milliGRAM(s) Oral three times a day with meals  metoprolol tartrate 25 milliGRAM(s) Oral two times a day  senna 2 Tablet(s) Oral at bedtime  sodium chloride 0.9% lock flush 3 milliLiter(s) IV Push every 8 hours    MEDICATIONS  (PRN):  acetaminophen   Tablet .. 650 milliGRAM(s) Oral every 6 hours PRN Moderate Pain (4 - 6)  polyethylene glycol 3350 17 Gram(s) Oral daily PRN Constipation      DIAGNOSTIC TESTING:  [ ] Echocardiogram:   < from: TTE Echo Complete w/ Contrast w/ Doppler (02.28.21 @ 09:39) >    Summary:   1. Left ventricular ejection fraction, by visual estimation, is 25 to 30%.   2. Severely decreased global left ventricular systolic function.   3. Abnormal septal motion consistent with post-operative status.   4. Severely increased LV wall thickness.   5. Severely enlarged left atrium.   6. Mild mitral valve regurgitation.   7. Severe mitral annular calcification.   8. Moderateto severe thickening of the anterior and posterior mitral valve leaflets.   9. Mitral valve mean gradient is 2.0 mmHg (at HR of 79 bpm).  10. Mild tricuspid regurgitation.  11. Mild pulmonic valve regurgitation.  12. Bioprosthesis in the aortic position, with mild aortic regurgitation, which is paravalvular in origin.  13. There is no evidence of pericardial effusion.  14. Endocardial visualization was enhanced with intravenous echo contrast.  15. Compared to TTE done on 2/26/2021, left ventricular function is unchanged from prior.    < end of copied text >    [ ]  Catheterization:  < from: Cardiac Cath Lab - Adult (02.16.21 @ 08:33) >  VENTRICLES: EF by echo was 30 %.  VALVES: AORTIC VALVE: There was critical aortic stenosis.  CORONARY VESSELS:The coronary circulation is co-dominant.  LM:   --  LM: Normal.  LAD:   --  Mid LAD: There was a tubular 90 % stenosis. The lesion was  eccentric.  CX:   --  OM1: There was a diffuse 85 % stenosis in the proximal third of  the vessel segment. The lesion was irregularly contoured and eccentric.  RCA:   --  Proximal RCA: There was a diffuse 99 % stenosis. The lesion was  irregularly contoured and eccentric.  --  Distal RCA: There was a diffuse 100 % stenosis.  COMPLICATIONS: No complications occurred during the cath lab visit.  DIAGNOSTIC IMPRESSIONS: Moderate Pulmonary Hypertension and elevation of  filling pressures. 2. Critical Aortic Stenosis with a mean PG >40mm Hg and  an SREEDHAR of <0.5cm2. 3. Severe LV Systolic dysfunction by TTE from 2/13/2021  with an estimated LVEF of 30%. 4. Triple Vessel CAD.  DIAGNOSTIC RECOMMENDATIONS: GDMT. 2. CTS consultation.  INTERVENTIONAL IMPRESSIONS: Moderate Pulmonary Hypertension and elevation  of filling pressures. 2. Critical Aortic Stenosis with a mean PG >40mm Hg  and an SREEDHAR of <0.5cm2. 3. Severe LV Systolic dysfunction by TTE from  2/13/2021 with an estimated LVEF of 30%. 4. Triple Vessel CAD.    Labs:                        9.3    13.71 )-----------( 199      ( 05 Mar 2021 06:29 )             29.6     03-05    140  |  100  |  35.0<H>  ----------------------------<  108<H>  4.0   |  30.0<H>  |  0.80    Ca    9.1      05 Mar 2021 06:29  Mg     1.7     03-05    TPro  5.5<L>  /  Alb  3.4  /  TBili  0.8  /  DBili  x   /  AST  30  /  ALT  11  /  AlkPhos  166<H>  03-04     17 Feb 2021 11:36 Troponin 1.35 ng/mL / Creatine Kinase x     /  CKMB x     / CPK Mass Assay % x       17 Feb 2021 01:45 Troponin 1.78 ng/mL / Creatine Kinase x     /  CKMB x     / CPK Mass Assay % x       16 Feb 2021 23:27 Troponin 1.83 ng/mL / Creatine Kinase 49 U/L /  CKMB x     / CPK Mass Assay % x          Serum Pro-Brain Natriuretic Peptide: 13087 pg/mL (02-21-21 @ 06:04)  Serum Pro-Brain Natriuretic Peptide: 58182 pg/mL (02-20-21 @ 06:31)  Serum Pro-Brain Natriuretic Peptide: 47005 pg/mL (02-16-21 @ 12:38)    Cholesterol 105 mg/dL; Direct LDL --; HDL Cholesterol, Serum 39 mg/dL; HDL/ Total Cholesterol Ratio Measurement --; Total Cholesterol/ HDL Ratio Measurement --; Triglycerides, Serum 62 mg/dL  A1C with Estimated Average Glucose Result: 5.2 % (02-16-21 @ 12:38)      TELEMETRY: bi-V pacing 70s

## 2021-03-05 NOTE — PROGRESS NOTE ADULT - ASSESSMENT
76F PMH of anemia, hypothyroidism, and GI bleed 1 year ago (refused Endo/colonoscopy and never followed up) presented to [facility] originally with fever and SOB found to have urosepsis s/p course of rocephin (completed ), anemia requiring 2 PRBC (no GI work up because pt wanted to sign out AMA), ruled in for NSTEMI, transferred to [facility]  and underwent cardiac cath which showed multivessel dz and severe AS, preop carotid US with b/l carotid stenosis confirmed by CTA, seen by vascular and cleared for OR (outpt follow up), s/p endoscopy  and colonoscopy  without evidence of acute bleed, noted to have hiatal hernia, now s/p CABG x3 with LIMA, AVR  with Dr. Cruz, postop course notable for KIMMY, CHB with junctional escape requiring pacing via EPM, slow inotrope wean,  off 3/1. Pt now  S/P BIV AICD placement on 3/2. 3/3 started lopressor 3/4 L chest tube removed, and 3/5 right chest tube removed.

## 2021-03-05 NOTE — PROGRESS NOTE ADULT - ASSESSMENT
76y Female with PMH anemia, and hypothyroidism.  Pt presented to [facility] a few days ago with fever and SOB.  She was found to have a UTI/sepsis with elevated lactate, + UA, and temp of 102.  She was started on Rocephin.  HGB was 6.  She was transfused 2 units of PRBC.  Per medical record, a year ago she had a GI bleed and was offered an endoscopy and colonoscopy but she refused and then failed to follow up outpatient.  Per medical record she did not want to stay at [facility] but agreed once she ruled in for NSTEMI.  She was transferred to [facility] for cardiac cath.     2/16 - s/p LHC showing EF 30%. LM normal, mLAD 90%, OM1 85%, pRCA 99%, dRCA 100%, mod pulm  HTN, SREEDHAR <0.5 consistent with critical AS  2-17- EGD with old blood suspect from pharynx/epistaxis?, poor bowel prep  2/18- plan for repeat colonoscopy  2/19- diverticulosis on colonoscopy   2/24- underwent CABG x3 (LIMA-LAD, SVG-OM1, OM2) and #23 bio-AVR,  2/25- remains intubated as not fully awake for CPAP trial, on milrinone, norepinephrine and dopamine for junctional kunal, CHB, currently AV pacing; arousable and following commands  2/26- extubated yesterday, remains on inotropes and vasopressors, KIMMY on Lasix gtt--> Bumex, albumin, [place] with PAD ~14  2/27-2/28- weaned off vasopressors on low dose dobutamine gtt, nonoligouric switched to PO torsemide, repeat TTE LV EF still 25-30%, remains in CHB off epicardial pacing  3/1- weaned off dobutamine gtt, pending CRT-D  3/2- s/p CRT-D  3/3- Cr. continues to improve, chest tubes still high output, sundowning at night thinking her family was here  3/4- 1 chest tube still in place         HFEF/ICM, Cardiogenic shock- postop, resolved  -continue reduced dose torsemide 20mg daily to maintain euvolemia, monitor bicarb  - repeat TTE LV EF ~25%-30%, repeat pBNP level  - change to metoprolol succinate on discharge, Cr. normalized but low BP not able to use ARNI for now, add spironolactone 12.5mg today, then eventual ACEi, consider addition of Dapagliflozin for HFrEF as well.   - mild delirium- constant reorientation    KIMMY postop  - resolved and diuresing well, nephrology following     CHB- postop  - s/p CRT-D, bi-V pacing on tele, EPS follow up      Severe Multivessel CAD s/p CABG x3  - cath LHC mLAD 90%, OM1 85%, pRCA 99%, dRCA 100%  - continue ASA 81, statin, LDL 54  - monitor chest tube output for ongoing pleural effusions  - PT/OOB ambulate      Critical AS s/p bio-AVR  -continue ASA 81  -TTE with mild paravalvular regurgitation    Large PFO  -incidental noted on intraop LUIS MIGUEL with predominate L-to-R shunt  -continue to monitor, consider percutaneous closure in the future if episode of CVA or systemic emboli    Acute blood loss anemia  - received x2 PRBC at [facility] before transfer, 2 PRBC postop- monitor H/H   - occult blood positive, source unclear, colonoscopy with diverticulosis  - monitor for recurrent bleeding, pAfib          Chet Grimaldo DO, [facility]  Faculty Non-Invasive Cardiologist  [phone]

## 2021-03-05 NOTE — PROGRESS NOTE ADULT - SUBJECTIVE AND OBJECTIVE BOX
Patient feels well.  Down to one chest tube.  Reports feeling pain in her back due to how she is positioned.  Has breakfast and will be assisted.     REVIEW OF SYSTEMS  Constitutional - No fever,  +fatigue  HEENT - No vertigo, No neck pain  Neurological - No headaches, + memory loss, +loss of strength, No numbness, No tremors  Musculoskeletal - No joint pain, No joint swelling, +muscle pain  Psychiatric - No depression, No anxiety    FUNCTIONAL PROGRESS  3/3  Sit-Stand Transfer Training  Sit-to-Stand Transfer Training Rehab Potential: good, to achieve stated therapy goals  Transfer Training Sit-to-Stand Transfer: moderate assist (50% patient effort);  1 person assist;  verbal cues;  rolling walker  Transfer Training Stand-to-Sit Transfer: moderate assist (50% patient effort);  1 person assist;  verbal cues;  rolling walker    Gait Training  Gait Training Rehab Potential: good, to achieve stated therapy goals  Gait Training: moderate assist (50% patient effort);  1 person assist;  verbal cues;  rolling walker;  5 feet  Gait Analysis: 3-point gait   decreased hip/knee flexion;  decreased step length;  impaired balance;  decreased strength;  5 feet;  rolling walker  Gait Number of Times:: x 2  Type of Rest Type of Rest: sitting  Duration of Rest Duration of Rest: 4 min       VITALS  T(C): 36.8 (03-05-21 @ 09:00), Max: 37.3 (03-04-21 @ 20:50)  HR: 73 (03-05-21 @ 09:00) (73 - 103)  BP: 112/52 (03-05-21 @ 09:00) (103/60 - 123/70)  RR: 19 (03-05-21 @ 09:00) (18 - 19)  SpO2: 94% (03-05-21 @ 09:00) (94% - 96%)  Wt(kg): --    MEDICATIONS   acetaminophen   Tablet .. 650 milliGRAM(s) every 6 hours PRN  aspirin enteric coated 81 milliGRAM(s) daily  atorvastatin 40 milliGRAM(s) at bedtime  enoxaparin Injectable 30 milliGRAM(s) daily  famotidine    Tablet 20 milliGRAM(s) daily  levothyroxine 100 MICROGram(s) daily  magnesium oxide 400 milliGRAM(s) three times a day with meals  metoprolol tartrate 25 milliGRAM(s) two times a day  polyethylene glycol 3350 17 Gram(s) daily PRN  senna 2 Tablet(s) at bedtime  sodium chloride 0.9% lock flush 3 milliLiter(s) every 8 hours      RECENT LABS/IMAGING                          9.3    13.71 )-----------( 199      ( 05 Mar 2021 06:29 )             29.6     03-05    140  |  100  |  35.0<H>  ----------------------------<  108<H>  4.0   |  30.0<H>  |  0.80    Ca    9.1      05 Mar 2021 06:29  Mg     1.7     03-05    TPro  5.5<L>  /  Alb  3.4  /  TBili  0.8  /  DBili  x   /  AST  30  /  ALT  11  /  AlkPhos  166<H>  03-04                  TTE 2/28 - Summary:   1. Left ventricular ejection fraction, by visual estimation, is 25 to 30%.   2. Severely decreased global left ventricular systolic function.   3. Abnormal septal motion consistent with post-operative status.   4. Severely increased LV wall thickness.   5. Severely enlarged left atrium.   6. Mild mitral valve regurgitation.   7. Severe mitral annular calcification.   8. Moderate to severe thickening of the anterior and posterior mitral valve leaflets.   9. Mitral valve mean gradient is 2.0 mmHg (at HR of 79 bpm).  10. Mild tricuspid regurgitation.  11. Mild pulmonic valve regurgitation.  12. Bioprosthesis in the aortic position, with mild aortic regurgitation, which is paravalvular in origin.  13. There is no evidence of pericardial effusion.  14. Endocardial visualization was enhanced with intravenous echo contrast.  15. Compared to TTE done on 2/26/2021, left ventricular function is unchanged from prior.    CXR 2/28 - Reduction of pleural effusions.    CXR 3/4 - Bilateral chest tubes in place. Residual lung a bibasilar pleural effusions and/or airspace disease as seen on prior 3/3/2021 chest radiograph    ----------------------------------------------------------------------------------------  PHYSICAL EXAM  Constitutional - NAD, Comfortable  Chest - Increased work of breathing, No wheezing, +1 chest tubes  Extremities - BLE edema  Neurologic Exam -                    Cognitive - AAO to self, PART of situation, Slowed Processing      Motor -                      LEFT    UE - ShAB 1/5,  4/5                    RIGHT UE - ShAB 1/5,  4/5                    LEFT    LE - HF 1/5, KE 2/5                     RIGHT LE - HF 1/5, KE 1/5      Sensory - Intact to LT  Psychiatric - Mood stable, Affect Flat  ----------------------------------------------------------------------------------------  ASSESSMENT/PLAN  76yFemale with functional deficits after +NSTEMI/CAD/Severe AS  CAD s/p CABG x3 - ASA, Lipitor  Severe AS s/p AVR & Acute CHF - Demadex  HTN - Lopressor  Pleural effusions - Chest tubes x2  Pain - Tylenol  DVT PPX - SCDs, Lovenox  Rehab - Medically being optimized. Continue to recommend ACUTE inpatient rehabilitation for the functional deficits consisting of 3 hours of therapy/day & 24 hour RN/daily PMR physician for comorbid medical management. Will continue to follow for ongoing rehab needs and recommendations. Patient will be able to tolerate 3 hours a day.    Continue bedside therapy as well as OOB throughout the day with mobilization throughout the day with staff to maintain cardiopulmonary function and prevention of secondary complications related to debility.     Discussed with rehab clinical team.

## 2021-03-05 NOTE — PROGRESS NOTE ADULT - PROBLEM SELECTOR PLAN 1
S/p AVR/C3L  Continue ASA and Lipitor.  Continue Lopressor.  DASH/TLC diet.  Physical therapy ordered  Tylenol PRN for pain control.  Right CT d/c'd  AM labs and xray.

## 2021-03-05 NOTE — PROGRESS NOTE ADULT - SUBJECTIVE AND OBJECTIVE BOX
SUBJECTIVE:   Pt in bed sleeping but arousable, " pt states, " I am tired"  Patient c/o of b/l heel pain from her calceoulus  She denies chest pain, shortness of breath, palpitations, headache, dizziness, nausea, or vomiting.      Overnight events:  none      PAST MEDICAL & SURGICAL HISTORY:  Iron deficiency anemia due to chronic blood loss    Hypothyroid    History of tonsillectomy      MEDICATIONS  acetaminophen   Tablet .. 650 milliGRAM(s) Oral every 6 hours PRN  aspirin enteric coated 81 milliGRAM(s) Oral daily  atorvastatin 40 milliGRAM(s) Oral at bedtime  enoxaparin Injectable 30 milliGRAM(s) SubCutaneous daily  famotidine    Tablet 20 milliGRAM(s) Oral daily  levothyroxine 100 MICROGram(s) Oral daily  magnesium oxide 400 milliGRAM(s) Oral three times a day with meals  metoprolol tartrate 25 milliGRAM(s) Oral two times a day  polyethylene glycol 3350 17 Gram(s) Oral daily PRN  senna 2 Tablet(s) Oral at bedtime  sodium chloride 0.9% lock flush 3 milliLiter(s) IV Push every 8 hours  MEDICATIONS  (PRN):  acetaminophen   Tablet .. 650 milliGRAM(s) Oral every 6 hours PRN Moderate Pain (4 - 6)  polyethylene glycol 3350 17 Gram(s) Oral daily PRN Constipation      Daily     Daily Weight in k.1 (05 Mar 2021 05:32)                              9.3    13.71 )-----------( 199      ( 05 Mar 2021 06:29 )             29.6   03-    140  |  100  |  35.0<H>  ----------------------------<  108<H>  4.0   |  30.0<H>  |  0.80    Ca    9.1      05 Mar 2021 06:29  Mg     1.7     03    TPro  5.5<L>  /  Alb  3.4  /  TBili  0.8  /  DBili  x   /  AST  30  /  ALT  11  /  AlkPhos  166<H>              Objective:  T(C): 36.7 (21 @ 16:01), Max: 37.3 (21 @ 20:50)  HR: 82 (21 @ 16:01) (73 - 95)  BP: 113/69 (21 @ 16:01) (104/59 - 113/69)  RR: 18 (21 @ 16:01) (18 - 19)  SpO2: 99% (21 @ 16:01) (94% - 99%)  Wt(kg): --CAPILLARY BLOOD GLUCOSE      I&O's Summary    04 Mar 2021 07:  -  05 Mar 2021 07:00  --------------------------------------------------------  IN: 1050 mL / OUT: 1457 mL / NET: -407 mL    05 Mar 2021 07:01  -  05 Mar 2021 17:17  --------------------------------------------------------  IN: 100 mL / OUT: 1200 mL / NET: -1100 mL        Physical Exam  Neuro: A+O x 3, non-focal, speech clear and intact  Pulm: bilaterally diminshed   CV: RRR, irregularly irregular, +S1S2  Abdomen: soft, nontender, nondistended, positive bowel sounds, last BM 3/1  Extremities: warm, well perfused. +1 edema. + DP pulses  Inc: MSI C/D/I/stable w/ dressing, ___ C/D/I with Ace wrap  Chest tubes: right Chest tube removed CXR stable  Incisions: midline sternal incision, + staples, c/d/i. sternum stable, L ACW + steris c/d/i      Imaging:  CXR:  < from: Xray Chest 1 View- PORTABLE-Urgent (Xray Chest 1 View- PORTABLE-Urgent .) (21 @ 11:40) >  CLINICAL INFORMATION:  Postoperative  Cardiac surgery.    TECHNIQUE:  Portable  AP view of the chest was obtained.    FINDINGS:   3/4/2021 available for review.  RIGHT chest tube in place.    The lungs show residual blunting of costophrenic angles indicating pleural effusions. Upper lobes clear.. No pneumothorax.        The heart and mediastinum are within normal limits following cardiac  surgery. Right atrium and biventricular cardiac wire leads in place.    Visualized osseous structures are intact.        IMPRESSION:  No significant change.    < end of copied text >      ECG:    < from: 12 Lead ECG (21 @ 06:59) >    Ventricular Rate 78 BPM    Atrial Rate 78 BPM    QRS Duration 134 ms    Q-T Interval 464 ms    QTC Calculation(Bazett) 528 ms    P Axis 33 degrees    R Axis -67 degrees    T Axis 67 degrees    Diagnosis Line Ventricular-paced rhythm  Abnormal ECG    < end of copied text >             SUBJECTIVE:   pt in bed alert and slightly confused but easily reoriented, pt states, I am ok   Patient denies acute pain with radiating or aggravating factors.  He denies chest pain, shortness of breath, palpitations, headache, dizziness, nausea, or vomiting.        Overnight events:  none      PAST MEDICAL & SURGICAL HISTORY:  Iron deficiency anemia due to chronic blood loss    Hypothyroid    History of tonsillectomy      MEDICATIONS  acetaminophen   Tablet .. 650 milliGRAM(s) Oral every 6 hours PRN  aspirin enteric coated 81 milliGRAM(s) Oral daily  atorvastatin 40 milliGRAM(s) Oral at bedtime  enoxaparin Injectable 30 milliGRAM(s) SubCutaneous daily  famotidine    Tablet 20 milliGRAM(s) Oral daily  levothyroxine 100 MICROGram(s) Oral daily  magnesium oxide 400 milliGRAM(s) Oral three times a day with meals  metoprolol tartrate 25 milliGRAM(s) Oral two times a day  polyethylene glycol 3350 17 Gram(s) Oral daily PRN  senna 2 Tablet(s) Oral at bedtime  sodium chloride 0.9% lock flush 3 milliLiter(s) IV Push every 8 hours  MEDICATIONS  (PRN):  acetaminophen   Tablet .. 650 milliGRAM(s) Oral every 6 hours PRN Moderate Pain (4 - 6)  polyethylene glycol 3350 17 Gram(s) Oral daily PRN Constipation      Daily     Daily Weight in k.1 (05 Mar 2021 05:32)                              9.3    13.71 )-----------( 199      ( 05 Mar 2021 06:29 )             29.6   03-05    140  |  100  |  35.0<H>  ----------------------------<  108<H>  4.0   |  30.0<H>  |  0.80    Ca    9.1      05 Mar 2021 06:29  Mg     1.7     03    TPro  5.5<L>  /  Alb  3.4  /  TBili  0.8  /  DBili  x   /  AST  30  /  ALT  11  /  AlkPhos  166<H>              Objective:  T(C): 36.7 (21 @ 16:01), Max: 37.3 (21 @ 20:50)  HR: 82 (21 @ 16:01) (73 - 95)  BP: 113/69 (21 @ 16:01) (104/59 - 113/69)  RR: 18 (21 @ 16:01) (18 - 19)  SpO2: 99% (21 @ 16:01) (94% - 99%)  Wt(kg): --CAPILLARY BLOOD GLUCOSE      I&O's Summary    04 Mar 2021 07:01  -  05 Mar 2021 07:00  --------------------------------------------------------  IN: 1050 mL / OUT: 1457 mL / NET: -407 mL    05 Mar 2021 07:01  -  05 Mar 2021 17:17  --------------------------------------------------------  IN: 100 mL / OUT: 1200 mL / NET: -1100 mL        Physical Exam  Neuro: A+O x 3, non-focal, speech clear and intact  Pulm: bilaterally diminshed   CV: RRR, irregularly irregular, +S1S2  Abdomen: soft, nontender, nondistended, positive bowel sounds, last BM 3/1  Extremities: warm, well perfused. +1 edema. + DP pulses  Chest tubes: right Chest tube removed CXR stable  Incisions: midline sternal incision, + staples, c/d/i. sternum stable, L ACW + steris c/d/i      Imaging:  CXR:  < from: Xray Chest 1 View- PORTABLE-Urgent (Xray Chest 1 View- PORTABLE-Urgent .) (21 @ 11:40) >  CLINICAL INFORMATION:  Postoperative  Cardiac surgery.    TECHNIQUE:  Portable  AP view of the chest was obtained.    FINDINGS:   3/4/2021 available for review.  RIGHT chest tube in place.    The lungs show residual blunting of costophrenic angles indicating pleural effusions. Upper lobes clear.. No pneumothorax.        The heart and mediastinum are within normal limits following cardiac  surgery. Right atrium and biventricular cardiac wire leads in place.    Visualized osseous structures are intact.        IMPRESSION:  No significant change.    < end of copied text >      ECG:    < from: 12 Lead ECG (21 @ 06:59) >    Ventricular Rate 78 BPM    Atrial Rate 78 BPM    QRS Duration 134 ms    Q-T Interval 464 ms    QTC Calculation(Bazett) 528 ms    P Axis 33 degrees    R Axis -67 degrees    T Axis 67 degrees    Diagnosis Line Ventricular-paced rhythm  Abnormal ECG    < end of copied text >

## 2021-03-06 LAB
ALBUMIN SERPL ELPH-MCNC: 3.2 G/DL — LOW (ref 3.3–5.2)
ALP SERPL-CCNC: 207 U/L — HIGH (ref 40–120)
ALT FLD-CCNC: 24 U/L — SIGNIFICANT CHANGE UP
ANION GAP SERPL CALC-SCNC: 11 MMOL/L — SIGNIFICANT CHANGE UP (ref 5–17)
APPEARANCE UR: CLEAR — SIGNIFICANT CHANGE UP
AST SERPL-CCNC: 46 U/L — HIGH
BACTERIA # UR AUTO: ABNORMAL
BILIRUB SERPL-MCNC: 0.8 MG/DL — SIGNIFICANT CHANGE UP (ref 0.4–2)
BILIRUB UR-MCNC: NEGATIVE — SIGNIFICANT CHANGE UP
BUN SERPL-MCNC: 33 MG/DL — HIGH (ref 8–20)
CALCIUM SERPL-MCNC: 9.2 MG/DL — SIGNIFICANT CHANGE UP (ref 8.6–10.2)
CHLORIDE SERPL-SCNC: 97 MMOL/L — LOW (ref 98–107)
CO2 SERPL-SCNC: 33 MMOL/L — HIGH (ref 22–29)
COLOR SPEC: YELLOW — SIGNIFICANT CHANGE UP
CREAT SERPL-MCNC: 0.76 MG/DL — SIGNIFICANT CHANGE UP (ref 0.5–1.3)
DIFF PNL FLD: ABNORMAL
EPI CELLS # UR: SIGNIFICANT CHANGE UP
GLUCOSE SERPL-MCNC: 138 MG/DL — HIGH (ref 70–99)
GLUCOSE UR QL: NEGATIVE MG/DL — SIGNIFICANT CHANGE UP
HCT VFR BLD CALC: 30.1 % — LOW (ref 34.5–45)
HGB BLD-MCNC: 9.1 G/DL — LOW (ref 11.5–15.5)
KETONES UR-MCNC: NEGATIVE — SIGNIFICANT CHANGE UP
LEUKOCYTE ESTERASE UR-ACNC: NEGATIVE — SIGNIFICANT CHANGE UP
MAGNESIUM SERPL-MCNC: 1.8 MG/DL — SIGNIFICANT CHANGE UP (ref 1.6–2.6)
MCHC RBC-ENTMCNC: 27.5 PG — SIGNIFICANT CHANGE UP (ref 27–34)
MCHC RBC-ENTMCNC: 30.2 GM/DL — LOW (ref 32–36)
MCV RBC AUTO: 90.9 FL — SIGNIFICANT CHANGE UP (ref 80–100)
NITRITE UR-MCNC: NEGATIVE — SIGNIFICANT CHANGE UP
NT-PROBNP SERPL-SCNC: 9569 PG/ML — HIGH (ref 0–300)
PH UR: 6 — SIGNIFICANT CHANGE UP (ref 5–8)
PLATELET # BLD AUTO: 277 K/UL — SIGNIFICANT CHANGE UP (ref 150–400)
POTASSIUM SERPL-MCNC: 3.8 MMOL/L — SIGNIFICANT CHANGE UP (ref 3.5–5.3)
POTASSIUM SERPL-SCNC: 3.8 MMOL/L — SIGNIFICANT CHANGE UP (ref 3.5–5.3)
PROT SERPL-MCNC: 5.5 G/DL — LOW (ref 6.6–8.7)
PROT UR-MCNC: 30 MG/DL
RBC # BLD: 3.31 M/UL — LOW (ref 3.8–5.2)
RBC # FLD: 20.9 % — HIGH (ref 10.3–14.5)
RBC CASTS # UR COMP ASSIST: SIGNIFICANT CHANGE UP /HPF (ref 0–4)
SODIUM SERPL-SCNC: 141 MMOL/L — SIGNIFICANT CHANGE UP (ref 135–145)
SP GR SPEC: 1.01 — SIGNIFICANT CHANGE UP (ref 1.01–1.02)
UROBILINOGEN FLD QL: NEGATIVE MG/DL — SIGNIFICANT CHANGE UP
WBC # BLD: 18.57 K/UL — HIGH (ref 3.8–10.5)
WBC # FLD AUTO: 18.57 K/UL — HIGH (ref 3.8–10.5)
WBC UR QL: SIGNIFICANT CHANGE UP

## 2021-03-06 PROCEDURE — 71045 X-RAY EXAM CHEST 1 VIEW: CPT | Mod: 26

## 2021-03-06 RX ORDER — MAGNESIUM SULFATE 500 MG/ML
2 VIAL (ML) INJECTION ONCE
Refills: 0 | Status: COMPLETED | OUTPATIENT
Start: 2021-03-06 | End: 2021-03-06

## 2021-03-06 RX ORDER — POTASSIUM CHLORIDE 20 MEQ
40 PACKET (EA) ORAL ONCE
Refills: 0 | Status: COMPLETED | OUTPATIENT
Start: 2021-03-06 | End: 2021-03-06

## 2021-03-06 RX ADMIN — Medication 20 MILLIGRAM(S): at 05:02

## 2021-03-06 RX ADMIN — SENNA PLUS 2 TABLET(S): 8.6 TABLET ORAL at 21:39

## 2021-03-06 RX ADMIN — SODIUM CHLORIDE 3 MILLILITER(S): 9 INJECTION INTRAMUSCULAR; INTRAVENOUS; SUBCUTANEOUS at 21:26

## 2021-03-06 RX ADMIN — ENOXAPARIN SODIUM 30 MILLIGRAM(S): 100 INJECTION SUBCUTANEOUS at 21:39

## 2021-03-06 RX ADMIN — SODIUM CHLORIDE 3 MILLILITER(S): 9 INJECTION INTRAMUSCULAR; INTRAVENOUS; SUBCUTANEOUS at 12:40

## 2021-03-06 RX ADMIN — Medication 100 GRAM(S): at 12:38

## 2021-03-06 RX ADMIN — MAGNESIUM OXIDE 400 MG ORAL TABLET 400 MILLIGRAM(S): 241.3 TABLET ORAL at 08:44

## 2021-03-06 RX ADMIN — MAGNESIUM OXIDE 400 MG ORAL TABLET 400 MILLIGRAM(S): 241.3 TABLET ORAL at 12:38

## 2021-03-06 RX ADMIN — Medication 25 MILLIGRAM(S): at 05:02

## 2021-03-06 RX ADMIN — ATORVASTATIN CALCIUM 40 MILLIGRAM(S): 80 TABLET, FILM COATED ORAL at 21:39

## 2021-03-06 RX ADMIN — MAGNESIUM OXIDE 400 MG ORAL TABLET 400 MILLIGRAM(S): 241.3 TABLET ORAL at 18:15

## 2021-03-06 RX ADMIN — Medication 40 MILLIEQUIVALENT(S): at 12:38

## 2021-03-06 RX ADMIN — SODIUM CHLORIDE 3 MILLILITER(S): 9 INJECTION INTRAMUSCULAR; INTRAVENOUS; SUBCUTANEOUS at 05:03

## 2021-03-06 RX ADMIN — Medication 81 MILLIGRAM(S): at 08:44

## 2021-03-06 RX ADMIN — Medication 100 MICROGRAM(S): at 05:02

## 2021-03-06 RX ADMIN — Medication 25 MILLIGRAM(S): at 18:16

## 2021-03-06 RX ADMIN — FAMOTIDINE 20 MILLIGRAM(S): 10 INJECTION INTRAVENOUS at 08:44

## 2021-03-06 NOTE — PROGRESS NOTE ADULT - ASSESSMENT
76F PMH of anemia, hypothyroidism, and GI bleed 1 year ago (refused Endo/colonoscopy and never followed up) presented to [facility] originally with fever and SOB found to have urosepsis s/p course of rocephin (completed 2/17), anemia requiring 2 PRBC (no GI work up because pt wanted to sign out AMA), ruled in for NSTEMI, transferred to [facility] 2/16 and underwent cardiac cath which showed Multivessel CAD and Severe AS. Preop carotid US with b/l carotid stenosis confirmed by CTA, seen by vascular and cleared for OR (outpt follow up), s/p endoscopy 2/17 and colonoscopy 2/19 without evidence of acute bleed, noted to have hiatal hernia, now s/p CABG x3 with LIMALEE, AVR 2/24 with Dr. Cruz. 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 Physical therapy recommending Acute rehab as patient is only able to walk 5ft with a rolling walker and 1-person assist. Patient's family adamant about taking her home.

## 2021-03-06 NOTE — PROGRESS NOTE ADULT - PROBLEM SELECTOR PLAN 1
S/p AVR/C3L 2/24/21.   Hemodynamically stable s/p BiV AICD 3/2/21.   Continue ASA and Lipitor.  Continue Lopressor.  CDBEs, I/S use hourly while awake.   Encourage OOB and increased ambulation with physical therapy.   Follow up AM CXR.   Tylenol PRN for pain control.  Bowel regimen PRN.

## 2021-03-06 NOTE — PROGRESS NOTE ADULT - SUBJECTIVE AND OBJECTIVE BOX
POD # 10 s/p CABG x 3 (LIMA-LAD, Vein-OM1, OM2) with Right SVG harvest, AVR (23mm Capps 2700)    PAST MEDICAL & SURGICAL HISTORY:  Iron deficiency anemia due to chronic blood loss  Hypothyroid  History of tonsillectomy    FAMILY HISTORY:  Family history of cardiac disorder (Father)  Family history of diabetes mellitus (Father)    Brief Hospital Course: 76F PMH of anemia, hypothyroidism, and GI bleed 1 year ago (refused Endo/colonoscopy and never followed up) presented to [facility] originally with fever and SOB found to have urosepsis s/p course of rocephin (completed 2/17), anemia requiring 2 PRBC (no GI work up because pt wanted to sign out AMA), ruled in for NSTEMI, transferred to [facility] 2/16 and underwent cardiac cath which showed Multivessel CAD and Severe AS. Preop carotid US with b/l carotid stenosis confirmed by CTA, seen by vascular and cleared for OR (outpt follow up), s/p endoscopy 2/17 and colonoscopy 2/19 without evidence of acute bleed, noted to have hiatal hernia, now s/p CABG x3 with LIMA, AVR 2/24 with Dr. Cruz. 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 Physical therapy recommending Acute rehab as patient is only able to walk 5ft with a rolling walker and 1-person assist. Patient's family adamant about taking her home.       Subjective: Patient lying in bed in no acute distress. Answers all questions appropriately. Denies fevers, chills, lightheadedness, dizziness, HA, CP, palpitations, SOB, cough, abdominal pain, N/V, diarrhea, numbness/tingling in extremities, or any other acute complaints.    MEDICATIONS  (STANDING):  aspirin enteric coated 81 milliGRAM(s) Oral daily  atorvastatin 40 milliGRAM(s) Oral at bedtime  enoxaparin Injectable 30 milliGRAM(s) SubCutaneous daily  famotidine    Tablet 20 milliGRAM(s) Oral daily  levothyroxine 100 MICROGram(s) Oral daily  magnesium oxide 400 milliGRAM(s) Oral three times a day with meals  metoprolol tartrate 25 milliGRAM(s) Oral two times a day  senna 2 Tablet(s) Oral at bedtime  sodium chloride 0.9% lock flush 3 milliLiter(s) IV Push every 8 hours  torsemide 20 milliGRAM(s) Oral daily    MEDICATIONS  (PRN):  acetaminophen   Tablet .. 650 milliGRAM(s) Oral every 6 hours PRN Moderate Pain (4 - 6)  polyethylene glycol 3350 17 Gram(s) Oral daily PRN Constipation    Allergies: No Known Allergies    Vitals   T(C): 36.5 (06 Mar 2021 04:58), Max: 37.4 (05 Mar 2021 20:22)  T(F): 97.7 (06 Mar 2021 04:58), Max: 99.4 (05 Mar 2021 20:22)  HR: 98 (06 Mar 2021 04:58) (73 - 98)  BP: 110/66 (06 Mar 2021 04:58) (105/61 - 113/71)  RR: 20 (06 Mar 2021 04:58) (18 - 20)  SpO2: 99% (06 Mar 2021 04:58) (94% - 99%)    I&O's Detail    04 Mar 2021 07:01  -  05 Mar 2021 07:00  --------------------------------------------------------  IN:    Oral Fluid: 1050 mL  Total IN: 1050 mL    OUT:    Chest Tube (mL): 57 mL    Voided (mL): 1400 mL  Total OUT: 1457 mL    Total NET: -407 mL      05 Mar 2021 07:01  -  06 Mar 2021 05:33  --------------------------------------------------------  IN:    IV PiggyBack: 100 mL    Oral Fluid: 350 mL  Total IN: 450 mL    OUT:    Voided (mL): 2100 mL  Total OUT: 2100 mL    Total NET: -1650 mL    Physical Exam  Neuro: A+O x 3, non-focal, speech clear and intact  HEENT:  NCAT, PERRL, EOMI. No conjuctival edema or icterus, no thrush.    Neck:  Supple, trachea midline  Pulm: +Decreased BSs b/l bases, no accessory muscle use noted  CV: Paced  Abd: soft, NT, ND, + BS  Ext: YATES x 4, +1-2 LE pitting edema b/l, no cyanosis or clubbing, distal motor/neuro/circ intact  Skin: warm, dry, well perfused  Incisions: midsternal incision +staples, C/D/I, Sternum stable. Left chest wall +seri strips C/D/I     LABS                        9.3    13.71 )-----------( 199      ( 05 Mar 2021 06:29 )             29.6     03-05    140  |  100  |  35.0<H>  ----------------------------<  108<H>  4.0   |  30.0<H>  |  0.80    Ca    9.1      05 Mar 2021 06:29  Mg     1.7     03-05    TPro  5.5<L>  /  Alb  3.4  /  TBili  0.8  /  DBili  x   /  AST  30  /  ALT  11  /  AlkPhos  166<H>  03-04      Last CXR:  < from: Xray Chest 1 View- PORTABLE-Urgent (Xray Chest 1 View- PORTABLE-Urgent .) (03.05.21 @ 11:40) >  IMPRESSION:  No significant change.  FOLLOW-UP CHEST RADIOGRAPH 3/5/2021 AT 10:52 AM  Compared to prior examination RIGHT chest tube has been removed. Current film shows no airspace consolidation or effusion. No pneumothorax. Otherwise no interval change.  < end of copied text >

## 2021-03-06 NOTE — CHART NOTE - NSCHARTNOTEFT_GEN_A_CORE
CTS ACP Addendum    Briefly, 76F PMH of anemia, hypothyroidism, and GI bleed 1 year ago (refused Endo/colonoscopy and never followed up) presented to [facility] originally with fever and SOB found to have urosepsis s/p course of rocephin (completed 2/17), anemia requiring 2 PRBC (no GI work up because pt wanted to sign out AMA), ruled in for NSTEMI, transferred to [facility] 2/16 and underwent cardiac cath which showed Multivessel CAD and Severe AS. Preop carotid US with b/l carotid stenosis confirmed by CTA, seen by vascular and cleared for OR (outpt follow up), s/p endoscopy 2/17 and colonoscopy 2/19 without evidence of acute bleed, noted to have hiatal hernia, now s/p CABG x3 with LIMA, AVR 2/24 with Dr. Cruz. 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 Physical therapy recommending Acute rehab as patient is only able to walk 5ft with a rolling walker and 1-person assist. Patient's family adamant about taking her home.       Patient seen and examined. Notes, flowsheets, medications, radiologic images and labs reviewed. VSS, improved mental status today, no further hallucinations and is currently AAOx3, On exam she has diminished bs right base and still 1-2+ LE edema. R chest tube site (removed yesterday) is saturated with serous fluid > dressing changed. Labs WNL with the exception of a newly elevated WBC.     Plan:  - UA for WBC  - Daily PT to optimize for home  - PT and PM&R has been recommending Acute Rehab however son was refusing and wants to take her home.  - Mg and K repleted  - Continue Torsemide 20 QD  - No aldactone or other HF meds at this time per Dr Cruz  - Dr Cruz to speak with patient's son    Case discussed with Dr. Cruz in AM rounds

## 2021-03-06 NOTE — PROGRESS NOTE ADULT - PROBLEM SELECTOR PLAN 8
Consider Zyprexa at night or Haldol if needed as per Dr. Cruz   Nonfocal. Resolved with reorientation.

## 2021-03-06 NOTE — PROGRESS NOTE ADULT - PROBLEM SELECTOR PLAN 4
CHB with junctional escape postop now s/p BIV AICD 3/2/21.   EP following.    Device interrogated  No Afib seen.

## 2021-03-06 NOTE — PROGRESS NOTE ADULT - PROBLEM SELECTOR PLAN 9
SCDs, Lovenox for DVT prophylaxis.  Pantoprazole for GI prophylaxis.    Dispo: PT recommending [facility] VS [facility].  Evaluated by Acute rehab and recommendation pending discussion regarding social situation at home.  Per social work, Son does not want pt to go to a rehab.  Dispo to be determined.  Plan to be discussed / reviewed with CTICU team during AM rounds.

## 2021-03-07 LAB
ANION GAP SERPL CALC-SCNC: 11 MMOL/L — SIGNIFICANT CHANGE UP (ref 5–17)
BUN SERPL-MCNC: 32 MG/DL — HIGH (ref 8–20)
CALCIUM SERPL-MCNC: 9 MG/DL — SIGNIFICANT CHANGE UP (ref 8.6–10.2)
CHLORIDE SERPL-SCNC: 94 MMOL/L — LOW (ref 98–107)
CO2 SERPL-SCNC: 33 MMOL/L — HIGH (ref 22–29)
CREAT SERPL-MCNC: 0.66 MG/DL — SIGNIFICANT CHANGE UP (ref 0.5–1.3)
GLUCOSE SERPL-MCNC: 199 MG/DL — HIGH (ref 70–99)
HCT VFR BLD CALC: 28.8 % — LOW (ref 34.5–45)
HGB BLD-MCNC: 8.8 G/DL — LOW (ref 11.5–15.5)
MAGNESIUM SERPL-MCNC: 1.9 MG/DL — SIGNIFICANT CHANGE UP (ref 1.6–2.6)
MCHC RBC-ENTMCNC: 27.9 PG — SIGNIFICANT CHANGE UP (ref 27–34)
MCHC RBC-ENTMCNC: 30.6 GM/DL — LOW (ref 32–36)
MCV RBC AUTO: 91.4 FL — SIGNIFICANT CHANGE UP (ref 80–100)
PLATELET # BLD AUTO: 306 K/UL — SIGNIFICANT CHANGE UP (ref 150–400)
POTASSIUM SERPL-MCNC: 3.8 MMOL/L — SIGNIFICANT CHANGE UP (ref 3.5–5.3)
POTASSIUM SERPL-SCNC: 3.8 MMOL/L — SIGNIFICANT CHANGE UP (ref 3.5–5.3)
RBC # BLD: 3.15 M/UL — LOW (ref 3.8–5.2)
RBC # FLD: 21.5 % — HIGH (ref 10.3–14.5)
SODIUM SERPL-SCNC: 138 MMOL/L — SIGNIFICANT CHANGE UP (ref 135–145)
WBC # BLD: 18.97 K/UL — HIGH (ref 3.8–10.5)
WBC # FLD AUTO: 18.97 K/UL — HIGH (ref 3.8–10.5)

## 2021-03-07 PROCEDURE — 71045 X-RAY EXAM CHEST 1 VIEW: CPT | Mod: 26

## 2021-03-07 RX ORDER — POTASSIUM CHLORIDE 20 MEQ
40 PACKET (EA) ORAL ONCE
Refills: 0 | Status: COMPLETED | OUTPATIENT
Start: 2021-03-07 | End: 2021-03-07

## 2021-03-07 RX ORDER — FOLIC ACID 0.8 MG
1 TABLET ORAL DAILY
Refills: 0 | Status: DISCONTINUED | OUTPATIENT
Start: 2021-03-07 | End: 2021-03-12

## 2021-03-07 RX ORDER — SPIRONOLACTONE 25 MG/1
25 TABLET, FILM COATED ORAL DAILY
Refills: 0 | Status: DISCONTINUED | OUTPATIENT
Start: 2021-03-07 | End: 2021-03-08

## 2021-03-07 RX ORDER — FERROUS SULFATE 325(65) MG
325 TABLET ORAL DAILY
Refills: 0 | Status: DISCONTINUED | OUTPATIENT
Start: 2021-03-07 | End: 2021-03-12

## 2021-03-07 RX ORDER — ACETAZOLAMIDE 250 MG/1
500 TABLET ORAL EVERY 12 HOURS
Refills: 0 | Status: DISCONTINUED | OUTPATIENT
Start: 2021-03-07 | End: 2021-03-08

## 2021-03-07 RX ORDER — ASCORBIC ACID 60 MG
500 TABLET,CHEWABLE ORAL DAILY
Refills: 0 | Status: DISCONTINUED | OUTPATIENT
Start: 2021-03-07 | End: 2021-03-12

## 2021-03-07 RX ORDER — MAGNESIUM SULFATE 500 MG/ML
2 VIAL (ML) INJECTION ONCE
Refills: 0 | Status: COMPLETED | OUTPATIENT
Start: 2021-03-07 | End: 2021-03-07

## 2021-03-07 RX ADMIN — FAMOTIDINE 20 MILLIGRAM(S): 10 INJECTION INTRAVENOUS at 17:19

## 2021-03-07 RX ADMIN — ACETAZOLAMIDE 110 MILLIGRAM(S): 250 TABLET ORAL at 10:28

## 2021-03-07 RX ADMIN — Medication 81 MILLIGRAM(S): at 10:40

## 2021-03-07 RX ADMIN — Medication 40 MILLIEQUIVALENT(S): at 10:38

## 2021-03-07 RX ADMIN — MAGNESIUM OXIDE 400 MG ORAL TABLET 400 MILLIGRAM(S): 241.3 TABLET ORAL at 17:19

## 2021-03-07 RX ADMIN — SENNA PLUS 2 TABLET(S): 8.6 TABLET ORAL at 21:58

## 2021-03-07 RX ADMIN — MAGNESIUM OXIDE 400 MG ORAL TABLET 400 MILLIGRAM(S): 241.3 TABLET ORAL at 12:28

## 2021-03-07 RX ADMIN — Medication 50 GRAM(S): at 10:38

## 2021-03-07 RX ADMIN — SPIRONOLACTONE 25 MILLIGRAM(S): 25 TABLET, FILM COATED ORAL at 10:30

## 2021-03-07 RX ADMIN — Medication 25 MILLIGRAM(S): at 17:19

## 2021-03-07 RX ADMIN — ATORVASTATIN CALCIUM 40 MILLIGRAM(S): 80 TABLET, FILM COATED ORAL at 21:58

## 2021-03-07 RX ADMIN — Medication 20 MILLIGRAM(S): at 05:02

## 2021-03-07 RX ADMIN — ACETAZOLAMIDE 110 MILLIGRAM(S): 250 TABLET ORAL at 17:19

## 2021-03-07 RX ADMIN — Medication 100 MICROGRAM(S): at 05:02

## 2021-03-07 RX ADMIN — ENOXAPARIN SODIUM 30 MILLIGRAM(S): 100 INJECTION SUBCUTANEOUS at 21:58

## 2021-03-07 RX ADMIN — MAGNESIUM OXIDE 400 MG ORAL TABLET 400 MILLIGRAM(S): 241.3 TABLET ORAL at 10:33

## 2021-03-07 RX ADMIN — SODIUM CHLORIDE 3 MILLILITER(S): 9 INJECTION INTRAMUSCULAR; INTRAVENOUS; SUBCUTANEOUS at 16:15

## 2021-03-07 RX ADMIN — Medication 650 MILLIGRAM(S): at 17:20

## 2021-03-07 RX ADMIN — SODIUM CHLORIDE 3 MILLILITER(S): 9 INJECTION INTRAMUSCULAR; INTRAVENOUS; SUBCUTANEOUS at 09:55

## 2021-03-07 RX ADMIN — Medication 25 MILLIGRAM(S): at 05:02

## 2021-03-07 RX ADMIN — SODIUM CHLORIDE 3 MILLILITER(S): 9 INJECTION INTRAMUSCULAR; INTRAVENOUS; SUBCUTANEOUS at 21:53

## 2021-03-07 NOTE — CHART NOTE - NSCHARTNOTEFT_GEN_A_CORE
CTS ACP Addendum    Briefly, 76F PMH of anemia, hypothyroidism, and GI bleed 1 year ago (refused Endo/colonoscopy and never followed up) presented to [facility] originally with fever and SOB found to have urosepsis s/p course of rocephin (completed 2/17), anemia requiring 2 PRBC (no GI work up because pt wanted to sign out AMA), ruled in for NSTEMI, transferred to [facility] 2/16 and underwent cardiac cath which showed Multivessel CAD and Severe AS. Preop carotid US with b/l carotid stenosis confirmed by CTA, seen by vascular and cleared for OR (outpt follow up), s/p endoscopy 2/17 and colonoscopy 2/19 without evidence of acute bleed, noted to have hiatal hernia, now s/p CABG x3 with LIMALEE, AVR 2/24 with Dr. Cruz. 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 Physical therapy recommending Acute rehab as patient is only able to walk 5ft with a rolling walker and 1-person assist. Patient's family adamant about taking her home.       Patient seen and examined. Vitals stable, medications, radiologic images and labs reviewed.     PHYSICAL EXAM:  General: WN/WD NAD  Neurology: A&Ox3, nonfocal, YATES x 4  Respiratory: Diminished at the right base   CV: RRR, S1S2, no murmurs, rubs or gallops  Abdominal: Soft, NT, ND +BS, Last BM  Extremities: 1-2+ LE edema, + peripheral pulses  Incisions: MSI CDI & healing well, sternum Stable    Plan:  Continue ASA & Lipitor  Continue Lopressor  Diamox 500mg IV every 12 hours x 48 hours & Aldactone 25mg PO daily ordered as per Dr. Cruz.  Continue torsemide  Strict I/O's  Potassium & Magnesium supplemented  Encourage the use of I/S, CDBE, OOB to chair, daily PT  Ferrous sulfate, vitamin C & folate ordered for anemia supplementation  As per son refusing acute rehab and wants to take her home.  Plan discussed with Dr. Cruz in CTS AM rounds.

## 2021-03-07 NOTE — PROGRESS NOTE ADULT - PROBLEM SELECTOR PLAN 9
SCDs, Lovenox for DVT prophylaxis.  Pantoprazole for GI prophylaxis.    Dispo: Recommending Acute rehab. Son does not want pt to go to a rehab.  Dispo to be determined pending discussion with Son as patient only able to walk 5-10ft with a RW and 1-person assist. Concern for safety if patient goes home.   Plan to be discussed / reviewed with CTICU team during AM rounds.

## 2021-03-07 NOTE — PROGRESS NOTE ADULT - PROBLEM SELECTOR PLAN 1
S/p AVR/C3L 2/24/21.   Hemodynamically stable s/p BiV AICD 3/2/21 for CHB.   Continue ASA and Lipitor.  Continue Lopressor as tolerated by BP.  CDBEs, I/S use hourly while awake.   Encourage OOB and increased ambulation with physical therapy.   Chest PT.   Follow up AM CXR.   Continue torsemide 20 PO QD for diuresis.   Strict I/Os.  Supplement electrolytes to maintain K>4 and Mg>2.   Tylenol PRN for pain control. Holding any narcotics.   Bowel regimen PRN.

## 2021-03-07 NOTE — PROGRESS NOTE ADULT - SUBJECTIVE AND OBJECTIVE BOX
POD # 11 s/p CABG x 3 (LIMA-LAD, Vein-OM1, OM2) with Right SVG harvest, AVR (23mm Capps 2700)    PAST MEDICAL & SURGICAL HISTORY:  Iron deficiency anemia due to chronic blood loss  Hypothyroid  History of tonsillectomy    FAMILY HISTORY:  Family history of cardiac disorder (Father)  Family history of diabetes mellitus (Father)    Brief Hospital Course: 76F PMH of anemia, hypothyroidism, and GI bleed 1 year ago (refused Endo/colonoscopy and never followed up) presented to [facility] originally with fever and SOB found to have urosepsis s/p course of rocephin (completed ), anemia requiring 2 PRBC (no GI work up because pt wanted to sign out AMA), ruled in for NSTEMI, transferred to [facility]  and underwent cardiac cath which showed Multivessel CAD and Severe AS. Preop carotid US with b/l carotid stenosis confirmed by CTA, seen by vascular and cleared for OR (outpt follow up), s/p endoscopy  and colonoscopy  without evidence of acute bleed, noted to have hiatal hernia, now s/p CABG x3 with LIMA, AVR  with Dr. Cruz. 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 Physical therapy recommending Acute rehab as patient is only able to walk 5ft-10ft with a rolling walker and 1-person assist. Patient's family adamant about taking her home.       Subjective: Patient lying in bed in no acute distress. Answers all questions appropriately. States "I was getting confused easily after surgery and it was a little scary, but I'm feeling better." States she is not having any issues sleeping. +Pain controlled. +Limited appetite. +Limited ambulation with PT. Denies fevers, chills, lightheadedness, dizziness, HA, CP, palpitations, SOB, cough, abdominal pain, N/V, diarrhea, numbness/tingling in extremities, or any other acute complaints.    MEDICATIONS  (STANDING):  aspirin enteric coated 81 milliGRAM(s) Oral daily  atorvastatin 40 milliGRAM(s) Oral at bedtime  enoxaparin Injectable 30 milliGRAM(s) SubCutaneous daily  famotidine    Tablet 20 milliGRAM(s) Oral daily  levothyroxine 100 MICROGram(s) Oral daily  magnesium oxide 400 milliGRAM(s) Oral three times a day with meals  metoprolol tartrate 25 milliGRAM(s) Oral two times a day  senna 2 Tablet(s) Oral at bedtime  sodium chloride 0.9% lock flush 3 milliLiter(s) IV Push every 8 hours  torsemide 20 milliGRAM(s) Oral daily    MEDICATIONS  (PRN):  acetaminophen   Tablet .. 650 milliGRAM(s) Oral every 6 hours PRN Moderate Pain (4 - 6)  polyethylene glycol 3350 17 Gram(s) Oral daily PRN Constipation    Allergies: No Known Allergies    Vitals   T(C): 36.9 (06 Mar 2021 20:52), Max: 36.9 (06 Mar 2021 20:52)  T(F): 98.5 (06 Mar 2021 20:52), Max: 98.5 (06 Mar 2021 20:52)  HR: 84 (06 Mar 2021 20:52) (84 - 107)  BP: 101/50 (06 Mar 2021 20:52) (101/50 - 120/71)  BP(mean): 67 (06 Mar 2021 20:52) (67 - 67)  RR: 16 (06 Mar 2021 20:52) (16 - 20)  SpO2: 96% (06 Mar 2021 20:52) (95% - 99%)    I&O's Detail    05 Mar 2021 07:01  -  06 Mar 2021 07:00  --------------------------------------------------------  IN:    IV PiggyBack: 100 mL    Oral Fluid: 350 mL  Total IN: 450 mL    OUT:    Voided (mL): 2100 mL  Total OUT: 2100 mL    Total NET: -1650 mL      06 Mar 2021 07:01  -  07 Mar 2021 02:27  --------------------------------------------------------  IN:  Total IN: 0 mL    OUT:    Voided (mL): 900 mL  Total OUT: 900 mL    Total NET: -900 mL    Physical Exam  Neuro: A+O x 3, non-focal, speech clear and intact  HEENT:  NCAT, PERRL, EOMI. No conjuctival edema or icterus, no thrush.    Neck:  Supple, trachea midline  Pulm: +Decreased BSs b/l bases, no accessory muscle use noted  CV: Paced  Abd: soft, NT, ND, + BS  Ext: YATES x 4, +1-2 LE pitting edema b/l, no cyanosis or clubbing, distal motor/neuro/circ intact  Skin: warm, dry, well perfused  Incisions: midsternal incision +staples, C/D/I, Sternum stable. Left chest wall +seri strips C/D/I     LABS                        9.1    18.57 )-----------( 277      ( 06 Mar 2021 06:57 )             30.1     03-    141  |  97<L>  |  33.0<H>  ----------------------------<  138<H>  3.8   |  33.0<H>  |  0.76    Ca    9.2      06 Mar 2021 06:52  Mg     1.8     -    TPro  5.5<L>  /  Alb  3.2<L>  /  TBili  0.8  /  DBili  x   /  AST  46<H>  /  ALT  24  /  AlkPhos  207<H>  03-    Urinalysis Basic - ( 06 Mar 2021 19:10 )  Color: Yellow / Appearance: Clear / S.010 / pH: x  Gluc: x / Ketone: Negative  / Bili: Negative / Urobili: Negative mg/dL   Blood: x / Protein: 30 mg/dL / Nitrite: Negative   Leuk Esterase: Negative / RBC: 0-2 /HPF / WBC 0-2   Sq Epi: x / Non Sq Epi: Occasional / Bacteria: Occasional      Last CXR:  < from: Xray Chest 1 View- PORTABLE-Urgent (Xray Chest 1 View- PORTABLE-Urgent .) (21 @ 05:31) >  Frontal expiratory view of the chest shows the heart to be similar in size. Left defibrillator is again noted.  The lungs show similar small effusions and there is no evidence of pneumothorax.  IMPRESSION:  No pneumothorax.  < end of copied text >

## 2021-03-07 NOTE — PROGRESS NOTE ADULT - ASSESSMENT
76F PMH of anemia, hypothyroidism, and GI bleed 1 year ago (refused Endo/colonoscopy and never followed up) presented to [facility] originally with fever and SOB found to have urosepsis s/p course of rocephin (completed 2/17), anemia requiring 2 PRBC (no GI work up because pt wanted to sign out AMA), ruled in for NSTEMI, transferred to [facility] 2/16 and underwent cardiac cath which showed Multivessel CAD and Severe AS. Preop carotid US with b/l carotid stenosis confirmed by CTA, seen by vascular and cleared for OR (outpt follow up), s/p endoscopy 2/17 and colonoscopy 2/19 without evidence of acute bleed, noted to have hiatal hernia, now s/p CABG x3 with LIMALEE, AVR 2/24 with Dr. Cruz. 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 Physical therapy recommending Acute rehab as patient is only able to walk 5ft-10ft with a rolling walker and 1-person assist. Patient's family adamant about taking her home.

## 2021-03-08 LAB
ALBUMIN SERPL ELPH-MCNC: 2.9 G/DL — LOW (ref 3.3–5.2)
ALP SERPL-CCNC: 295 U/L — HIGH (ref 40–120)
ALT FLD-CCNC: 88 U/L — HIGH
ANION GAP SERPL CALC-SCNC: 12 MMOL/L — SIGNIFICANT CHANGE UP (ref 5–17)
AST SERPL-CCNC: 123 U/L — HIGH
BILIRUB SERPL-MCNC: 1 MG/DL — SIGNIFICANT CHANGE UP (ref 0.4–2)
BUN SERPL-MCNC: 31 MG/DL — HIGH (ref 8–20)
CALCIUM SERPL-MCNC: 9.4 MG/DL — SIGNIFICANT CHANGE UP (ref 8.6–10.2)
CHLORIDE SERPL-SCNC: 96 MMOL/L — LOW (ref 98–107)
CO2 SERPL-SCNC: 30 MMOL/L — HIGH (ref 22–29)
CREAT SERPL-MCNC: 0.71 MG/DL — SIGNIFICANT CHANGE UP (ref 0.5–1.3)
GLUCOSE BLDC GLUCOMTR-MCNC: 132 MG/DL — HIGH (ref 70–99)
GLUCOSE SERPL-MCNC: 119 MG/DL — HIGH (ref 70–99)
HCT VFR BLD CALC: 27.8 % — LOW (ref 34.5–45)
HGB BLD-MCNC: 8.4 G/DL — LOW (ref 11.5–15.5)
MAGNESIUM SERPL-MCNC: 2.2 MG/DL — SIGNIFICANT CHANGE UP (ref 1.8–2.6)
MCHC RBC-ENTMCNC: 27.9 PG — SIGNIFICANT CHANGE UP (ref 27–34)
MCHC RBC-ENTMCNC: 30.2 GM/DL — LOW (ref 32–36)
MCV RBC AUTO: 92.4 FL — SIGNIFICANT CHANGE UP (ref 80–100)
PHOSPHATE SERPL-MCNC: 2.8 MG/DL — SIGNIFICANT CHANGE UP (ref 2.4–4.7)
PLATELET # BLD AUTO: 311 K/UL — SIGNIFICANT CHANGE UP (ref 150–400)
POTASSIUM SERPL-MCNC: 3.6 MMOL/L — SIGNIFICANT CHANGE UP (ref 3.5–5.3)
POTASSIUM SERPL-SCNC: 3.6 MMOL/L — SIGNIFICANT CHANGE UP (ref 3.5–5.3)
PROT SERPL-MCNC: 5.5 G/DL — LOW (ref 6.6–8.7)
RBC # BLD: 3.01 M/UL — LOW (ref 3.8–5.2)
RBC # FLD: 21.7 % — HIGH (ref 10.3–14.5)
SODIUM SERPL-SCNC: 138 MMOL/L — SIGNIFICANT CHANGE UP (ref 135–145)
WBC # BLD: 17.85 K/UL — HIGH (ref 3.8–10.5)
WBC # FLD AUTO: 17.85 K/UL — HIGH (ref 3.8–10.5)

## 2021-03-08 PROCEDURE — 99233 SBSQ HOSP IP/OBS HIGH 50: CPT

## 2021-03-08 PROCEDURE — 71045 X-RAY EXAM CHEST 1 VIEW: CPT | Mod: 26

## 2021-03-08 PROCEDURE — 99232 SBSQ HOSP IP/OBS MODERATE 35: CPT

## 2021-03-08 RX ORDER — ALBUMIN HUMAN 25 %
250 VIAL (ML) INTRAVENOUS
Refills: 0 | Status: DISCONTINUED | OUTPATIENT
Start: 2021-03-08 | End: 2021-03-08

## 2021-03-08 RX ORDER — ALBUMIN HUMAN 25 %
250 VIAL (ML) INTRAVENOUS
Refills: 0 | Status: COMPLETED | OUTPATIENT
Start: 2021-03-08 | End: 2021-03-08

## 2021-03-08 RX ORDER — MIDODRINE HYDROCHLORIDE 2.5 MG/1
5 TABLET ORAL THREE TIMES A DAY
Refills: 0 | Status: DISCONTINUED | OUTPATIENT
Start: 2021-03-08 | End: 2021-03-12

## 2021-03-08 RX ORDER — POTASSIUM CHLORIDE 20 MEQ
40 PACKET (EA) ORAL ONCE
Refills: 0 | Status: COMPLETED | OUTPATIENT
Start: 2021-03-08 | End: 2021-03-08

## 2021-03-08 RX ADMIN — MAGNESIUM OXIDE 400 MG ORAL TABLET 400 MILLIGRAM(S): 241.3 TABLET ORAL at 11:51

## 2021-03-08 RX ADMIN — Medication 81 MILLIGRAM(S): at 09:18

## 2021-03-08 RX ADMIN — SODIUM CHLORIDE 3 MILLILITER(S): 9 INJECTION INTRAMUSCULAR; INTRAVENOUS; SUBCUTANEOUS at 21:50

## 2021-03-08 RX ADMIN — Medication 1 MILLIGRAM(S): at 09:19

## 2021-03-08 RX ADMIN — Medication 40 MILLIEQUIVALENT(S): at 09:17

## 2021-03-08 RX ADMIN — Medication 500 MILLIGRAM(S): at 09:19

## 2021-03-08 RX ADMIN — MIDODRINE HYDROCHLORIDE 5 MILLIGRAM(S): 2.5 TABLET ORAL at 20:06

## 2021-03-08 RX ADMIN — Medication 125 MILLILITER(S): at 14:08

## 2021-03-08 RX ADMIN — SODIUM CHLORIDE 3 MILLILITER(S): 9 INJECTION INTRAMUSCULAR; INTRAVENOUS; SUBCUTANEOUS at 05:23

## 2021-03-08 RX ADMIN — MAGNESIUM OXIDE 400 MG ORAL TABLET 400 MILLIGRAM(S): 241.3 TABLET ORAL at 17:28

## 2021-03-08 RX ADMIN — SODIUM CHLORIDE 3 MILLILITER(S): 9 INJECTION INTRAMUSCULAR; INTRAVENOUS; SUBCUTANEOUS at 13:45

## 2021-03-08 RX ADMIN — ATORVASTATIN CALCIUM 40 MILLIGRAM(S): 80 TABLET, FILM COATED ORAL at 21:53

## 2021-03-08 RX ADMIN — ENOXAPARIN SODIUM 30 MILLIGRAM(S): 100 INJECTION SUBCUTANEOUS at 21:53

## 2021-03-08 RX ADMIN — Medication 325 MILLIGRAM(S): at 12:00

## 2021-03-08 RX ADMIN — Medication 25 MILLIGRAM(S): at 21:54

## 2021-03-08 RX ADMIN — Medication 25 MILLIGRAM(S): at 05:40

## 2021-03-08 RX ADMIN — Medication 100 MICROGRAM(S): at 05:40

## 2021-03-08 RX ADMIN — ACETAZOLAMIDE 110 MILLIGRAM(S): 250 TABLET ORAL at 05:40

## 2021-03-08 RX ADMIN — SENNA PLUS 2 TABLET(S): 8.6 TABLET ORAL at 21:53

## 2021-03-08 RX ADMIN — FAMOTIDINE 20 MILLIGRAM(S): 10 INJECTION INTRAVENOUS at 09:19

## 2021-03-08 RX ADMIN — Medication 125 MILLILITER(S): at 12:01

## 2021-03-08 RX ADMIN — Medication 20 MILLIGRAM(S): at 05:40

## 2021-03-08 RX ADMIN — MAGNESIUM OXIDE 400 MG ORAL TABLET 400 MILLIGRAM(S): 241.3 TABLET ORAL at 09:19

## 2021-03-08 NOTE — PROGRESS NOTE ADULT - SUBJECTIVE AND OBJECTIVE BOX
[place] [facility] Faculty Practice                                                               Office: [address] [address]                                                              Telephone: [phone]. Fax:[phone]                                                                             PROGRESS NOTE  Reason for follow up: CAD/ICM/HFrEF, severe AS, s/p CABG, bioAVR  Overnight: No new events.   Update: low SBP 80s today required albumin infusion      Review of symptoms:   Cardiac:  No chest pain. No dyspnea. No palpitations.  Respiratory: No cough. No dyspnea  Gastrointestinal: No diarrhea. No abdominal pain. No bleeding.     Past medical history: No updates.   	  Vital Signs Last 24 Hrs  T(C): 36.3 (03-08-21 @ 16:00), Max: 36.9 (03-08-21 @ 05:00)  T(F): 97.4 (03-08-21 @ 16:00), Max: 98.5 (03-08-21 @ 05:00)  HR: 98 (03-08-21 @ 16:00) (76 - 98)  BP: 94/56 (03-08-21 @ 16:00) (76/40 - 105/50)  BP(mean): --  RR: 17 (03-08-21 @ 16:00) (16 - 18)  SpO2: 98% (03-08-21 @ 16:00) (98% - 100%)  I&O's Summary    07 Mar 2021 07:01  -  08 Mar 2021 07:00  --------------------------------------------------------  IN: 550 mL / OUT: 1350 mL / NET: -800 mL    08 Mar 2021 07:01  -  08 Mar 2021 17:49  --------------------------------------------------------  IN: 1300 mL / OUT: 0 mL / NET: 1300 mL          PHYSICAL EXAM:  Appearance: Comfortable. No acute distress, fatigued appearing laying in bed  HEENT:  Head and neck: Atraumatic. Normocephalic.  Normal oral mucosa, PERRL,   Neurologic: A&Ox 2, no focal deficits. EOMI, Cranial nerves are intact.  Lymphatic: No cervical lymphadenopathy  Cardiovascular: Normal S1 S2, No murmur, rubs/gallops. No JVD, midline sternotomy scar  Respiratory: Lungs clear to auscultation  Gastrointestinal:  Soft, Non-tender, + BS  Lower Extremities: No edema  Psychiatry: Patient is calm. No agitation. Mood & affect appropriate  Skin: No rashes/ecchymoses/cyanosis/ulcers visualized on the face, hands or feet.      CURRENT MEDICATIONS:  MEDICATIONS  (STANDING):  ascorbic acid 500 milliGRAM(s) Oral daily  aspirin enteric coated 81 milliGRAM(s) Oral daily  atorvastatin 40 milliGRAM(s) Oral at bedtime  enoxaparin Injectable 30 milliGRAM(s) SubCutaneous daily  famotidine    Tablet 20 milliGRAM(s) Oral daily  ferrous    sulfate 325 milliGRAM(s) Oral daily  folic acid 1 milliGRAM(s) Oral daily  levothyroxine 100 MICROGram(s) Oral daily  magnesium oxide 400 milliGRAM(s) Oral three times a day with meals  metoprolol tartrate 25 milliGRAM(s) Oral two times a day  senna 2 Tablet(s) Oral at bedtime  sodium chloride 0.9% lock flush 3 milliLiter(s) IV Push every 8 hours  spironolactone 25 milliGRAM(s) Oral daily  torsemide 20 milliGRAM(s) Oral daily    MEDICATIONS  (PRN):  acetaminophen   Tablet .. 650 milliGRAM(s) Oral every 6 hours PRN Moderate Pain (4 - 6)  polyethylene glycol 3350 17 Gram(s) Oral daily PRN Constipation      DIAGNOSTIC TESTING:  [ ] Echocardiogram:   < from: TTE Echo Complete w/ Contrast w/ Doppler (02.28.21 @ 09:39) >    Summary:   1. Left ventricular ejection fraction, by visual estimation, is 25 to 30%.   2. Severely decreased global left ventricular systolic function.   3. Abnormal septal motion consistent with post-operative status.   4. Severely increased LV wall thickness.   5. Severely enlarged left atrium.   6. Mild mitral valve regurgitation.   7. Severe mitral annular calcification.   8. Moderateto severe thickening of the anterior and posterior mitral valve leaflets.   9. Mitral valve mean gradient is 2.0 mmHg (at HR of 79 bpm).  10. Mild tricuspid regurgitation.  11. Mild pulmonic valve regurgitation.  12. Bioprosthesis in the aortic position, with mild aortic regurgitation, which is paravalvular in origin.  13. There is no evidence of pericardial effusion.  14. Endocardial visualization was enhanced with intravenous echo contrast.  15. Compared to TTE done on 2/26/2021, left ventricular function is unchanged from prior.    < end of copied text >    [ ]  Catheterization:  < from: Cardiac Cath Lab - Adult (02.16.21 @ 08:33) >  VENTRICLES: EF by echo was 30 %.  VALVES: AORTIC VALVE: There was critical aortic stenosis.  CORONARY VESSELS:The coronary circulation is co-dominant.  LM:   --  LM: Normal.  LAD:   --  Mid LAD: There was a tubular 90 % stenosis. The lesion was  eccentric.  CX:   --  OM1: There was a diffuse 85 % stenosis in the proximal third of  the vessel segment. The lesion was irregularly contoured and eccentric.  RCA:   --  Proximal RCA: There was a diffuse 99 % stenosis. The lesion was  irregularly contoured and eccentric.  --  Distal RCA: There was a diffuse 100 % stenosis.  COMPLICATIONS: No complications occurred during the cath lab visit.  DIAGNOSTIC IMPRESSIONS: Moderate Pulmonary Hypertension and elevation of  filling pressures. 2. Critical Aortic Stenosis with a mean PG >40mm Hg and  an SREEDHAR of <0.5cm2. 3. Severe LV Systolic dysfunction by TTE from 2/13/2021  with an estimated LVEF of 30%. 4. Triple Vessel CAD.  DIAGNOSTIC RECOMMENDATIONS: GDMT. 2. CTS consultation.  INTERVENTIONAL IMPRESSIONS: Moderate Pulmonary Hypertension and elevation  of filling pressures. 2. Critical Aortic Stenosis with a mean PG >40mm Hg  and an SREEDHAR of <0.5cm2. 3. Severe LV Systolic dysfunction by TTE from  2/13/2021 with an estimated LVEF of 30%. 4. Triple Vessel CAD.    Labs:                        8.4    17.85 )-----------( 311      ( 08 Mar 2021 06:16 )             27.8     03-08    138  |  96<L>  |  31.0<H>  ----------------------------<  119<H>  3.6   |  30.0<H>  |  0.71    Ca    9.4      08 Mar 2021 06:16  Phos  2.8     03-08  Mg     2.2     03-08    TPro  5.5<L>  /  Alb  2.9<L>  /  TBili  1.0  /  DBili  x   /  AST  123<H>  /  ALT  88<H>  /  AlkPhos  295<H>  03-08 17 Feb 2021 11:36 Troponin 1.35 ng/mL / Creatine Kinase x     /  CKMB x     / CPK Mass Assay % x       17 Feb 2021 01:45 Troponin 1.78 ng/mL / Creatine Kinase x     /  CKMB x     / CPK Mass Assay % x       16 Feb 2021 23:27 Troponin 1.83 ng/mL / Creatine Kinase 49 U/L /  CKMB x     / CPK Mass Assay % x          Serum Pro-Brain Natriuretic Peptide: 9569 pg/mL (03-06-21 @ 06:52)  Serum Pro-Brain Natriuretic Peptide: 75928 pg/mL (02-21-21 @ 06:04)  Serum Pro-Brain Natriuretic Peptide: 81237 pg/mL (02-20-21 @ 06:31)  Serum Pro-Brain Natriuretic Peptide: 55945 pg/mL (02-16-21 @ 12:38)    Cholesterol 105 mg/dL; Direct LDL --; HDL Cholesterol, Serum 39 mg/dL; HDL/ Total Cholesterol Ratio Measurement --; Total Cholesterol/ HDL Ratio Measurement --; Triglycerides, Serum 62 mg/dL  A1C with Estimated Average Glucose Result: 5.2 % (02-16-21 @ 12:38)      TELEMETRY: Bi-V paced 90-100s

## 2021-03-08 NOTE — CHART NOTE - NSCHARTNOTEFT_GEN_A_CORE
Brief Summary     76F PMH of anemia, hypothyroidism, and GI bleed 1 year ago (refused Endo/colonoscopy and never followed up) presented to [facility] originally with fever and SOB found to have urosepsis s/p course of rocephin (completed 2/17), anemia requiring 2 PRBC (no GI work up because pt wanted to sign out AMA), ruled in for NSTEMI, transferred to [facility] 2/16 and underwent cardiac cath which showed Multivessel CAD and Severe AS. Preop carotid US with b/l carotid stenosis confirmed by CTA, seen by vascular and cleared for OR (outpt follow up), s/p endoscopy 2/17 and colonoscopy 2/19 without evidence of acute bleed, noted to have hiatal hernia, now s/p CABG x3 with LIMA, AVR 2/24 with Dr. Cruz. 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 Physical therapy recommending Acute rehab as patient is only able to walk 5ft-10ft with a rolling walker and 1-person assist.     Plan was for patient to be discharge home today however, pt was hypotensive in the AM.  Aldactone Held and albumin x2 given as per Dr. Kaufman       Patient seen and examined. Notes, flowsheets, medications, radiologic images and labs reviewed.  VSS. Pt in Bed alert, NAD. Pt states, " I feel good"     Neuro: A+O x 3, non-focal, speech clear and intact  HEENT:  NCAT, PERRL, EOMI. No conjuctival edema or icterus, no thrush.  Neck: supple, trachea midline  Pulm:  bilaterally diminished, no rales/rhonchi/wheezing, no accessory muscle use noted  CV:Paced   Abd: soft, NT, ND, + BS  Ext: YATES x 4, no edema, no cyanosis or clubbing, 2+ DP b/l, distal motor/neuro/circ intact.   Skin: warm, dry, well perfused  Incisions: midsternal incision healing well, C/D/I, Sternum stable. Left chest wall PPM site C/D/I    PLAN of care D/W with Dr. Cruz in AM rounds Brief Summary     76F PMH of anemia, hypothyroidism, and GI bleed 1 year ago (refused Endo/colonoscopy and never followed up) presented to [facility] originally with fever and SOB found to have urosepsis s/p course of rocephin (completed 2/17), anemia requiring 2 PRBC (no GI work up because pt wanted to sign out AMA), ruled in for NSTEMI, transferred to [facility] 2/16 and underwent cardiac cath which showed Multivessel CAD and Severe AS. Preop carotid US with b/l carotid stenosis confirmed by CTA, seen by vascular and cleared for OR (outpt follow up), s/p endoscopy 2/17 and colonoscopy 2/19 without evidence of acute bleed, noted to have hiatal hernia, now s/p CABG x3 with LIMA, AVR 2/24 with Dr. Cruz. 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 Physical therapy recommending Acute rehab as patient is only able to walk 5ft-10ft with a rolling walker and 1-person assist.     Plan was for patient to be discharge home today however, pt was hypotensive in the AM.  Aldactone Held and albumin x2 given as per Dr. Kaufman       Patient seen and examined. Notes, flowsheets, medications, radiologic images and labs reviewed.  VSS. Pt in Bed alert, NAD. Pt states, " I feel good"     Neuro: A+O x 3, non-focal, speech clear and intact  HEENT:  NCAT, PERRL, EOMI. No conjuctival edema or icterus, no thrush.  Neck: supple, trachea midline  Pulm:  bilaterally diminished, no rales/rhonchi/wheezing, no accessory muscle use noted  CV:Paced   Abd: soft, NT, ND, + BS  Ext: YATES x 4, no edema, no cyanosis or clubbing, 2+ DP b/l, distal motor/neuro/circ intact.   Skin: warm, dry, well perfused  Incisions: midsternal incision healing well, C/D/I, Sternum stable. Left chest wall PPM site C/D/I, REVH C/D/I    PLAN of care D/W with Dr. Cruz in AM rounds

## 2021-03-08 NOTE — PROGRESS NOTE ADULT - ASSESSMENT
76 year old female with PMH of hypothyroidism transferred from [place] with NSTEMI now s/p CABG and AVR.     She was found to have an elevated TSH, likely due to noncompliance with Synthroid.  Her postoperative course was complicated by heart block s/p PPM.      1.  Hypothyroidism-  Continue  LT4 100 mcg daily   Repeat TFTS as an out patient in 4 to 6 weeks  2.  CAD s/p CABG-  management as per CT surgery team.  On ASA, statin and metoprolol  3.  heart block-  s/p PPM   76 year old female with PMH of hypothyroidism transferred from [place] with NSTEMI now s/p CABG and AVR.     She was found to have an elevated TSH, likely due to noncompliance with Synthroid.  Her postoperative course was complicated by heart block s/p PPM.      1.  Hypothyroidism-  Continue  LT4 100 mcg daily   Repeat TFTs as an out patient in 4 to 6 weeks  2.  CAD s/p CABG-  management as per CT surgery team.  On ASA, statin and metoprolol  3.  heart block-  s/p PPM

## 2021-03-08 NOTE — PROGRESS NOTE ADULT - SUBJECTIVE AND OBJECTIVE BOX
Patient sitting up eating breakfast.  Reports no pain this AM.     REVIEW OF SYSTEMS  Constitutional - No fever,  No fatigue  Neurological - +loss of strength    FUNCTIONAL PROGRESS  3/7  Bed Mobility  Bed Mobility Training Rehab Potential: fair, will monitor progress closely  Bed Mobility Training Symptoms Noted During/After Treatment: none  Bed Mobility Training Rolling/Turning: moderate assist (50% patient effort);  1 person assist;  bed rails  Bed Mobility Training Scooting: moderate assist (50% patient effort);  1 person assist;  bed rails  Bed Mobility Training Bridging: moderate assist (50% patient effort);  1 person assist;  bed rails  Bed Mobility Training Supine-to-Sit: moderate assist (50% patient effort);  1 person assist;  bed rails  Bed Mobility Training Limitations: decreased ability to use arms for pushing/pulling;  decreased ability to use legs for bridging/pushing;  impaired ability to control trunk for mobility;  impaired postural control;  impaired motor control;  decreased strength    Bed-Chair Transfer Training  Bed-to-Chair Transfer Training Rehab Potential: fair, will monitor progress closely  Bed-to-Chair Transfer Training Symptoms Noted During/After Treatment: none  Transfer Training Bed-to-Chair Transfer: maximum assist (25% patient effort);  1 person assist;  full weight-bearing  Transfer Training Chair-to-Bed Transfer: maximum assist (25% patient effort);  1 person assist;  full weight-bearing  Bed-to-Chair Transfer Training Transfer Safety Analysis: decreased step length;  impaired postural control;  impaired motor control;  decreased strength;  impaired balance    Sit-Stand Transfer Training  Sit-to-Stand Transfer Training Rehab Potential: fair, will monitor progress closely  Sit-to-Stand Transfer Training Symptoms Noted During/After Treatment: none  Transfer Training Sit-to-Stand Transfer: moderate assist (50% patient effort);  1 person assist;  full weight-bearing  Transfer Training Stand-to-Sit Transfer: moderate assist (50% patient effort);  1 person assist;  full weight-bearing  Sit-to-Stand Transfer Training Transfer Safety Analysis: decreased sequencing ability;  decreased step length;  impaired postural control;  impaired motor control;  decreased strength;  impaired balance        VITALS  T(C): 36.9 (21 @ 05:00), Max: 37.6 (21 @ 16:30)  HR: 76 (21 @ 07:00) (76 - 100)  BP: 76/40 (21 @ 07:00) (76/40 - 120/75)  RR: 16 (21 @ 07:00) (16 - 19)  SpO2: 100% (21 @ 07:00) (96% - 100%)  Wt(kg): --    MEDICATIONS   acetaminophen   Tablet .. 650 milliGRAM(s) every 6 hours PRN  albumin human  5% IVPB 250 milliLiter(s) every 1 hour  ascorbic acid 500 milliGRAM(s) daily  aspirin enteric coated 81 milliGRAM(s) daily  atorvastatin 40 milliGRAM(s) at bedtime  enoxaparin Injectable 30 milliGRAM(s) daily  famotidine    Tablet 20 milliGRAM(s) daily  ferrous    sulfate 325 milliGRAM(s) daily  folic acid 1 milliGRAM(s) daily  levothyroxine 100 MICROGram(s) daily  magnesium oxide 400 milliGRAM(s) three times a day with meals  metoprolol tartrate 25 milliGRAM(s) two times a day  polyethylene glycol 3350 17 Gram(s) daily PRN  senna 2 Tablet(s) at bedtime  sodium chloride 0.9% lock flush 3 milliLiter(s) every 8 hours  spironolactone 25 milliGRAM(s) daily  torsemide 20 milliGRAM(s) daily      RECENT LABS/IMAGING                          8.4    17.85 )-----------( 311      ( 08 Mar 2021 06:16 )             27.8     03-08    138  |  96<L>  |  31.0<H>  ----------------------------<  119<H>  3.6   |  30.0<H>  |  0.71    Ca    9.4      08 Mar 2021 06:16  Phos  2.8     03-08  Mg     2.2     03-08    TPro  5.5<L>  /  Alb  2.9<L>  /  TBili  1.0  /  DBili  x   /  AST  123<H>  /  ALT  88<H>  /  AlkPhos  295<H>  03-08      Urinalysis Basic - ( 06 Mar 2021 19:10 )    Color: Yellow / Appearance: Clear / S.010 / pH: x  Gluc: x / Ketone: Negative  / Bili: Negative / Urobili: Negative mg/dL   Blood: x / Protein: 30 mg/dL / Nitrite: Negative   Leuk Esterase: Negative / RBC: 0-2 /HPF / WBC 0-2   Sq Epi: x / Non Sq Epi: Occasional / Bacteria: Occasional                TTE  - Summary:   1. Left ventricular ejection fraction, by visual estimation, is 25 to 30%.   2. Severely decreased global left ventricular systolic function.   3. Abnormal septal motion consistent with post-operative status.   4. Severely increased LV wall thickness.   5. Severely enlarged left atrium.   6. Mild mitral valve regurgitation.   7. Severe mitral annular calcification.   8. Moderate to severe thickening of the anterior and posterior mitral valve leaflets.   9. Mitral valve mean gradient is 2.0 mmHg (at HR of 79 bpm).  10. Mild tricuspid regurgitation.  11. Mild pulmonic valve regurgitation.  12. Bioprosthesis in the aortic position, with mild aortic regurgitation, which is paravalvular in origin.  13. There is no evidence of pericardial effusion.  14. Endocardial visualization was enhanced with intravenous echo contrast.  15. Compared to TTE done on 2021, left ventricular function is unchanged from prior.    CXR  - Reduction of pleural effusions.    CXR 3/4 - Bilateral chest tubes in place. Residual lung a bibasilar pleural effusions and/or airspace disease as seen on prior 3/3/2021 chest radiograph    CXR 3/8 - Cardiomegaly and bilateral pleural effusions, right larger than left. No evidence of pulmonary vascular congestion..    ----------------------------------------------------------------------------------------  PHYSICAL EXAM  Constitutional - NAD, Comfortable  Extremities - BLE edema  Neurologic Exam -                    Cognitive - AAO to self, Slowed Processing      Motor -                      LEFT    UE - ShAB 1/5,  4/5                    RIGHT UE - ShAB 1/5,  4/5                    LEFT    LE - HF 2/5, KE 2/5, DF 3/5                     RIGHT LE - HF 2/5, KE 1/5, DF 3/5     Sensory - Intact to LT  Psychiatric - Mood stable, Affect Flat  ----------------------------------------------------------------------------------------  ASSESSMENT/PLAN  76yFemale with functional deficits after +NSTEMI/CAD/Severe AS  CAD s/p CABG x3 - ASA, Lipitor  Severe AS s/p AVR & Acute CHF - Demadex  HTN - Lopressor, Aldactone  Pain - Tylenol  DVT PPX - SCDs, Lovenox  Rehab - Workup incomplete. Planned for [facility]. Continue to recommend ACUTE inpatient rehabilitation for the functional deficits consisting of 3 hours of therapy/day & 24 hour RN/daily PMR physician for comorbid medical management. Will continue to follow for ongoing rehab needs and recommendations. Patient will be able to tolerate 3 hours a day.    Continue bedside therapy as well as OOB throughout the day with mobilization throughout the day with staff to maintain cardiopulmonary function and prevention of secondary complications related to debility.     Discussed with rehab clinical team.

## 2021-03-08 NOTE — PROGRESS NOTE ADULT - ASSESSMENT
76F PMH of anemia, hypothyroidism, and GI bleed 1 year ago (refused Endo/colonoscopy and never followed up) presented to [facility] originally with fever and SOB found to have urosepsis s/p course of rocephin (completed 2/17), anemia requiring 2 PRBC (no GI work up because pt wanted to sign out AMA), ruled in for NSTEMI, transferred to [facility] 2/16 and underwent cardiac cath which showed Multivessel CAD and Severe AS. Preop carotid US with b/l carotid stenosis confirmed by CTA, seen by vascular and cleared for OR (outpt follow up), s/p endoscopy 2/17 and colonoscopy 2/19 without evidence of acute bleed, noted to have hiatal hernia, now s/p CABG x3 with LIMALEE, AVR 2/24 with Dr. Cruz. 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 Physical therapy recommending Acute rehab as patient is only able to walk 5ft-10ft with a rolling walker and 1-person assist. Patient's family adamant about taking her home.

## 2021-03-08 NOTE — PROGRESS NOTE ADULT - SUBJECTIVE AND OBJECTIVE BOX
CC:  follow up hypothyroidism    Interval HPI/ Overnight Events:  Patient denies any CP.  Dose of LT4 was increased to 100 mcg daily today.    MEDICATIONS  (STANDING):  aspirin enteric coated 81 milliGRAM(s) Oral daily  atorvastatin 40 milliGRAM(s) Oral at bedtime  enoxaparin Injectable 30 milliGRAM(s) SubCutaneous daily  famotidine    Tablet 20 milliGRAM(s) Oral daily  levothyroxine 100 MICROGram(s) Oral daily  magnesium oxide 400 milliGRAM(s) Oral three times a day with meals  metoprolol tartrate 25 milliGRAM(s) Oral two times a day  senna 2 Tablet(s) Oral at bedtime  sodium chloride 0.9% lock flush 3 milliLiter(s) IV Push every 8 hours  torsemide 40 milliGRAM(s) Oral daily    Vital Signs Last 24 Hrs  T(C): 36.7 (04 Mar 2021 16:23), Max: 36.9 (03 Mar 2021 21:19)  T(F): 98 (04 Mar 2021 16:23), Max: 98.4 (03 Mar 2021 21:19)  HR: 103 (04 Mar 2021 17:15) (90 - 103)  BP: 123/70 (04 Mar 2021 17:15) (101/63 - 123/70)  RR: 18 (04 Mar 2021 17:15) (18 - 19)  SpO2: 96% (04 Mar 2021 17:15) (92% - 98%)    PE:  Gen: AAO, NAD  HEENT:  sclera anicteric, MMM  Neck:  no thyromegaly, no LAD  CV:  nl S1 + S2, RRR, no m/r/g  Resp:  nl respiratory effort, CTA b/l  GI/ Abd: soft, NT/ ND  Psych: affect appropriate    LABS:  Free Thyroxine, Serum: 1.0 ng/dL (03.04.21 @ 12:23)  Thyroid Stimulating Hormone, Serum: 12.56 uIU/mL (03.04.21 @ 06:28)  T4, Serum: 4.3 ug/dL (03.04.21 @ 06:28)    03-04    140  |  99  |  35.0<H>  ----------------------------<  89  4.2   |  30.0<H>  |  0.99    Ca    9.1      04 Mar 2021 06:28  Mg     1.9     03-04    TPro  5.5<L>  /  Alb  3.4  /  TBili  0.8  /  DBili  x   /  AST  30  /  ALT  11  /  AlkPhos  166<H>  03-04                          9.4    15.43 )-----------( 201      ( 04 Mar 2021 06:28 )             30.3

## 2021-03-08 NOTE — PROGRESS NOTE ADULT - SUBJECTIVE AND OBJECTIVE BOX
POD # 12 s/p CABG x 3 (LIMA-LAD, Vein-OM1, OM2) with Right SVG harvest, AVR (23mm Capps 2700)    PAST MEDICAL & SURGICAL HISTORY:  Iron deficiency anemia due to chronic blood loss  Hypothyroid  History of tonsillectomy    FAMILY HISTORY:  Family history of cardiac disorder (Father)  Family history of diabetes mellitus (Father)    Brief Hospital Course: 76F PMH of anemia, hypothyroidism, and GI bleed 1 year ago (refused Endo/colonoscopy and never followed up) presented to [facility] originally with fever and SOB found to have urosepsis s/p course of rocephin (completed ), anemia requiring 2 PRBC (no GI work up because pt wanted to sign out AMA), ruled in for NSTEMI, transferred to [facility]  and underwent cardiac cath which showed Multivessel CAD and Severe AS. Preop carotid US with b/l carotid stenosis confirmed by CTA, seen by vascular and cleared for OR (outpt follow up), s/p endoscopy  and colonoscopy  without evidence of acute bleed, noted to have hiatal hernia, now s/p CABG x3 with LIMA, AVR  with Dr. Cruz. 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 Physical therapy recommending Acute rehab as patient is only able to walk 5ft-10ft with a rolling walker and 1-person assist. Patient's family adamant about taking her home.       Subjective: Patient lying in bed in no acute distress. Answers all questions appropriately. Expresses concern about going to rehab. +Pain controlled. +Limited appetite. +Limited ambulation with PT. Denies fevers, chills, lightheadedness, dizziness, HA, CP, palpitations, SOB, cough, abdominal pain, N/V, diarrhea, numbness/tingling in extremities, or any other acute complaints.    MEDICATIONS  (STANDING):  acetaZOLAMIDE  IVPB 500 milliGRAM(s) IV Intermittent every 12 hours  ascorbic acid 500 milliGRAM(s) Oral daily  aspirin enteric coated 81 milliGRAM(s) Oral daily  atorvastatin 40 milliGRAM(s) Oral at bedtime  enoxaparin Injectable 30 milliGRAM(s) SubCutaneous daily  famotidine    Tablet 20 milliGRAM(s) Oral daily  ferrous    sulfate 325 milliGRAM(s) Oral daily  folic acid 1 milliGRAM(s) Oral daily  levothyroxine 100 MICROGram(s) Oral daily  magnesium oxide 400 milliGRAM(s) Oral three times a day with meals  metoprolol tartrate 25 milliGRAM(s) Oral two times a day  senna 2 Tablet(s) Oral at bedtime  sodium chloride 0.9% lock flush 3 milliLiter(s) IV Push every 8 hours  spironolactone 25 milliGRAM(s) Oral daily  torsemide 20 milliGRAM(s) Oral daily    MEDICATIONS  (PRN):  acetaminophen   Tablet .. 650 milliGRAM(s) Oral every 6 hours PRN Moderate Pain (4 - 6)  polyethylene glycol 3350 17 Gram(s) Oral daily PRN Constipation    Allergies: No Known Allergies    Vitals   T(C): 36.8 (07 Mar 2021 21:54), Max: 37.6 (07 Mar 2021 16:30)  T(F): 98.3 (07 Mar 2021 21:54), Max: 99.7 (07 Mar 2021 16:30)  HR: 79 (07 Mar 2021 21:54) (79 - 101)  BP: 100/59 (07 Mar 2021 21:54) (100/59 - 120/75)  BP(mean): 79 (07 Mar 2021 04:44) (79 - 79)  RR: 17 (07 Mar 2021 21:54) (16 - 20)  SpO2: 98% (07 Mar 2021 21:54) (95% - 98%)    I&O's Detail    06 Mar 2021 07:01  -  07 Mar 2021 07:00  --------------------------------------------------------  IN:  Total IN: 0 mL    OUT:    Voided (mL): 1450 mL  Total OUT: 1450 mL    Total NET: -1450 mL      07 Mar 2021 07:01  -  08 Mar 2021 03:54  --------------------------------------------------------  IN:    Oral Fluid: 500 mL  Total IN: 500 mL    OUT:    Voided (mL): 800 mL  Total OUT: 800 mL    Total NET: -300 mL    Physical Exam  Neuro: A+O x 3, non-focal, speech clear and intact  HEENT:  NCAT, PERRL, EOMI. No conjuctival edema or icterus, no thrush.    Neck:  Supple, trachea midline  Pulm: +Decreased BSs b/l bases, no accessory muscle use noted  CV: Paced  Abd: soft, NT, ND, + BS  Ext: YATES x 4, +1-2 LE pitting edema b/l, no cyanosis or clubbing, distal motor/neuro/circ intact  Skin: warm, dry, well perfused  Incisions: midsternal incision healing well, C/D/I, Sternum stable. Left chest wall PPM site C/D/I     LABS                        8.8    18.97 )-----------( 306      ( 07 Mar 2021 07:03 )             28.8     03-07    138  |  94<L>  |  32.0<H>  ----------------------------<  199<H>  3.8   |  33.0<H>  |  0.66    Ca    9.0      07 Mar 2021 07:03  Mg     1.9     03-07    TPro  5.5<L>  /  Alb  3.2<L>  /  TBili  0.8  /  DBili  x   /  AST  46<H>  /  ALT  24  /  AlkPhos  207<H>  03-06    Urinalysis Basic - ( 06 Mar 2021 19:10 )  Color: Yellow / Appearance: Clear / S.010 / pH: x  Gluc: x / Ketone: Negative  / Bili: Negative / Urobili: Negative mg/dL   Blood: x / Protein: 30 mg/dL / Nitrite: Negative   Leuk Esterase: Negative / RBC: 0-2 /HPF / WBC 0-2   Sq Epi: x / Non Sq Epi: Occasional / Bacteria: Occasional      Last CXR:  < from: Xray Chest 1 View- PORTABLE-Routine (Xray Chest 1 View- PORTABLE-Routine in AM.) (03.07.21 @ 05:24) >  Frontal expiratory view of the chest shows the heart to be similar in size. Left cardiac defibrillator and aortic valve ring are again noted.  The lungs show smaller pleural effusions and there is no evidence of pneumothorax.  IMPRESSION:  Smaller effusions.  < end of copied text >

## 2021-03-08 NOTE — PROGRESS NOTE ADULT - PROBLEM SELECTOR PLAN 1
S/p AVR/C3L 2/24/21.   Hemodynamically stable s/p BiV AICD 3/2/21 for CHB.   Continue ASA and Lipitor.  Continue Lopressor as tolerated by BP.  CDBEs, I/S use hourly while awake.   Encourage OOB and increased ambulation with physical therapy.   Chest PT.   Follow up AM CXR.   Continue torsemide and aldactone added for diuresis.   Strict I/Os.  Supplement electrolytes to maintain K>4 and Mg>2.   Tylenol PRN for pain control. Holding any narcotics.   Bowel regimen PRN.

## 2021-03-08 NOTE — PROGRESS NOTE ADULT - ASSESSMENT
76y Female with PMH anemia, and hypothyroidism.  Pt presented to [facility] a few days ago with fever and SOB.  She was found to have a UTI/sepsis with elevated lactate, + UA, and temp of 102.  She was started on Rocephin.  HGB was 6.  She was transfused 2 units of PRBC.  Per medical record, a year ago she had a GI bleed and was offered an endoscopy and colonoscopy but she refused and then failed to follow up outpatient.  Per medical record she did not want to stay at [facility] but agreed once she ruled in for NSTEMI.  She was transferred to [facility] for cardiac cath.     2/16 - s/p LHC showing EF 30%. LM normal, mLAD 90%, OM1 85%, pRCA 99%, dRCA 100%, mod pulm  HTN, SREEDHAR <0.5 consistent with critical AS  2-17- EGD with old blood suspect from pharynx/epistaxis?, poor bowel prep  2/18- plan for repeat colonoscopy  2/19- diverticulosis on colonoscopy   2/24- underwent CABG x3 (LIMA-LAD, SVG-OM1, OM2) and #23 bio-AVR,  2/25- remains intubated as not fully awake for CPAP trial, on milrinone, norepinephrine and dopamine for junctional kunal, CHB, currently AV pacing; arousable and following commands  2/26- extubated yesterday, remains on inotropes and vasopressors, KIMMY on Lasix gtt--> Bumex, albumin, [place] with PAD ~14  2/27-2/28- weaned off vasopressors on low dose dobutamine gtt, nonoligouric switched to PO torsemide, repeat TTE LV EF still 25-30%, remains in CHB off epicardial pacing  3/1- weaned off dobutamine gtt, pending CRT-D  3/2- s/p CRT-D  3/3- Cr. continues to improve, chest tubes still high output, sundowning at night thinking her family was here  3/4- 1 chest tube still in place  3/8- hypotensive required albumin infusions         HFEF/ICM, Cardiogenic shock- postop, resolved  -continue reduced dose torsemide 20mg daily to maintain euvolemia, monitor bicarb  - repeat TTE LV EF ~25%-30%, repeat pBNP level  - low SBP today meds held, change to low dose metoprolol succinate on discharge, Cr. normalized but low BP not able to use ARNI, hold spironolactone as well given hypotensive, consider addition of Dapagliflozin on discharge for HFrEF.   - mild delirium- constant reorientation    KIMMY postop  - resolved and diuresing well, nephrology following     CHB- postop  - s/p CRT-D, bi-V pacing on tele, EPS follow up      Severe Multivessel CAD s/p CABG x3  - cath [facility] mLAD 90%, OM1 85%, pRCA 99%, dRCA 100%  - continue ASA 81, statin, LDL 54  - monitor chest tube output for ongoing pleural effusions  - PT/OOB ambulate      Critical AS s/p bio-AVR  -continue ASA 81  -TTE with mild paravalvular regurgitation    Large PFO  -incidental noted on intraop LUIS MIGUEL with predominate L-to-R shunt  -continue to monitor, consider percutaneous closure in the future if episode of CVA or systemic emboli    Acute blood loss anemia  - received x2 PRBC at [place] before transfer, 2 PRBC postop- monitor H/H   - occult blood positive, source unclear, colonoscopy with diverticulosis  - monitor for recurrent bleeding, pAfib          Chet Grimaldo DO, [facility]  Faculty Non-Invasive Cardiologist  [phone]

## 2021-03-09 LAB
ANION GAP SERPL CALC-SCNC: 11 MMOL/L — SIGNIFICANT CHANGE UP (ref 5–17)
BUN SERPL-MCNC: 33 MG/DL — HIGH (ref 8–20)
CALCIUM SERPL-MCNC: 9.6 MG/DL — SIGNIFICANT CHANGE UP (ref 8.6–10.2)
CHLORIDE SERPL-SCNC: 99 MMOL/L — SIGNIFICANT CHANGE UP (ref 98–107)
CO2 SERPL-SCNC: 30 MMOL/L — HIGH (ref 22–29)
CREAT SERPL-MCNC: 0.7 MG/DL — SIGNIFICANT CHANGE UP (ref 0.5–1.3)
GLUCOSE BLDC GLUCOMTR-MCNC: 125 MG/DL — HIGH (ref 70–99)
GLUCOSE SERPL-MCNC: 124 MG/DL — HIGH (ref 70–99)
HCT VFR BLD CALC: 27.1 % — LOW (ref 34.5–45)
HGB BLD-MCNC: 8.2 G/DL — LOW (ref 11.5–15.5)
MAGNESIUM SERPL-MCNC: 2.1 MG/DL — SIGNIFICANT CHANGE UP (ref 1.8–2.6)
MCHC RBC-ENTMCNC: 27.9 PG — SIGNIFICANT CHANGE UP (ref 27–34)
MCHC RBC-ENTMCNC: 30.3 GM/DL — LOW (ref 32–36)
MCV RBC AUTO: 92.2 FL — SIGNIFICANT CHANGE UP (ref 80–100)
PLATELET # BLD AUTO: 341 K/UL — SIGNIFICANT CHANGE UP (ref 150–400)
POTASSIUM SERPL-MCNC: 3.7 MMOL/L — SIGNIFICANT CHANGE UP (ref 3.5–5.3)
POTASSIUM SERPL-SCNC: 3.7 MMOL/L — SIGNIFICANT CHANGE UP (ref 3.5–5.3)
RBC # BLD: 2.94 M/UL — LOW (ref 3.8–5.2)
RBC # FLD: 21.9 % — HIGH (ref 10.3–14.5)
SARS-COV-2 RNA SPEC QL NAA+PROBE: SIGNIFICANT CHANGE UP
SODIUM SERPL-SCNC: 140 MMOL/L — SIGNIFICANT CHANGE UP (ref 135–145)
WBC # BLD: 15.52 K/UL — HIGH (ref 3.8–10.5)
WBC # FLD AUTO: 15.52 K/UL — HIGH (ref 3.8–10.5)

## 2021-03-09 PROCEDURE — 71045 X-RAY EXAM CHEST 1 VIEW: CPT | Mod: 26

## 2021-03-09 PROCEDURE — 32557 INSERT CATH PLEURA W/ IMAGE: CPT | Mod: 78

## 2021-03-09 PROCEDURE — 99232 SBSQ HOSP IP/OBS MODERATE 35: CPT

## 2021-03-09 PROCEDURE — 93308 TTE F-UP OR LMTD: CPT | Mod: 26

## 2021-03-09 PROCEDURE — 99233 SBSQ HOSP IP/OBS HIGH 50: CPT

## 2021-03-09 PROCEDURE — 71045 X-RAY EXAM CHEST 1 VIEW: CPT | Mod: 26,77

## 2021-03-09 PROCEDURE — 99024 POSTOP FOLLOW-UP VISIT: CPT

## 2021-03-09 RX ORDER — POTASSIUM CHLORIDE 20 MEQ
40 PACKET (EA) ORAL ONCE
Refills: 0 | Status: COMPLETED | OUTPATIENT
Start: 2021-03-09 | End: 2021-03-09

## 2021-03-09 RX ORDER — METOPROLOL TARTRATE 50 MG
12.5 TABLET ORAL
Refills: 0 | Status: DISCONTINUED | OUTPATIENT
Start: 2021-03-09 | End: 2021-03-12

## 2021-03-09 RX ORDER — POTASSIUM CHLORIDE 20 MEQ
20 PACKET (EA) ORAL DAILY
Refills: 0 | Status: DISCONTINUED | OUTPATIENT
Start: 2021-03-10 | End: 2021-03-12

## 2021-03-09 RX ADMIN — Medication 20 MILLIGRAM(S): at 07:51

## 2021-03-09 RX ADMIN — Medication 40 MILLIEQUIVALENT(S): at 13:01

## 2021-03-09 RX ADMIN — Medication 100 MICROGRAM(S): at 05:08

## 2021-03-09 RX ADMIN — ENOXAPARIN SODIUM 30 MILLIGRAM(S): 100 INJECTION SUBCUTANEOUS at 21:19

## 2021-03-09 RX ADMIN — Medication 12.5 MILLIGRAM(S): at 18:28

## 2021-03-09 RX ADMIN — Medication 1 MILLIGRAM(S): at 13:01

## 2021-03-09 RX ADMIN — MAGNESIUM OXIDE 400 MG ORAL TABLET 400 MILLIGRAM(S): 241.3 TABLET ORAL at 13:01

## 2021-03-09 RX ADMIN — Medication 650 MILLIGRAM(S): at 07:41

## 2021-03-09 RX ADMIN — Medication 81 MILLIGRAM(S): at 13:01

## 2021-03-09 RX ADMIN — MIDODRINE HYDROCHLORIDE 5 MILLIGRAM(S): 2.5 TABLET ORAL at 05:08

## 2021-03-09 RX ADMIN — ATORVASTATIN CALCIUM 40 MILLIGRAM(S): 80 TABLET, FILM COATED ORAL at 21:19

## 2021-03-09 RX ADMIN — SODIUM CHLORIDE 3 MILLILITER(S): 9 INJECTION INTRAMUSCULAR; INTRAVENOUS; SUBCUTANEOUS at 17:47

## 2021-03-09 RX ADMIN — MAGNESIUM OXIDE 400 MG ORAL TABLET 400 MILLIGRAM(S): 241.3 TABLET ORAL at 18:28

## 2021-03-09 RX ADMIN — SODIUM CHLORIDE 3 MILLILITER(S): 9 INJECTION INTRAMUSCULAR; INTRAVENOUS; SUBCUTANEOUS at 05:10

## 2021-03-09 RX ADMIN — Medication 12.5 MILLIGRAM(S): at 05:08

## 2021-03-09 RX ADMIN — MAGNESIUM OXIDE 400 MG ORAL TABLET 400 MILLIGRAM(S): 241.3 TABLET ORAL at 07:51

## 2021-03-09 RX ADMIN — SENNA PLUS 2 TABLET(S): 8.6 TABLET ORAL at 21:19

## 2021-03-09 RX ADMIN — Medication 500 MILLIGRAM(S): at 13:01

## 2021-03-09 RX ADMIN — MIDODRINE HYDROCHLORIDE 5 MILLIGRAM(S): 2.5 TABLET ORAL at 13:01

## 2021-03-09 RX ADMIN — FAMOTIDINE 20 MILLIGRAM(S): 10 INJECTION INTRAVENOUS at 13:01

## 2021-03-09 RX ADMIN — Medication 650 MILLIGRAM(S): at 07:51

## 2021-03-09 RX ADMIN — Medication 325 MILLIGRAM(S): at 13:01

## 2021-03-09 RX ADMIN — MIDODRINE HYDROCHLORIDE 5 MILLIGRAM(S): 2.5 TABLET ORAL at 18:27

## 2021-03-09 NOTE — DIETITIAN NUTRITION RISK NOTIFICATION - PROVIDER ATTESTATION
By signing this assessment you are acknowledging and agree with the diagnosis/diagnoses assigned by the Registered Dietitian

## 2021-03-09 NOTE — DIETITIAN NUTRITION RISK NOTIFICATION - MALNUTRITION EVALUATION AS DEMONSTRATED BY (ADULTS > 20 YEARS OF AGE)
Inadequate energy intake...
Weight loss.../Inadequate energy intake.../Loss of subcutaneous fat.../Loss of muscle...
Weight loss.../Inadequate energy intake.../Loss of subcutaneous fat.../Loss of muscle.../Fluid accumulation...

## 2021-03-09 NOTE — PROGRESS NOTE ADULT - SUBJECTIVE AND OBJECTIVE BOX
Brief Hospital Course:   2/24: AVR (T), C3L  3/2: BIV-ICD (CHB post op)    Significant recent/past 24 hr events:  No acute overnight events. Pt remains HD stable and without acute complaints.  Denies N/V/D HA, dizziness, blurry vision, numbness/tingling, SOB, cough, chest pain, palpitations, abd pain or urinary sx's.     Subjective:  ROS negative x 10 systems except as noted above    Patient is a 76y old  Female who presents with a chief complaint of NSTEMI s/p CABG and AVR (08 Mar 2021 10:01)    HPI:  75y/o female never smoker with history of hypothyroid and DIVYA who was brought to [facility] for dyspnea and diarrhea and was found to have melena. She received 2 units of PRBC for a hemoglobin of 6.9. She was found to have a troponin of 656 and 710 and a pro-BNP of 29,809. An echo showed moderately to severely reduced LVSF with moderate diffuse hypokinesis with regional variations, moderate concentric LVH and severe AS. She was also diagnosed with sepsis secondary to a UTI. She admits to GANN (walking up one flight of stairs or < 100 feet). She also states recent black stools, and loss of approximately 30 pounds over 1 year secondary to poor PO intake secondary to ageusia. She denies chest pain, orthopnea, PND, palpitations or syncope/near syncope.    Symptoms:        Angina (Class): N/A       Ischemic Symptoms: GANN (CCs class 3 anginal equivalent)    Heart Failure: ACC/AHA stage C, NYHA functional class 3 HFrEF    Assessment of LVEF:       EF: 25-30%       Assessed by: Echo       Date: 2/13/2021    Prior Cardiac Interventions:       PCI's: N/A       CABG: N/A    Noninvasive Testing:   Stress Test: N/A    Echo: 2/13/2021       LVSF: Moderately to severely reduced LVSF with moderate diffuse hypokinesis with regional variations. Moderate concentric LVH.       EF: 25-30%       RVSF: Poorly visualized, mild to moderate pulmonary hypertension.       LA: Mildly dilated       RA: Normal       Mitral Valve: Severely to moderately calcified leaflets, severe MAC, moderate MR. Possible vegetation       Aortic Valve: Moderately calcified leaflets, mild AR, severe AS (max gradient: 77, mean gradient: 43).       Tricuspid Valve: Poorly visualized, mild TR.       Pulmonic Valve: Poorly visualized, no DE.    XR: Enlarged but stable cardiac silhouette.    Antianginal Therapies:        Beta Blockers: Toprol 25 mg daily       Calcium Channel Blockers: N/A       Long Acting Nitrates: N/A       Ranexa: N/A    Associated Risk Factors:        Cerebrovascular Disease: N/A       Chronic Lung Disease: N/A       Peripheral Arterial Disease: N/A       Chronic Kidney Disease (if yes, what is GFR): N/A       Uncontrolled Diabetes (if yes, what is HgbA1C or FBS): N/A       Poorly Controlled Hypertension (if yes, what is SBP): N/A       Morbid Obesity (if yes, what is BMI): N/A       History of Recent Ventricular Arrhythmia: N/A       Inability to Ambulate Safely: N/A       Need for Therapeutic Anticoagulation: N/A       Antiplatelet or Contrast Allergy: N/A (16 Feb 2021 07:35)    PAST MEDICAL & SURGICAL HISTORY:  Iron deficiency anemia due to chronic blood loss    Hypothyroid    History of tonsillectomy      FAMILY HISTORY:  Family history of cardiac disorder (Father)    Family history of diabetes mellitus (Father)      Vitals   ICU Vital Signs Last 24 Hrs  T(C): 37.2 (08 Mar 2021 19:58), Max: 37.2 (08 Mar 2021 19:58)  T(F): 98.9 (08 Mar 2021 19:58), Max: 98.9 (08 Mar 2021 19:58)  HR: 83 (08 Mar 2021 21:50) (76 - 98)  BP: 114/65 (08 Mar 2021 21:50) (76/40 - 114/65)  BP(mean): --  ABP: --  ABP(mean): --  RR: 18 (08 Mar 2021 21:50) (16 - 18)  SpO2: 97% (08 Mar 2021 21:50) (97% - 100%)    I&O's Detail    07 Mar 2021 07:01  -  08 Mar 2021 07:00  --------------------------------------------------------  IN:    IV PiggyBack: 50 mL    Oral Fluid: 500 mL  Total IN: 550 mL    OUT:    Voided (mL): 1350 mL  Total OUT: 1350 mL    Total NET: -800 mL    08 Mar 2021 07:01  -  09 Mar 2021 04:53  --------------------------------------------------------  IN:    Albumin 5%  - 250 mL: 500 mL    Oral Fluid: 1140 mL  Total IN: 1640 mL    OUT:  Total OUT: 0 mL    Total NET: 1640 mL    LABS                        8.4    17.85 )-----------( 311      ( 08 Mar 2021 06:16 )             27.8     03-08    138  |  96<L>  |  31.0<H>  ----------------------------<  119<H>  3.6   |  30.0<H>  |  0.71    Ca    9.4      08 Mar 2021 06:16  Phos  2.8     03-08  Mg     2.2     03-08    TPro  5.5<L>  /  Alb  2.9<L>  /  TBili  1.0  /  DBili  x   /  AST  123<H>  /  ALT  88<H>  /  AlkPhos  295<H>  03-08    LIVER FUNCTIONS - ( 08 Mar 2021 06:16 )  Alb: 2.9 g/dL / Pro: 5.5 g/dL / ALK PHOS: 295 U/L / ALT: 88 U/L / AST: 123 U/L / GGT: x             POCT Blood Glucose.: 132 mg/dL (03-08-21 @ 17:21)    MEDICATIONS  (STANDING):  ascorbic acid 500 milliGRAM(s) Oral daily  aspirin enteric coated 81 milliGRAM(s) Oral daily  atorvastatin 40 milliGRAM(s) Oral at bedtime  enoxaparin Injectable 30 milliGRAM(s) SubCutaneous daily  famotidine    Tablet 20 milliGRAM(s) Oral daily  ferrous    sulfate 325 milliGRAM(s) Oral daily  folic acid 1 milliGRAM(s) Oral daily  levothyroxine 100 MICROGram(s) Oral daily  magnesium oxide 400 milliGRAM(s) Oral three times a day with meals  metoprolol tartrate 12.5 milliGRAM(s) Oral two times a day  midodrine. 5 milliGRAM(s) Oral three times a day  senna 2 Tablet(s) Oral at bedtime  sodium chloride 0.9% lock flush 3 milliLiter(s) IV Push every 8 hours  torsemide 20 milliGRAM(s) Oral daily    MEDICATIONS  (PRN):  acetaminophen   Tablet .. 650 milliGRAM(s) Oral every 6 hours PRN Moderate Pain (4 - 6)  polyethylene glycol 3350 17 Gram(s) Oral daily PRN Constipation      Allergies:  No Known Allergies      Physical Exam:   Constitutional: NAD  Neck: supple, trachea midline. No JVD  Respiratory: Diminished b/l lower lobes, no accessory muscle use noted. No wheezing, rales or rhonchi noted b/l.   Cardiovascular: AV-paced (Majority V-paced), normal S1, S2; no murmurs or rub  Gastrointestinal: Soft, non-tender, non distended, normal bowel sounds  Extremities: YATES x 4, mild LE peripheral edema, no cyanosis, no clubbing   Vascular: Equal and normal pulses: 2+ peripheral pulses throughout  Neurological: A+O x 3; speech clear and intact; no gross sensory/motor deficits  Psychiatric: calm, normal mood, normal affect  Musculoskeletal: No joint swelling or deformity; no limitation of movement  Skin: warm, dry, well perfused, no rashes  Tubes/Lines: CT removed-sites C/D/I. PPM site with steri-strips, C/D/I  Incision: Sternal incision (No audible click)  D/I without obvious bleeding/discharge. EVH site D/I without obvious bleeding/discharge.       Code Status: Full Code      Critical care time spent: 35 minutes (reviewing chart including medication, labs and imaging results, discussions with interdisciplinary team, discussing goals of care/advanced directives, counseling patient and/or family, non-inclusive of procedures)    Case including assessment/plan of care discussed with attending.

## 2021-03-09 NOTE — PROCEDURE NOTE - NSPROCDETAILS_GEN_ALL_CORE
guidewire recovered/lumen(s) aspirated and flushed/sterile dressing applied/sterile technique, catheter placed/ultrasound guidance with use of sterile gel and probe cove
Seldinger technique/dressing applied/secured in place/sterile dressing applied/percutaneous/thoracostomy tube placed percutaneously

## 2021-03-09 NOTE — PROGRESS NOTE ADULT - REASON FOR ADMISSION
CAD/ICM, AS
NSTEMI;  Anemia + FOBT.
s/p AVR/CABG
AS, CAD
CAD, AS
CAD, severe AS
OHS
s/p BiV-ICD implant
KIMMY
NSTEMI s/p CABG and AVR
NSTEMI/CHF/Sepsis from UTI

## 2021-03-09 NOTE — PROGRESS NOTE ADULT - SUBJECTIVE AND OBJECTIVE BOX
[place] [facility] Faculty Practice                                                               Office: [address] [address]                                                              Telephone: [phone]. Fax:[phone]                                                                             PROGRESS NOTE  Reason for follow up: CAD/ICM, HFrEF, severe AS s/p CABG, bio-AVR  Overnight: No new events.   Update: receiving midodrine for hypotension and also on low dose metoprolol; laying in bed and fatigued appearing       Review of symptoms:   Cardiac:  No chest pain. No dyspnea. No palpitations.  Respiratory: No cough. No dyspnea  Gastrointestinal: No diarrhea. No abdominal pain. No bleeding.     Past medical history: No updates.   	  Vital Signs Last 24 Hrs  T(C): 37 (03-09-21 @ 15:40), Max: 37.2 (03-08-21 @ 19:58)  T(F): 98.6 (03-09-21 @ 15:40), Max: 98.9 (03-08-21 @ 19:58)  HR: 100 (03-09-21 @ 15:40) (79 - 100)  BP: 126/74 (03-09-21 @ 15:40) (106/49 - 126/74)  BP(mean): --  RR: 18 (03-09-21 @ 15:40) (18 - 18)  SpO2: 97% (03-09-21 @ 15:40) (97% - 100%)  I&O's Summary    08 Mar 2021 07:01  -  09 Mar 2021 07:00  --------------------------------------------------------  IN: 1880 mL / OUT: 600 mL / NET: 1280 mL    09 Mar 2021 07:01  -  09 Mar 2021 16:56  --------------------------------------------------------  IN: 240 mL / OUT: 1 mL / NET: 239 mL          PHYSICAL EXAM:  Appearance: Comfortable. No acute distress, fatigued appearing laying in bed  HEENT:  Head and neck: Atraumatic. Normocephalic.  Normal oral mucosa, PERRL,   Neurologic: A&Ox 2, no focal deficits. EOMI, Cranial nerves are intact.  Lymphatic: No cervical lymphadenopathy  Cardiovascular: Normal S1 S2, No murmur, rubs/gallops. No JVD, midline sternotomy scar  Respiratory: Lungs clear to auscultation  Gastrointestinal:  Soft, Non-tender, + BS  Lower Extremities: No edema  Psychiatry: Patient is calm. No agitation. Mood & affect appropriate  Skin: No rashes/ecchymoses/cyanosis/ulcers visualized on the face, hands or feet.      CURRENT MEDICATIONS:  MEDICATIONS  (STANDING):  ascorbic acid 500 milliGRAM(s) Oral daily  aspirin enteric coated 81 milliGRAM(s) Oral daily  atorvastatin 40 milliGRAM(s) Oral at bedtime  enoxaparin Injectable 30 milliGRAM(s) SubCutaneous daily  famotidine    Tablet 20 milliGRAM(s) Oral daily  ferrous    sulfate 325 milliGRAM(s) Oral daily  folic acid 1 milliGRAM(s) Oral daily  levothyroxine 100 MICROGram(s) Oral daily  magnesium oxide 400 milliGRAM(s) Oral three times a day with meals  metoprolol tartrate 12.5 milliGRAM(s) Oral two times a day  midodrine. 5 milliGRAM(s) Oral three times a day  senna 2 Tablet(s) Oral at bedtime  sodium chloride 0.9% lock flush 3 milliLiter(s) IV Push every 8 hours  torsemide 20 milliGRAM(s) Oral daily    MEDICATIONS  (PRN):  acetaminophen   Tablet .. 650 milliGRAM(s) Oral every 6 hours PRN Moderate Pain (4 - 6)  polyethylene glycol 3350 17 Gram(s) Oral daily PRN Constipation      DIAGNOSTIC TESTING:  [ ] Echocardiogram:   < from: TTE Echo Complete w/ Contrast w/ Doppler (02.28.21 @ 09:39) >    Summary:   1. Left ventricular ejection fraction, by visual estimation, is 25 to 30%.   2. Severely decreased global left ventricular systolic function.   3. Abnormal septal motion consistent with post-operative status.   4. Severely increased LV wall thickness.   5. Severely enlarged left atrium.   6. Mild mitral valve regurgitation.   7. Severe mitral annular calcification.   8. Moderateto severe thickening of the anterior and posterior mitral valve leaflets.   9. Mitral valve mean gradient is 2.0 mmHg (at HR of 79 bpm).  10. Mild tricuspid regurgitation.  11. Mild pulmonic valve regurgitation.  12. Bioprosthesis in the aortic position, with mild aortic regurgitation, which is paravalvular in origin.  13. There is no evidence of pericardial effusion.  14. Endocardial visualization was enhanced with intravenous echo contrast.  15. Compared to TTE done on 2/26/2021, left ventricular function is unchanged from prior.    < end of copied text >    [ ]  Catheterization:  < from: Cardiac Cath Lab - Adult (02.16.21 @ 08:33) >  VENTRICLES: EF by echo was 30 %.  VALVES: AORTIC VALVE: There was critical aortic stenosis.  CORONARY VESSELS:The coronary circulation is co-dominant.  LM:   --  LM: Normal.  LAD:   --  Mid LAD: There was a tubular 90 % stenosis. The lesion was  eccentric.  CX:   --  OM1: There was a diffuse 85 % stenosis in the proximal third of  the vessel segment. The lesion was irregularly contoured and eccentric.  RCA:   --  Proximal RCA: There was a diffuse 99 % stenosis. The lesion was  irregularly contoured and eccentric.  --  Distal RCA: There was a diffuse 100 % stenosis.  COMPLICATIONS: No complications occurred during the cath lab visit.  DIAGNOSTIC IMPRESSIONS: Moderate Pulmonary Hypertension and elevation of  filling pressures. 2. Critical Aortic Stenosis with a mean PG >40mm Hg and  an SREEDHAR of <0.5cm2. 3. Severe LV Systolic dysfunction by TTE from 2/13/2021  with an estimated LVEF of 30%. 4. Triple Vessel CAD.  DIAGNOSTIC RECOMMENDATIONS: GDMT. 2. CTS consultation.  INTERVENTIONAL IMPRESSIONS: Moderate Pulmonary Hypertension and elevation  of filling pressures. 2. Critical Aortic Stenosis with a mean PG >40mm Hg  and an SREEDHAR of <0.5cm2. 3. Severe LV Systolic dysfunction by TTE from  2/13/2021 with an estimated LVEF of 30%. 4. Triple Vessel CAD.    Labs:                        8.2    15.52 )-----------( 341      ( 09 Mar 2021 06:01 )             27.1     03-09    140  |  99  |  33.0<H>  ----------------------------<  124<H>  3.7   |  30.0<H>  |  0.70    Ca    9.6      09 Mar 2021 06:01  Phos  2.8     03-08  Mg     2.1     03-09    TPro  5.5<L>  /  Alb  2.9<L>  /  TBili  1.0  /  DBili  x   /  AST  123<H>  /  ALT  88<H>  /  AlkPhos  295<H>  03-08 17 Feb 2021 11:36 Troponin 1.35 ng/mL / Creatine Kinase x     /  CKMB x     / CPK Mass Assay % x       17 Feb 2021 01:45 Troponin 1.78 ng/mL / Creatine Kinase x     /  CKMB x     / CPK Mass Assay % x       16 Feb 2021 23:27 Troponin 1.83 ng/mL / Creatine Kinase 49 U/L /  CKMB x     / CPK Mass Assay % x          Serum Pro-Brain Natriuretic Peptide: 9569 pg/mL (03-06-21 @ 06:52)  Serum Pro-Brain Natriuretic Peptide: 77630 pg/mL (02-21-21 @ 06:04)  Serum Pro-Brain Natriuretic Peptide: 86835 pg/mL (02-20-21 @ 06:31)  Serum Pro-Brain Natriuretic Peptide: 17371 pg/mL (02-16-21 @ 12:38)    Cholesterol 105 mg/dL; Direct LDL --; HDL Cholesterol, Serum 39 mg/dL; HDL/ Total Cholesterol Ratio Measurement --; Total Cholesterol/ HDL Ratio Measurement --; Triglycerides, Serum 62 mg/dL  A1C with Estimated Average Glucose Result: 5.2 % (02-16-21 @ 12:38)      TELEMETRY: Bi-V paced 80s

## 2021-03-09 NOTE — PROGRESS NOTE ADULT - PROBLEM SELECTOR PLAN 1
S/p AVR/C3L 2/24/21 with Dr Cruz.  Hemodynamically stable s/p BiV AICD 3/2/21 for CHB.   Continue ASA and Lipitor.  Continue Lopressor as tolerated by BP.  CDBEs, I/S use hourly while awake.   Encourage OOB and increased ambulation with physical therapy.   Chest PT.   Follow up AM CXR.   Continue torsemide for diuresis.   Strict I/Os.  Supplement electrolytes to maintain K>4 and Mg>2.   Tylenol PRN for pain control. Holding any narcotics.   Bowel regimen PRN.  Cont supportive care and discuss case in AM with team.

## 2021-03-09 NOTE — CHART NOTE - NSCHARTNOTEFT_GEN_A_CORE
Source: Patient [ ]  Family [ ]   other [x ] pt currently sleeping    Current Diet: Diet, Regular:   Consistent Carbohydrate {No Snacks} (CSTCHO)  DASH/TLC {Sodium & Cholesterol Restricted} (DASH)  Supplement Feeding Modality:  Oral  Ensure Enlive Cans or Servings Per Day:  3       Frequency:  Daily (03-02-21 @ 16:47)    PO intake:  Pt with fair to poor po intake at meals per nursing flowsheets- po intake appears to fluctuate.  Breakfast not yet consumed as pt remains asleep at this time.    Current Weight:   (3/9) 162 lbs  (3/7) 163.3 lbs  (3/4) 167.7 lbs  (2/24) 134.9 lbs  (2/17) 175.2 lbs    % Weight Change - wt fluctuations noted, will continue to monitor.  Aware pt with 1+ trace B/L leg edema noted.      Pertinent Medications: MEDICATIONS  (STANDING):  ascorbic acid 500 milliGRAM(s) Oral daily  aspirin enteric coated 81 milliGRAM(s) Oral daily  atorvastatin 40 milliGRAM(s) Oral at bedtime  enoxaparin Injectable 30 milliGRAM(s) SubCutaneous daily  famotidine    Tablet 20 milliGRAM(s) Oral daily  ferrous    sulfate 325 milliGRAM(s) Oral daily  folic acid 1 milliGRAM(s) Oral daily  levothyroxine 100 MICROGram(s) Oral daily  magnesium oxide 400 milliGRAM(s) Oral three times a day with meals  metoprolol tartrate 12.5 milliGRAM(s) Oral two times a day  midodrine. 5 milliGRAM(s) Oral three times a day  senna 2 Tablet(s) Oral at bedtime  sodium chloride 0.9% lock flush 3 milliLiter(s) IV Push every 8 hours  torsemide 20 milliGRAM(s) Oral daily    MEDICATIONS  (PRN):  acetaminophen   Tablet .. 650 milliGRAM(s) Oral every 6 hours PRN Moderate Pain (4 - 6)  polyethylene glycol 3350 17 Gram(s) Oral daily PRN Constipation    Pertinent Labs: CBC Full  -  ( 09 Mar 2021 06:01 )  WBC Count : 15.52 K/uL  RBC Count : 2.94 M/uL  Hemoglobin : 8.2 g/dL  Hematocrit : 27.1 %  Platelet Count - Automated : 341 K/uL  Mean Cell Volume : 92.2 fl  Mean Cell Hemoglobin : 27.9 pg  Mean Cell Hemoglobin Concentration : 30.3 gm/dL  03-09 Na140 mmol/L Glu 124 mg/dL<H> K+ 3.7 mmol/L Cr  0.70 mg/dL BUN 33.0 mg/dL<H> Phos n/a   Alb n/a   PAB n/a       Skin: sx MSI    Nutrition focused physical exam conducted - found signs of malnutrition [ ]absent [x ]present    Subcutaneous fat loss: MODERATE [x ] Orbital fat pads region, [x ]Buccal fat region, [ ]Triceps region,  [ ]Ribs region    Muscle wasting: SEVERE [x ]Temples region, [ x]Clavicle region, [x ]Shoulder region, [ ]Scapula region, [ ]Interosseous region,  [ ]thigh region, [ ]Calf region    Current Nutrition Diagnosis: Pt remains at nutrition risk secondary to severe protein calorie malnutrition (chronic) related to inability to meet sufficient protein energy requirements in setting of advanced age, poor appetite with ageusia and Multivessel CAD and Severe AS as evidenced by pt meeting <75% estimated energy intake > 1 months, 17% (30lb) unintentional wt loss x1 years and severe muscle wasting.  Pt continues with fluctuating po intake at meals- poor/fair per RN flowsheets.      Recommendations:   1. Continue with Ensure tid  2. RX: MVI daily   3. Continue with Vit C, feso4, folic acid daily  4. Monitor daily wts     Monitoring and Evaluation:   [x ] PO intake [ x] Tolerance to diet prescription [X] Weights  [X] Follow up per protocol [X] Labs:

## 2021-03-09 NOTE — PROGRESS NOTE ADULT - ASSESSMENT
76y Female with PMH anemia, and hypothyroidism.  Pt presented to [facility] a few days ago with fever and SOB.  She was found to have a UTI/sepsis with elevated lactate, + UA, and temp of 102.  She was started on Rocephin.  HGB was 6.  She was transfused 2 units of PRBC.  Per medical record, a year ago she had a GI bleed and was offered an endoscopy and colonoscopy but she refused and then failed to follow up outpatient.  Per medical record she did not want to stay at [facility] but agreed once she ruled in for NSTEMI.  She was transferred to [facility] for cardiac cath.     2/16 - s/p LHC showing EF 30%. LM normal, mLAD 90%, OM1 85%, pRCA 99%, dRCA 100%, mod pulm  HTN, SREEDHAR <0.5 consistent with critical AS  2-17- EGD with old blood suspect from pharynx/epistaxis?, poor bowel prep  2/18- plan for repeat colonoscopy  2/19- diverticulosis on colonoscopy   2/24- underwent CABG x3 (LIMA-LAD, SVG-OM1, OM2) and #23 bio-AVR,  2/25- remains intubated as not fully awake for CPAP trial, on milrinone, norepinephrine and dopamine for junctional kunal, CHB, currently AV pacing; arousable and following commands  2/26- extubated yesterday, remains on inotropes and vasopressors, KIMMY on Lasix gtt--> Bumex, albumin, Pe Ell with PAD ~14  2/27-2/28- weaned off vasopressors on low dose dobutamine gtt, nonoligouric switched to PO torsemide, repeat TTE LV EF still 25-30%, remains in CHB off epicardial pacing  3/1- weaned off dobutamine gtt, pending CRT-D  3/2- s/p CRT-D  3/3- Cr. continues to improve, chest tubes still high output, sundowning at night thinking her family was here  3/4- 1 chest tube still in place  3/8- hypotensive required albumin infusions         HFEF/ICM, Cardiogenic shock- postop, resolved  -continue reduced dose torsemide 20mg daily to maintain euvolemia, monitor bicarb  - repeat TTE LV EF ~25%-30%, repeat pBNP level  - low SBP now on midodrine, unclear if able to tolerate even low dose metoprolol, Cr. normalized but low BP not able to use ARNI, hold spironolactone as well given hypotensive, consider addition of Dapagliflozin on discharge for HFrEF.   - mild delirium- constant reorientation    KIMMY postop  - resolved and diuresing well, nephrology following     CHB- postop  - s/p CRT-D, bi-V pacing on tele, EPS follow up      Severe Multivessel CAD s/p CABG x3  - cath [facility] mLAD 90%, OM1 85%, pRCA 99%, dRCA 100%  - continue ASA 81, statin, LDL 54  - monitor chest tube output for ongoing pleural effusions  - PT/OOB ambulate      Critical AS s/p bio-AVR  -continue ASA 81  -TTE with mild paravalvular regurgitation    Large PFO  -incidental noted on intraop LUIS MIGUEL with predominate L-to-R shunt  -continue to monitor, consider percutaneous closure in the future if episode of CVA or systemic emboli    Acute blood loss anemia  - received x2 PRBC at [place] before transfer, 2 PRBC postop- monitor H/H   - occult blood positive, source unclear, colonoscopy with diverticulosis  - monitor for recurrent bleeding, pAfib      PT/rehab pending      Chet Grimaldo DO, [facility]  Faculty Non-Invasive Cardiologist  [phone]

## 2021-03-09 NOTE — PROGRESS NOTE ADULT - SUBJECTIVE AND OBJECTIVE BOX
Patient having pain in the middle of her but cheeks, at location of coccyx.  RN aware and about to provide Tylenol.  Concerns for patient developing a pressure ulceration as she is always slid down on the chair.   Patient needs to off load onto her ischial tuberosities to avoid prevention.     REVIEW OF SYSTEMS  Constitutional - No fever,  +fatigue  HEENT - No vertigo, No neck pain  Neurological - No headaches, +memory loss, +loss of strength, No numbness, No tremors  Musculoskeletal - +joint pain, No joint swelling, +muscle pain  Psychiatric - No depression, +anxiety    FUNCTIONAL PROGRESS  3/7  Bed Mobility  Bed Mobility Training Rehab Potential: fair, will monitor progress closely  Bed Mobility Training Symptoms Noted During/After Treatment: none  Bed Mobility Training Rolling/Turning: moderate assist (50% patient effort);  1 person assist;  bed rails  Bed Mobility Training Scooting: moderate assist (50% patient effort);  1 person assist;  bed rails  Bed Mobility Training Bridging: moderate assist (50% patient effort);  1 person assist;  bed rails  Bed Mobility Training Supine-to-Sit: moderate assist (50% patient effort);  1 person assist;  bed rails  Bed Mobility Training Limitations: decreased ability to use arms for pushing/pulling;  decreased ability to use legs for bridging/pushing;  impaired ability to control trunk for mobility;  impaired postural control;  impaired motor control;  decreased strength    Bed-Chair Transfer Training  Bed-to-Chair Transfer Training Rehab Potential: fair, will monitor progress closely  Bed-to-Chair Transfer Training Symptoms Noted During/After Treatment: none  Transfer Training Bed-to-Chair Transfer: maximum assist (25% patient effort);  1 person assist;  full weight-bearing  Transfer Training Chair-to-Bed Transfer: maximum assist (25% patient effort);  1 person assist;  full weight-bearing  Bed-to-Chair Transfer Training Transfer Safety Analysis: decreased step length;  impaired postural control;  impaired motor control;  decreased strength;  impaired balance    Sit-Stand Transfer Training  Sit-to-Stand Transfer Training Rehab Potential: fair, will monitor progress closely  Sit-to-Stand Transfer Training Symptoms Noted During/After Treatment: none  Transfer Training Sit-to-Stand Transfer: moderate assist (50% patient effort);  1 person assist;  full weight-bearing  Transfer Training Stand-to-Sit Transfer: moderate assist (50% patient effort);  1 person assist;  full weight-bearing  Sit-to-Stand Transfer Training Transfer Safety Analysis: decreased sequencing ability;  decreased step length;  impaired postural control;  impaired motor control;  decreased strength;  impaired balance      VITALS  T(C): 36.6 (03-09-21 @ 05:05), Max: 37.2 (03-08-21 @ 19:58)  HR: 80 (03-09-21 @ 07:52) (80 - 98)  BP: 123/67 (03-09-21 @ 07:52) (94/56 - 123/67)  RR: 18 (03-09-21 @ 05:05) (16 - 18)  SpO2: 98% (03-09-21 @ 05:05) (97% - 100%)  Wt(kg): --    MEDICATIONS   acetaminophen   Tablet .. 650 milliGRAM(s) every 6 hours PRN  ascorbic acid 500 milliGRAM(s) daily  aspirin enteric coated 81 milliGRAM(s) daily  atorvastatin 40 milliGRAM(s) at bedtime  enoxaparin Injectable 30 milliGRAM(s) daily  famotidine    Tablet 20 milliGRAM(s) daily  ferrous    sulfate 325 milliGRAM(s) daily  folic acid 1 milliGRAM(s) daily  levothyroxine 100 MICROGram(s) daily  magnesium oxide 400 milliGRAM(s) three times a day with meals  metoprolol tartrate 12.5 milliGRAM(s) two times a day  midodrine. 5 milliGRAM(s) three times a day  polyethylene glycol 3350 17 Gram(s) daily PRN  potassium chloride   Powder 40 milliEquivalent(s) once  senna 2 Tablet(s) at bedtime  sodium chloride 0.9% lock flush 3 milliLiter(s) every 8 hours  torsemide 20 milliGRAM(s) daily      RECENT LABS/IMAGING                          8.2    15.52 )-----------( 341      ( 09 Mar 2021 06:01 )             27.1     03-09    140  |  99  |  33.0<H>  ----------------------------<  124<H>  3.7   |  30.0<H>  |  0.70    Ca    9.6      09 Mar 2021 06:01  Phos  2.8     03-08  Mg     2.1     03-09    TPro  5.5<L>  /  Alb  2.9<L>  /  TBili  1.0  /  DBili  x   /  AST  123<H>  /  ALT  88<H>  /  AlkPhos  295<H>  03-08                    TTE 2/28 - Summary:   1. Left ventricular ejection fraction, by visual estimation, is 25 to 30%.   2. Severely decreased global left ventricular systolic function.   3. Abnormal septal motion consistent with post-operative status.   4. Severely increased LV wall thickness.   5. Severely enlarged left atrium.   6. Mild mitral valve regurgitation.   7. Severe mitral annular calcification.   8. Moderate to severe thickening of the anterior and posterior mitral valve leaflets.   9. Mitral valve mean gradient is 2.0 mmHg (at HR of 79 bpm).  10. Mild tricuspid regurgitation.  11. Mild pulmonic valve regurgitation.  12. Bioprosthesis in the aortic position, with mild aortic regurgitation, which is paravalvular in origin.  13. There is no evidence of pericardial effusion.  14. Endocardial visualization was enhanced with intravenous echo contrast.  15. Compared to TTE done on 2/26/2021, left ventricular function is unchanged from prior.    CXR 2/28 - Reduction of pleural effusions.    CXR 3/4 - Bilateral chest tubes in place. Residual lung a bibasilar pleural effusions and/or airspace disease as seen on prior 3/3/2021 chest radiograph    CXR 3/8 - Cardiomegaly and bilateral pleural effusions, right larger than left. No evidence of pulmonary vascular congestion..    ----------------------------------------------------------------------------------------  PHYSICAL EXAM  Constitutional - NAD, Uncomfortable  Extremities - BLE edema  Neurologic Exam -                    Cognitive - AAO to self, PART of situation     Motor -                      LEFT    UE - ShAB 2/5,  4/5                    RIGHT UE - ShAB 2/5,  4/5                    LEFT    LE - HF 2/5, KE 2/5, DF 3/5                     RIGHT LE - HF 2/5, KE 1/5, DF 3/5     Sensory - Intact to LT  Psychiatric - Mood anxious regarding the pain   ----------------------------------------------------------------------------------------  ASSESSMENT/PLAN  76yFemale with functional deficits after +NSTEMI/CAD/Severe AS  CAD s/p CABG x3 - ASA, Lipitor  Severe AS s/p AVR & Acute CHF - Demadex  HTN - Lopressor, Aldactone  HypoTN - Midodrine  HypoK+ - KCl   Pain - Tylenol  DVT PPX - SCDs, Lovenox  Rehab - Workup incomplete. Planned for LUIS MIGUEL/TTE. Continue to recommend ACUTE inpatient rehabilitation for the functional deficits consisting of 3 hours of therapy/day & 24 hour RN/daily PMR physician for comorbid medical management. Will continue to follow for ongoing rehab needs and recommendations. Patient will be able to tolerate 3 hours a day.    Continue bedside therapy as well as OOB throughout the day with mobilization throughout the day with staff to maintain cardiopulmonary function and prevention of secondary complications related to debility.     Discussed with rehab clinical team.

## 2021-03-09 NOTE — PROGRESS NOTE ADULT - ASSESSMENT
76F PMH of anemia, hypothyroidism, and GI bleed 1 year ago (refused Endo/colonoscopy and never followed up) presented to [facility] originally with fever and SOB found to have urosepsis s/p course of rocephin (completed 2/17), anemia requiring 2 PRBC (no GI work up because pt wanted to sign out AMA), ruled in for NSTEMI, transferred to [facility] 2/16 and underwent cardiac cath which showed Multivessel CAD and Severe AS. Preop carotid US with b/l carotid stenosis confirmed by CTA, seen by vascular and cleared for OR (outpt follow up), s/p endoscopy 2/17 and colonoscopy 2/19 without evidence of acute bleed, noted to have hiatal hernia, now s/p CABG x3 with LIMALEE, AVR 2/24 with Dr. Cruz. 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 Physical therapy recommending Acute rehab as patient is only able to walk 5ft-10ft with a rolling walker and 1-person assist. Patient's family adamant about taking her home.

## 2021-03-09 NOTE — CHART NOTE - NSCHARTNOTEFT_GEN_A_CORE
76F PMH of anemia, hypothyroidism, and GI bleed 1 year ago (refused Endo/colonoscopy and never followed up) presented to [facility] originally with fever and SOB found to have urosepsis s/p course of rocephin (completed 2/17), anemia requiring 2 PRBC (no GI work up because pt wanted to sign out AMA), ruled in for NSTEMI, transferred to [facility] 2/16 and underwent cardiac cath which showed Multivessel CAD and Severe AS. Preop carotid US with b/l carotid stenosis confirmed by CTA, seen by vascular and cleared for OR (outpt follow up), s/p endoscopy 2/17 and colonoscopy 2/19 without evidence of acute bleed, noted to have hiatal hernia, now s/p CABG x3 with LIMA, AVR 2/24 with Dr. Cruz. 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 Physical therapy recommending Acute rehab as patient is only able to walk 5ft-10ft with a rolling walker and 1-person assist.      Plan:  Family now agreeable to acute rehab.  Right pigtail placed for pleural effusion.   TTE done today pending read.  Potassium repleted.  Discussed with Dr. Cruz.

## 2021-03-09 NOTE — DIETITIAN NUTRITION RISK NOTIFICATION - UPON NUTRITIONAL ASSESSMENT BY THE REGISTERED DIETITIAN YOUR PATIENT WAS DETERMINED TO MEET CRITERIA/HAS EVIDENCE OF THE FOLLOWING DIAGNOSIS:
Moderate protein-calorie malnutrition
Moderate protein-calorie malnutrition
Severe protein-calorie malnutrition

## 2021-03-09 NOTE — DIETITIAN NUTRITION RISK NOTIFICATION - TREATMENT: THE FOLLOWING DIET HAS BEEN RECOMMENDED
Diet, NPO after Midnight:      NPO Start Date: 01-Mar-2021,   NPO Start Time: 23:59 (03-01-21 @ 18:23) [Active]  Diet, Regular:   Consistent Carbohydrate {No Snacks} (CSTCHO)  DASH/TLC {Sodium & Cholesterol Restricted} (DASH) (03-01-21 @ 15:32) [Active]      
Diet, Regular:   Consistent Carbohydrate {No Snacks} (CSTCHO)  DASH/TLC {Sodium & Cholesterol Restricted} (DASH)  Supplement Feeding Modality:  Oral  Ensure Enlive Cans or Servings Per Day:  3       Frequency:  Daily (03-02-21 @ 16:47) [Active]    Continue with Ensure tid  
Diet, DASH/TLC:   Sodium & Cholesterol Restricted  High Fiber (HIFIBER) (02-19-21 @ 09:08) [Active]

## 2021-03-10 LAB
ALBUMIN SERPL ELPH-MCNC: 3.2 G/DL — LOW (ref 3.3–5.2)
ALP SERPL-CCNC: 389 U/L — HIGH (ref 40–120)
ALT FLD-CCNC: 97 U/L — HIGH
ANION GAP SERPL CALC-SCNC: 11 MMOL/L — SIGNIFICANT CHANGE UP (ref 5–17)
AST SERPL-CCNC: 99 U/L — HIGH
BILIRUB SERPL-MCNC: 0.7 MG/DL — SIGNIFICANT CHANGE UP (ref 0.4–2)
BUN SERPL-MCNC: 34 MG/DL — HIGH (ref 8–20)
CALCIUM SERPL-MCNC: 9.7 MG/DL — SIGNIFICANT CHANGE UP (ref 8.6–10.2)
CHLORIDE SERPL-SCNC: 102 MMOL/L — SIGNIFICANT CHANGE UP (ref 98–107)
CO2 SERPL-SCNC: 29 MMOL/L — SIGNIFICANT CHANGE UP (ref 22–29)
CREAT SERPL-MCNC: 0.7 MG/DL — SIGNIFICANT CHANGE UP (ref 0.5–1.3)
GLUCOSE SERPL-MCNC: 118 MG/DL — HIGH (ref 70–99)
HCT VFR BLD CALC: 27.5 % — LOW (ref 34.5–45)
HGB BLD-MCNC: 8.4 G/DL — LOW (ref 11.5–15.5)
MAGNESIUM SERPL-MCNC: 2 MG/DL — SIGNIFICANT CHANGE UP (ref 1.6–2.6)
MCHC RBC-ENTMCNC: 28.5 PG — SIGNIFICANT CHANGE UP (ref 27–34)
MCHC RBC-ENTMCNC: 30.5 GM/DL — LOW (ref 32–36)
MCV RBC AUTO: 93.2 FL — SIGNIFICANT CHANGE UP (ref 80–100)
PLATELET # BLD AUTO: 421 K/UL — HIGH (ref 150–400)
POTASSIUM SERPL-MCNC: 4.1 MMOL/L — SIGNIFICANT CHANGE UP (ref 3.5–5.3)
POTASSIUM SERPL-SCNC: 4.1 MMOL/L — SIGNIFICANT CHANGE UP (ref 3.5–5.3)
PROT SERPL-MCNC: 5.8 G/DL — LOW (ref 6.6–8.7)
RBC # BLD: 2.95 M/UL — LOW (ref 3.8–5.2)
RBC # FLD: 22.3 % — HIGH (ref 10.3–14.5)
SODIUM SERPL-SCNC: 142 MMOL/L — SIGNIFICANT CHANGE UP (ref 135–145)
WBC # BLD: 14.64 K/UL — HIGH (ref 3.8–10.5)
WBC # FLD AUTO: 14.64 K/UL — HIGH (ref 3.8–10.5)

## 2021-03-10 PROCEDURE — 71045 X-RAY EXAM CHEST 1 VIEW: CPT | Mod: 26,77

## 2021-03-10 PROCEDURE — 99024 POSTOP FOLLOW-UP VISIT: CPT

## 2021-03-10 PROCEDURE — 99233 SBSQ HOSP IP/OBS HIGH 50: CPT

## 2021-03-10 PROCEDURE — 99232 SBSQ HOSP IP/OBS MODERATE 35: CPT

## 2021-03-10 PROCEDURE — 71045 X-RAY EXAM CHEST 1 VIEW: CPT | Mod: 26

## 2021-03-10 RX ADMIN — MIDODRINE HYDROCHLORIDE 5 MILLIGRAM(S): 2.5 TABLET ORAL at 11:47

## 2021-03-10 RX ADMIN — SODIUM CHLORIDE 3 MILLILITER(S): 9 INJECTION INTRAMUSCULAR; INTRAVENOUS; SUBCUTANEOUS at 00:31

## 2021-03-10 RX ADMIN — Medication 650 MILLIGRAM(S): at 05:00

## 2021-03-10 RX ADMIN — MAGNESIUM OXIDE 400 MG ORAL TABLET 400 MILLIGRAM(S): 241.3 TABLET ORAL at 17:51

## 2021-03-10 RX ADMIN — ATORVASTATIN CALCIUM 40 MILLIGRAM(S): 80 TABLET, FILM COATED ORAL at 20:22

## 2021-03-10 RX ADMIN — Medication 12.5 MILLIGRAM(S): at 05:01

## 2021-03-10 RX ADMIN — SENNA PLUS 2 TABLET(S): 8.6 TABLET ORAL at 20:22

## 2021-03-10 RX ADMIN — MIDODRINE HYDROCHLORIDE 5 MILLIGRAM(S): 2.5 TABLET ORAL at 17:51

## 2021-03-10 RX ADMIN — Medication 325 MILLIGRAM(S): at 11:46

## 2021-03-10 RX ADMIN — MAGNESIUM OXIDE 400 MG ORAL TABLET 400 MILLIGRAM(S): 241.3 TABLET ORAL at 11:46

## 2021-03-10 RX ADMIN — FAMOTIDINE 20 MILLIGRAM(S): 10 INJECTION INTRAVENOUS at 17:51

## 2021-03-10 RX ADMIN — Medication 650 MILLIGRAM(S): at 06:00

## 2021-03-10 RX ADMIN — SODIUM CHLORIDE 3 MILLILITER(S): 9 INJECTION INTRAMUSCULAR; INTRAVENOUS; SUBCUTANEOUS at 05:15

## 2021-03-10 RX ADMIN — Medication 12.5 MILLIGRAM(S): at 17:51

## 2021-03-10 RX ADMIN — Medication 100 MICROGRAM(S): at 05:01

## 2021-03-10 RX ADMIN — SODIUM CHLORIDE 3 MILLILITER(S): 9 INJECTION INTRAMUSCULAR; INTRAVENOUS; SUBCUTANEOUS at 20:21

## 2021-03-10 RX ADMIN — ENOXAPARIN SODIUM 30 MILLIGRAM(S): 100 INJECTION SUBCUTANEOUS at 20:22

## 2021-03-10 RX ADMIN — Medication 20 MILLIGRAM(S): at 05:02

## 2021-03-10 RX ADMIN — Medication 500 MILLIGRAM(S): at 11:46

## 2021-03-10 RX ADMIN — MIDODRINE HYDROCHLORIDE 5 MILLIGRAM(S): 2.5 TABLET ORAL at 05:01

## 2021-03-10 RX ADMIN — Medication 1 MILLIGRAM(S): at 11:46

## 2021-03-10 RX ADMIN — MAGNESIUM OXIDE 400 MG ORAL TABLET 400 MILLIGRAM(S): 241.3 TABLET ORAL at 08:53

## 2021-03-10 RX ADMIN — Medication 20 MILLIEQUIVALENT(S): at 11:59

## 2021-03-10 RX ADMIN — Medication 81 MILLIGRAM(S): at 11:46

## 2021-03-10 RX ADMIN — SODIUM CHLORIDE 3 MILLILITER(S): 9 INJECTION INTRAMUSCULAR; INTRAVENOUS; SUBCUTANEOUS at 14:49

## 2021-03-10 NOTE — PROGRESS NOTE ADULT - ASSESSMENT
76y Female with PMH anemia, and hypothyroidism.  Pt presented to Health system a few days ago with fever and SOB.  She was found to have a UTI/sepsis with elevated lactate, + UA, and temp of 102.  She was started on Rocephin.  HGB was 6.  She was transfused 2 units of PRBC.  Per medical record, a year ago she had a GI bleed and was offered an endoscopy and colonoscopy but she refused and then failed to follow up outpatient.  Per medical record she did not want to stay at [facility] but agreed once she ruled in for NSTEMI.  She was transferred to [facility] for cardiac cath.     2/16 - s/p LHC showing EF 30%. LM normal, mLAD 90%, OM1 85%, pRCA 99%, dRCA 100%, mod pulm  HTN, SREEDHAR <0.5 consistent with critical AS  2-17- EGD with old blood suspect from pharynx/epistaxis?, poor bowel prep  2/18- plan for repeat colonoscopy  2/19- diverticulosis on colonoscopy   2/24- underwent CABG x3 (LIMA-LAD, SVG-OM1, OM2) and #23 bio-AVR,  2/25- remains intubated as not fully awake for CPAP trial, on milrinone, norepinephrine and dopamine for junctional kunal, CHB, currently AV pacing; arousable and following commands  2/26- extubated yesterday, remains on inotropes and vasopressors, KIMMY on Lasix gtt--> Bumex, albumin, [place] with PAD ~14  2/27-2/28- weaned off vasopressors on low dose dobutamine gtt, nonoligouric switched to PO torsemide, repeat TTE LV EF still 25-30%, remains in CHB off epicardial pacing  3/1- weaned off dobutamine gtt, pending CRT-D  3/2- s/p CRT-D  3/3- Cr. continues to improve, chest tubes still high output, sundowning at night thinking her family was here  3/4- 1 chest tube still in place  3/8- hypotensive required albumin infusions  3/9- s/p R-chest tube         HFEF/ICM, Cardiogenic shock- postop, resolved  -continue reduced dose torsemide 20mg daily to maintain euvolemia, monitor bicarb  - TTE LV EF ~25%-30%, review repeat Echo done yesterday much improved but doubt EF is >50%  - remain low SBP now on midodrine, unclear if able to tolerate even low dose metoprolol, Cr. normalized but low BP not able to use ARNI, hold spironolactone as well given hypotensive, consider addition of Dapagliflozin on discharge for HFrEF.   - mild delirium- constant reorientation    KIMMY postop  - resolved and diuresing well, nephrology following     CHB- postop  - s/p CRT-D, bi-V pacing on tele, EPS follow up      Severe Multivessel CAD s/p CABG x3  - cath [facility] mLAD 90%, OM1 85%, pRCA 99%, dRCA 100%  - continue ASA 81, statin, LDL 54  - monitor chest tube output for ongoing pleural effusions  - PT/OOB ambulate      Critical AS s/p bio-AVR  -continue ASA 81      Large PFO  -incidental noted on intraop LUIS MIGUEL with predominate L-to-R shunt  -continue to monitor, consider percutaneous closure in the future if episode of CVA or systemic emboli    Acute blood loss anemia  - received x2 PRBC at [facility] before transfer, 2 PRBC postop- monitor H/H   - occult blood positive, source unclear, colonoscopy with diverticulosis  - monitor for recurrent bleeding, pAfib      PT/rehab pending      Chet rGimaldo DO, [facility]  Faculty Non-Invasive Cardiologist  [phone]

## 2021-03-10 NOTE — PROGRESS NOTE ADULT - SUBJECTIVE AND OBJECTIVE BOX
Subjective: Patient lying in bed, no acute distress noted, denies chest pain, pressure, dizziness, shortness of breath, dizziness, headache, abdominal pain, N/V/D.     VITAL SIGNS  Vital Signs Last 24 Hrs  T(C): 37.2 (21 @ 21:00), Max: 37.2 (21 @ 21:00)  T(F): 99 (21 @ 21:00), Max: 99 (21 @ 21:00)  HR: 90 (03-10-21 @ 00:15) (79 - 100)  BP: 122/62 (03-10-21 @ 00:15) (91/39 - 126/74)  RR: 18 (03-10-21 @ 00:15) (18 - 18)  SpO2: 97% (03-10-21 @ 00:15) (97% - 98%)  on room air          Telemetry/Alarms:  Paced  LVEF: 55%    MEDICATIONS  acetaminophen   Tablet .. 650 milliGRAM(s) Oral every 6 hours PRN  ascorbic acid 500 milliGRAM(s) Oral daily  aspirin enteric coated 81 milliGRAM(s) Oral daily  atorvastatin 40 milliGRAM(s) Oral at bedtime  enoxaparin Injectable 30 milliGRAM(s) SubCutaneous daily  famotidine    Tablet 20 milliGRAM(s) Oral daily  ferrous    sulfate 325 milliGRAM(s) Oral daily  folic acid 1 milliGRAM(s) Oral daily  levothyroxine 100 MICROGram(s) Oral daily  magnesium oxide 400 milliGRAM(s) Oral three times a day with meals  metoprolol tartrate 12.5 milliGRAM(s) Oral two times a day  midodrine. 5 milliGRAM(s) Oral three times a day  polyethylene glycol 3350 17 Gram(s) Oral daily PRN  potassium chloride    Tablet ER 20 milliEquivalent(s) Oral daily  senna 2 Tablet(s) Oral at bedtime  sodium chloride 0.9% lock flush 3 milliLiter(s) IV Push every 8 hours  torsemide 20 milliGRAM(s) Oral daily      PHYSICAL EXAM  General: well nourished, well developed, no acute distress  Neurology: alert and oriented x 3, nonfocal, no gross deficits  Respiratory: clear to auscultation bilaterally  CV: regular rate and rhythm, normal S1, S2  Abdomen: soft, nontender, nondistended, positive bowel sounds,  Extremities: warm, well perfused. +2 BLE edema. + DP pulses bilaterally  Incisions: midline sternal incision,  c/d/i. sternum stable. Left chest wall PPM site, + Steri-strips c/d/i. Right LE harvest site, c/d/i.   Psych: Appropriate     @ 07:01  -   @ 07:00  --------------------------------------------------------  IN: 1880 mL / OUT: 600 mL / NET: 1280 mL     @ 07:01  -  03-10 @ 02:31  --------------------------------------------------------  IN: 480 mL / OUT: 1151 mL / NET: -671 mL        Weights:  Daily     Daily Weight in k.5 (09 Mar 2021 04:50)  Admit Wt: Drug Dosing Weight  Height (cm): 157 (2021 12:10)  Weight (kg): 61.2 (2021 12:10)  BMI (kg/m2): 24.8 (2021 12:10)  BSA (m2): 1.61 (2021 12:10)    All laboratory results, radiology and medications reviewed.    LABS      140  |  99  |  33.0<H>  ----------------------------<  124<H>  3.7   |  30.0<H>  |  0.70    Ca    9.6      09 Mar 2021 06:01  Phos  2.8       Mg     2.1         TPro  5.5<L>  /  Alb  2.9<L>  /  TBili  1.0  /  DBili  x   /  AST  123<H>  /  ALT  88<H>  /  AlkPhos  295<H>                                   8.2    15.52 )-----------( 341      ( 09 Mar 2021 06:01 )             27.1              CAPILLARY BLOOD GLUCOSE      POCT Blood Glucose.: 125 mg/dL (09 Mar 2021 17:09)         < from: Xray Chest 1 View- PORTABLE-Routine (Xray Chest 1 View- PORTABLE-Routine in AM.) (21 @ 05:58) >  Frontal expiratory view of the chest shows the heart to be similarly enlarged in size. Left defibrillator is again noted.    The lungs show increased central congestion with progression of pleural effusions and there is no evidence of pneumothorax.    IMPRESSION:  Congestive changes as noted.    < end of copied text >    < from: TTE Echo Complete w/ Contrast w/ Doppler (21 @ 11:08) >  Summary:   1. Small circumferential pericardial effusion without echo evidence of cardiac tamponade.   2. Left ventricular ejection fraction, by visual estimation, is 55 to 60%.   3. Normal global left ventricular systolic function.   4. Spectral Doppler shows impaired relaxation pattern of left ventricular myocardial filling (Grade I diastolic dysfunction).   5. There is severe concentric left ventricular hypertrophy.   6. Severely enlarged left atrium.   7. Normal right ventricular size and function.   8. Mild tricuspid regurgitation.   9. Mild mitral valve regurgitation.  10. Mitral valve mean gradient is 4.2 mmHg (HR 76) consistent with mild mitral stenosis.  11. Severe thickening and calcification of the anterior and posterior mitral valve leaflets.  12. Mild mitral annular calcification.  13. Aortic valve is normally functioning bioprosthetic valve. There is trivial para-valvular leak. Mean gradient is 14 mm Hg, acceleration time is 63 msec.  14. Compared to a prior study from 21, there is significant improvement in LV function and a small pericardial effusion.    < end of copied text >    PAST MEDICAL & SURGICAL HISTORY:  Iron deficiency anemia due to chronic blood loss    Hypothyroid    History of tonsillectomy

## 2021-03-10 NOTE — CHART NOTE - NSCHARTNOTEFT_GEN_A_CORE
76F PMH of anemia, hypothyroidism, and GI bleed 1 year ago (refused Endo/colonoscopy and never followed up) presented to [facility] originally with fever and SOB found to have urosepsis s/p course of rocephin (completed 2/17), anemia requiring 2 PRBC (no GI work up because pt wanted to sign out AMA), ruled in for NSTEMI, transferred to [facility] 2/16 and underwent cardiac cath which showed Multivessel CAD and Severe AS. Preop carotid US with b/l carotid stenosis confirmed by CTA, seen by vascular and cleared for OR (outpt follow up), s/p endoscopy 2/17 and colonoscopy 2/19 without evidence of acute bleed, noted to have hiatal hernia, now s/p CABG x3 with LIMA, AVR 2/24 with Dr. Cruz. 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 Physical therapy recommending Acute rehab as patient is only able to walk 5ft-10ft with a rolling walker and 1-person assist.     Pt has RIght Pigtail which was d/c 'd as per Dr. Cruz pending CXR       Patient seen and examined. Notes, flowsheets, medications, radiologic images and labs reviewed.  VSS. Pt in Bed alert, NAD. Pt states, " I feel good"     Neuro: A+O x 3, non-focal, speech clear and intact  HEENT:  NCAT, PERRL, EOMI. No conjuctival edema or icterus, no thrush.  Neck: supple, trachea midline  Pulm:  bilaterally diminished, no rales/rhonchi/wheezing, no accessory muscle use noted  CV:Paced   Abd: soft, NT, ND, + BS  Ext: YATES x 4, +2-3 b/l LE edema, 2+ DP b/l, distal motor/neuro/circ intact.   Skin: warm, dry, well perfused  Incisions: midsternal incision healing well, C/D/I, Sternum stable. Left chest wall PPM site C/D/I, REVH C/D/I    PLAN   LUIS MIGUEL results indicating EF  55 to 60%. stating that incomparison to a prior study from 2/28/21, there is significant improvement in LV function and a small pericardial effusion.  Cont. torsemide     Coronary artery disease involving native coronary artery of native heart without angina pectoris.    S/p AVR/C3L 2/24/21 with Dr Cruz.  Hemodynamically stable s/p BiV AICD 3/2/21 for CHB.   Continue ASA and Lipitor.  Continue Lopressor as tolerated by BP.  CDBEs, I/S use hourly while awake.   Encourage OOB and increased ambulation with physical therapy.   Chest PT.   Follow up daily CXR 3/11  Supplement electrolytes to maintain K>4 and Mg>2.   Tylenol PRN for pain control. Holding any narcotics.   Bowel regimen PRN.  COVID Pending in the AM     KIMMY (acute kidney injury).   ATN in setting of CHB.  Resolved.   Trending BUN/Cr.  Continue torsemide for diuresis.   Continue to monitor electrolytes, goal K>4 and Mg >2.   Renal consult appreciated.     Heart block.    CHB with junctional escape postop now s/p BIV AICD 3/2/21.   EP following.  Device interrogated  No Afib seen.     Acute systolic heart failure.   Continue torsemide  Continue Lopressor  Continue statin  Continue aspirin  Follow up daily CXR.   Daily weight    Hypothyroidism, unspecified type.   Endocrine following  Elevated TSH, low T4  Increased to Synthroid 100 mcg on 3/4/21.   Will need follow up as outpatient in 4-6 weeks for TFT monitoring.     Prophylactic measure.    SCDs, Lovenox for DVT prophylaxis.  Pepcid for GI prophylaxis.    Dispo: family amenable to Acute rehab, pt awaiting bed placement       Plan of care d/w Dr. Cruz

## 2021-03-10 NOTE — PROGRESS NOTE ADULT - SUBJECTIVE AND OBJECTIVE BOX
Patient feels ok, has another chest tube placed.  Assisted with breakfast.  Reports pain is more controlled.     REVIEW OF SYSTEMS  Constitutional - No fever,  +fatigue  Neurological - +loss of strength    FUNCTIONAL PROGRESS  3/10  Sit-Stand Transfer Training  Sit-to-Stand Transfer Training Rehab Potential: good, to achieve stated therapy goals  Transfer Training Sit-to-Stand Transfer: moderate assist (50% patient effort);  1 person assist;  rolling walker  Transfer Training Stand-to-Sit Transfer: minimum assist (75% patient effort);  1 person assist;  rolling walker  Sit-to-Stand Transfer Training Transfer Safety Analysis: decreased weight-shifting ability;  decreased strength;  impaired balance    Gait Training  Gait Training Rehab Potential: good, to achieve stated therapy goals  Gait Training: minimum assist (75% patient effort);  1 person + 1 person to manage equipment;  rolling walker;  pt req assist for chest tube/drain management. ;  15 feet  Gait Analysis: decreased step length;  decreased stride length;  decreased strength;  impaired balance;  pt requires VCs for posture, navigation and RW advancement    Therapeutic Exercise  Therapeutic Exercise Rehab Effort: good  Therapeutic Exercise Detail: Pt performs Fran UE/LE AROM Therex in functional planes, postural exercise, ankle pumps, quad sets, glute sets, arm curls, terminal knee ext. Pt unable to perform SLR in reclined position due to LE weakness.       VITALS  T(C): 36.6 (03-10-21 @ 09:44), Max: 37.2 (03-09-21 @ 21:00)  HR: 83 (03-10-21 @ 09:44) (79 - 100)  BP: 89/50 (03-10-21 @ 09:44) (89/50 - 126/74)  RR: 18 (03-10-21 @ 09:44) (18 - 18)  SpO2: 98% (03-10-21 @ 09:44) (95% - 98%)  Wt(kg): --    MEDICATIONS   acetaminophen   Tablet .. 650 milliGRAM(s) every 6 hours PRN  ascorbic acid 500 milliGRAM(s) daily  aspirin enteric coated 81 milliGRAM(s) daily  atorvastatin 40 milliGRAM(s) at bedtime  enoxaparin Injectable 30 milliGRAM(s) daily  famotidine    Tablet 20 milliGRAM(s) daily  ferrous    sulfate 325 milliGRAM(s) daily  folic acid 1 milliGRAM(s) daily  levothyroxine 100 MICROGram(s) daily  magnesium oxide 400 milliGRAM(s) three times a day with meals  metoprolol tartrate 12.5 milliGRAM(s) two times a day  midodrine. 5 milliGRAM(s) three times a day  polyethylene glycol 3350 17 Gram(s) daily PRN  potassium chloride    Tablet ER 20 milliEquivalent(s) daily  senna 2 Tablet(s) at bedtime  sodium chloride 0.9% lock flush 3 milliLiter(s) every 8 hours  torsemide 20 milliGRAM(s) daily      RECENT LABS/IMAGING                          8.4    14.64 )-----------( 421      ( 10 Mar 2021 06:54 )             27.5     03-10    142  |  102  |  34.0<H>  ----------------------------<  118<H>  4.1   |  29.0  |  0.70    Ca    9.7      10 Mar 2021 06:54  Mg     2.0     03-10    TPro  5.8<L>  /  Alb  3.2<L>  /  TBili  0.7  /  DBili  x   /  AST  99<H>  /  ALT  97<H>  /  AlkPhos  389<H>  03-10                    TTE 2/28 - Summary:   1. Left ventricular ejection fraction, by visual estimation, is 25 to 30%.   2. Severely decreased global left ventricular systolic function.   3. Abnormal septal motion consistent with post-operative status.   4. Severely increased LV wall thickness.   5. Severely enlarged left atrium.   6. Mild mitral valve regurgitation.   7. Severe mitral annular calcification.   8. Moderate to severe thickening of the anterior and posterior mitral valve leaflets.   9. Mitral valve mean gradient is 2.0 mmHg (at HR of 79 bpm).  10. Mild tricuspid regurgitation.  11. Mild pulmonic valve regurgitation.  12. Bioprosthesis in the aortic position, with mild aortic regurgitation, which is paravalvular in origin.  13. There is no evidence of pericardial effusion.  14. Endocardial visualization was enhanced with intravenous echo contrast.  15. Compared to TTE done on 2/26/2021, left ventricular function is unchanged from prior.    CXR 2/28 - Reduction of pleural effusions.    CXR 3/4 - Bilateral chest tubes in place. Residual lung a bibasilar pleural effusions and/or airspace disease as seen on prior 3/3/2021 chest radiograph    CXR 3/8 - Cardiomegaly and bilateral pleural effusions, right larger than left. No evidence of pulmonary vascular congestion..    CXR 3/10 - Small bilateral pleural effusions.  ----------------------------------------------------------------------------------------  PHYSICAL EXAM  Constitutional - NAD, Comfortable  Extremities - BLE edema  Neurologic Exam -                    Cognitive - AAO to self, PART of situation     Motor -                      LEFT    UE - ShAB 2/5,  4/5                    RIGHT UE - ShAB 2/5,  4/5                    LEFT    LE - HF 2/5, KE 2/5, DF 3/5                     RIGHT LE - HF 2/5, KE 1/5, DF 3/5     Sensory - Intact to LT  Psychiatric - Mood calm  ----------------------------------------------------------------------------------------  ASSESSMENT/PLAN  76yFemale with functional deficits after +NSTEMI/CAD/Severe AS  CAD s/p CABG x3 - ASA, Lipitor  Severe AS s/p AVR & Acute CHF - Demadex  Pleural effusions - Chest tube x1  HTN - Lopressor  HypoTN - Midodrine  Pain - Tylenol  DVT PPX - SCDs, Lovenox  Rehab - Workup incomplete. Planned for LUIS MIGUEL. Continue to recommend ACUTE inpatient rehabilitation for the functional deficits consisting of 3 hours of therapy/day & 24 hour RN/daily PMR physician for comorbid medical management. Will continue to follow for ongoing rehab needs and recommendations. Patient will be able to tolerate 3 hours a day.    Continue bedside therapy as well as OOB throughout the day with mobilization throughout the day with staff to maintain cardiopulmonary function and prevention of secondary complications related to debility.     Discussed with rehab clinical team.

## 2021-03-10 NOTE — PROGRESS NOTE ADULT - ASSESSMENT
76F PMH of anemia, hypothyroidism, and GI bleed 1 year ago (refused Endo/colonoscopy and never followed up) presented to [facility] originally with fever and SOB found to have urosepsis s/p course of rocephin (completed 2/17), anemia requiring 2 PRBC (no GI work up because pt wanted to sign out AMA), ruled in for NSTEMI, transferred to [facility] 2/16 and underwent cardiac cath which showed Multivessel CAD and Severe AS. Preop carotid US with b/l carotid stenosis confirmed by CTA, seen by vascular and cleared for OR (outpt follow up), s/p endoscopy 2/17 and colonoscopy 2/19 without evidence of acute bleed, noted to have hiatal hernia, now s/p CABG x3 with LIMALEE, AVR 2/24 with Dr. Cruz. 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 Physical therapy recommending Acute rehab as patient is only able to walk 5ft-10ft with a rolling walker and 1-person assist. Patient's family now agreeing for Acute rehab, awaiting bed placement.

## 2021-03-10 NOTE — PROGRESS NOTE ADULT - PROBLEM SELECTOR PLAN 9
SCDs, Lovenox for DVT prophylaxis.  Pepcid for GI prophylaxis.    Dispo: Recommending Acute rehab, awaiting bed placement   Plan needs to discuss with CTS team in AM   Plan to be discussed / reviewed with CTICU team during AM rounds.

## 2021-03-11 LAB
ANION GAP SERPL CALC-SCNC: 11 MMOL/L — SIGNIFICANT CHANGE UP (ref 5–17)
BUN SERPL-MCNC: 33 MG/DL — HIGH (ref 8–20)
CALCIUM SERPL-MCNC: 9.2 MG/DL — SIGNIFICANT CHANGE UP (ref 8.6–10.2)
CHLORIDE SERPL-SCNC: 102 MMOL/L — SIGNIFICANT CHANGE UP (ref 98–107)
CO2 SERPL-SCNC: 27 MMOL/L — SIGNIFICANT CHANGE UP (ref 22–29)
CREAT SERPL-MCNC: 0.71 MG/DL — SIGNIFICANT CHANGE UP (ref 0.5–1.3)
GLUCOSE SERPL-MCNC: 121 MG/DL — HIGH (ref 70–99)
HCT VFR BLD CALC: 25.5 % — LOW (ref 34.5–45)
HGB BLD-MCNC: 7.8 G/DL — LOW (ref 11.5–15.5)
MAGNESIUM SERPL-MCNC: 2 MG/DL — SIGNIFICANT CHANGE UP (ref 1.6–2.6)
MCHC RBC-ENTMCNC: 28.5 PG — SIGNIFICANT CHANGE UP (ref 27–34)
MCHC RBC-ENTMCNC: 30.6 GM/DL — LOW (ref 32–36)
MCV RBC AUTO: 93.1 FL — SIGNIFICANT CHANGE UP (ref 80–100)
PLATELET # BLD AUTO: 356 K/UL — SIGNIFICANT CHANGE UP (ref 150–400)
POTASSIUM SERPL-MCNC: 4.2 MMOL/L — SIGNIFICANT CHANGE UP (ref 3.5–5.3)
POTASSIUM SERPL-SCNC: 4.2 MMOL/L — SIGNIFICANT CHANGE UP (ref 3.5–5.3)
RBC # BLD: 2.74 M/UL — LOW (ref 3.8–5.2)
RBC # FLD: 22.1 % — HIGH (ref 10.3–14.5)
SODIUM SERPL-SCNC: 140 MMOL/L — SIGNIFICANT CHANGE UP (ref 135–145)
WBC # BLD: 11.72 K/UL — HIGH (ref 3.8–10.5)
WBC # FLD AUTO: 11.72 K/UL — HIGH (ref 3.8–10.5)

## 2021-03-11 PROCEDURE — 71045 X-RAY EXAM CHEST 1 VIEW: CPT | Mod: 26

## 2021-03-11 PROCEDURE — 99024 POSTOP FOLLOW-UP VISIT: CPT

## 2021-03-11 PROCEDURE — 99232 SBSQ HOSP IP/OBS MODERATE 35: CPT

## 2021-03-11 RX ORDER — POTASSIUM CHLORIDE 20 MEQ
40 PACKET (EA) ORAL ONCE
Refills: 0 | Status: COMPLETED | OUTPATIENT
Start: 2021-03-11 | End: 2021-03-11

## 2021-03-11 RX ORDER — FUROSEMIDE 40 MG
40 TABLET ORAL ONCE
Refills: 0 | Status: COMPLETED | OUTPATIENT
Start: 2021-03-11 | End: 2021-03-11

## 2021-03-11 RX ADMIN — MAGNESIUM OXIDE 400 MG ORAL TABLET 400 MILLIGRAM(S): 241.3 TABLET ORAL at 11:45

## 2021-03-11 RX ADMIN — SENNA PLUS 2 TABLET(S): 8.6 TABLET ORAL at 20:48

## 2021-03-11 RX ADMIN — Medication 650 MILLIGRAM(S): at 03:03

## 2021-03-11 RX ADMIN — Medication 650 MILLIGRAM(S): at 09:28

## 2021-03-11 RX ADMIN — SODIUM CHLORIDE 3 MILLILITER(S): 9 INJECTION INTRAMUSCULAR; INTRAVENOUS; SUBCUTANEOUS at 05:50

## 2021-03-11 RX ADMIN — MIDODRINE HYDROCHLORIDE 5 MILLIGRAM(S): 2.5 TABLET ORAL at 18:30

## 2021-03-11 RX ADMIN — MIDODRINE HYDROCHLORIDE 5 MILLIGRAM(S): 2.5 TABLET ORAL at 11:45

## 2021-03-11 RX ADMIN — Medication 20 MILLIGRAM(S): at 05:50

## 2021-03-11 RX ADMIN — MAGNESIUM OXIDE 400 MG ORAL TABLET 400 MILLIGRAM(S): 241.3 TABLET ORAL at 09:28

## 2021-03-11 RX ADMIN — Medication 20 MILLIEQUIVALENT(S): at 09:27

## 2021-03-11 RX ADMIN — Medication 500 MILLIGRAM(S): at 09:28

## 2021-03-11 RX ADMIN — Medication 1 MILLIGRAM(S): at 09:28

## 2021-03-11 RX ADMIN — MIDODRINE HYDROCHLORIDE 5 MILLIGRAM(S): 2.5 TABLET ORAL at 05:51

## 2021-03-11 RX ADMIN — Medication 100 MICROGRAM(S): at 05:50

## 2021-03-11 RX ADMIN — Medication 12.5 MILLIGRAM(S): at 05:50

## 2021-03-11 RX ADMIN — ATORVASTATIN CALCIUM 40 MILLIGRAM(S): 80 TABLET, FILM COATED ORAL at 20:48

## 2021-03-11 RX ADMIN — Medication 325 MILLIGRAM(S): at 09:27

## 2021-03-11 RX ADMIN — SODIUM CHLORIDE 3 MILLILITER(S): 9 INJECTION INTRAMUSCULAR; INTRAVENOUS; SUBCUTANEOUS at 20:38

## 2021-03-11 RX ADMIN — Medication 40 MILLIEQUIVALENT(S): at 18:31

## 2021-03-11 RX ADMIN — SODIUM CHLORIDE 3 MILLILITER(S): 9 INJECTION INTRAMUSCULAR; INTRAVENOUS; SUBCUTANEOUS at 14:20

## 2021-03-11 RX ADMIN — Medication 650 MILLIGRAM(S): at 04:03

## 2021-03-11 RX ADMIN — FAMOTIDINE 20 MILLIGRAM(S): 10 INJECTION INTRAVENOUS at 09:28

## 2021-03-11 RX ADMIN — ENOXAPARIN SODIUM 30 MILLIGRAM(S): 100 INJECTION SUBCUTANEOUS at 20:48

## 2021-03-11 RX ADMIN — Medication 81 MILLIGRAM(S): at 09:27

## 2021-03-11 RX ADMIN — MAGNESIUM OXIDE 400 MG ORAL TABLET 400 MILLIGRAM(S): 241.3 TABLET ORAL at 18:31

## 2021-03-11 RX ADMIN — Medication 12.5 MILLIGRAM(S): at 18:31

## 2021-03-11 RX ADMIN — Medication 40 MILLIGRAM(S): at 14:21

## 2021-03-11 NOTE — CHART NOTE - NSCHARTNOTEFT_GEN_A_CORE
76F PMH of anemia, hypothyroidism, and GI bleed 1 year ago (refused Endo/colonoscopy and never followed up) presented to [facility] originally with fever and SOB found to have urosepsis s/p course of rocephin (completed 2/17), anemia requiring 2 PRBC (no GI work up because pt wanted to sign out AMA), ruled in for NSTEMI, transferred to [facility] 2/16 and underwent cardiac cath which showed Multivessel CAD and Severe AS. Preop carotid US with b/l carotid stenosis confirmed by CTA, seen by vascular and cleared for OR (outpt follow up), s/p endoscopy 2/17 and colonoscopy 2/19 without evidence of acute bleed, noted to have hiatal hernia, now s/p CABG x3 with LIMALEE, AVR 2/24 with Dr. Cruz. 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    Physical therapy recommending Acute rehab as patient   pt increased walking distance from 5-10 Ft to15ft with a rolling walker and 1-person assist.     Patient seen and examined. Notes, flowsheets, medications, radiologic images and labs reviewed.  VSS. Pt in Bed alert, NAD. Pt states, " I am Ok"     Neuro: A+O x 3, non-focal, speech clear and intact  HEENT:  NCAT, PERRL, EOMI. No conjuctival edema or icterus, no thrush.  Neck: supple, trachea midline  Pulm:  bilaterally diminished, no rales/rhonchi/wheezing, no accessory muscle use noted  CV:Paced   Abd: soft, NT, ND, + BS  Ext: YATES x 4, +2-3 b/l LE edema, 2+ DP b/l, distal motor/neuro/circ intact.   Skin: warm, dry, well perfused  Incisions: midsternal incision healing well, C/D/I, Sternum stable. Left chest wall PPM site C/D/I, REVH C/D/I    PLAN   LUIS MIGUEL results indicating EF  55 to 60%. stating that incomparison to a prior study from 2/28/21, there is significant improvement in LV function and a small pericardial effusion.  Cont. torsemide     H& H 7.5/25 1 unit of Prbc given followed by Lasix 40 mg IVP    CAD   S/p AVR/C3L 2/24/21 with Dr Cruz.  Hemodynamically stable s/p BiV AICD 3/2/21 for CHB.   Continue ASA and Lipitor.  Continue Lopressor as tolerated by BP.  CDBEs, I/S use hourly while awake.   Encourage OOB and increased ambulation with physical therapy.   Chest PT.   Follow up daily CXR 3/11  Supplement electrolytes to maintain K>4 and Mg>2.   Tylenol PRN for pain control. Holding any narcotics.   Bowel regimen PRN.  COVID (-) 3/9   trend H & H    KIMMY (acute kidney injury).   ATN in setting of CHB.  Resolved.   Trending BUN/Cr.  Continue torsemide for diuresis.   Continue to monitor electrolytes, goal K>4 and Mg >2.   Renal consult appreciated.     Heart block.    CHB with junctional escape postop now s/p BIV AICD 3/2/21.   EP following.  Device interrogated  No Afib seen.     Acute systolic heart failure.   Continue torsemide  Continue Lopressor  Continue statin  Continue aspirin  Follow up daily CXR.   Daily weight    Hypothyroidism, unspecified type.   Endocrine following  Elevated TSH, low T4  Increased to Synthroid 100 mcg on 3/4/21.   Will need follow up as outpatient in 4-6 weeks for TFT monitoring.     Prophylactic measure.    SCDs, Lovenox for DVT prophylaxis.  Pepcid for GI prophylaxis.    Dispo: family amenable to Acute rehab, pt awaiting bed placement         Plan of care d/w Dr. Cruz.

## 2021-03-11 NOTE — PROGRESS NOTE ADULT - ASSESSMENT
76y Female with PMH anemia, and hypothyroidism.  Pt presented to [facility] a few days ago with fever and SOB.  She was found to have a UTI/sepsis with elevated lactate, + UA, and temp of 102.  She was started on Rocephin.  HGB was 6.  She was transfused 2 units of PRBC.  Per medical record, a year ago she had a GI bleed and was offered an endoscopy and colonoscopy but she refused and then failed to follow up outpatient.  Per medical record she did not want to stay at [place] but agreed once she ruled in for NSTEMI.  She was transferred to [facility] for cardiac cath.     2/16 - s/p LHC showing EF 30%. LM normal, mLAD 90%, OM1 85%, pRCA 99%, dRCA 100%, mod pulm  HTN, SREEDHAR <0.5 consistent with critical AS  2-17- EGD with old blood suspect from pharynx/epistaxis?, poor bowel prep  2/18- plan for repeat colonoscopy  2/19- diverticulosis on colonoscopy   2/24- underwent CABG x3 (LIMA-LAD, SVG-OM1, OM2) and #23 bio-AVR,  2/25- remains intubated as not fully awake for CPAP trial, on milrinone, norepinephrine and dopamine for junctional kunal, CHB, currently AV pacing; arousable and following commands  2/26- extubated yesterday, remains on inotropes and vasopressors, KIMMY on Lasix gtt--> Bumex, albumin, [place] with PAD ~14  2/27-2/28- weaned off vasopressors on low dose dobutamine gtt, nonoligouric switched to PO torsemide, repeat TTE LV EF still 25-30%, remains in CHB off epicardial pacing  3/1- weaned off dobutamine gtt, pending CRT-D  3/2- s/p CRT-D  3/3- Cr. continues to improve, chest tubes still high output, sundowning at night thinking her family was here  3/4- 1 chest tube still in place  3/8- hypotensive required albumin infusions  3/9- s/p R-chest tube  3/10- chest tube removed, IV Lasix 40mg x1         HFEF/ICM, Cardiogenic shock- postop, resolved  -continue reduced dose torsemide 20mg daily to maintain euvolemia, monitor bicarb  - TTE LV EF ~25%-30%, review repeat Echo done yesterday much improved but doubt EF is >50%  - repeat pBNP, continue Lasix to keep euvolemic, LE compression stockings  - remain low SBP now on midodrine, unclear if able to tolerate even low dose metoprolol, Cr. normalized but low BP not able to use ARNI, hold spironolactone as well given hypotensive, consider addition of Dapagliflozin on discharge for HFrEF.   - mild delirium- constant reorientation    KIMMY postop  - resolved and diuresing well    CHB- postop  - s/p CRT-D, bi-V pacing on tele, EPS follow up      Severe Multivessel CAD s/p CABG x3  - cath LHC mLAD 90%, OM1 85%, pRCA 99%, dRCA 100%  - continue ASA 81, statin, LDL 54  - monitor chest tube output for ongoing pleural effusions  - PT/OOB ambulate      Critical AS s/p bio-AVR  -continue ASA 81      Large PFO  -incidental noted on intraop LUIS MIGUEL with predominate L-to-R shunt  -continue to monitor, consider percutaneous closure in the future if episode of CVA or systemic emboli    Acute blood loss anemia  - received x2 PRBC at [place] before transfer, 2 PRBC postop- monitor H/H   - occult blood positive, source unclear, colonoscopy with diverticulosis  - monitor for recurrent bleeding, pAfib      PT/rehab pending      Chet Grimaldo DO, [facility]  Faculty Non-Invasive Cardiologist  [phone]

## 2021-03-11 NOTE — PROGRESS NOTE ADULT - PROBLEM SELECTOR PLAN 1
S/p AVR/C3L 2/24/21 with Dr Cruz.  Hemodynamically stable s/p BiV AICD 3/2/21 for CHB.   Continue ASA and Lipitor.  Continue Lopressor as tolerated by BP.  Continue Midodrine  CDBEs, I/S use hourly while awake.   Encourage OOB and increased ambulation with physical therapy.   Chest PT.   Follow up AM CXR.   Continue torsemide for diuresis.   Strict I/Os.  Supplement electrolytes to maintain K>4 and Mg>2.   Tylenol PRN for pain control. Holding any narcotics.   Bowel regimen PRN.  Cont supportive care and discuss case in AM with team.

## 2021-03-11 NOTE — PROGRESS NOTE ADULT - ASSESSMENT
76F PMH of anemia, hypothyroidism, and GI bleed 1 year ago (refused Endo/colonoscopy and never followed up) presented to [facility] originally with fever and SOB found to have urosepsis s/p course of rocephin (completed 2/17), anemia requiring 2 PRBC (no GI work up because pt wanted to sign out AMA), ruled in for NSTEMI, transferred to [facility] 2/16 and underwent cardiac cath which showed Multivessel CAD and Severe AS. Preop carotid US with b/l carotid stenosis confirmed by CTA, seen by vascular and cleared for OR (outpt follow up), s/p endoscopy 2/17 and colonoscopy 2/19 without evidence of acute bleed, noted to have hiatal hernia, now s/p CABG x3 with LIMALEE, AVR 2/24 with Dr. Cruz. 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 Physical therapy recommending Acute rehab as patient is only able to walk 5ft-10ft with a rolling walker and 1-person assist. Patient's family now agreeing for Acute rehab, awaiting bed placement.

## 2021-03-11 NOTE — PROGRESS NOTE ADULT - SUBJECTIVE AND OBJECTIVE BOX
Subjective: Patient lyin gin bed, no acute distress, denies chst pain, pressure, palpitation, dizziness, shortness of breath.     VITAL SIGNS  Vital Signs Last 24 Hrs  T(C): 37.1 (03-11-21 @ 05:46), Max: 37.2 (03-10-21 @ 20:16)  T(F): 98.7 (03-11-21 @ 05:46), Max: 99 (03-10-21 @ 20:16)  HR: 103 (03-11-21 @ 05:46) (83 - 105)  BP: 108/67 (03-11-21 @ 05:46) (89/50 - 126/68)  RR: 18 (03-11-21 @ 05:46) (16 - 18)  SpO2: 96% (03-11-21 @ 05:46) (96% - 98%)  on room air           Telemetry/Alarms:  Paced  LVEF: 55%    MEDICATIONS  acetaminophen   Tablet .. 650 milliGRAM(s) Oral every 6 hours PRN  ascorbic acid 500 milliGRAM(s) Oral daily  aspirin enteric coated 81 milliGRAM(s) Oral daily  atorvastatin 40 milliGRAM(s) Oral at bedtime  enoxaparin Injectable 30 milliGRAM(s) SubCutaneous daily  famotidine    Tablet 20 milliGRAM(s) Oral daily  ferrous    sulfate 325 milliGRAM(s) Oral daily  folic acid 1 milliGRAM(s) Oral daily  levothyroxine 100 MICROGram(s) Oral daily  magnesium oxide 400 milliGRAM(s) Oral three times a day with meals  metoprolol tartrate 12.5 milliGRAM(s) Oral two times a day  midodrine. 5 milliGRAM(s) Oral three times a day  polyethylene glycol 3350 17 Gram(s) Oral daily PRN  potassium chloride    Tablet ER 20 milliEquivalent(s) Oral daily  senna 2 Tablet(s) Oral at bedtime  sodium chloride 0.9% lock flush 3 milliLiter(s) IV Push every 8 hours  torsemide 20 milliGRAM(s) Oral daily      PHYSICAL EXAM  General: well nourished, well developed, no acute distress  Neurology: alert and oriented x 3, nonfocal, no gross deficits  Respiratory: clear to auscultation bilaterally  CV: regular rate and rhythm, normal S1, S2  Abdomen: soft, nontender, nondistended, positive bowel sounds,  Extremities: warm, well perfused. +2 BLE edema. + DP pulses bilaterally  Incisions: midline sternal incision,  c/d/i. sternum stable. Left chest wall PPM site, + Steri-strips c/d/i. Right LE harvest site, c/d/i.   Psych: Appropriate    03-09 @ 07:01  -  03-10 @ 07:00  --------------------------------------------------------  IN: 720 mL / OUT: 1501 mL / NET: -781 mL    03-10 @ 07:01  -  03-11 @ 05:52  --------------------------------------------------------  IN: 120 mL / OUT: 0 mL / NET: 120 mL        Weights:  Daily     Daily   Admit Wt: Drug Dosing Weight  Height (cm): 157 (24 Feb 2021 12:10)  Weight (kg): 61.2 (24 Feb 2021 12:10)  BMI (kg/m2): 24.8 (24 Feb 2021 12:10)  BSA (m2): 1.61 (24 Feb 2021 12:10)    All laboratory results, radiology and medications reviewed.    LABS  03-10    142  |  102  |  34.0<H>  ----------------------------<  118<H>  4.1   |  29.0  |  0.70    Ca    9.7      10 Mar 2021 06:54  Mg     2.0     03-10    TPro  5.8<L>  /  Alb  3.2<L>  /  TBili  0.7  /  DBili  x   /  AST  99<H>  /  ALT  97<H>  /  AlkPhos  389<H>  03-10                                 8.4    14.64 )-----------( 421      ( 10 Mar 2021 06:54 )             27.5            Bilirubin Total, Serum: 0.7 mg/dL (03-10 @ 06:54)    < from: Xray Chest 1 View- PORTABLE-Routine (Xray Chest 1 View- PORTABLE-Routine in AM.) (03.10.21 @ 05:39) >    FINDINGS:  Median sternotomy and CABG. Aortic valve replacement. Left AICD. Right chest tube.  Small bilateral pleural effusions. No pneumothorax.  There is cardiomegaly.      IMPRESSION: Small bilateral pleural effusions.    < end of copied text >    < from: TTE Echo Complete w/ Contrast w/ Doppler (03.09.21 @ 11:08) >  Summary:   1. Small circumferential pericardial effusion without echo evidence of cardiac tamponade.   2. Left ventricular ejection fraction, by visual estimation, is 55 to 60%.   3. Normal global left ventricular systolic function.   4. Spectral Doppler shows impaired relaxation pattern of left ventricular myocardial filling (Grade I diastolic dysfunction).   5. There is severe concentric left ventricular hypertrophy.   6. Severely enlarged left atrium.   7. Normal right ventricular size and function.   8. Mild tricuspid regurgitation.   9. Mild mitral valve regurgitation.  10. Mitral valve mean gradient is 4.2 mmHg (HR 76) consistent with mild mitral stenosis.  11. Severe thickening and calcification of the anterior and posterior mitral valve leaflets.  12. Mild mitral annular calcification.  13. Aortic valve is normally functioning bioprosthetic valve. There is trivial para-valvular leak. Mean gradient is 14 mm Hg, acceleration time is 63 msec.  14. Compared to a prior study from 2/28/21, there is significant improvement in LV function and a small pericardial effusion.    < end of copied text >    PAST MEDICAL & SURGICAL HISTORY:  Iron deficiency anemia due to chronic blood loss    Hypothyroid    History of tonsillectomy

## 2021-03-12 ENCOUNTER — TRANSCRIPTION ENCOUNTER (OUTPATIENT)
Age: 76
End: 2021-03-12

## 2021-03-12 ENCOUNTER — INPATIENT (INPATIENT)
Facility: HOSPITAL | Age: 76
LOS: 10 days | Discharge: HOME HEALTH | DRG: 949 | End: 2021-03-23
Attending: PHYSICAL MEDICINE & REHABILITATION | Admitting: PHYSICAL MEDICINE & REHABILITATION
Payer: MEDICARE

## 2021-03-12 VITALS
TEMPERATURE: 98 F | OXYGEN SATURATION: 98 % | SYSTOLIC BLOOD PRESSURE: 146 MMHG | HEART RATE: 99 BPM | RESPIRATION RATE: 18 BRPM | DIASTOLIC BLOOD PRESSURE: 76 MMHG

## 2021-03-12 VITALS
DIASTOLIC BLOOD PRESSURE: 58 MMHG | WEIGHT: 173.28 LBS | RESPIRATION RATE: 16 BRPM | SYSTOLIC BLOOD PRESSURE: 111 MMHG | OXYGEN SATURATION: 93 % | HEIGHT: 66 IN | TEMPERATURE: 98 F | HEART RATE: 99 BPM

## 2021-03-12 DIAGNOSIS — Z95.1 PRESENCE OF AORTOCORONARY BYPASS GRAFT: ICD-10-CM

## 2021-03-12 DIAGNOSIS — Z90.89 ACQUIRED ABSENCE OF OTHER ORGANS: Chronic | ICD-10-CM

## 2021-03-12 DIAGNOSIS — K92.2 GASTROINTESTINAL HEMORRHAGE, UNSPECIFIED: ICD-10-CM

## 2021-03-12 LAB
ANION GAP SERPL CALC-SCNC: 10 MMOL/L — SIGNIFICANT CHANGE UP (ref 5–17)
BUN SERPL-MCNC: 34 MG/DL — HIGH (ref 8–20)
CALCIUM SERPL-MCNC: 9 MG/DL — SIGNIFICANT CHANGE UP (ref 8.6–10.2)
CHLORIDE SERPL-SCNC: 98 MMOL/L — SIGNIFICANT CHANGE UP (ref 98–107)
CO2 SERPL-SCNC: 30 MMOL/L — HIGH (ref 22–29)
CREAT SERPL-MCNC: 0.63 MG/DL — SIGNIFICANT CHANGE UP (ref 0.5–1.3)
GLUCOSE SERPL-MCNC: 113 MG/DL — HIGH (ref 70–99)
HCT VFR BLD CALC: 28 % — LOW (ref 34.5–45)
HGB BLD-MCNC: 8.8 G/DL — LOW (ref 11.5–15.5)
MAGNESIUM SERPL-MCNC: 1.9 MG/DL — SIGNIFICANT CHANGE UP (ref 1.6–2.6)
MCHC RBC-ENTMCNC: 28.7 PG — SIGNIFICANT CHANGE UP (ref 27–34)
MCHC RBC-ENTMCNC: 31.4 GM/DL — LOW (ref 32–36)
MCV RBC AUTO: 91.2 FL — SIGNIFICANT CHANGE UP (ref 80–100)
NT-PROBNP SERPL-SCNC: 9003 PG/ML — HIGH (ref 0–300)
PLATELET # BLD AUTO: 353 K/UL — SIGNIFICANT CHANGE UP (ref 150–400)
POTASSIUM SERPL-MCNC: 4.1 MMOL/L — SIGNIFICANT CHANGE UP (ref 3.5–5.3)
POTASSIUM SERPL-SCNC: 4.1 MMOL/L — SIGNIFICANT CHANGE UP (ref 3.5–5.3)
RBC # BLD: 3.07 M/UL — LOW (ref 3.8–5.2)
RBC # FLD: 20.8 % — HIGH (ref 10.3–14.5)
SARS-COV-2 RNA SPEC QL NAA+PROBE: SIGNIFICANT CHANGE UP
SARS-COV-2 RNA SPEC QL NAA+PROBE: SIGNIFICANT CHANGE UP
SODIUM SERPL-SCNC: 138 MMOL/L — SIGNIFICANT CHANGE UP (ref 135–145)
WBC # BLD: 10.3 K/UL — SIGNIFICANT CHANGE UP (ref 3.8–10.5)
WBC # FLD AUTO: 10.3 K/UL — SIGNIFICANT CHANGE UP (ref 3.8–10.5)

## 2021-03-12 PROCEDURE — 83880 ASSAY OF NATRIURETIC PEPTIDE: CPT

## 2021-03-12 PROCEDURE — 85610 PROTHROMBIN TIME: CPT

## 2021-03-12 PROCEDURE — 85018 HEMOGLOBIN: CPT

## 2021-03-12 PROCEDURE — 83735 ASSAY OF MAGNESIUM: CPT

## 2021-03-12 PROCEDURE — 86923 COMPATIBILITY TEST ELECTRIC: CPT

## 2021-03-12 PROCEDURE — C1894: CPT

## 2021-03-12 PROCEDURE — 71045 X-RAY EXAM CHEST 1 VIEW: CPT

## 2021-03-12 PROCEDURE — P9016: CPT

## 2021-03-12 PROCEDURE — 71045 X-RAY EXAM CHEST 1 VIEW: CPT | Mod: 26

## 2021-03-12 PROCEDURE — 94760 N-INVAS EAR/PLS OXIMETRY 1: CPT

## 2021-03-12 PROCEDURE — C1730: CPT

## 2021-03-12 PROCEDURE — 84443 ASSAY THYROID STIM HORMONE: CPT

## 2021-03-12 PROCEDURE — 94010 BREATHING CAPACITY TEST: CPT

## 2021-03-12 PROCEDURE — 99233 SBSQ HOSP IP/OBS HIGH 50: CPT

## 2021-03-12 PROCEDURE — 86850 RBC ANTIBODY SCREEN: CPT

## 2021-03-12 PROCEDURE — 99232 SBSQ HOSP IP/OBS MODERATE 35: CPT

## 2021-03-12 PROCEDURE — 93312 ECHO TRANSESOPHAGEAL: CPT

## 2021-03-12 PROCEDURE — 88342 IMHCHEM/IMCYTCHM 1ST ANTB: CPT

## 2021-03-12 PROCEDURE — C1898: CPT

## 2021-03-12 PROCEDURE — 93005 ELECTROCARDIOGRAM TRACING: CPT

## 2021-03-12 PROCEDURE — C8929: CPT

## 2021-03-12 PROCEDURE — 82435 ASSAY OF BLOOD CHLORIDE: CPT

## 2021-03-12 PROCEDURE — 81001 URINALYSIS AUTO W/SCOPE: CPT

## 2021-03-12 PROCEDURE — 84295 ASSAY OF SERUM SODIUM: CPT

## 2021-03-12 PROCEDURE — C1887: CPT

## 2021-03-12 PROCEDURE — 36415 COLL VENOUS BLD VENIPUNCTURE: CPT

## 2021-03-12 PROCEDURE — 80053 COMPREHEN METABOLIC PANEL: CPT

## 2021-03-12 PROCEDURE — 99152 MOD SED SAME PHYS/QHP 5/>YRS: CPT

## 2021-03-12 PROCEDURE — 82272 OCCULT BLD FECES 1-3 TESTS: CPT

## 2021-03-12 PROCEDURE — 33249 INSJ/RPLCMT DEFIB W/LEAD(S): CPT

## 2021-03-12 PROCEDURE — 84436 ASSAY OF TOTAL THYROXINE: CPT

## 2021-03-12 PROCEDURE — C1883: CPT

## 2021-03-12 PROCEDURE — C1892: CPT

## 2021-03-12 PROCEDURE — 71046 X-RAY EXAM CHEST 2 VIEWS: CPT

## 2021-03-12 PROCEDURE — 94002 VENT MGMT INPAT INIT DAY: CPT

## 2021-03-12 PROCEDURE — 93460 R&L HRT ART/VENTRICLE ANGIO: CPT

## 2021-03-12 PROCEDURE — 97116 GAIT TRAINING THERAPY: CPT

## 2021-03-12 PROCEDURE — 97530 THERAPEUTIC ACTIVITIES: CPT

## 2021-03-12 PROCEDURE — 82962 GLUCOSE BLOOD TEST: CPT

## 2021-03-12 PROCEDURE — 86965 POOLING BLOOD PLATELETS: CPT

## 2021-03-12 PROCEDURE — 33225 L VENTRIC PACING LEAD ADD-ON: CPT

## 2021-03-12 PROCEDURE — 85014 HEMATOCRIT: CPT

## 2021-03-12 PROCEDURE — 97110 THERAPEUTIC EXERCISES: CPT

## 2021-03-12 PROCEDURE — P9037: CPT

## 2021-03-12 PROCEDURE — C1889: CPT

## 2021-03-12 PROCEDURE — 99024 POSTOP FOLLOW-UP VISIT: CPT

## 2021-03-12 PROCEDURE — 93880 EXTRACRANIAL BILAT STUDY: CPT

## 2021-03-12 PROCEDURE — 85027 COMPLETE CBC AUTOMATED: CPT

## 2021-03-12 PROCEDURE — U0003: CPT

## 2021-03-12 PROCEDURE — 86901 BLOOD TYPING SEROLOGIC RH(D): CPT

## 2021-03-12 PROCEDURE — 86900 BLOOD TYPING SEROLOGIC ABO: CPT

## 2021-03-12 PROCEDURE — 93325 DOPPLER ECHO COLOR FLOW MAPG: CPT

## 2021-03-12 PROCEDURE — 80061 LIPID PANEL: CPT

## 2021-03-12 PROCEDURE — 84439 ASSAY OF FREE THYROXINE: CPT

## 2021-03-12 PROCEDURE — 87640 STAPH A DNA AMP PROBE: CPT

## 2021-03-12 PROCEDURE — C1777: CPT

## 2021-03-12 PROCEDURE — C1882: CPT

## 2021-03-12 PROCEDURE — 85025 COMPLETE CBC W/AUTO DIFF WBC: CPT

## 2021-03-12 PROCEDURE — 88305 TISSUE EXAM BY PATHOLOGIST: CPT

## 2021-03-12 PROCEDURE — 84484 ASSAY OF TROPONIN QUANT: CPT

## 2021-03-12 PROCEDURE — 88304 TISSUE EXAM BY PATHOLOGIST: CPT

## 2021-03-12 PROCEDURE — 83036 HEMOGLOBIN GLYCOSYLATED A1C: CPT

## 2021-03-12 PROCEDURE — 93320 DOPPLER ECHO COMPLETE: CPT

## 2021-03-12 PROCEDURE — 84480 ASSAY TRIIODOTHYRONINE (T3): CPT

## 2021-03-12 PROCEDURE — C1713: CPT

## 2021-03-12 PROCEDURE — 70498 CT ANGIOGRAPHY NECK: CPT

## 2021-03-12 PROCEDURE — 97163 PT EVAL HIGH COMPLEX 45 MIN: CPT

## 2021-03-12 PROCEDURE — 82947 ASSAY GLUCOSE BLOOD QUANT: CPT

## 2021-03-12 PROCEDURE — 94003 VENT MGMT INPAT SUBQ DAY: CPT

## 2021-03-12 PROCEDURE — 82803 BLOOD GASES ANY COMBINATION: CPT

## 2021-03-12 PROCEDURE — 84100 ASSAY OF PHOSPHORUS: CPT

## 2021-03-12 PROCEDURE — 36430 TRANSFUSION BLD/BLD COMPNT: CPT

## 2021-03-12 PROCEDURE — 82550 ASSAY OF CK (CPK): CPT

## 2021-03-12 PROCEDURE — C1751: CPT

## 2021-03-12 PROCEDURE — 83605 ASSAY OF LACTIC ACID: CPT

## 2021-03-12 PROCEDURE — C1769: CPT

## 2021-03-12 PROCEDURE — P9012: CPT

## 2021-03-12 PROCEDURE — 85730 THROMBOPLASTIN TIME PARTIAL: CPT

## 2021-03-12 PROCEDURE — 84134 ASSAY OF PREALBUMIN: CPT

## 2021-03-12 PROCEDURE — 87641 MR-STAPH DNA AMP PROBE: CPT

## 2021-03-12 PROCEDURE — P9045: CPT

## 2021-03-12 PROCEDURE — 93306 TTE W/DOPPLER COMPLETE: CPT

## 2021-03-12 PROCEDURE — 84132 ASSAY OF SERUM POTASSIUM: CPT

## 2021-03-12 PROCEDURE — 82330 ASSAY OF CALCIUM: CPT

## 2021-03-12 PROCEDURE — 80048 BASIC METABOLIC PNL TOTAL CA: CPT

## 2021-03-12 PROCEDURE — U0005: CPT

## 2021-03-12 RX ORDER — ENOXAPARIN SODIUM 100 MG/ML
30 INJECTION SUBCUTANEOUS
Qty: 0 | Refills: 0 | DISCHARGE
Start: 2021-03-12

## 2021-03-12 RX ORDER — FERROUS SULFATE 325(65) MG
1 TABLET ORAL
Qty: 0 | Refills: 0 | DISCHARGE
Start: 2021-03-12

## 2021-03-12 RX ORDER — MIDODRINE HYDROCHLORIDE 2.5 MG/1
5 TABLET ORAL THREE TIMES A DAY
Refills: 0 | Status: DISCONTINUED | OUTPATIENT
Start: 2021-03-14 | End: 2021-03-15

## 2021-03-12 RX ORDER — INFLUENZA VIRUS VACCINE 15; 15; 15; 15 UG/.5ML; UG/.5ML; UG/.5ML; UG/.5ML
0.5 SUSPENSION INTRAMUSCULAR ONCE
Refills: 0 | Status: COMPLETED | OUTPATIENT
Start: 2021-03-12 | End: 2021-03-12

## 2021-03-12 RX ORDER — POLYETHYLENE GLYCOL 3350 17 G/17G
17 POWDER, FOR SOLUTION ORAL
Qty: 0 | Refills: 0 | DISCHARGE
Start: 2021-03-12

## 2021-03-12 RX ORDER — ASPIRIN/CALCIUM CARB/MAGNESIUM 324 MG
1 TABLET ORAL
Qty: 0 | Refills: 0 | DISCHARGE
Start: 2021-03-12

## 2021-03-12 RX ORDER — MIDODRINE HYDROCHLORIDE 2.5 MG/1
1 TABLET ORAL
Qty: 0 | Refills: 0 | DISCHARGE
Start: 2021-03-12

## 2021-03-12 RX ORDER — SENNA PLUS 8.6 MG/1
2 TABLET ORAL AT BEDTIME
Refills: 0 | Status: DISCONTINUED | OUTPATIENT
Start: 2021-03-14 | End: 2021-03-23

## 2021-03-12 RX ORDER — FOLIC ACID 0.8 MG
1 TABLET ORAL
Qty: 0 | Refills: 0 | DISCHARGE
Start: 2021-03-12

## 2021-03-12 RX ORDER — METOPROLOL TARTRATE 50 MG
12.5 TABLET ORAL
Qty: 0 | Refills: 0 | DISCHARGE
Start: 2021-03-12

## 2021-03-12 RX ORDER — ENOXAPARIN SODIUM 100 MG/ML
30 INJECTION SUBCUTANEOUS DAILY
Refills: 0 | Status: DISCONTINUED | OUTPATIENT
Start: 2021-03-12 | End: 2021-03-12

## 2021-03-12 RX ORDER — FOLIC ACID 0.8 MG
1 TABLET ORAL DAILY
Refills: 0 | Status: DISCONTINUED | OUTPATIENT
Start: 2021-03-12 | End: 2021-03-23

## 2021-03-12 RX ORDER — ATORVASTATIN CALCIUM 80 MG/1
40 TABLET, FILM COATED ORAL AT BEDTIME
Refills: 0 | Status: DISCONTINUED | OUTPATIENT
Start: 2021-03-12 | End: 2021-03-23

## 2021-03-12 RX ORDER — FAMOTIDINE 10 MG/ML
20 INJECTION INTRAVENOUS DAILY
Refills: 0 | Status: DISCONTINUED | OUTPATIENT
Start: 2021-03-12 | End: 2021-03-23

## 2021-03-12 RX ORDER — MAGNESIUM OXIDE 400 MG ORAL TABLET 241.3 MG
1 TABLET ORAL
Qty: 0 | Refills: 0 | DISCHARGE
Start: 2021-03-12

## 2021-03-12 RX ORDER — METOPROLOL TARTRATE 50 MG
12.5 TABLET ORAL
Refills: 0 | Status: DISCONTINUED | OUTPATIENT
Start: 2021-03-14 | End: 2021-03-23

## 2021-03-12 RX ORDER — LEVOTHYROXINE SODIUM 125 MCG
1 TABLET ORAL
Qty: 0 | Refills: 0 | DISCHARGE

## 2021-03-12 RX ORDER — FAMOTIDINE 10 MG/ML
1 INJECTION INTRAVENOUS
Qty: 0 | Refills: 0 | DISCHARGE
Start: 2021-03-12

## 2021-03-12 RX ORDER — MAGNESIUM OXIDE 400 MG ORAL TABLET 241.3 MG
400 TABLET ORAL
Refills: 0 | Status: DISCONTINUED | OUTPATIENT
Start: 2021-03-14 | End: 2021-03-23

## 2021-03-12 RX ORDER — ACETAMINOPHEN 500 MG
2 TABLET ORAL
Qty: 0 | Refills: 0 | DISCHARGE
Start: 2021-03-12

## 2021-03-12 RX ORDER — SENNA PLUS 8.6 MG/1
2 TABLET ORAL
Qty: 0 | Refills: 0 | DISCHARGE
Start: 2021-03-12

## 2021-03-12 RX ORDER — ASCORBIC ACID 60 MG
1 TABLET,CHEWABLE ORAL
Qty: 0 | Refills: 0 | DISCHARGE
Start: 2021-03-12

## 2021-03-12 RX ORDER — POTASSIUM CHLORIDE 20 MEQ
20 PACKET (EA) ORAL DAILY
Refills: 0 | Status: DISCONTINUED | OUTPATIENT
Start: 2021-03-12 | End: 2021-03-23

## 2021-03-12 RX ORDER — LEVOTHYROXINE SODIUM 125 MCG
100 TABLET ORAL DAILY
Refills: 0 | Status: DISCONTINUED | OUTPATIENT
Start: 2021-03-12 | End: 2021-03-23

## 2021-03-12 RX ORDER — ATORVASTATIN CALCIUM 80 MG/1
1 TABLET, FILM COATED ORAL
Qty: 0 | Refills: 0 | DISCHARGE
Start: 2021-03-12

## 2021-03-12 RX ORDER — POTASSIUM CHLORIDE 20 MEQ
1 PACKET (EA) ORAL
Qty: 0 | Refills: 0 | DISCHARGE
Start: 2021-03-12

## 2021-03-12 RX ORDER — FERROUS SULFATE 325(65) MG
325 TABLET ORAL DAILY
Refills: 0 | Status: DISCONTINUED | OUTPATIENT
Start: 2021-03-12 | End: 2021-03-23

## 2021-03-12 RX ORDER — LEVOTHYROXINE SODIUM 125 MCG
1 TABLET ORAL
Qty: 0 | Refills: 0 | DISCHARGE
Start: 2021-03-12

## 2021-03-12 RX ORDER — ASCORBIC ACID 60 MG
500 TABLET,CHEWABLE ORAL DAILY
Refills: 0 | Status: DISCONTINUED | OUTPATIENT
Start: 2021-03-12 | End: 2021-03-23

## 2021-03-12 RX ORDER — POLYETHYLENE GLYCOL 3350 17 G/17G
17 POWDER, FOR SOLUTION ORAL DAILY
Refills: 0 | Status: DISCONTINUED | OUTPATIENT
Start: 2021-03-14 | End: 2021-03-23

## 2021-03-12 RX ORDER — ACETAMINOPHEN 500 MG
650 TABLET ORAL EVERY 6 HOURS
Refills: 0 | Status: DISCONTINUED | OUTPATIENT
Start: 2021-03-14 | End: 2021-03-23

## 2021-03-12 RX ORDER — ASPIRIN/CALCIUM CARB/MAGNESIUM 324 MG
81 TABLET ORAL DAILY
Refills: 0 | Status: DISCONTINUED | OUTPATIENT
Start: 2021-03-12 | End: 2021-03-23

## 2021-03-12 RX ORDER — ENOXAPARIN SODIUM 100 MG/ML
40 INJECTION SUBCUTANEOUS DAILY
Refills: 0 | Status: DISCONTINUED | OUTPATIENT
Start: 2021-03-12 | End: 2021-03-23

## 2021-03-12 RX ORDER — LANOLIN ALCOHOL/MO/W.PET/CERES
3 CREAM (GRAM) TOPICAL AT BEDTIME
Refills: 0 | Status: DISCONTINUED | OUTPATIENT
Start: 2021-03-12 | End: 2021-03-23

## 2021-03-12 RX ADMIN — Medication 12.5 MILLIGRAM(S): at 05:30

## 2021-03-12 RX ADMIN — Medication 20 MILLIEQUIVALENT(S): at 12:31

## 2021-03-12 RX ADMIN — Medication 1 MILLIGRAM(S): at 12:30

## 2021-03-12 RX ADMIN — Medication 500 MILLIGRAM(S): at 12:31

## 2021-03-12 RX ADMIN — MIDODRINE HYDROCHLORIDE 5 MILLIGRAM(S): 2.5 TABLET ORAL at 05:30

## 2021-03-12 RX ADMIN — ENOXAPARIN SODIUM 40 MILLIGRAM(S): 100 INJECTION SUBCUTANEOUS at 22:28

## 2021-03-12 RX ADMIN — ATORVASTATIN CALCIUM 40 MILLIGRAM(S): 80 TABLET, FILM COATED ORAL at 22:28

## 2021-03-12 RX ADMIN — POLYETHYLENE GLYCOL 3350 17 GRAM(S): 17 POWDER, FOR SOLUTION ORAL at 12:30

## 2021-03-12 RX ADMIN — Medication 100 MICROGRAM(S): at 05:30

## 2021-03-12 RX ADMIN — MAGNESIUM OXIDE 400 MG ORAL TABLET 400 MILLIGRAM(S): 241.3 TABLET ORAL at 16:32

## 2021-03-12 RX ADMIN — Medication 12.5 MILLIGRAM(S): at 16:33

## 2021-03-12 RX ADMIN — Medication 20 MILLIGRAM(S): at 05:30

## 2021-03-12 RX ADMIN — Medication 325 MILLIGRAM(S): at 12:31

## 2021-03-12 RX ADMIN — Medication 81 MILLIGRAM(S): at 12:31

## 2021-03-12 RX ADMIN — MAGNESIUM OXIDE 400 MG ORAL TABLET 400 MILLIGRAM(S): 241.3 TABLET ORAL at 12:31

## 2021-03-12 RX ADMIN — FAMOTIDINE 20 MILLIGRAM(S): 10 INJECTION INTRAVENOUS at 12:31

## 2021-03-12 RX ADMIN — SODIUM CHLORIDE 3 MILLILITER(S): 9 INJECTION INTRAMUSCULAR; INTRAVENOUS; SUBCUTANEOUS at 13:32

## 2021-03-12 RX ADMIN — MIDODRINE HYDROCHLORIDE 5 MILLIGRAM(S): 2.5 TABLET ORAL at 12:31

## 2021-03-12 RX ADMIN — SODIUM CHLORIDE 3 MILLILITER(S): 9 INJECTION INTRAMUSCULAR; INTRAVENOUS; SUBCUTANEOUS at 04:41

## 2021-03-12 NOTE — PROGRESS NOTE ADULT - PROBLEM SELECTOR PROBLEM 6
Acute systolic heart failure
Acute cystitis without hematuria
Bilateral carotid artery stenosis
Acute systolic heart failure
Acute cystitis without hematuria
Acute systolic heart failure
Bilateral carotid artery stenosis
Acute cystitis without hematuria
Acute systolic heart failure
Bilateral carotid artery stenosis
Acute systolic heart failure
Acute cystitis without hematuria
Acute systolic heart failure
Acute cystitis without hematuria
Acute systolic heart failure
Acute systolic heart failure
Bilateral carotid artery stenosis
Acute systolic heart failure
Acute systolic heart failure

## 2021-03-12 NOTE — PROGRESS NOTE ADULT - PROBLEM SELECTOR PROBLEM 2
Aortic valve stenosis, etiology of cardiac valve disease unspecified
Occult blood in stools
Aortic valve stenosis, etiology of cardiac valve disease unspecified

## 2021-03-12 NOTE — DISCHARGE NOTE PROVIDER - NSDCCPTREATMENT_GEN_ALL_CORE_FT
PRINCIPAL PROCEDURE  Procedure: CABG, with aortic valve replacement and LUIS MIGUEL  Findings and Treatment: CABGx3 LIMA-LAD, Vein-OM1, OM2  AVR 23mm Capps 2700  Endoscopic harvest of greater saphenous vein from right lower extremity      SECONDARY PROCEDURE  Procedure: Implantation of biv ICD  Findings and Treatment:

## 2021-03-12 NOTE — PROGRESS NOTE ADULT - PROBLEM SELECTOR PLAN 5
as above
Endocrine was consulted for elevated TSH.  Home dose Synthroid ordered for suspected noncompliance with current medications at home.
Endocrine was consulted for elevated TSH.  Home dose Synthroid ordered for suspected noncompliance with current medications at home.
Plan as above.
Plan as above.
as above
Plan as above.
as above
Endocrine was consulted for elevated TSH.  Home dose Synthroid ordered for suspected noncompliance with current medications at home.
completed course of Rocephin
as above
Endocrine was consulted for elevated TSH, home dose Synthroid ordered  Repeat thyroid function test in AM
Endocrine was consulted for elevated TSH.  Home dose Synthroid ordered for suspected noncompliance with current medications at home.
Plan as above.
Plan as above.
as above
as above
Plan as above.
completed course of Rocephin
Endocrine was consulted for elevated TSH.  Home dose Synthroid ordered for suspected noncompliance with current medications at home.
Plan as above.
completed course of rocephin

## 2021-03-12 NOTE — DISCHARGE NOTE PROVIDER - NSDCFUSCHEDAPPT_GEN_ALL_CORE_FT
JAMES AU ; 03/15/2021 ; [facility] ; 03/24/2021 ; NPP CTSurg [address] [facility]

## 2021-03-12 NOTE — PROGRESS NOTE ADULT - PROBLEM SELECTOR PROBLEM 9
Prophylactic measure
Acute systolic heart failure
Confusion
Prophylactic measure
Acute systolic heart failure
Confusion
Prophylactic measure

## 2021-03-12 NOTE — DISCHARGE NOTE PROVIDER - NSDCFUADDAPPT_GEN_ALL_CORE_FT
Please follow up with Dr. Cruz on 3/24/21 at 2:15    **Please obtain Chest X-Ray (script enclosed in folder) 1-2 days PRIOR to scheduled appointment, preferably at a [facility] facility.**    The cardiac surgery office is located at [address] [place] [address].   Please call the CT Surgery office upon arrival to your appointment, you will then be instructed when to enter the building. This is being done due to COVID precautions.

## 2021-03-12 NOTE — DISCHARGE NOTE PROVIDER - CARE PROVIDER_API CALL
Rodriguez Ulrich  [address] [facility], [address], [place], [place].  Phone: [phone]  Fax: (   )    -  Scheduled Appointment: 03/15/2021 02:15 PM    Kristy Ortiz)  Cardiac Electrophysiology; Cardiovascular Disease; Internal Medicine  [address] [place] [address]  Phone: [phone]  Fax: [phone]  Scheduled Appointment: 03/15/2021 01:00 PM   Kristy Ortiz)  Cardiac Electrophysiology; Cardiovascular Disease; Internal Medicine  [address] [place] [address]  Phone: [phone]  Fax: [phone]  Scheduled Appointment: 03/15/2021 01:00 PM    Quin Perez)  EndocrinologyMetabDiabetes; Internal Medicine  [address] [place] [address]  Phone: [phone]  Fax: [phone]  Follow Up Time:     Rodriguez Ulrich  [address] [facility], [address], [place], [place].  Phone: [phone]  Fax: (   )    -  Scheduled Appointment: 03/15/2021 02:15 PM

## 2021-03-12 NOTE — DISCHARGE NOTE PROVIDER - DETAILS OF MALNUTRITION DIAGNOSIS/DIAGNOSES
This patient has been assessed with a concern for Malnutrition and was treated during this hospitalization for the following Nutrition diagnosis/diagnoses:     -  03/09/2021: Severe protein-calorie malnutrition   -  03/02/2021: Moderate protein-calorie malnutrition   -  02/21/2021: Moderate protein-calorie malnutrition   This patient has been assessed with a concern for Malnutrition and was treated during this hospitalization for the following Nutrition diagnosis/diagnoses:     -  03/09/2021: Severe protein-calorie malnutrition   -  03/02/2021: Moderate protein-calorie malnutrition   -  02/21/2021: Moderate protein-calorie malnutrition    This patient has been assessed with a concern for Malnutrition and was treated during this hospitalization for the following Nutrition diagnosis/diagnoses:     -  03/09/2021: Severe protein-calorie malnutrition   -  03/02/2021: Moderate protein-calorie malnutrition   -  02/21/2021: Moderate protein-calorie malnutrition

## 2021-03-12 NOTE — H&P ADULT - HISTORY OF PRESENT ILLNESS
77yo F with PMH of anemia, hypothyroidism, and GI bleed presented as transfer from [place] to [facility] on 2/16 for cardiac cath for NSTEMI. Pt initially presented a few days prior to [place] with fever and SOB and was found to have NSTEMI, urosepsis s/p course of ceftriaxone (completed 2/17), anemia requiring 2 PRBC (no GI work up because pt wanted to sign out AMA. Cardiac cath 2/16 showed multivessel CAD and severe aortic stenosis. Preop carotid US with bilateral carotid stenosis confirmed by CTA. Patient was seen by vascular and cleared for OR (will need outpatient follow up for carotid stenosis). Had endoscopy 2/17 and colonoscopy 2/19 without evidence of acute bleed, however was noted to have hiatal hernia. She is s/p CABG x3 with LIMA and AVR 2/24 with Dr. Cruz. 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 TTE on 3/9 showing EF improved to 55-60%. Last chest tube removed 3/9. Coccyx noted to have some skin breakdown-pt complaining of buttock pain.  Patient was evaluated by PM&R and therapy for functional deficits and gait/ ADL impairments and recommended acute rehabilitation. Patient was medically optimized for discharge to [facility] on 3/12/21.

## 2021-03-12 NOTE — H&P ADULT - ASSESSMENT
77yo F with PMH of anemia, hypothyroidism, and GI bleed presented as transfer from [place] to [facility] on 2/16 for cardiac cath for NSTEMI. Found to have multivessel CAD and severe AS. 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    Comprehensive Multidisciplinary Rehab Program:  - Gait, ADL, Functional impairments  - CMS Impairment group: 09 (Cardiac)  - PT/OT/ SLP 3 hours a day 5 days a week    #Functional deficits 2/2 Acute Systolic Heart Failure  - Aortic Stenosis s/p bio-AVR (2/24), NSTEMI and CAD s/p CABGx3 (2/24)  - CHB s/p BiV AICD (3/2) – device interrogated, no afib seen  - TTE on 3/9 shows EF improved to 55-60% after surgery  - c/w asa and Lipitor  - c/w lopressor  - c/w demadex for diuresis and daily potassium supplementation of 20mEq  - supplement electrolytes to maintain K>4 and Mg>2  - midodrine 5mg TID for BP support  - incentive spirometry, consider Chest PT   - daily weights, strict Is and Os  - hospitalist consult    #LE Edema  - 2/2 above  - c/w bilateral LE ace wraps  - diuresis with demadex    #Bilateral Carotid Stenosis  - seen by vascular surgery at [facility] – will need OP follow up    #Hiatal Hernia  - found during endoscopy  - follow up as outpatient    #Hypothyroid  - c/w Synthroid 100mcg  - will need OP f/u in 4-6 weeks for TFT monitoring    ***    Sleep:  - Melatonin PRN    Pain:  - Tylenol PRN    GI/Bowel:  - Senna 2 tabs at qhs, Miralax daily prn  - GI ppx: pepcid    /Bladder:  - Monitor PVR if no void in 8h; SC for >400 cc  - Toileting schedule q4h    Diet / Dysphagia:    - Diet: Carbohydrate Consistent  - Nutrition to follow    Skin/ Pressure Injury Prevention:  - assessment on admission   - Incisions:  -Turn Q2hrs in bed while awake, OOB to Chair, PT/OT/SLP     DVT prophylaxis: Lovenox, SCDs    Precautions/ Restrictions  - Falls, Cardiac, Sternal  - Lungs: Aspiration, Incentive Spirometer    - COVID PCR: neg 3/12    --------------------------------------------  Outpatient Follow up:    --------------------------------------------      MEDICAL PROGNOSIS: GOOD            REHAB POTENTIAL: GOOD             ESTIMATED DISPOSITION: HOME WITH HOME CARE            ELOS: 10-14 Days   EXPECTED THERAPY:     P.T. 1hr/day       O.T. 1hr/day      S.L.P. 1hr/day     P&O Unnecessary     EXP FREQUENCY: 5 days per 7 day period     PRESCREEN COMPARISION:   I have reviewed the prescreen information and I have found no relevant changes between the preadmission screening and my post admission evaluation     RATIONALE FOR INPATIENT ADMISSION - Patient demonstrates the following: (check all that apply)  [X] Medically appropriate for rehabilitation admission  [X] Has attainable rehab goals with an appropriate initial discharge plan  [X] Has rehabilitation potential (expected to make a significant improvement within a reasonable period of time)   [X] Requires close medical management by a rehab physician, rehab nursing care, Hospitalist and comprehensive interdisciplinary team (including PT, OT, & or SLP, Prosthetics and Orthotics)   75yo F with PMH of anemia, hypothyroidism, and GI bleed presented as transfer from [place] to [facility] on 2/16 for cardiac cath for NSTEMI. Found to have multivessel CAD and severe AS. 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    Comprehensive Multidisciplinary Rehab Program:  - Gait, ADL, Functional impairments  - CMS Impairment group: 09 (Cardiac)  - PT/OT/ SLP 3 hours a day 5 days a week    #Functional deficits 2/2 Acute Systolic Heart Failure  - Aortic Stenosis s/p bio-AVR (2/24), NSTEMI and CAD s/p CABGx3 (2/24)  - CHB s/p BiV AICD (3/2) – device interrogated, no afib seen  - TTE on 3/9 shows EF improved to 55-60% after surgery  - c/w asa and Lipitor  - c/w lopressor  - c/w demadex for diuresis and daily potassium supplementation of 20mEq  - supplement electrolytes to maintain K>4 and Mg>2  - midodrine 5mg TID for BP support  - incentive spirometry, consider Chest PT   - daily weights, strict Is and Os  - hospitalist consult  - weekly CXR per cardiology  - has follow up with EP doctor  Dr. Ortiz on March 15 to check on new cardiac device/BIV-ICD    will need to call to reschedule/ inform she is in rehab    #LE Edema  - 2/2 above  - c/w bilateral LE ace wraps  - diuresis with demadex    #Bilateral Carotid Stenosis  - seen by vascular surgery at [facility] – will need OP follow up    #Hiatal Hernia  - found during endoscopy  - follow up as outpatient    #Hypothyroid  - c/w Synthroid 100mcg  - will need OP f/u in 4-6 weeks for TFT monitoring    ***    Sleep:  - Melatonin PRN    Pain:  - Tylenol PRN    GI/Bowel:  - Senna 2 tabs at qhs, Miralax daily prn  - GI ppx: pepcid    /Bladder:  - Monitor PVR if no void in 8h; SC for >400 cc  - Toileting schedule q4h    Diet / Dysphagia:    - Diet: Carbohydrate Consistent  - Nutrition to follow    Skin/ Pressure Injury Prevention:  - assessment on admission:     Stage II sacral and b/l buttock pressure injuries     - barrier cream and Allevyn dressing placed    - offloading   - Incisions:     Sternal incision open to air    drain incisions: one oozing mild amount of blood - dry, sterile, dressing placed  -Turn Q2hrs in bed while awake, OOB to Chair, PT/OT/SLP     DVT prophylaxis: Lovenox, SCDs    Precautions/ Restrictions  - Falls, Cardiac, Sternal  - Lungs: Aspiration, Incentive Spirometer    - COVID PCR: neg 3/12    --------------------------------------------  Outpatient Follow up:  Kristy Ortiz)  Cardiac Electrophysiology; Cardiovascular Disease; Internal Medicine  [address] [place] [address]  Phone: [phone]    Quin Perez)  EndocrinologyMetabDiabetes; Internal Medicine  [address] [place] [address]  Phone: [phone]    Rodriguez Ulrich  [address] [place] [address], [address], [place], [place].  Phone: [phone]    --------------------------------------------      MEDICAL PROGNOSIS: GOOD            REHAB POTENTIAL: GOOD             ESTIMATED DISPOSITION: HOME WITH HOME CARE            ELOS: 10-14 Days   EXPECTED THERAPY:     P.T. 1hr/day       O.T. 1hr/day      S.L.P. 1hr/day     P&O Unnecessary     EXP FREQUENCY: 5 days per 7 day period     PRESCREEN COMPARISION:   I have reviewed the prescreen information and I have found no relevant changes between the preadmission screening and my post admission evaluation     RATIONALE FOR INPATIENT ADMISSION - Patient demonstrates the following: (check all that apply)  [X] Medically appropriate for rehabilitation admission  [X] Has attainable rehab goals with an appropriate initial discharge plan  [X] Has rehabilitation potential (expected to make a significant improvement within a reasonable period of time)   [X] Requires close medical management by a rehab physician, rehab nursing care, Hospitalist and comprehensive interdisciplinary team (including PT, OT, & or SLP, Prosthetics and Orthotics)

## 2021-03-12 NOTE — DISCHARGE NOTE PROVIDER - NSDCCPCAREPLAN_GEN_ALL_CORE_FT
PRINCIPAL DISCHARGE DIAGNOSIS  Diagnosis: Coronary artery disease involving native coronary artery of native heart without angina pectoris  Assessment and Plan of Treatment: Coronary artery disease involving native coronary artery of native heart without angina pectoris  You have a follow up appointment with Dr. Cruz in March 24 at 2:15pm. Follow up with your cardiologist and primary care doctor within 7-10 days after discharge. Sternal wound precautions, Blood glucose fingerstick checks qAC/HS, daily weights, DASH diet, ensure supplements bid, daily shower,  PT/OT Rehab  per rehab facility. BMP, CBC daily to monitor H & H and BUN/ CR. CXR weekly. Vitals per routine.      1. Take ALL of your medications as ordered. Fill your prescriptions the day you are discharged and take according to the schedule you were given. Continue to take a stool softener if you are taking narcotic pain medications. AVOID medications such as ibuprofen or naproxen if you have had bypass surgery. If you have any questions or are unable to fill the prescriptions, please call the office right away at [phone].  2. Shower daily. Clean all incisions daily while showering with warm water and mild soap, pat dry with a clean towel and do not cover with any dressings unless instructed to. No bathing, swimming in a pool or the ocean until instructed by MD.  DO NOT use creams or lotions on the wound.  3. We advise that you do not drive until instructed by MD.   4. You may not return to work until instructed by MD.   5. Please eat a low fat, low cholesterol, low salt diet. (No added/extra salt). Please drinks Ensures three TID   6. Weigh yourself every day in the morning and record it in the weight log in your red folder. Notify the office of any weight gain more than 2-3 pounds in 24 hours.  7. Continue breathing exercises several times a day. Continue to use your heart pillow.  8. No heavy lifting nothing greater than 5 pounds until cleared by MD.   9. Call / Notify MD any fever greater than 101.0, any drainage from incisions or if they become red, hot or very tender to the touch.  10. Increase activity as tolerated. Walk indoors and/or outdoors at least 3 times a day.        SECONDARY DISCHARGE DIAGNOSES  Diagnosis: Aortic valve stenosis, etiology of cardiac valve disease unspecified  Assessment and Plan of Treatment: Aortic valve stenosis, etiology of cardiac valve disease unspecified  - You will receive a wallet card about your new valve in the mail.  Please carry it with you to present to anyone who may ask if you have any medical implants.  - Be sure to inform your doctors including your dentist about your valve since you will need to take antibiotics to reduce the risk of infection before certain medical and dental procedures.      Diagnosis: Heart block  Assessment and Plan of Treatment: Heart block  Please follow with EP doctor  Dr. Ortiz on March 15 at 1pm for your appt. to check on your new  cardiac device/BIV-ICD   1. You have a follow up for your cardiac device BIV -ICD on left chest wall   2. Keep affected arm below shoulder, with no heavy lifting for 6 weeks.    5. Please notify the physician if there is any bleeding, drainage or swelling of the site.    6. Please notify the physician of any fever greater than 100.4.      Diagnosis: Hypothyroidism, unspecified type  Assessment and Plan of Treatment: Hypothyroidism, unspecified type  Take all medications as prescribed. Follow up with your primary care doctor once discharged from Rehab.       Diagnosis: Anemia, unspecified type  Assessment and Plan of Treatment: Anemia, unspecified type  By taking iron (ferrous sulfate), folate and vitamin C as directed for one month, your red blood cell count should improve. Please take a stool softener with the medications as they may cause constipation.  In the rehab please monitor CBC daily       Diagnosis: Gastrointestinal hemorrhage, unspecified gastrointestinal hemorrhage type  Assessment and Plan of Treatment: Gastrointestinal hemorrhage, unspecified gastrointestinal hemorrhage type       PRINCIPAL DISCHARGE DIAGNOSIS  Diagnosis: Coronary artery disease involving native coronary artery of native heart without angina pectoris  Assessment and Plan of Treatment: Coronary artery disease involving native coronary artery of native heart without angina pectoris  You have a follow up appointment with Dr. Cruz in March 24 at 2:15pm. Follow up with your cardiologist and primary care doctor within 7-10 days after discharge. Sternal wound precautions, Blood glucose fingerstick checks qAC/HS, daily weights, DASH diet, ensure supplements bid, daily shower,  PT/OT Rehab  per rehab facility. BMP, CBC daily to monitor H & H and BUN/ CR. CXR weekly. Vitals per routine.      1. Take ALL of your medications as ordered. Fill your prescriptions the day you are discharged and take according to the schedule you were given. Continue to take a stool softener if you are taking narcotic pain medications. AVOID medications such as ibuprofen or naproxen if you have had bypass surgery. If you have any questions or are unable to fill the prescriptions, please call the office right away at [phone].  2. Shower daily. Clean all incisions daily while showering with warm water and mild soap, pat dry with a clean towel and do not cover with any dressings unless instructed to. No bathing, swimming in a pool or the ocean until instructed by MD.  DO NOT use creams or lotions on the wound.  3. We advise that you do not drive until instructed by MD.   4. You may not return to work until instructed by MD.   5. Please eat a low fat, low cholesterol, low salt diet. (No added/extra salt). Please drinks Ensures three TID   6. Weigh yourself every day in the morning and record it in the weight log in your red folder. Notify the office of any weight gain more than 2-3 pounds in 24 hours.  7. Continue breathing exercises several times a day. Continue to use your heart pillow.  8. No heavy lifting nothing greater than 5 pounds until cleared by MD.   9. Call / Notify MD any fever greater than 101.0, any drainage from incisions or if they become red, hot or very tender to the touch.  10. Increase activity as tolerated. Walk indoors and/or outdoors at least 3 times a day.        SECONDARY DISCHARGE DIAGNOSES  Diagnosis: Aortic valve stenosis, etiology of cardiac valve disease unspecified  Assessment and Plan of Treatment: Aortic valve stenosis, etiology of cardiac valve disease unspecified  - You will receive a wallet card about your new valve in the mail.  Please carry it with you to present to anyone who may ask if you have any medical implants.  - Be sure to inform your doctors including your dentist about your valve since you will need to take antibiotics to reduce the risk of infection before certain medical and dental procedures.      Diagnosis: Heart block  Assessment and Plan of Treatment: Heart block  Please follow with EP doctor  Dr. Ortiz on March 15 at 1pm for your appt. to check on your new  cardiac device/BIV-ICD   1. You have a follow up for your cardiac device BIV -ICD on left chest wall   2. Keep affected arm below shoulder, with no heavy lifting for 6 weeks.    5. Please notify the physician if there is any bleeding, drainage or swelling of the site.    6. Please notify the physician of any fever greater than 100.4.      Diagnosis: Hypothyroidism, unspecified type  Assessment and Plan of Treatment: Hypothyroidism, unspecified type  Take all medications as prescribed. Follow up with your primary care and endocrine doctor once discharged from Rehab.       Diagnosis: Anemia, unspecified type  Assessment and Plan of Treatment: Anemia, unspecified type  By taking iron (ferrous sulfate), folate and vitamin C as directed for one month, your red blood cell count should improve. Please take a stool softener with the medications as they may cause constipation.  In the rehab please monitor CBC daily

## 2021-03-12 NOTE — PROGRESS NOTE ADULT - ASSESSMENT
76y Female with PMH anemia, and hypothyroidism.  Pt presented to [facility] a few days ago with fever and SOB.  She was found to have a UTI/sepsis with elevated lactate, + UA, and temp of 102.  She was started on Rocephin.  HGB was 6.  She was transfused 2 units of PRBC.  Per medical record, a year ago she had a GI bleed and was offered an endoscopy and colonoscopy but she refused and then failed to follow up outpatient.  Per medical record she did not want to stay at [place] but agreed once she ruled in for NSTEMI.  She was transferred to [facility] for cardiac cath.     2/16 - s/p LHC showing EF 30%. LM normal, mLAD 90%, OM1 85%, pRCA 99%, dRCA 100%, mod pulm  HTN, SREEDHAR <0.5 consistent with critical AS  2-17- EGD with old blood suspect from pharynx/epistaxis?, poor bowel prep  2/18- plan for repeat colonoscopy  2/19- diverticulosis on colonoscopy   2/24- underwent CABG x3 (LIMA-LAD, SVG-OM1, OM2) and #23 bio-AVR,  2/25- remains intubated as not fully awake for CPAP trial, on milrinone, norepinephrine and dopamine for junctional kunal, CHB, currently AV pacing; arousable and following commands  2/26- extubated yesterday, remains on inotropes and vasopressors, KIMMY on Lasix gtt--> Bumex, albumin, [place] with PAD ~14  2/27-2/28- weaned off vasopressors on low dose dobutamine gtt, nonoligouric switched to PO torsemide, repeat TTE LV EF still 25-30%, remains in CHB off epicardial pacing  3/1- weaned off dobutamine gtt, pending CRT-D  3/2- s/p CRT-D  3/3- Cr. continues to improve, chest tubes still high output, sundowning at night thinking her family was here  3/4- 1 chest tube still in place  3/8- hypotensive required albumin infusions  3/9- s/p R-chest tube  3/10- chest tube removed, IV Lasix 40mg x1  3/11- LE edema with stable pBNP, ACE-bandage         HFEF/ICM, Cardiogenic shock- postop, resolved  -continue reduced dose torsemide 20mg daily to maintain euvolemia, monitor bicarb  - TTE LV EF ~25%-30%, review repeat Echo done yesterday much improved but doubt EF is >50%  - repeat pBNP, continue Lasix to keep euvolemic, LE compression stockings  - remain low SBP now on midodrine, unclear if able to tolerate even low dose metoprolol, Cr. normalized but low BP not able to use ARNI, hold spironolactone as well given hypotensive, consider addition of Dapagliflozin on discharge for HFrEF.   - mild delirium- constant reorientation  - continue ACE bandage wrap for LE edema, leg elevation and compression stockings, pBNP stable doubt from CHF- defer diuresis for now    KIMMY postop  - resolved and diuresing well    CHB- postop  - s/p CRT-D, bi-V pacing on tele, EPS follow up      Severe Multivessel CAD s/p CABG x3  - cath C mLAD 90%, OM1 85%, pRCA 99%, dRCA 100%  - continue ASA 81, statin, LDL 54  - monitor chest tube output for ongoing pleural effusions  - PT/OOB ambulate      Critical AS s/p bio-AVR  -continue ASA 81      Large PFO  -incidental noted on intraop LUIS MIGUEL with predominate L-to-R shunt  -continue to monitor, consider percutaneous closure in the future if episode of CVA or systemic emboli    Acute blood loss anemia  - received x2 PRBC at [place] before transfer, 2 PRBC postop- monitor H/H   - occult blood positive, source unclear, colonoscopy with diverticulosis  - monitor for recurrent bleeding, pAfib      Aggressive PT, OOB/rehab pending      Chet Grimaldo DO, [facility]  Faculty Non-Invasive Cardiologist  [phone]

## 2021-03-12 NOTE — PROGRESS NOTE ADULT - SUBJECTIVE AND OBJECTIVE BOX
Subjective: Patient lying in bed, no acute distress, stated "I am okay" denies chest pain, pressure, palpitation, dizziness, shortness of breath.     VITAL SIGNS  Vital Signs Last 24 Hrs  T(C): 36.9 (21 @ 05:25), Max: 37.4 (21 @ 11:37)  T(F): 98.4 (21 @ 05:25), Max: 99.3 (21 @ 11:37)  HR: 97 (21 @ 05:25) (81 - 100)  BP: 127/58 (21 @ 05:25) (91/52 - 127/58)  RR: 16 (21 @ 05:25) (16 - 19)  SpO2: 96% (21 @ 05:25) (96% - 100%)  on room air           Telemetry/Alarms:  V paced  LVEF: 55%    MEDICATIONS  acetaminophen   Tablet .. 650 milliGRAM(s) Oral every 6 hours PRN  ascorbic acid 500 milliGRAM(s) Oral daily  aspirin enteric coated 81 milliGRAM(s) Oral daily  atorvastatin 40 milliGRAM(s) Oral at bedtime  enoxaparin Injectable 30 milliGRAM(s) SubCutaneous daily  famotidine    Tablet 20 milliGRAM(s) Oral daily  ferrous    sulfate 325 milliGRAM(s) Oral daily  folic acid 1 milliGRAM(s) Oral daily  levothyroxine 100 MICROGram(s) Oral daily  magnesium oxide 400 milliGRAM(s) Oral three times a day with meals  metoprolol tartrate 12.5 milliGRAM(s) Oral two times a day  midodrine. 5 milliGRAM(s) Oral three times a day  polyethylene glycol 3350 17 Gram(s) Oral daily PRN  potassium chloride    Tablet ER 20 milliEquivalent(s) Oral daily  senna 2 Tablet(s) Oral at bedtime  sodium chloride 0.9% lock flush 3 milliLiter(s) IV Push every 8 hours  torsemide 20 milliGRAM(s) Oral daily      PHYSICAL EXAM  General: well nourished, well developed, no acute distress  Neurology: alert and oriented x 3, nonfocal, no gross deficits  Respiratory: clear to auscultation bilaterally  CV: regular rate and rhythm, normal S1, S2  Abdomen: soft, nontender, nondistended, positive bowel sounds,  Extremities: warm, well perfused. +2 BLE edema. + DP pulses bilaterally, ACE wrap  Incisions: midline sternal incision,  c/d/i. sternum stable. Left chest wall PPM site, + Steri-strips c/d/i. Right LE harvest site, c/d/i.   Psych: Appropriate    03-10 @ 07:01  -   @ 07:00  --------------------------------------------------------  IN: 470 mL / OUT: 850 mL / NET: -380 mL     @ 07:  -   @ 06:12  --------------------------------------------------------  IN: 654 mL / OUT: 1552 mL / NET: -898 mL        Weights:  Daily     Daily Weight in k.5 (12 Mar 2021 05:51)  Admit Wt: Drug Dosing Weight  Height (cm): 157 (2021 12:10)  Weight (kg): 61.2 (2021 12:10)  BMI (kg/m2): 24.8 (2021 12:10)  BSA (m2): 1.61 (2021 12:10)    All laboratory results, radiology and medications reviewed.    LABS      140  |  102  |  33.0<H>  ----------------------------<  121<H>  4.2   |  27.0  |  0.71    Ca    9.2      11 Mar 2021 05:52  Mg     2.0         TPro  5.8<L>  /  Alb  3.2<L>  /  TBili  0.7  /  DBili  x   /  AST  99<H>  /  ALT  97<H>  /  AlkPhos  389<H>  03-10                                 7.8    11.72 )-----------( 356      ( 11 Mar 2021 05:52 )             25.5            < from: Xray Chest 1 View- PORTABLE-Routine (Xray Chest 1 View- PORTABLE-Routine in AM.) (21 @ 05:59) >  Findings: The initial film is from 3/10/2021 at 3:36 PM. A cardiac device overlies and obscures the left hemithorax with leads in place. Heart is enlarged. Prosthetic aortic valve osteopenia. Degenerative changes.    Subsequent film from today at 4:26 AM shows worsening bilateral infiltrates. Worsening right effusion. No pneumothorax. Is seen. Bibasilar opacities compatible with effusion/infiltrate, right greater than left.    Impression: Worsening bilateral interstitial infiltrates. May be cardiac related. Probable bilateral effusions right greater than left. No pneumothorax    < end of copied text >    < from: TTE Echo Complete w/ Contrast w/ Doppler (21 @ 11:08) >    Summary:   1. Small circumferential pericardial effusion without echo evidence of cardiac tamponade.   2. Left ventricular ejection fraction, by visual estimation, is 55 to 60%.   3. Normal global left ventricular systolic function.   4. Spectral Doppler shows impaired relaxation pattern of left ventricular myocardial filling (Grade I diastolic dysfunction).   5. There is severe concentric left ventricular hypertrophy.   6. Severely enlarged left atrium.   7. Normal right ventricular size and function.   8. Mild tricuspid regurgitation.   9. Mild mitral valve regurgitation.  10. Mitral valve mean gradient is 4.2 mmHg (HR 76) consistent with mild mitral stenosis.  11. Severe thickening and calcification of the anterior and posterior mitral valve leaflets.  12. Mild mitral annular calcification.  13. Aortic valve is normally functioning bioprosthetic valve. There is trivial para-valvular leak. Mean gradient is 14 mm Hg, acceleration time is 63 msec.  14. Compared to a prior study from 21, there is significant improvement in LV function and a small pericardial effusion.    < end of copied text >    PAST MEDICAL & SURGICAL HISTORY:  Iron deficiency anemia due to chronic blood loss    Hypothyroid    History of tonsillectomy

## 2021-03-12 NOTE — PROGRESS NOTE ADULT - PROBLEM SELECTOR PLAN 3
ATN in setting of CPB   Bun/Cr stable  cont to trend BMP  monitor lytes  renal consult appreciated  bumex gtt off, reamains on low dose BID bumex IV, consider transition to torsemide today
ATN in setting of CPB   Bun/Cr stable  cont to trend   monitor lytes  renal consult appreciated  Torsemide BID, BUN/creat stable
Anemia, unspecified type.  Recommendation: ?GI bleed.  Pt with prior suspected GI bleed last year and refused workup and did not follow up outpatient.    Reported "dark stool" prior to going to [place], S/P PRBC x 2 at [place]  GI following   Scheduled for endoscopy and colonoscopy in AM  Colon prep in progress
likely anemia of chronic dz   GI work up negative   H/H stable after transfusion at [place]
ATN in setting of CHB.  Resolved.   Trending BUN/Cr.  Continue Torsemide 20mg QD.  Continue to monitor electrolytes, goal K>4 and Mg >2.   Renal consult appreciated.
ATN in setting of CHB.  Resolved.   Trending BUN/Cr.  Continue torsemide for diuresis.   Continue to monitor electrolytes, goal K>4 and Mg >2.   Renal consult appreciated.
ATN in setting of CPB   Bun/Cr stable  cont to trend   monitor lytes  renal consult appreciated  Torsemide BID, BUN/creat stable
ATN in setting of CHB.  Resolved.   Trending BUN/Cr.  Continue torsemide for diuresis.   Continue to monitor electrolytes, goal K>4 and Mg >2.   Renal consult appreciated.
ATN in setting of CPB   Bun/Cr stable  cont to trend   monitor lytes  renal consult appreciated  bumex gtt off, reamains on low dose BID bumex IV, consider transition to torsemide today
likely anemia of chronic dz   GI work up negative
Anemia with unknown source.   Pt with prior suspected GI bleed last year, refused workup and did not follow up outpatient.    Reported "dark stool" prior to going to [place], s/p PRBC x 2 at [place].  GI following.  Endoscopy and colonoscopy on this admission without bleeding.    Cleared for surgery from GI standpoint.  Continue to trend H/H.
likely anemia of chronic dz   GI work up negative
ATN in setting of CHB.  Resolved.   Trending BUN/Cr.  Continue torsemide for diuresis.   Continue to monitor electrolytes, goal K>4 and Mg >2.   Renal signed off
ATN in setting of CHB.  Resolved.   Trending BUN/Cr.  Continue torsemide for diuresis.   Continue to monitor electrolytes, goal K>4 and Mg >2.   Renal signed off
Anemia with unknown source.   Pt with prior suspected GI bleed last year, refused workup and did not follow up outpatient.    Reported "dark stool" prior to going to [place], s/p PRBC x 2 at [place].  GI following.  Endoscopy and colonoscopy on this admission without bleeding.    Cleared for surgery from GI standpoint.  Continue to trend H/H.
stable at this time   continue to follow
ATN in setting of CHB.  Resolved.   Trending BUN/Cr.  Continue torsemide and aldactone added for diuresis.   Continue to monitor electrolytes, goal K>4 and Mg >2.   Renal consult appreciated.
Anemia with unknown source.   Pt with prior suspected GI bleed last year, refused workup and did not follow up outpatient.    Reported "dark stool" prior to going to [place], s/p PRBC x 2 at [place].  GI following.  Endoscopy and colonoscopy on this admission without bleeding.  Cleared for surgery from GI standpoint.
Anemia with unknown source.   Pt with prior suspected GI bleed last year, refused workup and did not follow up outpatient.    Reported "dark stool" prior to going to [place], s/p PRBC x 2 at [place].  GI following.  Scheduled for colonoscopy tomorrow
ATN in setting of CHB.  Resolved.   Trending BUN/Cr.  Continue Torsemide 20mg QD.  Continue to monitor electrolytes, goal K>4 and Mg >2.   Renal consult appreciated.
ATN in setting of CPB   Bun/Cr stable  cont to trend BMP  monitor lytes  renal consult appreciated  bumex gtt off, reamains on low dose BID bumex IV, consider transition to torsemide today
Anemia with unknown source.   Pt with prior suspected GI bleed last year, refused workup and did not follow up outpatient.    Reported "dark stool" prior to going to [place], s/p PRBC x 2 at [place].  GI following.  Scheduled for colonoscopy later today.
likely anemia of chronic dz   GI work up negative   H/H stable after transfusion at [place]
likely anemia of chronic dz   GI work up negative
likely anemia of chronic dz   GI work up negative

## 2021-03-12 NOTE — PROGRESS NOTE ADULT - PROBLEM SELECTOR PROBLEM 7
Bilateral carotid artery stenosis
Hypothyroidism, unspecified type
Prophylactic measure
Hypothyroidism, unspecified type
Hypothyroidism, unspecified type
Prophylactic measure
Bilateral carotid artery stenosis
Hypothyroidism, unspecified type
Hypothyroidism, unspecified type
Prophylactic measure
Hypothyroidism, unspecified type
NSTEMI (non-ST elevated myocardial infarction)
NSTEMI (non-ST elevated myocardial infarction)
Prophylactic measure
Bilateral carotid artery stenosis
Hypothyroidism, unspecified type
Hypothyroidism, unspecified type
NSTEMI (non-ST elevated myocardial infarction)
Hypothyroidism, unspecified type
Prophylactic measure
Hypothyroidism, unspecified type
NSTEMI (non-ST elevated myocardial infarction)
Bilateral carotid artery stenosis

## 2021-03-12 NOTE — PROGRESS NOTE ADULT - SUBJECTIVE AND OBJECTIVE BOX
Patient being changed for BM in chair.  Needs a bit of assist to remain standing.   Poor standing balance.  Chest tube since removed.  Coccyx at risk of breakdown Has loss of skin integrity.    REVIEW OF SYSTEMS  Constitutional - No fever,  +fatigue  HEENT - No vertigo, No neck pain  Neurological - No headaches, +loss of strength    FUNCTIONAL PROGRESS  3/11  Bed Mobility  Bed Mobility Training Rehab Potential: good, to achieve stated therapy goals  Bed Mobility Training Supine-to-Sit: minimum assist (75% patient effort);  1 person assist  Bed Mobility Training Limitations: decreased ability to use arms for pushing/pulling;  decreased ability to use legs for bridging/pushing;  decreased strength;  impaired balance    Sit-Stand Transfer Training  Sit-to-Stand Transfer Training Rehab Potential: good, to achieve stated therapy goals  Transfer Training Sit-to-Stand Transfer: minimum assist (75% patient effort);  rolling walker;  min from elevated surface, mod assist from low sitting recliner  Transfer Training Stand-to-Sit Transfer: minimum assist (75% patient effort);  1 person assist;  rolling walker  Sit-to-Stand Transfer Training Transfer Safety Analysis: decreased weight-shifting ability;  decreased strength;  impaired balance    Gait Training  Gait Training Rehab Potential: good, to achieve stated therapy goals  Gait Training: minimum assist (75% patient effort);  1 person assist;  rolling walker;  15 feet  Gait Analysis: decreased step length;  decreased stride length;  decreased strength;  impaired balance;  pt requires VCs for posture, physical assistance to navigate obstacles in front of walking path       VITALS  T(C): 36.9 (03-12-21 @ 05:25), Max: 37.4 (03-11-21 @ 11:37)  HR: 97 (03-12-21 @ 05:25) (81 - 100)  BP: 127/58 (03-12-21 @ 05:25) (91/52 - 127/58)  RR: 16 (03-12-21 @ 05:25) (16 - 19)  SpO2: 96% (03-12-21 @ 05:25) (96% - 100%)  Wt(kg): --    MEDICATIONS   acetaminophen   Tablet .. 650 milliGRAM(s) every 6 hours PRN  ascorbic acid 500 milliGRAM(s) daily  aspirin enteric coated 81 milliGRAM(s) daily  atorvastatin 40 milliGRAM(s) at bedtime  enoxaparin Injectable 30 milliGRAM(s) daily  famotidine    Tablet 20 milliGRAM(s) daily  ferrous    sulfate 325 milliGRAM(s) daily  folic acid 1 milliGRAM(s) daily  levothyroxine 100 MICROGram(s) daily  magnesium oxide 400 milliGRAM(s) three times a day with meals  metoprolol tartrate 12.5 milliGRAM(s) two times a day  midodrine. 5 milliGRAM(s) three times a day  polyethylene glycol 3350 17 Gram(s) daily PRN  potassium chloride    Tablet ER 20 milliEquivalent(s) daily  senna 2 Tablet(s) at bedtime  sodium chloride 0.9% lock flush 3 milliLiter(s) every 8 hours  torsemide 20 milliGRAM(s) daily      RECENT LABS/IMAGING                          8.8    10.30 )-----------( 353      ( 12 Mar 2021 06:18 )             28.0     03-12    138  |  98  |  34.0<H>  ----------------------------<  113<H>  4.1   |  30.0<H>  |  0.63    Ca    9.0      12 Mar 2021 06:18  Mg     1.9     03-12                        TTE 2/28 - Summary:   1. Left ventricular ejection fraction, by visual estimation, is 25 to 30%.   2. Severely decreased global left ventricular systolic function.   3. Abnormal septal motion consistent with post-operative status.   4. Severely increased LV wall thickness.   5. Severely enlarged left atrium.   6. Mild mitral valve regurgitation.   7. Severe mitral annular calcification.   8. Moderate to severe thickening of the anterior and posterior mitral valve leaflets.   9. Mitral valve mean gradient is 2.0 mmHg (at HR of 79 bpm).  10. Mild tricuspid regurgitation.  11. Mild pulmonic valve regurgitation.  12. Bioprosthesis in the aortic position, with mild aortic regurgitation, which is paravalvular in origin.  13. There is no evidence of pericardial effusion.  14. Endocardial visualization was enhanced with intravenous echo contrast.  15. Compared to TTE done on 2/26/2021, left ventricular function is unchanged from prior.    CXR 2/28 - Reduction of pleural effusions.    CXR 3/4 - Bilateral chest tubes in place. Residual lung a bibasilar pleural effusions and/or airspace disease as seen on prior 3/3/2021 chest radiograph    CXR 3/8 - Cardiomegaly and bilateral pleural effusions, right larger than left. No evidence of pulmonary vascular congestion..    CXR 3/10 - Small bilateral pleural effusions.    CXR 3/11 - Worsening bilateral interstitial infiltrates. May be cardiac related. Probable bilateral effusions right greater than left. No pneumothorax    ----------------------------------------------------------------------------------------  PHYSICAL EXAM  Constitutional - NAD, Comfortable  Extremities - No calf tenderness  Neurologic Exam -                    Cognitive - AAOx self, PART of situation, hospital     Motor -                      LEFT    UE - ShAB 2/5, EF 2/5,  4/5                    RIGHT UE - ShAB 2/5, EF 2/5,  4/5                    LEFT    LE - HF 2/5, KE 2/5, DF 3/5                     RIGHT LE - HF 2/5, KE 1/5, DF 3/5     Sensory - Intact to LT  Psychiatric - Mood calm  ----------------------------------------------------------------------------------------  ASSESSMENT/PLAN  76yFemale with functional deficits after +NSTEMI/CAD/Severe AS  CAD s/p CABG x3 - ASA, Lipitor  Severe AS s/p AVR & Acute CHF - Demadex  Pleural effusions - Monitoring  HTN - Lopressor  HypoTN - Midodrine  Pain - Tylenol  DVT PPX - SCDs, Lovenox  Rehab - Medically being optimized. Continue to recommend ACUTE inpatient rehabilitation for the functional deficits consisting of 3 hours of therapy/day & 24 hour RN/daily PMR physician for comorbid medical management. Will continue to follow for ongoing rehab needs and recommendations. Patient will be able to tolerate 3 hours a day.    Continue bedside therapy as well as OOB throughout the day with mobilization throughout the day with staff to maintain cardiopulmonary function and prevention of secondary complications related to debility.     Discussed with rehab clinical team.

## 2021-03-12 NOTE — PROGRESS NOTE ADULT - PROBLEM SELECTOR PROBLEM 1
Coronary artery disease involving native coronary artery of native heart without angina pectoris
Coronary artery disease involving native coronary artery of native heart without angina pectoris
Anemia, unspecified type
Coronary artery disease involving native coronary artery of native heart without angina pectoris

## 2021-03-12 NOTE — PROGRESS NOTE ADULT - NUTRITIONAL ASSESSMENT
This patient has been assessed with a concern for Malnutrition and has been determined to have a diagnosis/diagnoses of Moderate protein-calorie malnutrition.    This patient is being managed with:   Diet Regular-  Consistent Carbohydrate {No Snacks} (CSTCHO)  DASH/TLC {Sodium & Cholesterol Restricted} (DASH)  Supplement Feeding Modality:  Oral  Ensure Enlive Cans or Servings Per Day:  3       Frequency:  Daily  Entered: Mar  2 2021  4:48PM    
This patient has been assessed with a concern for Malnutrition and has been determined to have a diagnosis/diagnoses of Moderate protein-calorie malnutrition.    This patient is being managed with:   Diet Regular-  Consistent Carbohydrate {No Snacks} (CSTCHO)  DASH/TLC {Sodium & Cholesterol Restricted} (DASH)  Entered: Feb 26 2021  1:04PM    
This patient has been assessed with a concern for Malnutrition and has been determined to have a diagnosis/diagnoses of Moderate protein-calorie malnutrition.    This patient is being managed with:   Diet Regular-  Consistent Carbohydrate {No Snacks} (CSTCHO)  DASH/TLC {Sodium & Cholesterol Restricted} (DASH)  Supplement Feeding Modality:  Oral  Ensure Enlive Cans or Servings Per Day:  3       Frequency:  Daily  Entered: Mar  2 2021  4:48PM    
This patient has been assessed with a concern for Malnutrition and has been determined to have a diagnosis/diagnoses of Moderate protein-calorie malnutrition.    This patient is being managed with:   Diet NPO after Midnight-     NPO Start Date: 01-Mar-2021   NPO Start Time: 23:59  Entered: Mar  1 2021  6:23PM    Diet Regular-  Consistent Carbohydrate {No Snacks} (CSTCHO)  DASH/TLC {Sodium & Cholesterol Restricted} (DASH)  Entered: Mar  1 2021  3:32PM    
This patient has been assessed with a concern for Malnutrition and has been determined to have a diagnosis/diagnoses of Moderate protein-calorie malnutrition.    This patient is being managed with:   Diet Regular-  Consistent Carbohydrate {No Snacks} (CSTCHO)  DASH/TLC {Sodium & Cholesterol Restricted} (DASH)  Supplement Feeding Modality:  Oral  Ensure Enlive Cans or Servings Per Day:  3       Frequency:  Daily  Entered: Mar  2 2021  4:48PM    
This patient has been assessed with a concern for Malnutrition and has been determined to have a diagnosis/diagnoses of Moderate protein-calorie malnutrition.    This patient is being managed with:   Diet Regular-  Consistent Carbohydrate {No Snacks} (CSTCHO)  DASH/TLC {Sodium & Cholesterol Restricted} (DASH)  Supplement Feeding Modality:  Oral  Ensure Enlive Cans or Servings Per Day:  3       Frequency:  Daily  Entered: Mar  2 2021  4:48PM    
This patient has been assessed with a concern for Malnutrition and has been determined to have a diagnosis/diagnoses of Moderate protein-calorie malnutrition.    This patient is being managed with:   Diet NPO after Midnight-     NPO Start Date: 23-Feb-2021   NPO Start Time: 23:59  Entered: Feb 23 2021  9:27AM    Diet DASH/TLC-  Sodium & Cholesterol Restricted  High Fiber (HIFIBER)  Entered: Feb 19 2021  9:07AM    
This patient has been assessed with a concern for Malnutrition and has been determined to have a diagnosis/diagnoses of Severe protein-calorie malnutrition.    Recommendations:  Diet, Regular:   Consistent Carbohydrate {No Snacks} (CSTCHO)  DASH/TLC {Sodium & Cholesterol Restricted} (DASH)  Supplement Feeding Modality:  Oral  Ensure Enlive Cans 1 can TID   MVI daily   Continue with Vit C, feso4, folic acid daily  Monitor daily wts
This patient has been assessed with a concern for Malnutrition and has been determined to have a diagnosis/diagnoses of Moderate protein-calorie malnutrition.    This patient is being managed with:   Diet DASH/TLC-  Sodium & Cholesterol Restricted  High Fiber (HIFIBER)  Entered: Feb 19 2021  9:07AM    
This patient has been assessed with a concern for Malnutrition and has been determined to have a diagnosis/diagnoses of Moderate protein-calorie malnutrition.    This patient is being managed with:   Diet Regular-  Consistent Carbohydrate {No Snacks} (CSTCHO)  DASH/TLC {Sodium & Cholesterol Restricted} (DASH)  Entered: Feb 26 2021  1:04PM    
This patient has been assessed with a concern for Malnutrition and has been determined to have a diagnosis/diagnoses of Moderate protein-calorie malnutrition.    This patient is being managed with:   Diet DASH/TLC-  Sodium & Cholesterol Restricted  High Fiber (HIFIBER)  Entered: Feb 19 2021  9:07AM    
This patient has been assessed with a concern for Malnutrition and has been determined to have a diagnosis/diagnoses of Moderate protein-calorie malnutrition.    This patient is being managed with:   Diet Regular-  Consistent Carbohydrate {No Snacks} (CSTCHO)  DASH/TLC {Sodium & Cholesterol Restricted} (DASH)  Entered: Feb 26 2021  1:04PM    
This patient has been assessed with a concern for Malnutrition and has been determined to have a diagnosis/diagnoses of Moderate protein-calorie malnutrition.    This patient is being managed with:   Diet Regular-  Consistent Carbohydrate {No Snacks} (CSTCHO)  DASH/TLC {Sodium & Cholesterol Restricted} (DASH)  Supplement Feeding Modality:  Oral  Ensure Enlive Cans or Servings Per Day:  3       Frequency:  Daily  Entered: Mar  2 2021  4:48PM    
This patient has been assessed with a concern for Malnutrition and has been determined to have a diagnosis/diagnoses of Severe protein-calorie malnutrition.    This patient is being managed with:   Diet Regular-  Consistent Carbohydrate {No Snacks} (CSTCHO)  DASH/TLC {Sodium & Cholesterol Restricted} (DASH)  Supplement Feeding Modality:  Oral  Ensure Enlive Cans or Servings Per Day:  3       Frequency:  Daily  Entered: Mar  2 2021  4:48PM    
This patient has been assessed with a concern for Malnutrition and has been determined to have a diagnosis/diagnoses of Moderate protein-calorie malnutrition.    This patient is being managed with:   Diet Regular-  Consistent Carbohydrate {No Snacks} (CSTCHO)  DASH/TLC {Sodium & Cholesterol Restricted} (DASH)  Supplement Feeding Modality:  Oral  Ensure Enlive Cans or Servings Per Day:  3       Frequency:  Daily  Entered: Mar  2 2021  4:48PM    
This patient has been assessed with a concern for Malnutrition and has been determined to have a diagnosis/diagnoses of Moderate protein-calorie malnutrition.    This patient is being managed with:   Diet Regular-  Consistent Carbohydrate {No Snacks} (CSTCHO)  DASH/TLC {Sodium & Cholesterol Restricted} (DASH)  Supplement Feeding Modality:  Oral  Ensure Enlive Cans or Servings Per Day:  3       Frequency:  Daily  Entered: Mar  2 2021  4:48PM    
This patient has been assessed with a concern for Malnutrition and has been determined to have a diagnosis/diagnoses of Moderate protein-calorie malnutrition.    This patient is being managed with:   Diet NPO-  Entered: Feb 24 2021  8:04PM    
This patient has been assessed with a concern for Malnutrition and has been determined to have a diagnosis/diagnoses of Severe protein-calorie malnutrition.    This patient is being managed with:   Diet Regular-  Consistent Carbohydrate {No Snacks} (CSTCHO)  DASH/TLC {Sodium & Cholesterol Restricted} (DASH)  Supplement Feeding Modality:  Oral  Ensure Enlive Cans or Servings Per Day:  3       Frequency:  Daily  Entered: Mar  2 2021  4:48PM    
This patient has been assessed with a concern for Malnutrition and has been determined to have a diagnosis/diagnoses of Moderate protein-calorie malnutrition.    This patient is being managed with:   Diet NPO-  Entered: Feb 24 2021  8:04PM

## 2021-03-12 NOTE — PROGRESS NOTE ADULT - PROBLEM SELECTOR PLAN 6
Continue torsemide  Continue Lopressor,   Continue statin  Continue aspirin  Follow up daily CXR.   Daily weight  Strict I/O  Cardiology following
vascular consult appreciated  no intervention at this time, cleared to proceed with cardiac surgery
continue torsemide 20 poas per JG  daily cxr  serial i/o's
Continue torsemide  Continue Lopressor,   Continue statin  Continue aspirin  Follow up daily CXR.   Daily weight  Strict I/O  Cardiology following
UTI/sepsis at [place] with elevated lactate, temp 102 and + UA.    ID was consulted, completed dose of Rocephin
UTI/sepsis at [place] with elevated lactate, temp 102 and + UA.    ID consulted and signed off.  Completed course of Rocephin 2/17.
vascular consult appreciated  no intervention at this time, cleared to proceed with cardiac surgery
Continue torsemide and aldactone added for diuresis.   Follow up daily CXR.   Strict I/Os.
Continue torsemide 20 PO as per JG.  Follow up daily CXR.   Strict I/Os.
vascular consult appreciated  no intervention at this time, cleared to proceed with cardiac surgery
UTI/sepsis at [place] with elevated lactate, temp 102 and + UA.    ID consulted.  Completed course of Rocephin 2/17.
UTI/sepsis at [place] with elevated lactate, temp 102 and + UA.    ID consulted and signed off.  Completed course of Rocephin 2/17.
Continue torsemide  Follow up daily CXR.   Strict I/Os.
continue torsemide po  daily cxr  serial i/o's
resolved off abx at this time
UTI/sepsis at [place] with elevated lactate, temp 102 and + UA.    ID consulted.  Completed course of Rocephin 2/17.
vascular consult appreciated  no interevention at this time, cleared to proceed with cardiac surgery
continue torsemide po  daily cxr  serial i/o's
Continue torsemide  Continue Lopressor  Continue statin  Continue aspirin  Follow up daily CXR.   Daily weight  Strict I/O
UTI/sepsis at [place] with elevated lactate, temp 102 and + UA.    ID consulted and signed off.  Completed course of Rocephin 2/17.
continue torsemide po  daily cxr  serial i/o's
present on admission requiring IV lasix    postop on bumex
Continue torsemide 20 PO as per JG.  Follow up daily CAR.   Strict I/Os.

## 2021-03-12 NOTE — PROGRESS NOTE ADULT - PROBLEM SELECTOR PLAN 8
Improved with occasional periods of confusion/ forgetfulness  Continue to monitor closely, reorient as needed

## 2021-03-12 NOTE — PROGRESS NOTE ADULT - ASSESSMENT
76F PMH of anemia, hypothyroidism, and GI bleed 1 year ago (refused Endo/colonoscopy and never followed up) presented to [facility] originally with fever and SOB found to have urosepsis s/p course of rocephin (completed 2/17), anemia requiring 2 PRBC (no GI work up because pt wanted to sign out AMA), ruled in for NSTEMI, transferred to [facility] 2/16 and underwent cardiac cath which showed Multivessel CAD and Severe AS. Preop carotid US with b/l carotid stenosis confirmed by CTA, seen by vascular and cleared for OR (outpt follow up), s/p endoscopy 2/17 and colonoscopy 2/19 without evidence of acute bleed, noted to have hiatal hernia, now s/p CABG x3 with LIMALEE, AVR 2/24 with Dr. Cruz. 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 Physical therapy recommending Acute rehab as patient is only able to walk 5ft-10ft with a rolling walker and 1-person assist. Patient's family now agreeing for Acute rehab, awaiting bed placement.

## 2021-03-12 NOTE — PROGRESS NOTE ADULT - PROBLEM SELECTOR PROBLEM 3
Anemia, unspecified type
KIMMY (acute kidney injury)
KIMMY (acute kidney injury)
Anemia, unspecified type
KIMMY (acute kidney injury)
Anemia, unspecified type
KIMMY (acute kidney injury)
Anemia, unspecified type
KIMMY (acute kidney injury)
Anemia, unspecified type
KIMMY (acute kidney injury)
Anemia, unspecified type
KIMMY (acute kidney injury)
Anemia, unspecified type
KIMMY (acute kidney injury)
Anemia, unspecified type
Anemia, unspecified type

## 2021-03-12 NOTE — PROGRESS NOTE ADULT - PROBLEM SELECTOR PLAN 9
SCDs, Lovenox for DVT prophylaxis.  Pepcid for GI prophylaxis.    Dispo: Recommending Acute rehab, awaiting bed placement   Plan needs to discuss with CTS team in AM

## 2021-03-12 NOTE — PROGRESS NOTE ADULT - PROVIDER SPECIALTY LIST ADULT
CT Surgery
Cardiology
Electrophysiology
Endocrinology
Endocrinology
Nephrology
Rehab Medicine
Anesthesia
CT Surgery
Cardiology
Electrophysiology
Nephrology
Nephrology
CT Surgery
Cardiology
Endocrinology
Endocrinology
Gastroenterology
Endocrinology
CT Surgery
Gastroenterology
CT Surgery

## 2021-03-12 NOTE — PROGRESS NOTE ADULT - PROBLEM SELECTOR PROBLEM 8
Acute systolic heart failure
Acute systolic heart failure
Prophylactic measure
Acute systolic heart failure
NSTEMI (non-ST elevated myocardial infarction)
Prophylactic measure
Confusion
NSTEMI (non-ST elevated myocardial infarction)
Prophylactic measure
Confusion
Prophylactic measure
Acute systolic heart failure
Confusion
Prophylactic measure
Confusion
Confusion
Prophylactic measure

## 2021-03-12 NOTE — H&P ADULT - NSHPSOCIALHISTORY_GEN_ALL_CORE
SOCIAL HISTORY - as per documentation/history  Smoking - None  EtOH - ++  Drugs - None    FUNCTIONAL HISTORY  Lives with spouse, 6 RHODA  Independent with RW    Previous Functional Status:  Independent in ambulation, ADL's, transfers prior to hospitalization    Current Functional Status:  3/11  Bed Mobility  Bed Mobility Training Rehab Potential: good, to achieve stated therapy goals  Bed Mobility Training Supine-to-Sit: minimum assist (75% patient effort);  1 person assist  Bed Mobility Training Limitations: decreased ability to use arms for pushing/pulling;  decreased ability to use legs for bridging/pushing;  decreased strength;  impaired balance    Sit-Stand Transfer Training  Sit-to-Stand Transfer Training Rehab Potential: good, to achieve stated therapy goals  Transfer Training Sit-to-Stand Transfer: minimum assist (75% patient effort);  rolling walker;  min from elevated surface, mod assist from low sitting recliner  Transfer Training Stand-to-Sit Transfer: minimum assist (75% patient effort);  1 person assist;  rolling walker  Sit-to-Stand Transfer Training Transfer Safety Analysis: decreased weight-shifting ability;  decreased strength;  impaired balance    Gait Training  Gait Training Rehab Potential: good, to achieve stated therapy goals  Gait Training: minimum assist (75% patient effort);  1 person assist;  rolling walker;  15 feet  Gait Analysis: decreased step length;  decreased stride length;  decreased strength;  impaired balance;  pt requires VCs for posture, physical assistance to navigate obstacles in front of walking path

## 2021-03-12 NOTE — DISCHARGE NOTE PROVIDER - NSDCFUADDINST_GEN_ALL_CORE_FT
Please call the Cardiothoracic Surgery office at [phone] if you are experiencing any shortness of breath, chest pain, fevers or chills, drainage from the incisions, persistent nausea, vomiting or if you have any questions about your medications. If the symptoms are severe, call 911 and go to the nearest hospital.    Efforts to Avoid COVID-19 Infection      When your body is in a weakened state, such as after major heart or lung surgery, you could be more susceptible to infection and those infections could quickly become severe. Don’t be frightened but do stay home.    1. Take precaution   Currently, they advise at-risk populations to: avoid large crowds, stock up on supplies (especially medications), wash hands often (for a minimum of 20 seconds), stay home as much as possible and practice social distancing (six feet is recommended) when outside the home.   2. Use technology and this includes the phone   Applications like [employer], [employer], texting and even an old-fashioned phone call can keep those at-risk connected to family, friends and replace in-person participation with real-time interaction at family gatherings or social functions, until it is safe to resume in-person activity. It also keeps those at-risk connected to care providers. It’s important to remember that social distancing may be recommended but social isolation is not.   3. Be Knowledgeable   Know the emergency warning signs for COVID-19. If you notice any of the following symptoms: fevers, difficulty breathing or shortness of breath; persistent chest pain or pressure or new confusion – make sure you seek immediate medical attention.   4. Stay Up-to-Date   Pay attention to the latest up-to-date news regarding the situation and what is happening in the community so you can respond accordingly.      Your recovery is as dependent on continuing to move and be active as it is to avoid infection.   We encourage you to go outside and take brief walks, but while we are in a state of emergency and COVID-19 is on the rise, please limit contact with the public.

## 2021-03-12 NOTE — PROGRESS NOTE ADULT - PROBLEM SELECTOR PLAN 7
Endocrine following  Elevated TSH, low T4  Increased to Synthroid 100 mcg on 3/4/21.   Will need follow up as outpatient in 4-6 weeks for TFT monitoring.
Vascular consulted.  No plan for intervention at this time.  May proceed with Cardiac surgery.
Endocrine following  Recommend continue current synthroid dose as pt was not compliant with it as outpatient
Endocrine following  Recommend continue current synthroid dose as pt was not compliant with it as outpatient
Endocrine following  Elevated TSH, low T4  Increased to Synthroid 100 mcg 3/4
SCD for DVT prophylaxis  Protonix for PUD prophylaxis  Plan needs to discuss with CTS team in AM
SCD for DVT prophylaxis.  Protonix for PUD prophylaxis.    Plan for OR Wednesday 2/24/21.   Plan to be discussed further with CT Surgery attending / team in AM rounds.
SCD for DVT prophylaxis.  Protonix for PUD prophylaxis.  Plan to be discussed with CT Surgery attending / team in AM rounds.
Endocrine following  Elevated TSH, low T4  Increased to Synthroid 100 mcg on 3/4/21.   Will need follow up as outpatient in 4-6 weeks for TFT monitoring.
Endocrine following  Elevated TSH, low T4  Increased to Synthroid 100 mcg on 3/4/21.   Will need follow up as outpatient in 4-6 weeks for TFT monitoring.
Vascular consulted.  No plan for intervention at this time.  May proceed with Cardiac surgery.
as above
as above
SCD for DVT prophylaxis.  Protonix for PUD prophylaxis.    Plan for OR Wednesday 2/24/21.   Plan to be discussed further with CT Surgery attending / team in AM rounds.
Endocrine following  Elevated TSH, low T4  Increased to Synthroid 100 mcg today
Endocrine following  Elevated TSH, low T4  Increased to Synthroid 100 mcg on 3/4/21.   Will need follow up as outpatient in 4-6 weeks for TFT monitoring.
Endocrine following  Elevated TSH, low T4  Increased to Synthroid 100 mcg on 3/4/21.   Will need follow up as outpatient in 4-6 weeks for TFT monitoring.
SCD for DVT prophylaxis.  Protonix for PUD prophylaxis.  Discussed with Dr Cruz
as above
Endocrine following  Elevated TSH, low T4  Increased to Synthroid 100 mcg on 3/4/21.   Will need follow up as outpatient in 4-6 weeks for TFT monitoring.
Endocrine following  Elevated TSH, low T4  Increased to Synthroid 100 mcg on 3/4/21.   Will need follow up as outpatient in 4-6 weeks for TFT monitoring.
Vascular consulted.  No plan for intervention at this time.  May proceed with Cardiac surgery.
Endocrine following  Recommend continue current synthroid dose as pt was not compliant with it as outpatient
Vascular consulted.  No plan for intervention at this time.  May proceed with Cardiac surgery.
as above

## 2021-03-12 NOTE — PATIENT PROFILE ADULT - NS PRO AD NO ADVANCE DIRECTIVE
[place] eMERGENCY dEPARTMENT encounter:    [facility] EMERGENCY DEPARTMENT  [address] [facility] [address]  [phone]    CHIEF COMPLAINT:    Chief Complaint   Patient presents with   • Chest Pain Adult       HPI:    This is a 66 year old male who presented to the ED with the history of body pain. The patient presents to the emergency room for the complaint of generalized body pains, numbness, and tingling. The patient also states that his head feels heavy. He is also complaining of pain across the front of his chest, but this is actually chronic in nature. The patient states that his whole body just feels heavy. The patient states that he also felt slightly short of breath. He states that he has felt generally weak and fatigued over the past couple of days. He did have an EKG done at the clinic when he was visiting his primary care provider and it showed that he had frequent PVCs and there was concern about his ECG. The patient has not had any fevers or chills. No nausea, vomiting, diarrhea. No difficulty with urination. He does have a history of chronic Lyme disease and he follows with a specialist in [place]. The patient states that he does normally exercise at the [facility] about 3 days a week and lifts weights and uses a stairmaster for 20-25 minutes. He does not normally have any difficulty with his workouts. No additional complaints.    ALLERGIES:    ALLERGIES:   Allergen Reactions   • Sulfa Antibiotics Other (See Comments)     Chest pain   • Zetia [Ezetimibe] GI UPSET   • Meloxicam Other (See Comments)     Chest muscle pain       CURRENT MEDICATIONS:    Current Facility-Administered Medications   Medication Dose Route Frequency Provider Last Rate Last Dose   • sodium chloride 0.9% infusion   Intravenous Continuous Debbie Sainz MD         Current Outpatient Prescriptions   Medication Sig Dispense Refill   • cycloSPORINE (RESTASIS) 0.05 % ophthalmic emulsion Place  3 drops into both eyes 2 times daily.     • Magnesium 200 MG Tab Take 200 mg by mouth 2 times daily.     • nalTREXone (REVIA) 50 MG tablet Take 50 mg by mouth at bedtime.     • Valacyclovir HCl 1000 MG Tab Take 1 tablet by mouth 2 times daily. 1 tablet    • DISPENSE Take 2 each by mouth 2 times daily.     • DISPENSE Place 1 drop under the tongue daily.     • DISPENSE Place 1 each under the tongue daily.     • DISPENSE Take 50,000 Units by mouth daily.     • DISPENSE Apply 1 each topically daily.      • SYNTHROID 300 MCG tablet Take 1 tablet by mouth daily. 90 tablet 3   • rosuvastatin (CRESTOR) 10 MG tablet Take 1 tablet by mouth daily. 30 tablet 2   • amLODIPine (NORVASC) 2.5 MG tablet Take 1 tablet by mouth daily. Take with 5 mg tab for total of 7.5 mg daily. 90 tablet 3   • LORazepam (ATIVAN) 0.5 MG tablet Take 1 tablet by mouth 2 times daily. 180 tablet 1   • DISPENSE Biotic Complete, take on tab at bedtime     • Coenzyme Q10 (COQ-10) 400 MG Cap Take 2 capsules by mouth daily.      • DISPENSE Ferrofood, take 2 - 0 - 2 daily     • DISPENSE Orthomega Select DHA, take 2 - 0 - 1 daily.     • Ascorbic Acid (VITAMIN C) 1000 MG tablet Take 4,000 mg by mouth daily.      • pregabalin (LYRICA) 50 MG capsule Take 100 mg by mouth 2 times daily.     • acetaminophen 650 MG Tab Take 650 mg by mouth every 4 hours as needed for Pain. 30 tablet 0   • verapamil 240 MG 24 hr capsule Take 240 mg by mouth nightly.     • Cholecalciferol (VITAMIN D PO) Take 1 tablet by mouth daily.      • Carboxymeth-Glycerin-Polysorb (REFRESH OPTIVE ADVANCED) 0.5-1-0.5 % SOLN Apply 1-2 drops to eye nightly.     • aspirin 81 MG tablet Take 81 mg by mouth daily.         PAST MEDICAL HISTORY:    Past Medical History:   Diagnosis Date   • Allergy    • Arthritis    • Chronic headaches    • CKD (chronic kidney disease) stage 2, GFR 60-89 ml/min 8/29/2017   • Colon cancer screening 4/6/2017    Colonoscopy 04/06/2017. Normal exam. Poor prep in the entire  colon. Follow-up colonoscopy in 2-3 years with at least 2-3 day prep.   • Disorder of bone and cartilage, unspecified 2011   • Diverticulosis of colon 2017   • Dizzy spells    • Essential (primary) hypertension    • Family history of colon cancer 2017    Mother  at age of 57 from colon cancer.   • Fatigue 2014   • High cholesterol    • History of Graves' disease 2014   • HTN (hypertension) 2014   • Hyperinsulinemia 7/15/2014   • Hyperlipidemia 2014   • Hyperparathyroidism, primary (CMS/HCC) 2015    S/p single PT'ectomy    • IFG (impaired fasting glucose) 7/15/2014   • LVH (left ventricular hypertrophy), mild, on echo 2016   • Lyme disease 2017   • Male hypogonadism 2014   • Neuromuscular disorder (CMS/HCC)    • Obesity (BMI 30-39.9) 2014   • MARBIN on CPAP 2014   • Other postablative hypothyroidism 2014   • TMJ pain dysfunction syndrome 2017    Treated with bite plate   • Trigeminal neuralgia    • Vitamin B12 deficiency 2015       SURGICAL HISTORY:    Past Surgical History:   Procedure Laterality Date   • Colonoscopy  2017    repeat in 3 years, maranda   • Colonoscopy diagnostic  01/10/2012   • Fracture surgery      right wrist   • Parathyroidectomy     • [place] tooth extraction      removed 1       SOCIAL HISTORY:    Social History     Social History   • Marital status:      Spouse name: N/A   • Number of children: N/A   • Years of education: N/A     Social History Main Topics   • Smoking status: Former Smoker     Types: Pipe     Quit date: 1973   • Smokeless tobacco: Never Used   • Alcohol use No   • Drug use: No   • Sexual activity: Yes     Partners: Female     Other Topics Concern   •  Service No   • Blood Transfusions No   • Seat Belt Yes     Social History Narrative    Patient is  and lives at home with his wife. Patient is retired from teaching at [employer]. Patient has one son.         FAMILY HISTORY:    Family History   Problem Relation Age of Onset   • Cancer Mother      stomach   • Anemia Father    • Stroke Father    • Heart disease Father      carotid artery surger       REVIEW OF SYSTEMS:    As above per the HPI.  All other systems reviewed and otherwise negative.  Constitutional: Negative for fever and chills.   Skin: Negative for rash.   HEENT: Negative for ear pain or sore throat.  Respiratory: Negative cough. Positive for shortness of breath.    Cardiovascular: Positive for chest pain or chest pressure.   Gastrointestinal: Negative for nausea, vomiting, diarrhea or abdominal pain.   Genitourinary: Negative for dysuria, urgency or frequency.  Extremities:  Positive for body aches.   CNS: Negative for dizziness. Positive for headache.      PHYSICAL EXAM:   ED Triage Vitals [03/05/18 1634]   BP (!) 163/93   Pulse 90   Resp 16   Temp 98.3 °F (36.8 °C)   SpO2 98 %      Pulse Ox Interpretation: Within normal limits.  General: Alert, awake. No acute distress.   Skin:  Warm and dry without rash.    Head:  Normocephalic-atraumatic.   Neck:  Supple. No adenopathy.  No JVD.  Eye:  Normal conjunctiva and sclera.     EENT: Mucous membranes are moist. No pharyngeal erythema or exudates.  Cardiovascular: Symmetrical pulses.  RRR without murmur. Normal peripheral pulses. No peripheral edema bilaterally.  Respiratory:  Normal respiratory effort. CTA. Symmetrical expansion bilaterally. No stridor.  Chest Wall: No deformity. Diffuse anterior chest wall tenderness.  Gastrointestinal: soft, nontender, Non-distended.  Normal bowel sounds.  Musculoskeletal:  No deformities. General tenderness of the muscles.  Neuro: Orientated x 4. No focal deficits.    Psych: Patient is cooperative      ED Course/ MDM:    The patient had generalized tenderness of extremities. The patient has a history of chronic Lyme disease. He has seen a number of various providers for his symptoms and states that he feels very  frustrated. The patient states that his symptoms seem to be worsening and no acute given him a clear answer on what is going on. The patient did have a CBC, CMP, troponin, lactic acid, lipase, magnesium, CPK. The patient's blood work was unremarkable. He did have an elevated TSH a couple months ago, but now his TSH is slightly low. He did have a repeat influenza swab is he was worried about this and it was negative. His CRP is negative as well. The patient's ECG appears unchanged from prior. The patient did undergo a CT scan of the head which shows some chronic changes, but nothing acute. The patient did receive some maintenance IV fluids as he does have some diastolic dysfunction and I did not wish to volume overload him. I did talk to the patient about his symptoms and the normal evaluation in the emergency room. He repeatedly expressed frustration that he is unable to find a solution that will help him to live more comfortably. I did wish for him to follow-up with his primary care provider although the patient states that he had just seen him today. I do think important for him to have consistency in his care. I want him to follow-up with his PCP this week but I'll also be placing a referral to a neurologist. The patient states that he's never had a neurological workup for his symptoms previously.       Labs  Results for orders placed or performed during the hospital encounter of 03/05/18   Urinalysis & Reflex Micro with Culture if Indicated   Result Value    COLOR YELLOW    APPEARANCE CLEAR    GLUCOSE(URINE) NEGATIVE    BILIRUBIN NEGATIVE    KETONES NEGATIVE    SPECIFIC GRAVITY 1.010    BLOOD NEGATIVE    pH 7.0    PROTEIN(URINE) NEGATIVE    UROBILINOGEN 0.2    NITRITE NEGATIVE    LEUKOCYTE ESTERASE NEGATIVE    SPECIMEN TYPE URINE, CLEAN CATCH/MIDSTREAM   CBC & Auto Differential   Result Value    WBC 5.2    RBC 5.39    HGB 16.1    HCT 46.0     Comment: SPUN    MCV 85.3    MCH 29.9    MCHC 35.0    RDW-CV 13.1         DIFF TYPE AUTOMATED DIFFERENTIAL    Neutrophil 52    LYMPH 32    MONO 14    EOSIN 2    BASO 0    Absolute Neutrophil 2.7    Absolute Lymph 1.6    Absolute Mono 0.7    Absolute Eos 0.1    Absolute Baso 0.0   Comprehensive Metabolic Panel   Result Value    Sodium 141    Potassium 3.9    Chloride 103    Carbon Dioxide 29    Anion Gap 13    Glucose 101 (H)    BUN 17    Creatinine 0.84    GFR Estimate,  >90     Comment: eGFR results = or >90 mL/min/1.73m2 = Normal kidney function.    GFR Estimate, Non  90     Comment: eGFR results = or >90 mL/min/1.73m2 = Normal kidney function.    BUN/Creatinine Ratio 20    CALCIUM 9.5    TOTAL BILIRUBIN 0.6    AST/SGOT 25    ALT/SGPT 39    ALK PHOSPHATASE 59    TOTAL PROTEIN 7.9    Albumin 4.3    GLOBULIN 3.6    A/G Ratio, Serum 1.2   Troponin I Ultra Sensitive   Result Value    TROPONIN I <0.02   Lactic Acid Venous   Result Value    Lactic Acid Venous 1.1   Lipase Level   Result Value    Lipase 161   Magnesium Level   Result Value    MAGNESIUM 2.1   Creatine Kinase   Result Value       Thyroid Stimulating Hormone   Result Value    TSH 0.192 (L)   Influenza Rapid A/B   Result Value    SOURCE NASOPHARYNGEAL SWAB    INFLUENZA A NEGATIVE    INFLUENZA B ANTIGEN NEGATIVE   C Reactive Protein   Result Value    C-REACTIVE PROTEIN <0.3   Electrocardiogram 12-Lead   Result Value    Ventricular Rate EKG/Min (BPM) 80    Atrial Rate (BPM) 89    NV-Interval (MSEC) 158    QRS-Interval (MSEC) 108    QT-Interval (MSEC) 376    QTc 433    P Axis (Degrees) 67    R Axis (Degrees) -7    T Axis (Degrees) 62    REPORT TEXT      .  Sinus rhythm  with 2nd degree AV block (Mobitz II)  with frequent  premature ventricular complexes  Anteroseptal infarct  (cited on or before  11-FEB-2015)  Abnormal ECG  When compared with ECG of  18-SEP-2017 06:02,  premature ventricular complexes  are now  present  Sinus rhythm  is now  with 2nd degree AV block (Mobitz  II)  Questionable change in initial forces of  Anterior leads  Confirmed by RICHARD SAINZ MD ([id number]),  Aleta Hammond ([id number]) on 3/5/2018 5:04:24 PM         I have reviewed labs      Radiology, Images  CT Head Brain   Final Result   IMPRESSION: Mild generalized central and cortical atrophy with associated   small vessel ischemic change in the periventricular white matter. Tiny old   infarcts left basal ganglia.      No acute intracranial findings.           I have reviewed radiology images and impressions    ED Medications  ED Medication Orders     Start Ordered     Status Ordering Provider    03/05/18 1645 03/05/18 1644  sodium chloride 0.9% infusion  CONTINUOUS      Last MAR action:  New RICHARD [place]          Diagnosis  The primary encounter diagnosis was Paresthesia and pain of both upper extremities. A diagnosis of Paresthesia of both lower extremities was also pertinent to this visit.    Prescription(s):  New Prescriptions    IBUPROFEN (MOTRIN) 800 MG TABLET    Take 1 tablet by mouth every 8 hours as needed for Pain.       Follow Up:  Fran Mullen MD  [address] [place] [address]  [phone]    Schedule an appointment as soon as possible for a visit      Isidro Finney DO  [address] [place] [address]  [phone]    Schedule an appointment as soon as possible for a visit      [facility] Emergency Department  [address] [facility] [id number] [phone]    If symptoms worsen    Patient advised to follow up In ER in case of worsening or new symptoms. Patient agrees with plan.       Richard Sainz MD  03/05/18 [id number]     No

## 2021-03-12 NOTE — H&P ADULT - NSHPREVIEWOFSYSTEMS_GEN_ALL_CORE
REVIEW OF SYSTEMS  Constitutional: No fever, No Chills, + fatigue  HEENT: No eye pain, No visual disturbances, No difficulty hearing  Pulm: No cough,  No shortness of breath  Cardio: No chest pain, No palpitations  GI:  No abdominal pain, No nausea, No vomiting, No diarrhea, No constipation  : No dysuria, No frequency, No hematuria  Neuro: No headaches, No memory loss, + loss of strength, No numbness, No tremors  Skin: No itching, No rashes, No lesions   Endo: No temperature intolerance  MSK: No joint pain, No joint swelling, No muscle pain, No Neck or back pain  Psych:  No depression, No anxiety REVIEW OF SYSTEMS  Constitutional: No fever, No Chills, + fatigue  HEENT: No eye pain, No visual disturbances, No difficulty hearing  Pulm: No cough,  No shortness of breath  Cardio: No chest pain, No palpitations  GI:  No abdominal pain, No nausea, No vomiting, No diarrhea, No constipation  : No dysuria, No frequency, No hematuria  Neuro: No headaches, +memory loss, + loss of strength, No numbness, No tremors  Skin: No itching, No rashes, No lesions   Endo: No temperature intolerance  MSK: No joint pain, No joint swelling, No muscle pain, No Neck or back pain  Psych:  No depression, No anxiety

## 2021-03-12 NOTE — PROGRESS NOTE ADULT - PROBLEM SELECTOR PROBLEM 4
Heart block
Heart block
Hypothyroidism, unspecified type
Gastrointestinal hemorrhage, unspecified gastrointestinal hemorrhage type
Gastrointestinal hemorrhage, unspecified gastrointestinal hemorrhage type
Heart block
Gastrointestinal hemorrhage, unspecified gastrointestinal hemorrhage type
Heart block
Hypothyroidism, unspecified type
Heart block
Heart block
Hypothyroidism, unspecified type
Heart block
Hypothyroidism, unspecified type
Heart block
Heart block
Gastrointestinal hemorrhage, unspecified gastrointestinal hemorrhage type
Heart block
Hypothyroidism, unspecified type
Gastrointestinal hemorrhage, unspecified gastrointestinal hemorrhage type
Hypothyroidism, unspecified type

## 2021-03-12 NOTE — PROGRESS NOTE ADULT - SUBJECTIVE AND OBJECTIVE BOX
[facility] Faculty Practice                                                               Office: [address] [address]                                                              Telephone: [phone]. Fax:[phone]                                                                             PROGRESS NOTE  Reason for follow up: CAD/ICM/HFrEF, severe AS s/p CABG, bio-AVR, CHB, CRT-D  Overnight: No new events.   Update: more awake/alert today, complaining of pain her her back notified by RN with stage 2 sacral ulcer applied dressing, frail per RN not ambulating much, patient in sitting chair but sliding down with feet elevated, bilateral ACE bandage wrap still with significant pedal edema; remains on midodrine and low dose metoprolol, pBNP stable      Review of symptoms:   Cardiac:  No chest pain. No dyspnea. No palpitations.  Respiratory: No cough. No dyspnea  Gastrointestinal: No diarrhea. No abdominal pain. No bleeding.   +buttock pain    Past medical history: No updates.   	  Vital Signs Last 24 Hrs  T(C): 36.9 (03-12-21 @ 09:40), Max: 37.4 (03-11-21 @ 11:37)  T(F): 98.4 (03-12-21 @ 09:40), Max: 99.3 (03-11-21 @ 11:37)  HR: 80 (03-12-21 @ 09:40) (80 - 100)  BP: 103/58 (03-12-21 @ 09:40) (91/52 - 127/58)  BP(mean): --  RR: 18 (03-12-21 @ 09:40) (16 - 19)  SpO2: 98% (03-12-21 @ 09:40) (96% - 100%)  I&O's Summary    11 Mar 2021 07:01  -  12 Mar 2021 07:00  --------------------------------------------------------  IN: 654 mL / OUT: 1552 mL / NET: -898 mL          PHYSICAL EXAM:  Appearance: Comfortable. No acute distress  HEENT:  Head and neck: Atraumatic. Normocephalic.  Normal oral mucosa, PERRL, Neck is supple.    Neurologic: A&Ox 2, no focal deficits. EOMI, Cranial nerves are intact.  Cardiovascular: Normal S1 S2, No murmur, rubs/gallops. No JVD, midline sternotomy scar  Respiratory: Lungs clear to auscultation  Gastrointestinal:  Soft, Non-tender, + BS  Lower Extremities: Ace wraped, +2 pedal bilateral   Psychiatry: Patient is calm. No agitation. Mood & affect appropriate  Skin: No rashes/ecchymoses/cyanosis/ulcers visualized on the face, hands or feet.      CURRENT MEDICATIONS:  MEDICATIONS  (STANDING):  ascorbic acid 500 milliGRAM(s) Oral daily  aspirin enteric coated 81 milliGRAM(s) Oral daily  atorvastatin 40 milliGRAM(s) Oral at bedtime  enoxaparin Injectable 30 milliGRAM(s) SubCutaneous daily  famotidine    Tablet 20 milliGRAM(s) Oral daily  ferrous    sulfate 325 milliGRAM(s) Oral daily  folic acid 1 milliGRAM(s) Oral daily  levothyroxine 100 MICROGram(s) Oral daily  magnesium oxide 400 milliGRAM(s) Oral three times a day with meals  metoprolol tartrate 12.5 milliGRAM(s) Oral two times a day  midodrine. 5 milliGRAM(s) Oral three times a day  potassium chloride    Tablet ER 20 milliEquivalent(s) Oral daily  senna 2 Tablet(s) Oral at bedtime  sodium chloride 0.9% lock flush 3 milliLiter(s) IV Push every 8 hours  torsemide 20 milliGRAM(s) Oral daily    MEDICATIONS  (PRN):  acetaminophen   Tablet .. 650 milliGRAM(s) Oral every 6 hours PRN Moderate Pain (4 - 6)  polyethylene glycol 3350 17 Gram(s) Oral daily PRN Constipation      DIAGNOSTIC TESTING:  [ ] Echocardiogram:   < from: TTE Echo Complete w/ Contrast w/ Doppler (03.09.21 @ 11:08) >  Summary:   1. Small circumferential pericardial effusion without echo evidence of cardiac tamponade.   2. Left ventricular ejection fraction, by visual estimation, is 55 to 60%.   3. Normal global left ventricular systolic function.   4. Spectral Doppler shows impaired relaxation pattern of left ventricular myocardial filling (Grade I diastolic dysfunction).   5. There is severe concentric left ventricular hypertrophy.   6. Severely enlarged left atrium.   7. Normal right ventricular size and function.   8. Mild tricuspid regurgitation.   9. Mild mitral valve regurgitation.  10. Mitral valve mean gradient is 4.2 mmHg (HR 76) consistent with mild mitral stenosis.  11. Severe thickening and calcification of the anterior and posterior mitral valve leaflets.  12. Mild mitral annular calcification.  13. Aortic valve is normally functioning bioprosthetic valve. There is trivial para-valvular leak. Mean gradient is 14 mm Hg, acceleration time is 63 msec.  14. Compared to a prior study from 2/28/21, there is significant improvement in LV function and a small pericardial effusion.    < end of copied text >    [ ]  Catheterization:  < from: Cardiac Cath Lab - Adult (02.16.21 @ 08:33) >  HEMODYNAMICS: There is moderate pulmonary hypertension.  VENTRICLES: EF by echo was 30 %.  VALVES: AORTIC VALVE: There was critical aortic stenosis.  CORONARY VESSELS:The coronary circulation is co-dominant.  LM:   --  LM: Normal.  LAD:   --  Mid LAD: There was a tubular 90 % stenosis. The lesion was  eccentric.  CX:   --  OM1: There was a diffuse 85 % stenosis in the proximal third of  the vessel segment. The lesion was irregularly contoured and eccentric.  RCA:   --  Proximal RCA: There was a diffuse 99 % stenosis. The lesion was  irregularly contoured and eccentric.  --  Distal RCA: There was a diffuse 100 % stenosis.  COMPLICATIONS: No complications occurred during the cath lab visit.  DIAGNOSTIC IMPRESSIONS: Moderate Pulmonary Hypertension and elevation of  filling pressures. 2. Critical Aortic Stenosis with a mean PG >40mm Hg and  an SREEDHAR of <0.5cm2. 3. Severe LV Systolic dysfunction by TTE from 2/13/2021  with an estimated LVEF of 30%. 4. Triple Vessel CAD.  DIAGNOSTIC RECOMMENDATIONS: GDMT. 2. CTS consultation.  INTERVENTIONAL IMPRESSIONS: Moderate Pulmonary Hypertension and elevation  of filling pressures. 2. Critical Aortic Stenosis with a mean PG >40mm Hg  and an SREEDHAR of <0.5cm2. 3. Severe LV Systolic dysfunction by TTE from  2/13/2021 with an estimated LVEF of 30%. 4. Triple Vessel CAD.    Labs:                        8.8    10.30 )-----------( 353      ( 12 Mar 2021 06:18 )             28.0     03-12    138  |  98  |  34.0<H>  ----------------------------<  113<H>  4.1   |  30.0<H>  |  0.63    Ca    9.0      12 Mar 2021 06:18  Mg     1.9     03-12 17 Feb 2021 11:36 Troponin 1.35 ng/mL / Creatine Kinase x     /  CKMB x     / CPK Mass Assay % x       17 Feb 2021 01:45 Troponin 1.78 ng/mL / Creatine Kinase x     /  CKMB x     / CPK Mass Assay % x       16 Feb 2021 23:27 Troponin 1.83 ng/mL / Creatine Kinase 49 U/L /  CKMB x     / CPK Mass Assay % x          Serum Pro-Brain Natriuretic Peptide: 9003 pg/mL (03-12-21 @ 06:18)  Serum Pro-Brain Natriuretic Peptide: 9569 pg/mL (03-06-21 @ 06:52)  Serum Pro-Brain Natriuretic Peptide: 46130 pg/mL (02-21-21 @ 06:04)  Serum Pro-Brain Natriuretic Peptide: 18732 pg/mL (02-20-21 @ 06:31)  Serum Pro-Brain Natriuretic Peptide: 39795 pg/mL (02-16-21 @ 12:38)    Cholesterol 105 mg/dL; Direct LDL --; HDL Cholesterol, Serum 39 mg/dL; HDL/ Total Cholesterol Ratio Measurement --; Total Cholesterol/ HDL Ratio Measurement --; Triglycerides, Serum 62 mg/dL  A1C with Estimated Average Glucose Result: 5.2 % (02-16-21 @ 12:38)      TELEMETRY: V paced 90s

## 2021-03-12 NOTE — DISCHARGE NOTE PROVIDER - HOSPITAL COURSE
76F PMH of anemia, hypothyroidism, and GI bleed 1 year ago (refused Endo/colonoscopy and never followed up) presented to [facility] originally with fever and SOB found to have urosepsis s/p course of rocephin (completed 2/17), anemia requiring 2 PRBC (no GI work up because pt wanted to sign out AMA), ruled in for NSTEMI, transferred to [facility] 2/16 and underwent cardiac cath which showed Multivessel CAD and Severe AS. Preop carotid US with b/l carotid stenosis confirmed by CTA, seen by vascular and cleared for OR (outpt follow up), s/p endoscopy 2/17 and colonoscopy 2/19 without evidence of acute bleed, noted to have hiatal hernia, now s/p CABG x3 with LIMA, AVR 2/24 with Dr. Cruz. 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 Physical therapy recommending Acute rehab as patient is only able to walk 5ft-10ft with a rolling walker and 1-person assist. Patient's family now agreeing for Acute rehab. Pt received 1 unit of PRBC 3/11 for low H & H, followed by lasix to prevent overload.  H and H now stable 3/12.  Pt was seen at the bedside with the Dr. Cruz  and determined stable for discharge with follow up appt. March 24, 2021 at 2:15, as well as follow up appt. with Dr. Ortiz March 15, 2021 at 1pm.     < from: TTE Echo Complete w/ Contrast w/ Doppler (03.09.21 @ 11:08) >      Summary:   1. Small circumferential pericardial effusion without echo evidence of cardiac tamponade.   2. Left ventricular ejection fraction, by visual estimation, is 55 to 60%.   3. Normal global left ventricular systolic function.   4. Spectral Doppler shows impaired relaxation pattern of left ventricular myocardial filling (Grade I diastolic dysfunction).   5. There is severe concentric left ventricular hypertrophy.   6. Severely enlarged left atrium.   7. Normal right ventricular size and function.   8. Mild tricuspid regurgitation.   9. Mild mitral valve regurgitation.  10. Mitral valve mean gradient is 4.2 mmHg (HR 76) consistent with mild mitral stenosis.  11. Severe thickening and calcification of the anterior and posterior mitral valve leaflets.  12. Mild mitral annular calcification.  13. Aortic valve is normally functioning bioprosthetic valve. There is trivial para-valvular leak. Mean gradient is 14 mm Hg, acceleration time is 63 msec.  14. Compared to a prior study from 2/28/21, there is significant improvement in LV function and a small pericardial effusion.    < end of copied text >  < from: Xray Chest 1 View- PORTABLE-Routine (Xray Chest 1 View- PORTABLE-Routine in AM.) (03.12.21 @ 07:34) >    Findings:    Lungs/Pleura:  Interval improvement in previously seen bilateral lung opacities.    Heart/Mediastinum:  Heart is enlarged. Valve replacement.    Bones/soft tissues:  Sternotomy. Left chest wall cardiac device with leads in place.    Impression:    Interval improvement in bilateral lung opacities since 3/11/2021.    < end of copied text >     76F PMH of anemia, hypothyroidism, and GI bleed 1 year ago (refused Endo/colonoscopy and never followed up) presented to [facility] originally with fever and SOB found to have urosepsis s/p course of rocephin (completed 2/17), anemia requiring 2 PRBC (no GI work up because pt wanted to sign out AMA), ruled in for NSTEMI, transferred to [facility] 2/16 and underwent cardiac cath which showed Multivessel CAD and Severe AS. Preop carotid US with b/l carotid stenosis confirmed by CTA, seen by vascular and cleared for OR (outpt follow up), s/p endoscopy 2/17 and colonoscopy 2/19 without evidence of acute bleed, noted to have hiatal hernia, now s/p CABG x3 with LIMA, AVR 2/24 with Dr. Cruz. 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 Physical therapy recommending Acute rehab as patient is only able to walk 5ft-10ft with a rolling walker and 1-person assist. Patient's family now agreeing for Acute rehab. Pt received 1 unit of PRBC 3/11 for low H & H, followed by lasix to prevent overload.  H and H now stable 3/12.  Pt was seen at the bedside with the Dr. Cruz and determined stable for discharge with follow up appt. March 24, 2021 at 2:15, as well as follow up appt. with Dr. Ortiz March 15, 2021 at 1pm.     < from: TTE Echo Complete w/ Contrast w/ Doppler (03.09.21 @ 11:08) >      Summary:   1. Small circumferential pericardial effusion without echo evidence of cardiac tamponade.   2. Left ventricular ejection fraction, by visual estimation, is 55 to 60%.   3. Normal global left ventricular systolic function.   4. Spectral Doppler shows impaired relaxation pattern of left ventricular myocardial filling (Grade I diastolic dysfunction).   5. There is severe concentric left ventricular hypertrophy.   6. Severely enlarged left atrium.   7. Normal right ventricular size and function.   8. Mild tricuspid regurgitation.   9. Mild mitral valve regurgitation.  10. Mitral valve mean gradient is 4.2 mmHg (HR 76) consistent with mild mitral stenosis.  11. Severe thickening and calcification of the anterior and posterior mitral valve leaflets.  12. Mild mitral annular calcification.  13. Aortic valve is normally functioning bioprosthetic valve. There is trivial para-valvular leak. Mean gradient is 14 mm Hg, acceleration time is 63 msec.  14. Compared to a prior study from 2/28/21, there is significant improvement in LV function and a small pericardial effusion.    < end of copied text >  < from: Xray Chest 1 View- PORTABLE-Routine (Xray Chest 1 View- PORTABLE-Routine in AM.) (03.12.21 @ 07:34) >    Findings:    Lungs/Pleura:  Interval improvement in previously seen bilateral lung opacities.    Heart/Mediastinum:  Heart is enlarged. Valve replacement.    Bones/soft tissues:  Sternotomy. Left chest wall cardiac device with leads in place.    Impression:    Interval improvement in bilateral lung opacities since 3/11/2021.    < end of copied text >

## 2021-03-12 NOTE — PROGRESS NOTE ADULT - PROBLEM SELECTOR PLAN 2
Plan as above.
as above
as above
Same as above
as above
as above
Plan as above.
Plan as above.
as above
Plan as above.
as above
Plan as above.
as above
Plan as above.
as above
Plan as above.
as above

## 2021-03-12 NOTE — CHART NOTE - NSCHARTNOTEFT_GEN_A_CORE
Brief Summary   76F PMH of anemia, hypothyroidism, and GI bleed 1 year ago (refused Endo/colonoscopy and never followed up) presented to [facility] originally with fever and SOB found to have urosepsis s/p course of rocephin (completed 2/17), anemia requiring 2 PRBC (no GI work up because pt wanted to sign out AMA), ruled in for NSTEMI, transferred to [facility] 2/16 and underwent cardiac cath which showed Multivessel CAD and Severe AS. Preop carotid US with b/l carotid stenosis confirmed by CTA, seen by vascular and cleared for OR (outpt follow up), s/p endoscopy 2/17 and colonoscopy 2/19 without evidence of acute bleed, noted to have hiatal hernia, now s/p CABG x3 with LIMA, AVR 2/24 with Dr. Roldan. 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    Physical therapy recommending Acute rehab as patient   pt increased walking distance from 5-10 Ft to15ft with a rolling walker and 1-person assist.     Patient seen and examined. Notes, flowsheets, medications, radiologic images and labs reviewed.  VSS. Pt in Bed alert, NAD. Pt states, " I'm doing ok"    Neuro: A+O x 3, non-focal, speech clear and intact  HEENT:  NCAT, PERRL, EOMI. No conjuctival edema or icterus, no thrush.  Neck: supple, trachea midline  Pulm:  bilaterally diminished, no rales/rhonchi/wheezing, no accessory muscle use noted  CV:Paced   Abd: soft, NT, ND, + BS  Ext: YATES x 4, +2-3 b/l LE edema, 2+, b/l lower extremeites wrapped, DP b/l, distal motor/neuro/circ intact.   Skin: warm, dry, well perfused  Incisions: midsternal incision healing well, C/D/I, Sternum stable. Left chest wall PPM site C/D/I, REVH C/D/I    PLAN   LUIS MIGUEL results indicating EF  55 to 60%. stating that incomparison to a prior study from 2/28/21, there is significant improvement in LV function and a small pericardial effusion.  Cont. torsemide       CAD   S/p AVR/C3L 2/24/21 with Dr Roldan.  Hemodynamically stable s/p BiV AICD 3/2/21 for CHB.   Continue ASA and Lipitor.  Continue Lopressor as tolerated by BP.  CDBEs, I/S use hourly while awake.   Encourage OOB and increased ambulation with physical therapy.   Chest PT.   CXR stable   Supplement electrolytes to maintain K>4 and Mg>2.   Tylenol PRN for pain control. Holding any narcotics.   Bowel regimen PRN.  COVID (-) 3/12  H & H stable     KIMMY (acute kidney injury).   ATN in setting of CHB.  Resolved.   Trending BUN/Cr.  Continue torsemide for diuresis.   Continue to monitor electrolytes, goal K>4 and Mg >2.   Renal consult appreciated.     Heart block.    CHB with junctional escape postop now s/p BIV AICD 3/2/21.   EP following.  Device interrogated  No Afib seen.     Acute systolic heart failure.   Continue torsemide  Continue Lopressor  Continue statin  Continue aspirin  Follow up daily CXR.   Daily weight    Hypothyroidism, unspecified type.   Endocrine following  Elevated TSH, low T4  Increased to Synthroid 100 mcg on 3/4/21.   Will need follow up as outpatient in 4-6 weeks for TFT monitoring.     Prophylactic measure.    SCDs, Lovenox for DVT prophylaxis.  Pepcid for GI prophylaxis.    Dispo: family amenable to Acute rehab, Pt HD stable and going to [facility] facility today 3/12    PLAN of care D/W dr. Roldan      Plan of care d/w Dr. Roldan.

## 2021-03-12 NOTE — DISCHARGE NOTE PROVIDER - NSDCMRMEDTOKEN_GEN_ALL_CORE_FT
acetaminophen 325 mg oral tablet: 2 tab(s) orally every 6 hours, As needed, Moderate Pain (4 - 6)  ascorbic acid 500 mg oral tablet: 1 tab(s) orally once a day  aspirin 81 mg oral delayed release tablet: 1 tab(s) orally once a day  atorvastatin 40 mg oral tablet: 1 tab(s) orally once a day (at bedtime)  enoxaparin: 30 milligram(s) subcutaneous once a day  famotidine 20 mg oral tablet: 1 tab(s) orally once a day  ferrous sulfate 325 mg (65 mg elemental iron) oral tablet: 1 tab(s) orally once a day  folic acid 1 mg oral tablet: 1 tab(s) orally once a day  levothyroxine 100 mcg (0.1 mg) oral tablet: 1 tab(s) orally once a day  magnesium oxide 400 mg (241.3 mg elemental magnesium) oral tablet: 1 tab(s) orally 3 times a day (with meals)  metoprolol: 12.5 milligram(s) orally 2 times a day  midodrine 5 mg oral tablet: 1 tab(s) orally 3 times a day  polyethylene glycol 3350 oral powder for reconstitution: 17 gram(s) orally once a day, As needed, Constipation  potassium chloride 20 mEq oral tablet, extended release: 1 tab(s) orally once a day  senna oral tablet: 2 tab(s) orally once a day (at bedtime)  torsemide 20 mg oral tablet: 1 tab(s) orally once a day

## 2021-03-12 NOTE — DISCHARGE NOTE PROVIDER - PROVIDER TOKENS
FREE:[LAST:[Mojgan],FIRST:[Rodriguez],PHONE:[phone]],FAX:[(   )    -],ADDRESS:[[address] [place] [address], [address]],SCHEDULEDAPPT:[03/15/2021],SCHEDULEDAPPTTIME:[02:15 PM]],PROVIDER:[TOKEN:[[id number]],SCHEDULEDAPPT:[03/15/2021],SCHEDULEDAPPTTIME:[01:00 PM]] PROVIDER:[TOKEN:[[id number]],SCHEDULEDAPPT:[03/15/2021],SCHEDULEDAPPTTIME:[01:00 PM]],PROVIDER:[TOKEN:[[id number]]],FREE:[LAST:[Mojgan],FIRST:[Rodriguez],PHONE:[phone]],FAX:[(   )    -],ADDRESS:[[address] [place] [address], [address]],SCHEDULEDAPPT:[03/15/2021],SCHEDULEDAPPTTIME:[02:15 PM]]

## 2021-03-12 NOTE — DISCHARGE NOTE NURSING/CASE MANAGEMENT/SOCIAL WORK - PATIENT PORTAL LINK FT
You can access the FollowMyHealth Patient Portal offered by [facility] by registering at the following website: [email or web address]. By joining [employer]’s FollowMyHealth portal, you will also be able to view your health information using other applications (apps) compatible with our system.

## 2021-03-12 NOTE — H&P ADULT - NSHPLABSRESULTS_GEN_ALL_CORE
03-12    138  |  98  |  34.0<H>  ----------------------------<  113<H>  4.1   |  30.0<H>  |  0.63    Ca    9.0      12 Mar 2021 06:18  Mg     1.9     03-12    TPro  5.8<L>  /  Alb  3.2<L>  /  TBili  0.7  /  DBili  x   /  AST  99<H>  /  ALT  97<H>  /  AlkPhos  389<H>  03-10               8.8    10.30 )-----------( 353      ( 12 Mar 2021 06:18 )             28.0                  CXR 3/12/21    INTERPRETATION:  XR CHEST  History: Status post open heart surgery.  Technique: Single AP view of the chest.  Comparison: Chest x-ray 3/11/2021and 3/10/2021  Findings:  Lungs/Pleura:  Interval improvement in previously seen bilateral lung opacities.  Heart/Mediastinum:  Heart is enlarged. Valve replacement.  Bones/soft tissues:  Sternotomy. Left chest wall cardiac device with leads in place.  Impression: Interval improvement in bilateral lung opacities since 3/11/2021.      TTE 3/9:  Summary:   1. Small circumferential pericardial effusion without echo evidence of cardiac tamponade.   2. Left ventricular ejection fraction, by visual estimation, is 55 to 60%.   3. Normal global left ventricular systolic function.   4. Spectral Doppler shows impaired relaxation pattern of left ventricular myocardial filling (Grade I diastolic dysfunction).   5. There is severe concentric left ventricular hypertrophy.   6. Severely enlarged left atrium.   7. Normal right ventricular size and function.   8. Mild tricuspid regurgitation.   9. Mild mitral valve regurgitation.  10. Mitral valve mean gradient is 4.2 mmHg (HR 76) consistent with mild mitral stenosis.  11. Severe thickening and calcification of the anterior and posterior mitral valve leaflets.  12. Mild mitral annular calcification.  13. Aortic valve is normally functioning bioprosthetic valve. There is trivial para-valvular leak. Mean gradient is 14 mm Hg, acceleration time is 63 msec.  14. Compared to a prior study from 2/28/21, there is significant improvement in LV function and a small pericardial effusion.      CT Angio Neck w/ IV Cont (02.17.21 @ 16:14) >  FINDINGS:  CT ANGIOGRAPHY NECK:  Thoracic aorta and branch vessels: Patent. Calcified plaque.  Right carotid system: Moderate stenosis of the proximal right ICA by NASCET criteria.  Left carotid system: Severe stenosis ofthe left proximal ICA by NASCET criteria.  Vertebral arteries: Mild narrowing of the left mid V2 segment.  Additional findings:  Calcified plaque in the bilateral cavernous ICAs and right V4 segment.  Bilateral pleural effusions.  IMPRESSION:  1.  Right carotid system: Moderate stenosis of the proximal right ICA by NASCET criteria.  2.  Left carotid system: Severe stenosis of the left proximal ICA by NASCET criteria.  3.  Bilateral pleural effusions.      US Duplex Carotid Arteries Complete, Bilateral (02.16.21 @ 12:27) >  FINDINGS:  Prominent calcified atheromatous plaques are present along the course of the right and left carotid arteries.  In particular, there is a severe hemodynamically significant stenosis affecting the left internal carotid artery.  No elevated velocities or abnormal waveforms are encountered.  Peak systolic velocities are as follows:    RIGHT:  PROX CCA = 71 cm/s  DIST CCA = 61 cm/s  PROX ICA = 128 cm/s  DIST ICA = 103 cm/s  ECA = 140 cm/s  LEFT:  PROX CCA = 44 cm/s  DIST CCA = 45 cm/s  PROX ICA = 238/57 cm/s  MID ICA = 238/57 cm/s  DIST ICA = 127 cm/s  ECA = 111 cm/s    Antegrade flow is noted within both vertebral arteries.  IMPRESSION: There is a moderate, 50-69% stenosis of the right internal carotid artery.  There is a severe, greater than 70% stenosis of the left internal carotid artery.

## 2021-03-12 NOTE — CHART NOTE - NSCHARTNOTESELECT_GEN_ALL_CORE
CTS Addendum note/Event Note
Event Note
Nutrition Services
ACP/Event Note
CTS ACP Addendum/Event Note
CTS ACP Preop/Event Note
CTS note/Event Note
Event Note
Nutrition Services
Nutrition Services

## 2021-03-12 NOTE — PROGRESS NOTE ADULT - PROBLEM SELECTOR PROBLEM 5
Hypothyroidism, unspecified type
NSTEMI (non-ST elevated myocardial infarction)
NSTEMI (non-ST elevated myocardial infarction)
Hypothyroidism, unspecified type
Acute cystitis without hematuria
NSTEMI (non-ST elevated myocardial infarction)
NSTEMI (non-ST elevated myocardial infarction)
Hypothyroidism, unspecified type
Acute cystitis without hematuria
NSTEMI (non-ST elevated myocardial infarction)
Hypothyroidism, unspecified type
Acute cystitis without hematuria
NSTEMI (non-ST elevated myocardial infarction)
Acute cystitis without hematuria
Acute cystitis without hematuria
NSTEMI (non-ST elevated myocardial infarction)
Acute cystitis without hematuria
Hypothyroidism, unspecified type
Hypothyroidism, unspecified type
NSTEMI (non-ST elevated myocardial infarction)
NSTEMI (non-ST elevated myocardial infarction)
Hypothyroidism, unspecified type

## 2021-03-13 LAB
ALBUMIN SERPL ELPH-MCNC: 2.3 G/DL — LOW (ref 3.3–5)
ALP SERPL-CCNC: 273 U/L — HIGH (ref 40–120)
ALT FLD-CCNC: 67 U/L — HIGH (ref 10–45)
ANION GAP SERPL CALC-SCNC: 7 MMOL/L — SIGNIFICANT CHANGE UP (ref 5–17)
AST SERPL-CCNC: 42 U/L — HIGH (ref 10–40)
BASOPHILS # BLD AUTO: 0.06 K/UL — SIGNIFICANT CHANGE UP (ref 0–0.2)
BASOPHILS NFR BLD AUTO: 0.6 % — SIGNIFICANT CHANGE UP (ref 0–2)
BILIRUB SERPL-MCNC: 0.7 MG/DL — SIGNIFICANT CHANGE UP (ref 0.2–1.2)
BUN SERPL-MCNC: 31 MG/DL — HIGH (ref 7–23)
CALCIUM SERPL-MCNC: 9.5 MG/DL — SIGNIFICANT CHANGE UP (ref 8.4–10.5)
CHLORIDE SERPL-SCNC: 107 MMOL/L — SIGNIFICANT CHANGE UP (ref 96–108)
CO2 SERPL-SCNC: 31 MMOL/L — SIGNIFICANT CHANGE UP (ref 22–31)
CREAT SERPL-MCNC: 0.76 MG/DL — SIGNIFICANT CHANGE UP (ref 0.5–1.3)
EOSINOPHIL # BLD AUTO: 0.2 K/UL — SIGNIFICANT CHANGE UP (ref 0–0.5)
EOSINOPHIL NFR BLD AUTO: 2.1 % — SIGNIFICANT CHANGE UP (ref 0–6)
GLUCOSE SERPL-MCNC: 110 MG/DL — HIGH (ref 70–99)
HCT VFR BLD CALC: 29.9 % — LOW (ref 34.5–45)
HGB BLD-MCNC: 9.2 G/DL — LOW (ref 11.5–15.5)
IMM GRANULOCYTES NFR BLD AUTO: 0.8 % — SIGNIFICANT CHANGE UP (ref 0–1.5)
LYMPHOCYTES # BLD AUTO: 0.75 K/UL — LOW (ref 1–3.3)
LYMPHOCYTES # BLD AUTO: 7.9 % — LOW (ref 13–44)
MAGNESIUM SERPL-MCNC: 2 MG/DL — SIGNIFICANT CHANGE UP (ref 1.6–2.6)
MCHC RBC-ENTMCNC: 28.8 PG — SIGNIFICANT CHANGE UP (ref 27–34)
MCHC RBC-ENTMCNC: 30.8 GM/DL — LOW (ref 32–36)
MCV RBC AUTO: 93.4 FL — SIGNIFICANT CHANGE UP (ref 80–100)
MONOCYTES # BLD AUTO: 1.05 K/UL — HIGH (ref 0–0.9)
MONOCYTES NFR BLD AUTO: 11 % — SIGNIFICANT CHANGE UP (ref 2–14)
NEUTROPHILS # BLD AUTO: 7.4 K/UL — SIGNIFICANT CHANGE UP (ref 1.8–7.4)
NEUTROPHILS NFR BLD AUTO: 77.6 % — HIGH (ref 43–77)
NRBC # BLD: 0 /100 WBCS — SIGNIFICANT CHANGE UP (ref 0–0)
PLATELET # BLD AUTO: 350 K/UL — SIGNIFICANT CHANGE UP (ref 150–400)
POTASSIUM SERPL-MCNC: 4.3 MMOL/L — SIGNIFICANT CHANGE UP (ref 3.5–5.3)
POTASSIUM SERPL-SCNC: 4.3 MMOL/L — SIGNIFICANT CHANGE UP (ref 3.5–5.3)
PROT SERPL-MCNC: 5.7 G/DL — LOW (ref 6–8.3)
RBC # BLD: 3.2 M/UL — LOW (ref 3.8–5.2)
RBC # FLD: 21 % — HIGH (ref 10.3–14.5)
SODIUM SERPL-SCNC: 145 MMOL/L — SIGNIFICANT CHANGE UP (ref 135–145)
WBC # BLD: 9.54 K/UL — SIGNIFICANT CHANGE UP (ref 3.8–10.5)
WBC # FLD AUTO: 9.54 K/UL — SIGNIFICANT CHANGE UP (ref 3.8–10.5)

## 2021-03-13 PROCEDURE — 99222 1ST HOSP IP/OBS MODERATE 55: CPT | Mod: GC

## 2021-03-13 PROCEDURE — 99223 1ST HOSP IP/OBS HIGH 75: CPT | Mod: GC

## 2021-03-13 RX ADMIN — Medication 100 MICROGRAM(S): at 05:37

## 2021-03-13 RX ADMIN — FAMOTIDINE 20 MILLIGRAM(S): 10 INJECTION INTRAVENOUS at 11:31

## 2021-03-13 RX ADMIN — ATORVASTATIN CALCIUM 40 MILLIGRAM(S): 80 TABLET, FILM COATED ORAL at 22:10

## 2021-03-13 RX ADMIN — Medication 81 MILLIGRAM(S): at 11:31

## 2021-03-13 RX ADMIN — Medication 3 MILLIGRAM(S): at 00:50

## 2021-03-13 RX ADMIN — Medication 500 MILLIGRAM(S): at 11:31

## 2021-03-13 RX ADMIN — Medication 1 MILLIGRAM(S): at 11:31

## 2021-03-13 RX ADMIN — ENOXAPARIN SODIUM 40 MILLIGRAM(S): 100 INJECTION SUBCUTANEOUS at 22:10

## 2021-03-13 RX ADMIN — Medication 20 MILLIGRAM(S): at 05:37

## 2021-03-13 RX ADMIN — Medication 20 MILLIEQUIVALENT(S): at 11:31

## 2021-03-13 RX ADMIN — Medication 325 MILLIGRAM(S): at 11:31

## 2021-03-13 NOTE — CONSULT NOTE ADULT - ATTENDING COMMENTS
77yo F with PMH of anemia, hypothyroidism, and GI bleed presented as transfer from [place] to [facility] on 2/16 for cardiac cath for NSTEMI. 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

## 2021-03-13 NOTE — CONSULT NOTE ADULT - ASSESSMENT
75yo F with PMH of anemia, hypothyroidism, and GI bleed presented as transfer from [place] to [facility] on 2/16 for cardiac cath for NSTEMI. Found to have multivessel CAD and severe AS. 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 Admitted to [facility] for PT/ OT/ DVT ppx      #Acute Systolic Heart Failure  - Aortic Stenosis s/p bio-AVR (2/24), NSTEMI and CAD s/p CABGx3 (2/24)  - CHB s/p BiV AICD (3/2) – device interrogated, no afib seen  - TTE on 3/9 shows EF improved to 55-60% after surgery  - c/w asa and Lipitor  - c/w lopressor  - c/w torsemide for diuresis and daily potassium supplementation of 20mEq  - supplement electrolytes to maintain K>4 and Mg>2  - midodrine 5mg TID for BP support    #LE Edema  - 2/2 acute HF  - diuresis with torsemide    #Bilateral Carotid Stenosis  - seen by vascular surgery at [facility]; note/ recommendations reviewed – will need OP follow up  - on pre-operative eval for CABG found to have >70% L ICA stenosis, asymptomatic  -patient with asymptomatic high grade stenosis of L ICA, no indication for CEA at this time       #Hiatal Hernia\  -found during endoscopy [facility]  -seen by GI at [facility]; note /recommendations reviewed- will need OP follow up  -Patient with anemia and dark stools. Being evaluated for CABG after cardiac cath. No signs of overt GI bleeding. Patient should have EGD and colonoscopy. Will prep for the procedures. May need cardiac anesthesia    #Anemia  -Hgb currently stable  -Monitor Hgb  -Ferrous sulfate    #Hypothyroid  - c/w Synthroid 100mcg    #Pain:  - Tylenol     -#GI ppx:  - pepcid      #DVT prophylaxis:  - Lovenox        --------------------------------------------  Outpatient Follow up:  Kristy Ortiz)  Cardiac Electrophysiology; Cardiovascular Disease; Internal Medicine  [address] [place] [id number]  Phone: [phone]    Quin Perez)  EndocrinologyMetabDiabetes; Internal Medicine  [address] [place] [address]  Phone: [phone]    Rodriguez Ulrich  [address] [place] [address], [address], [place], [place].  Phone: [phone]    --------------------------------------------     75yo F with PMH of anemia, hypothyroidism, and GI bleed presented as transfer from [place] to [facility] on 2/16 for cardiac cath for NSTEMI. 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 Admitted to [facility] for PT/ OT/ DVT ppx    #HFrEF, improved  - c/w torsemide  - low bp- unable to start ACEI, BB  - will repeat cxr on monday 3/15/21  - pro bnp noted- improving will monitor    # elevated lft's- likely due to NSTEMI, HF- improving , will monitor    # Aortic Stenosis   -s/p bio-AVR (2/24)  - monitor    # NSTEMI and CAD s/p CABGx3 (2/24)  - CHB s/p BiV AICD (3/2) – device interrogated, no afib seen  - TTE on 3/9 shows EF improved to 55-60% after surgery  - EF on 2/28/21 25 to 30%, Severely decreased global left ventricular systolic function  - c/w asa  - c/w lipitor    # hypotension- c/w midodrine 5mg TID for BP support    #LE Edema  - 2/2 acute HF  - diuresis with torsemide    #Bilateral Carotid Stenosis  - seen by vascular surgery at [facility]; note/ recommendations reviewed – will need OP follow up  - on pre-operative eval for CABG found to have >70% L ICA stenosis, asymptomatic  -patient with asymptomatic high grade stenosis of L ICA, no indication for CEA at this time       #Hiatal Hernia  -found during endoscopy [facility]  -seen by GI at [facility]; note /recommendations reviewed- will need OP follow up  - c/w famotidine     #Anemia- likely blood loss vs ACD   s/p 2 PRBC at [facility]  -Hgb currently stable  -Monitor Hgb  -Ferrous sulfate, folic acid    #Hypothyroid  - c/w Synthroid 100mcg    #Pain:  - Tylenol     -#GI ppx:  - pepcid      #DVT prophylaxis:  - Lovenox    will follow  d/w rehab team

## 2021-03-13 NOTE — DIETITIAN INITIAL EVALUATION ADULT. - ETIOLOGY
MINNA WASHINGTON met with pt and his mother.  Ms. Quiana Cortez brought pt in because of neck rash.  Pt. lives at  [place] [id number].  As per mom, pt. is autistic. Recent Illness/Prolonged Hospitalization

## 2021-03-13 NOTE — CONSULT NOTE ADULT - SUBJECTIVE AND OBJECTIVE BOX
History of Present Illness:      77yo F with PMH of anemia, hypothyroidism, and GI bleed presented as transfer from [place] to [facility] on  for cardiac cath for NSTEMI. Pt initially presented a few days prior to [place] with fever and SOB and was found to have NSTEMI, urosepsis s/p course of ceftriaxone (completed ), anemia requiring 2 PRBC (no GI work up because pt wanted to sign out AMA. Cardiac cath  showed multivessel CAD and severe aortic stenosis. Preop carotid US with bilateral carotid stenosis confirmed by CTA. Patient was seen by vascular and cleared for OR (will need outpatient follow up for carotid stenosis). Had endoscopy  and colonoscopy  without evidence of acute bleed, however was noted to have hiatal hernia. She is s/p CABG x3 with LIMA and AVR  with Dr. Cruz. 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 TTE on 3/9 showing EF improved to 55-60%. Last chest tube removed 3/9. Coccyx noted to have some skin breakdown-pt complaining of buttock pain.  Patient was evaluated by PM&R and therapy for functional deficits and gait/ ADL impairments and recommended acute rehabilitation. Patient was medically optimized for discharge to [facility] on 3/12/21. Admitted to [facility] for PT/OT/DVT ppx    Patient seen at bedside, Endorses feeling tired/ weak.  States no overnight events, slept well. Tolerating PO intake well. Denies any pain, SOB, fevers, cough, ABD pain, N/V/D, constipation, dysuria.     Review of Systems: all negative except for HPI      Allergies and Intolerances:        Allergies:  	No Known Allergies:     Home Medications:   · 	levothyroxine 75 mcg (0.075 mg) oral tablet: 1 tab(s) orally once a day    .    Patient History:   PAST MEDICAL HISTORY:  Hypothyroid     Iron deficiency anemia due to chronic blood loss.     PAST SURGICAL HISTORY:  History of tonsillectomy.   CABG  AVR  AICD placement    FAMILY HISTORY:  Father  age 74 with PHX HTN, DM  Mother  age 73 with PHX HTN, DM     Social History:  Social History (marital status, living situation, occupation, tobacco use, alcohol and drug use, and sexual history):   SOCIAL HISTORY  Smoking - Denies  EtOH - 1 glass of wine intermittently/ infrequently  Drugs - Denies       Vital Signs Last 24 Hrs  T(C): 36.8 (13 Mar 2021 08:31), Max: 36.9 (12 Mar 2021 09:40)  T(F): 98.2 (13 Mar 2021 08:31), Max: 98.4 (12 Mar 2021 09:40)  HR: 97 (13 Mar 2021 08:31) (80 - 106)  BP: 122/68 (13 Mar 2021 08:31) (94/58 - 150/77)  BP(mean): --  RR: 15 (13 Mar 2021 08:31) (14 - 18)  SpO2: 96% (13 Mar 2021 08:31) (93% - 98%)      GENERAL- NAD  EAR/NOSE/MOUTH/THROAT - no pharyngeal exudates, no oral leisions,  MMM  EYES- ÓSCAR, conjunctiva and Sclera clear  NECK- supple  RESPIRATORY-  clear to auscultation bilaterally  CARDIOVASCULAR - SIS2, RRR  GI - soft NT BS present  EXTREMITIES- +1 pitting edema B/L LE  NEUROLOGY- no gross focal deficits  SKIN- no rashes, warm to touch, Midline chest surgical incision, approximated. Left chest wall incision covered by ster-strips  PSYCHIATRY- AAO X 3 (to person, place, year; unable to state exact date, short term intermittent confusion)   MUSCULOSKELETAL- ROM normal        Labs personally reviewed:                9.2                  145  | 31   | 31           9.54  >-----------< 350     ------------------------< 110                   29.9                 4.3  | 107  | 0.76                                         Ca 9.5   Mg 2.0   Ph x          Chest XRAY personally reviewed: improvement in bilateral lung opacities since 3/11/2021    < from: Xray Chest 1 View- PORTABLE-Routine (Xray Chest 1 View- PORTABLE-Routine in AM.) (21 @ 07:34) >    PROCEDURE DATE:  2021          INTERPRETATION:  XR CHEST    History: Status post open heart surgery.  Labs reviewed:       Technique: Single AP view of the chest.    Comparison: Chest x-ray 3/11/2021and 3/10/2021    Findings:    Lungs/Pleura:  Interval improvement in previously seen bilateral lung opacities.    Heart/Mediastinum:  Heart is enlarged. Valve replacement.    Bones/soft tissues:  Sternotomy. Left chest wall cardiac device with leads in place.    Impression:    Interval improvement in bilateral lung opacities since 3/11/2021.    < end of copied text >      < from: Xray Chest 1 View- PORTABLE-Routine (Xray Chest 1 View- PORTABLE-Routine in AM.) (21 @ 05:59) >  XAM:  XR CHEST PORTABLE ROUTINE 1V                         EXAM:  XR CHEST PORTABLE URGENT 1V                          PROCEDURE DATE:  03/10/2021          INTERPRETATION:  History: Removal of chest tube    Technique: Serial portable chest x-rays,2 films    Comparison: 3/10/2021 at 4:13 AM    Findings: The initial film is from 3/10/2021 at 3:36 PM. A cardiac device overlies and obscures the left hemithorax with leads in place. Heart is enlarged. Prosthetic aortic valve osteopenia. Degenerative changes.    Subsequent film from today at 4:26 AM shows worsening bilateral infiltrates. Worsening right effusion. No pneumothorax. Is seen. Bibasilar opacities compatible with effusion/infiltrate, right greater than left.    Impression: Worsening bilateral interstitial infiltrates. May be cardiac related. Probable bilateral effusions right greater than left. No pneumothorax    < end of copied text >        < from: TTE Echo Complete w/ Contrast w/ Doppler (21 @ 11:08) >  PHYSICIAN INTERPRETATION:  Left Ventricle:  Global LV systolic function was normal. Left ventricular ejection fraction, by visual estimation, is 55 to 60%. Spectral Doppler shows impaired relaxation pattern of left ventricular myocardial filling (Grade I diastolic dysfunction).  Right Ventricle: Normal right ventricular size and function.  Left Atrium: Severely enlarged left atrium.  Right Atrium: The right atrium was not well visualized.  Pericardium: A small pericardial effusion is present. The pericardial effusion is globally located around the entire heart. There is no evidence of cardiac tamponade. There is a small pleural effusion in both left and right lateral regions.  Mitral Valve: Severe thickening and calcification of the anterior and posterior mitral valve leaflets. There is mild mitral annular calcification. Mitral valve mean gradient is 4.2 mmHg consistent with mild mitral stenosis. Mild mitral valve regurgitation is seen.  Tricuspid Valve: The tricuspid valve is normal in structure. Mild tricuspid regurgitation is visualized.  Aortic Valve: The aortic valve is a normally functioning bioprosthetic valve. Trivial aortic valve regurgitation is seen.  Pulmonic Valve: The pulmonic valve is normal. Trace pulmonic valve regurgitation.  Aorta: The aortic root is normal in size and structure.  Pulmonary Artery: The pulmonary artery is not well seen.  Venous: The inferior vena cava was normal sized, with respiratory size variation greater than 50%.  Additional Comments: A pacer wire is visualized in the right atrium and right ventricle.  In comparison to the previous echocardiogram(s): Prior examinations are available and were reviewed for comparison purposes. Compared to a prior study from 21, there is significant improvement in LV function and a small pericardial effusion.      Summary:   1. Small circumferential pericardial effusion without echo evidence of cardiac tamponade.   2. Left ventricular ejection fraction, by visual estimation, is 55 to 60%.   3. Normal global left ventricular systolic function.   4. Spectral Doppler shows impaired relaxation pattern of left ventricular myocardial filling (Grade I diastolic dysfunction).   5. There is severe concentric left ventricular hypertrophy.   6. Severely enlarged left atrium.   7. Normal right ventricular size and function.   8. Mild tricuspid regurgitation.   9. Mild mitral valve regurgitation.  10. Mitral valve mean gradient is 4.2 mmHg (HR 76) consistent with mild mitral stenosis.  11. Severe thickening and calcification of the anterior and posterior mitral valve leaflets.  12. Mild mitral annular calcification.  13. Aortic valve is normally functioning bioprosthetic valve. There is trivial para-valvular leak. Mean gradient is 14 mm Hg, acceleration time is 63 msec.  14. Compared to a prior study from 21, there is significant improvement in LV function and a small pericardial effusion.    MD Ede Electronically signed on 3/9/2021 at 6:26:40 PM    < end of copied text >        ECG reviewed and interpreted: Ventricular-paced rhythm  Abnormal ECG    < from: 12 Lead ECG (21 @ 06:59) >  Ventricular Rate 78 BPM    Atrial Rate 78 BPM    QRS Duration 134 ms    Q-T Interval 464 ms    QTC Calculation(Bazett) 528 ms    P Axis 33 degrees    R Axis -67 degrees    T Axis 67 degrees    Diagnosis Line Ventricular-paced rhythm  Abnormal ECG    < end of copied text >        < from: US Duplex Carotid Arteries Complete, Bilateral (21 @ 12:27) >  IMPRESSION: There is a moderate, 50-69% stenosis of the right internal carotid artery.    There is a severe, greater than 70% stenosis of the left internal carotid artery.    < end of copied text >      MEDICATIONS  (STANDING):  ascorbic acid 500 milliGRAM(s) Oral daily  aspirin enteric coated 81 milliGRAM(s) Oral daily  atorvastatin 40 milliGRAM(s) Oral at bedtime  enoxaparin Injectable 40 milliGRAM(s) SubCutaneous daily  famotidine    Tablet 20 milliGRAM(s) Oral daily  ferrous    sulfate 325 milliGRAM(s) Oral daily  folic acid 1 milliGRAM(s) Oral daily  influenza   Vaccine 0.5 milliLiter(s) IntraMuscular once  levothyroxine 100 MICROGram(s) Oral daily  potassium chloride    Tablet ER 20 milliEquivalent(s) Oral daily  torsemide 20 milliGRAM(s) Oral daily    MEDICATIONS  (PRN):  melatonin 3 milliGRAM(s) Oral at bedtime PRN Insomnia           75yo F with PMH of anemia, hypothyroidism, and GI bleed presented as transfer from [place] to [facility] on  for cardiac cath for NSTEMI. Pt initially presented a few days prior to [place] with fever and SOB and was found to have NSTEMI, urosepsis s/p course of ceftriaxone (completed ), anemia requiring 2 PRBC (no GI work up because pt wanted to sign out AMA. Cardiac cath  showed multivessel CAD and severe aortic stenosis. Preop carotid US with bilateral carotid stenosis confirmed by CTA. Patient was seen by vascular and cleared for OR (will need outpatient follow up for carotid stenosis). Had endoscopy  and colonoscopy  without evidence of acute bleed, however was noted to have hiatal hernia. She is s/p CABG x3 with LIMA and AVR  with Dr. Cruz. 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 TTE on 3/9 showing EF improved to 55-60%. Last chest tube removed 3/9. Coccyx noted to have some skin breakdown-pt complaining of buttock pain.  Patient was evaluated by PM&R and therapy for functional deficits and gait/ ADL impairments and recommended acute rehabilitation. Patient was medically optimized for discharge to [facility] on 3/12/21. Admitted to [facility] for PT/OT/DVT ppx    Patient seen at bedside, Endorses feeling tired/ weak.  States no overnight events, slept well. Tolerating PO intake well. Denies any pain, SOB, fevers, cough, ABD pain, N/V/D, constipation, dysuria.     Review of Systems: all negative except for HPI      Allergies and Intolerances:        Allergies:  	No Known Allergies:     Home Medications:   · 	levothyroxine 75 mcg (0.075 mg) oral tablet: 1 tab(s) orally once a day    .    Patient History:   PAST MEDICAL HISTORY:  Hypothyroid     Iron deficiency anemia due to chronic blood loss.     PAST SURGICAL HISTORY:  History of tonsillectomy.   CABG  AVR  AICD placement    FAMILY HISTORY:  Father  age 74 with PHX HTN, DM  Mother  age 73 with PHX HTN, DM     Social History:  Social History (marital status, living situation, occupation, tobacco use, alcohol and drug use, and sexual history):   SOCIAL HISTORY  Smoking - Denies  EtOH - 1 glass of wine intermittently/ infrequently  Drugs - Denies       Vital Signs Last 24 Hrs  T(C): 36.8 (13 Mar 2021 08:31), Max: 36.9 (12 Mar 2021 09:40)  T(F): 98.2 (13 Mar 2021 08:31), Max: 98.4 (12 Mar 2021 09:40)  HR: 97 (13 Mar 2021 08:31) (80 - 106)  BP: 122/68 (13 Mar 2021 08:31) (94/58 - 150/77)  BP(mean): --  RR: 15 (13 Mar 2021 08:31) (14 - 18)  SpO2: 96% (13 Mar 2021 08:31) (93% - 98%)      GENERAL- NAD  EAR/NOSE/MOUTH/THROAT - no pharyngeal exudates, no oral leisions,  MMM  EYES- ÓSCAR, conjunctiva and Sclera clear  NECK- supple  RESPIRATORY-  clear to auscultation bilaterally  CARDIOVASCULAR - SIS2, RRR  GI - soft NT BS present  EXTREMITIES- +1 pitting edema B/L LE  NEUROLOGY- no gross focal deficits  SKIN- no rashes, warm to touch, Midline chest surgical incision, approximated. Left chest wall incision covered by ster-strips  PSYCHIATRY- AAO X 3 (to person, place, year; unable to state exact date, short term intermittent confusion)   MUSCULOSKELETAL- ROM normal        Labs personally reviewed:                9.2                  145  | 31   | 31           9.54  >-----------< 350     ------------------------< 110                   29.9                 4.3  | 107  | 0.76                                         Ca 9.5   Mg 2.0   Ph x          Chest XRAY personally reviewed: improvement in bilateral lung opacities since 3/11/2021    < from: Xray Chest 1 View- PORTABLE-Routine (Xray Chest 1 View- PORTABLE-Routine in AM.) (21 @ 07:34) >    PROCEDURE DATE:  2021          INTERPRETATION:  XR CHEST    History: Status post open heart surgery.  Labs reviewed:       Technique: Single AP view of the chest.    Comparison: Chest x-ray 3/11/2021and 3/10/2021    Findings:    Lungs/Pleura:  Interval improvement in previously seen bilateral lung opacities.    Heart/Mediastinum:  Heart is enlarged. Valve replacement.    Bones/soft tissues:  Sternotomy. Left chest wall cardiac device with leads in place.    Impression:    Interval improvement in bilateral lung opacities since 3/11/2021.    < end of copied text >      < from: Xray Chest 1 View- PORTABLE-Routine (Xray Chest 1 View- PORTABLE-Routine in AM.) (21 @ 05:59) >  XAM:  XR CHEST PORTABLE ROUTINE 1V                         EXAM:  XR CHEST PORTABLE URGENT 1V                          PROCEDURE DATE:  03/10/2021          INTERPRETATION:  History: Removal of chest tube    Technique: Serial portable chest x-rays,2 films    Comparison: 3/10/2021 at 4:13 AM    Findings: The initial film is from 3/10/2021 at 3:36 PM. A cardiac device overlies and obscures the left hemithorax with leads in place. Heart is enlarged. Prosthetic aortic valve osteopenia. Degenerative changes.    Subsequent film from today at 4:26 AM shows worsening bilateral infiltrates. Worsening right effusion. No pneumothorax. Is seen. Bibasilar opacities compatible with effusion/infiltrate, right greater than left.    Impression: Worsening bilateral interstitial infiltrates. May be cardiac related. Probable bilateral effusions right greater than left. No pneumothorax    < end of copied text >      < from: TTE Echo Complete w/ Contrast w/ Doppler (21 @ 09:39) >    Summary:   1. Left ventricular ejection fraction, by visual estimation, is 25 to 30%.   2. Severely decreased global left ventricular systolic function.   3. Abnormal septal motion consistent with post-operative status.   4. Severely increased LV wall thickness.   5. Severely enlarged left atrium.   6. Mild mitral valve regurgitation.   7. Severe mitral annular calcification.   8. Moderateto severe thickening of the anterior and posterior mitral valve leaflets.   9. Mitral valve mean gradient is 2.0 mmHg (at HR of 79 bpm).  10. Mild tricuspid regurgitation.  11. Mild pulmonic valve regurgitation.  12. Bioprosthesis in the aortic position, with mild aortic regurgitation, which is paravalvular in origin.  13. There is no evidence of pericardial effusion.  14. Endocardial visualization was enhanced with intravenous echo contrast.  15. Compared to TTE done on 2021, left ventricular function is unchanged from prior.    < end of copied text >      Summary:   1. Small circumferential pericardial effusion without echo evidence of cardiac tamponade.   2. Left ventricular ejection fraction, by visual estimation, is 55 to 60%.   3. Normal global left ventricular systolic function.   4. Spectral Doppler shows impaired relaxation pattern of left ventricular myocardial filling (Grade I diastolic dysfunction).   5. There is severe concentric left ventricular hypertrophy.   6. Severely enlarged left atrium.   7. Normal right ventricular size and function.   8. Mild tricuspid regurgitation.   9. Mild mitral valve regurgitation.  10. Mitral valve mean gradient is 4.2 mmHg (HR 76) consistent with mild mitral stenosis.  11. Severe thickening and calcification of the anterior and posterior mitral valve leaflets.  12. Mild mitral annular calcification.  13. Aortic valve is normally functioning bioprosthetic valve. There is trivial para-valvular leak. Mean gradient is 14 mm Hg, acceleration time is 63 msec.  14. Compared to a prior study from 21, there is significant improvement in LV function and a small pericardial effusion.    MD Ede Electronically signed on 3/9/2021 at 6:26:40 PM    < end of copied text >        ECG reviewed and interpreted: Ventricular-paced rhythm  Abnormal ECG    < from: 12 Lead ECG (21 @ 06:59) >  Ventricular Rate 78 BPM    Atrial Rate 78 BPM    QRS Duration 134 ms    Q-T Interval 464 ms    QTC Calculation(Bazett) 528 ms    P Axis 33 degrees    R Axis -67 degrees    T Axis 67 degrees    Diagnosis Line Ventricular-paced rhythm  Abnormal ECG    < end of copied text >        < from: US Duplex Carotid Arteries Complete, Bilateral (21 @ 12:27) >  IMPRESSION: There is a moderate, 50-69% stenosis of the right internal carotid artery.    There is a severe, greater than 70% stenosis of the left internal carotid artery.    < end of copied text >      MEDICATIONS  (STANDING):  ascorbic acid 500 milliGRAM(s) Oral daily  aspirin enteric coated 81 milliGRAM(s) Oral daily  atorvastatin 40 milliGRAM(s) Oral at bedtime  enoxaparin Injectable 40 milliGRAM(s) SubCutaneous daily  famotidine    Tablet 20 milliGRAM(s) Oral daily  ferrous    sulfate 325 milliGRAM(s) Oral daily  folic acid 1 milliGRAM(s) Oral daily  influenza   Vaccine 0.5 milliLiter(s) IntraMuscular once  levothyroxine 100 MICROGram(s) Oral daily  potassium chloride    Tablet ER 20 milliEquivalent(s) Oral daily  torsemide 20 milliGRAM(s) Oral daily    MEDICATIONS  (PRN):  melatonin 3 milliGRAM(s) Oral at bedtime PRN Insomnia

## 2021-03-13 NOTE — PROGRESS NOTE ADULT - SUBJECTIVE AND OBJECTIVE BOX
Patient seen and examined.  See H&P  Morning Labs:                          9.2    9.54  )-----------( 350      ( 13 Mar 2021 05:00 )             29.9     03-13    145  |  107  |  31<H>  ----------------------------<  110<H>  4.3   |  31  |  0.76    Ca    9.5      13 Mar 2021 05:00  Mg     2.0     03-13    TPro  5.7<L>  /  Alb  2.3<L>  /  TBili  0.7  /  DBili  x   /  AST  42<H>  /  ALT  67<H>  /  AlkPhos  273<H>  03-13      - Patient stable to start rehabilitation program.

## 2021-03-13 NOTE — DIETITIAN INITIAL EVALUATION ADULT. - PERTINENT LABORATORY DATA
3/13   Na-145  K-4.3  BUN-31H  Creat-0.76  Gluc-110H  Hemoglobin A1c - 5.2(on 2/16) - Recommend Discontinue Consistent Carbohydrate Diet Restriction No

## 2021-03-13 NOTE — DIETITIAN INITIAL EVALUATION ADULT. - ORAL INTAKE PTA/DIET HISTORY
on Consistent Carbohydrate DASH-TLC Diet w/ Thin Liquids & Ensure Enlive 8oz PO TID   Recommend Change Diet to Low Sodium Diet & Recommend Initiate Sander 1 Packet Daily  Education Provided on Need for Supplementation     Patient Does Follow "Low Sugar" Diet @Home, Consumes 2 Meals a Day & Does Take Vitamin/Supplements @Home (Ensure, Multivitamin, Iron Vitamin D3)

## 2021-03-13 NOTE — DIETITIAN INITIAL EVALUATION ADULT. - OTHER INFO
Initial Nutrition Assessment   76yr Old Female   Dx: CABG  Diet; Consistent Carbohydrate DASH-TLC Diet w/ Thin Liquids & Ensure Enlive 8oz PO TID  Recommend Initiate Sander 1 Packet Daily   Denies Food Allergy/Intolerance  Tolerates Diet Well - No Chewing/Swallowing Complications (Per Patient)  Stage 2 Pressure Ulcers Right Buttock, Left Buttock & Coccyx (as Per Nursing Flow Sheets)  +2 Edema Noted to Bilateral Ankles (as Per Nursing Flow Sheets)  No Recent Nausea/Vomiting/Diarrhea & Some Recent Constipation (as Per Patient)

## 2021-03-13 NOTE — DIETITIAN INITIAL EVALUATION ADULT. - PERTINENT MEDS FT
ASA, Lovenox, Pepcid, Lipitor, Levothyroxine, Torsemide, Vitamin C, Ferrous Sulfate, Folic Acid, Melatonin\

## 2021-03-13 NOTE — DIETITIAN INITIAL EVALUATION ADULT. - ADD RECOMMEND
1) Monitor Weights, Intake, Tolerance, Skin & Labwork   2) Sander 1 Packet Daily 3) Education Provided on Need for Supplementation 4) Continue Nutrition Plan of Care

## 2021-03-14 PROCEDURE — 99232 SBSQ HOSP IP/OBS MODERATE 35: CPT

## 2021-03-14 RX ADMIN — MAGNESIUM OXIDE 400 MG ORAL TABLET 400 MILLIGRAM(S): 241.3 TABLET ORAL at 17:07

## 2021-03-14 RX ADMIN — Medication 500 MILLIGRAM(S): at 12:24

## 2021-03-14 RX ADMIN — ATORVASTATIN CALCIUM 40 MILLIGRAM(S): 80 TABLET, FILM COATED ORAL at 21:52

## 2021-03-14 RX ADMIN — Medication 100 MICROGRAM(S): at 06:23

## 2021-03-14 RX ADMIN — Medication 325 MILLIGRAM(S): at 12:24

## 2021-03-14 RX ADMIN — FAMOTIDINE 20 MILLIGRAM(S): 10 INJECTION INTRAVENOUS at 12:24

## 2021-03-14 RX ADMIN — Medication 3 MILLIGRAM(S): at 21:54

## 2021-03-14 RX ADMIN — MIDODRINE HYDROCHLORIDE 5 MILLIGRAM(S): 2.5 TABLET ORAL at 17:07

## 2021-03-14 RX ADMIN — SENNA PLUS 2 TABLET(S): 8.6 TABLET ORAL at 21:52

## 2021-03-14 RX ADMIN — Medication 1 MILLIGRAM(S): at 12:24

## 2021-03-14 RX ADMIN — Medication 650 MILLIGRAM(S): at 21:54

## 2021-03-14 RX ADMIN — Medication 20 MILLIEQUIVALENT(S): at 12:24

## 2021-03-14 RX ADMIN — Medication 20 MILLIGRAM(S): at 06:23

## 2021-03-14 RX ADMIN — Medication 81 MILLIGRAM(S): at 12:24

## 2021-03-14 RX ADMIN — Medication 12.5 MILLIGRAM(S): at 17:06

## 2021-03-14 RX ADMIN — ENOXAPARIN SODIUM 40 MILLIGRAM(S): 100 INJECTION SUBCUTANEOUS at 21:52

## 2021-03-14 RX ADMIN — Medication 650 MILLIGRAM(S): at 22:45

## 2021-03-14 NOTE — PROGRESS NOTE ADULT - SUBJECTIVE AND OBJECTIVE BOX
75yo F with PMH of anemia, hypothyroidism, and GI bleed presented as transfer from [place] to [facility] on 2/16 for cardiac cath for NSTEMI. 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  Vital Signs Last 24 Hrs  T(C): 36.6 (14 Mar 2021 09:24), Max: 37.2 (13 Mar 2021 20:14)  T(F): 97.9 (14 Mar 2021 09:24), Max: 99 (13 Mar 2021 20:14)  HR: 99 (14 Mar 2021 09:24) (86 - 99)  BP: 92/57 (14 Mar 2021 09:24) (92/57 - 110/66)  BP(mean): --  RR: 15 (14 Mar 2021 09:24) (15 - 15)  SpO2: 97% (14 Mar 2021 09:24) (95% - 98%)        GENERAL- NAD  EAR/NOSE/MOUTH/THROAT - no pharyngeal exudates, no oral lesions  MMM  EYES- ÓSCAR, conjunctiva and Sclera clear  NECK- supple  RESPIRATORY-  clear to auscultation bilaterally  CARDIOVASCULAR - SIS2, RRR  GI - soft NT BS present  EXTREMITIES- +1 pitting edema B/L LE  NEUROLOGY- no gross focal deficits  SKIN- no rashes, warm to touch, Midline chest surgical incision, approximated. Left chest wall incision covered by ster-strips  PSYCHIATRY- AAO X 3 (to person, place, year; unable to state exact date, short term intermittent confusion)   MUSCULOSKELETAL- ROM normal                9.2                  145  | 31   | 31           9.54  >-----------< 350     ------------------------< 110                   29.9                 4.3  | 107  | 0.76                                         Ca 9.5   Mg 2.0   Ph x          MEDICATIONS  (STANDING):  ascorbic acid 500 milliGRAM(s) Oral daily  aspirin enteric coated 81 milliGRAM(s) Oral daily  atorvastatin 40 milliGRAM(s) Oral at bedtime  enoxaparin Injectable 40 milliGRAM(s) SubCutaneous daily  famotidine    Tablet 20 milliGRAM(s) Oral daily  ferrous    sulfate 325 milliGRAM(s) Oral daily  folic acid 1 milliGRAM(s) Oral daily  influenza   Vaccine 0.5 milliLiter(s) IntraMuscular once  levothyroxine 100 MICROGram(s) Oral daily  potassium chloride    Tablet ER 20 milliEquivalent(s) Oral daily  torsemide 20 milliGRAM(s) Oral daily    MEDICATIONS  (PRN):  melatonin 3 milliGRAM(s) Oral at bedtime PRN Insomnia

## 2021-03-14 NOTE — PROGRESS NOTE ADULT - ASSESSMENT
75yo F with PMH of anemia, hypothyroidism, and GI bleed presented as transfer from [place] to [facility] on 2/16 for cardiac cath for NSTEMI. 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 Admitted to [facility] for PT/ OT/ DVT ppx    #HFrEF, improved  - c/w torsemide  - low bp- unable to start ACEI, BB  - will repeat cxr on monday 3/15/21  - pro bnp noted- improving will monitor    # elevated lft's- likely due to NSTEMI- improving , will monitor    # Aortic Stenosis   -s/p bio-AVR (2/24)  - monitor    # NSTEMI and CAD s/p CABGx3 (2/24)  - CHB s/p BiV AICD (3/2) – device interrogated, no afib seen  - TTE on 3/9 shows EF improved to 55-60% after surgery  - EF on 2/28/21 25 to 30%, decreased global left ventricular systolic function  - c/w asa  - c/w lipitor    # hypotension- c/w midodrine 5mg TID for BP support    #LE Edema  - 2/2 acute HF  - diuresis with torsemide    #Bilateral Carotid Stenosis  - seen by vascular surgery at [facility]; note/ recommendations reviewed – will need OP follow up  - on pre-operative eval for CABG found to have >70% L ICA stenosis, asymptomatic  -patient with asymptomatic high grade stenosis of L ICA, no indication for CEA at this time       #Hiatal Hernia  -found during endoscopy [facility]  -seen by GI at [facility]; note /recommendations reviewed- will need OP follow up  - c/w famotidine     #Anemia- likely blood loss vs ACD   s/p 2 PRBC at [facility]  -Hgb currently stable  -Monitor Hgb  -Ferrous sulfate, folic acid    #Hypothyroid  - c/w Synthroid 100mcg    #Pain:  - Tylenol     -#GI ppx:  - pepcid      #DVT prophylaxis:  - Lovenox    will follow  d/w rehab team

## 2021-03-14 NOTE — PROGRESS NOTE ADULT - SUBJECTIVE AND OBJECTIVE BOX
Cc: Debility    HPI: Patient with no new medical issues today.  Pain controlled, no chest pain, no N/V, no Fevers/Chills. No other new ROS  Has been tolerating rehabilitation program.    ascorbic acid 500 milliGRAM(s) Oral daily  aspirin enteric coated 81 milliGRAM(s) Oral daily  atorvastatin 40 milliGRAM(s) Oral at bedtime  enoxaparin Injectable 40 milliGRAM(s) SubCutaneous daily  famotidine    Tablet 20 milliGRAM(s) Oral daily  ferrous    sulfate 325 milliGRAM(s) Oral daily  folic acid 1 milliGRAM(s) Oral daily  influenza   Vaccine 0.5 milliLiter(s) IntraMuscular once  levothyroxine 100 MICROGram(s) Oral daily  melatonin 3 milliGRAM(s) Oral at bedtime PRN  potassium chloride    Tablet ER 20 milliEquivalent(s) Oral daily  torsemide 20 milliGRAM(s) Oral daily    T(C): 37.2 (03-13-21 @ 20:14), Max: 37.2 (03-13-21 @ 20:14)  HR: 86 (03-14-21 @ 06:34) (86 - 98)  BP: 105/66 (03-14-21 @ 06:34) (105/66 - 110/66)  RR: 15 (03-14-21 @ 06:34) (15 - 15)  SpO2: 95% (03-14-21 @ 06:34) (95% - 98%)    In NAD  HEENT- EOMI  Heart- Well Perfused  Lungs- No resp distress, no use of accessory resp muscles  Abd- + BS, NT  Ext- No calf pain  Neuro- Exam unchanged  Psych- Affect wnl    Imp: Patient with diagnosis of debility admitted for comprehensive acute rehabilitation.    Plan:  - Continue therapies  - DVT prophylaxis- Lovenox  - Skin- Turn q2h, check skin daily  - Continue current medications; patient medically stable.   - Patient is stable to continue current rehabilitation program.

## 2021-03-15 ENCOUNTER — TRANSCRIPTION ENCOUNTER (OUTPATIENT)
Age: 76
End: 2021-03-15

## 2021-03-15 ENCOUNTER — APPOINTMENT (OUTPATIENT)
Dept: ELECTROPHYSIOLOGY | Facility: CLINIC | Age: 76
End: 2021-03-15

## 2021-03-15 LAB
ALBUMIN SERPL ELPH-MCNC: 2.2 G/DL — LOW (ref 3.3–5)
ALP SERPL-CCNC: 263 U/L — HIGH (ref 40–120)
ALT FLD-CCNC: 55 U/L — HIGH (ref 10–45)
ANION GAP SERPL CALC-SCNC: 7 MMOL/L — SIGNIFICANT CHANGE UP (ref 5–17)
AST SERPL-CCNC: 33 U/L — SIGNIFICANT CHANGE UP (ref 10–40)
BASOPHILS # BLD AUTO: 0.04 K/UL — SIGNIFICANT CHANGE UP (ref 0–0.2)
BASOPHILS NFR BLD AUTO: 0.5 % — SIGNIFICANT CHANGE UP (ref 0–2)
BILIRUB SERPL-MCNC: 0.6 MG/DL — SIGNIFICANT CHANGE UP (ref 0.2–1.2)
BUN SERPL-MCNC: 25 MG/DL — HIGH (ref 7–23)
CALCIUM SERPL-MCNC: 9.4 MG/DL — SIGNIFICANT CHANGE UP (ref 8.4–10.5)
CHLORIDE SERPL-SCNC: 104 MMOL/L — SIGNIFICANT CHANGE UP (ref 96–108)
CO2 SERPL-SCNC: 30 MMOL/L — SIGNIFICANT CHANGE UP (ref 22–31)
CREAT SERPL-MCNC: 0.7 MG/DL — SIGNIFICANT CHANGE UP (ref 0.5–1.3)
EOSINOPHIL # BLD AUTO: 0.17 K/UL — SIGNIFICANT CHANGE UP (ref 0–0.5)
EOSINOPHIL NFR BLD AUTO: 2.1 % — SIGNIFICANT CHANGE UP (ref 0–6)
GLUCOSE SERPL-MCNC: 111 MG/DL — HIGH (ref 70–99)
HCT VFR BLD CALC: 30 % — LOW (ref 34.5–45)
HGB BLD-MCNC: 9.1 G/DL — LOW (ref 11.5–15.5)
IMM GRANULOCYTES NFR BLD AUTO: 0.5 % — SIGNIFICANT CHANGE UP (ref 0–1.5)
LYMPHOCYTES # BLD AUTO: 0.69 K/UL — LOW (ref 1–3.3)
LYMPHOCYTES # BLD AUTO: 8.6 % — LOW (ref 13–44)
MCHC RBC-ENTMCNC: 28.9 PG — SIGNIFICANT CHANGE UP (ref 27–34)
MCHC RBC-ENTMCNC: 30.3 GM/DL — LOW (ref 32–36)
MCV RBC AUTO: 95.2 FL — SIGNIFICANT CHANGE UP (ref 80–100)
MONOCYTES # BLD AUTO: 1.03 K/UL — HIGH (ref 0–0.9)
MONOCYTES NFR BLD AUTO: 12.9 % — SIGNIFICANT CHANGE UP (ref 2–14)
NEUTROPHILS # BLD AUTO: 6.04 K/UL — SIGNIFICANT CHANGE UP (ref 1.8–7.4)
NEUTROPHILS NFR BLD AUTO: 75.4 % — SIGNIFICANT CHANGE UP (ref 43–77)
NRBC # BLD: 0 /100 WBCS — SIGNIFICANT CHANGE UP (ref 0–0)
PLATELET # BLD AUTO: 345 K/UL — SIGNIFICANT CHANGE UP (ref 150–400)
POTASSIUM SERPL-MCNC: 4.6 MMOL/L — SIGNIFICANT CHANGE UP (ref 3.5–5.3)
POTASSIUM SERPL-SCNC: 4.6 MMOL/L — SIGNIFICANT CHANGE UP (ref 3.5–5.3)
PROT SERPL-MCNC: 5.8 G/DL — LOW (ref 6–8.3)
RBC # BLD: 3.15 M/UL — LOW (ref 3.8–5.2)
RBC # FLD: 21.4 % — HIGH (ref 10.3–14.5)
SODIUM SERPL-SCNC: 141 MMOL/L — SIGNIFICANT CHANGE UP (ref 135–145)
WBC # BLD: 8.01 K/UL — SIGNIFICANT CHANGE UP (ref 3.8–10.5)
WBC # FLD AUTO: 8.01 K/UL — SIGNIFICANT CHANGE UP (ref 3.8–10.5)

## 2021-03-15 PROCEDURE — 99232 SBSQ HOSP IP/OBS MODERATE 35: CPT

## 2021-03-15 PROCEDURE — 71045 X-RAY EXAM CHEST 1 VIEW: CPT | Mod: 26

## 2021-03-15 RX ORDER — MIDODRINE HYDROCHLORIDE 2.5 MG/1
5 TABLET ORAL
Refills: 0 | Status: DISCONTINUED | OUTPATIENT
Start: 2021-03-15 | End: 2021-03-23

## 2021-03-15 RX ADMIN — ATORVASTATIN CALCIUM 40 MILLIGRAM(S): 80 TABLET, FILM COATED ORAL at 20:36

## 2021-03-15 RX ADMIN — Medication 20 MILLIEQUIVALENT(S): at 11:16

## 2021-03-15 RX ADMIN — Medication 500 MILLIGRAM(S): at 11:15

## 2021-03-15 RX ADMIN — MAGNESIUM OXIDE 400 MG ORAL TABLET 400 MILLIGRAM(S): 241.3 TABLET ORAL at 14:54

## 2021-03-15 RX ADMIN — Medication 100 MICROGRAM(S): at 05:50

## 2021-03-15 RX ADMIN — Medication 20 MILLIGRAM(S): at 05:50

## 2021-03-15 RX ADMIN — Medication 325 MILLIGRAM(S): at 11:16

## 2021-03-15 RX ADMIN — Medication 12.5 MILLIGRAM(S): at 17:32

## 2021-03-15 RX ADMIN — MIDODRINE HYDROCHLORIDE 5 MILLIGRAM(S): 2.5 TABLET ORAL at 17:32

## 2021-03-15 RX ADMIN — Medication 81 MILLIGRAM(S): at 11:15

## 2021-03-15 RX ADMIN — SENNA PLUS 2 TABLET(S): 8.6 TABLET ORAL at 20:35

## 2021-03-15 RX ADMIN — MIDODRINE HYDROCHLORIDE 5 MILLIGRAM(S): 2.5 TABLET ORAL at 05:51

## 2021-03-15 RX ADMIN — Medication 12.5 MILLIGRAM(S): at 05:51

## 2021-03-15 RX ADMIN — Medication 1 MILLIGRAM(S): at 11:14

## 2021-03-15 RX ADMIN — MAGNESIUM OXIDE 400 MG ORAL TABLET 400 MILLIGRAM(S): 241.3 TABLET ORAL at 08:06

## 2021-03-15 RX ADMIN — Medication 3 MILLIGRAM(S): at 21:25

## 2021-03-15 RX ADMIN — MIDODRINE HYDROCHLORIDE 5 MILLIGRAM(S): 2.5 TABLET ORAL at 11:15

## 2021-03-15 RX ADMIN — MAGNESIUM OXIDE 400 MG ORAL TABLET 400 MILLIGRAM(S): 241.3 TABLET ORAL at 17:32

## 2021-03-15 RX ADMIN — ENOXAPARIN SODIUM 40 MILLIGRAM(S): 100 INJECTION SUBCUTANEOUS at 20:34

## 2021-03-15 RX ADMIN — FAMOTIDINE 20 MILLIGRAM(S): 10 INJECTION INTRAVENOUS at 11:13

## 2021-03-15 NOTE — PROGRESS NOTE ADULT - SUBJECTIVE AND OBJECTIVE BOX
77yo F with PMH of anemia, hypothyroidism, and GI bleed presented as transfer from [place] to [facility] on 2/16 for cardiac cath for NSTEMI. 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    Vital Signs Last 24 Hrs  T(C): 36.7 (14 Mar 2021 21:57), Max: 36.7 (14 Mar 2021 21:57)  T(F): 98 (14 Mar 2021 21:57), Max: 98 (14 Mar 2021 21:57)  HR: 97 (15 Mar 2021 05:52) (97 - 102)  BP: 111/63 (15 Mar 2021 05:52) (111/63 - 125/66)  BP(mean): --  RR: 15 (15 Mar 2021 05:52) (15 - 15)  SpO2: 95% (15 Mar 2021 05:52) (95% - 95%)        GENERAL- NAD  EAR/NOSE/MOUTH/THROAT - no pharyngeal exudates, no oral lesions  MMM  EYES- ÓSCAR, conjunctiva and Sclera clear  NECK- supple  RESPIRATORY-  clear to auscultation bilaterally  CARDIOVASCULAR - SIS2, RRR  GI - soft NT BS present  EXTREMITIES- +1 pitting edema B/L LE  NEUROLOGY- no gross focal deficits  SKIN- no rashes, warm to touch, chest surgical incision clean  PSYCHIATRY- AAO X 3   MUSCULOSKELETAL- ROM normal                  9.1                  141  | 30   | 25           8.01  >-----------< 345     ------------------------< 111                   30.0                 4.6  | 104  | 0.70                                         Ca 9.4   Mg x     Ph x          MEDICATIONS  (STANDING):  ascorbic acid 500 milliGRAM(s) Oral daily  aspirin enteric coated 81 milliGRAM(s) Oral daily  atorvastatin 40 milliGRAM(s) Oral at bedtime  enoxaparin Injectable 40 milliGRAM(s) SubCutaneous daily  famotidine    Tablet 20 milliGRAM(s) Oral daily  ferrous    sulfate 325 milliGRAM(s) Oral daily  folic acid 1 milliGRAM(s) Oral daily  influenza   Vaccine 0.5 milliLiter(s) IntraMuscular once  levothyroxine 100 MICROGram(s) Oral daily  magnesium oxide 400 milliGRAM(s) Oral three times a day with meals  metoprolol tartrate 12.5 milliGRAM(s) Oral two times a day  midodrine. 5 milliGRAM(s) Oral three times a day  potassium chloride    Tablet ER 20 milliEquivalent(s) Oral daily  senna 2 Tablet(s) Oral at bedtime  torsemide 20 milliGRAM(s) Oral daily

## 2021-03-15 NOTE — PROGRESS NOTE ADULT - ASSESSMENT
77yo F with PMH of anemia, hypothyroidism, and GI bleed presented as transfer from [place] to [facility] on 2/16 for cardiac cath for NSTEMI. 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 Admitted to [facility] for PT/ OT/ DVT ppx    #HFrEF, improved  - c/w torsemide  - low bp- unable to start ACEI, reassess in am  - c/w metoprolol   - will repeat cxr on Monday 3/15/21  - pro bnp noted- improving will monitor    # elevated lft's- likely due to NSTEMI- improving , will monitor    # Aortic Stenosis   -s/p bio-AVR (2/24)  - monitor    # NSTEMI and CAD s/p CABGx3 (2/24)  - CHB s/p BiV AICD (3/2) – device interrogated, no afib seen  - TTE on 3/9 shows EF improved to 55-60% after surgery  - EF on 2/28/21 25 to 30%, decreased global left ventricular systolic function  - c/w asa  - c/w lipitor    # hypotension- will decrease midodrine 5mg to bid from TID for BP support    #LE Edema  - 2/2 acute HF  - diuresis with torsemide    #Bilateral Carotid Stenosis  - seen by vascular surgery at [facility]; note/ recommendations reviewed – will need OP follow up  - on pre-operative eval for CABG found to have >70% L ICA stenosis, asymptomatic  -patient with asymptomatic high grade stenosis of L ICA, no indication for CEA at this time       #Hiatal Hernia  -found during endoscopy [facility]  -seen by GI at [facility]; note /recommendations reviewed- will need OP follow up  - c/w famotidine     #Anemia- likely blood loss vs ACD   s/p 2 PRBC at [facility]  -Hgb currently stable  -Monitor Hgb  -Ferrous sulfate, folic acid    #Hypothyroid  - c/w Synthroid 100mcg    #Pain:  - Tylenol     -#GI ppx:  - pepcid      #DVT prophylaxis:  - Lovenox    will follow  d/w dr. greer

## 2021-03-15 NOTE — PROGRESS NOTE ADULT - SUBJECTIVE AND OBJECTIVE BOX
Patient is a 76y old  Female who presents with a chief complaint of Functional Deficits 2/2 CABG and AVR (9 - Cardiac) (14 Mar 2021 10:51)    77yo F with PMH of anemia, hypothyroidism, and GI bleed presented as transfer from [place] to [facility] on  for cardiac cath for NSTEMI. Pt initially presented a few days prior to [place] with fever and SOB and was found to have NSTEMI, urosepsis s/p course of ceftriaxone (completed ), anemia requiring 2 PRBC (no GI work up because pt wanted to sign out AMA. Cardiac cath  showed multivessel CAD and severe aortic stenosis. Preop carotid US with bilateral carotid stenosis confirmed by CTA. Patient was seen by vascular and cleared for OR (will need outpatient follow up for carotid stenosis). Had endoscopy  and colonoscopy  without evidence of acute bleed, however was noted to have hiatal hernia. She is s/p CABG x3 with LIMA and AVR  with Dr. Cruz. 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 TTE on 3/9 showing EF improved to 55-60%. Last chest tube removed 3/9. Coccyx noted to have some skin breakdown-pt complaining of buttock pain.  Patient was evaluated by PM&R and therapy for functional deficits and gait/ ADL impairments and recommended acute rehabilitation. Patient was medically optimized for discharge to [facility] on 3/12/21.   (12 Mar 2021 16:12)    PAST MEDICAL & SURGICAL HISTORY:  Iron deficiency anemia due to chronic blood loss  Hypothyroid  History of tonsillectomy    TODAY'S SUBJECTIVE & REVIEW OF SYMPTOMS:  Patient was seen and examined today. No acute events overnight or over the weekend.  Patient does not complain of pain, she does endorse some shortness of breath with physical activity but therapy is going well.  No constipation.      Allergies  No Known Allergies    PHYSICAL EXAM  76y  Vital Signs Last 24 Hrs  T(C): 36.7 (14 Mar 2021 21:57), Max: 36.7 (14 Mar 2021 21:57)  T(F): 98 (14 Mar 2021 21:57), Max: 98 (14 Mar 2021 21:57)  HR: 97 (15 Mar 2021 05:52) (97 - 102)  BP: 111/63 (15 Mar 2021 05:52) (111/63 - 125/66)  BP(mean): --  RR: 15 (15 Mar 2021 05:52) (15 - 15)  SpO2: 95% (15 Mar 2021 05:52) (95% - 95%)  Daily Weight in k.4 (15 Mar 2021 05:52)    Constitutional - NAD, Comfortable  HEENT - NCAT, EOMI, +dentures   Chest - good respiratory effort, CTAB   Cardio - warm and well perfused,   Abdomen -  Soft, NT ND  Extremities - bilateral pitting edema 2+ feet and 1+ shin  Neurologic Exam:                    Cognitive -             Orientation: Awake, Alert and oriented            Speech - Fluent, Comprehensible, No dysarthria, No aphasia      Cranial Nerves - No facial asymmetry, Tongue midline, EOMI, Shoulder shrug intact     Motor -                      LEFT    UE - ShAB 5/5, EF 5/5, EE 5/5, WE 5/5,  WNL                    RIGHT UE - ShAB 5/5, EF 5/5, EE 5/5, WE 5/5,  WNL                    LEFT    LE - HF 4/5, KE 5/5, DF 5/5, PF 5/5                    RIGHT LE - HF 4/5, KE 5/5, DF 5/5, PF 5/5        Sensory - Intact to LT bilateral  Psychiatric - Mood stable, Affect WNL  Skin on admission:     Sternal incision well approximated, no erythema or drainage      Drain incision site below sternal incision; right oozing blood, mild      Stage II sacral 0.5x0.5     Stage II R/L buttock 0.5x0.5     Multiple areas of ecchymosis on abdomen and extremities      RECENT LABS:                          9.1    8.01  )-----------( 345      ( 15 Mar 2021 06:55 )             30.0     03-15    141  |  104  |  25<H>  ----------------------------<  111<H>  4.6   |  30  |  0.70    Ca    9.4      15 Mar 2021 06:55    TPro  5.8<L>  /  Alb  2.2<L>  /  TBili  0.6  /  DBili  x   /  AST  33  /  ALT  55<H>  /  AlkPhos  263<H>  03-15    LIVER FUNCTIONS - ( 15 Mar 2021 06:55 )  Alb: 2.2 g/dL / Pro: 5.8 g/dL / ALK PHOS: 263 U/L / ALT: 55 U/L / AST: 33 U/L / GGT: x             MEDICATIONS  (STANDING):  ascorbic acid 500 milliGRAM(s) Oral daily  aspirin enteric coated 81 milliGRAM(s) Oral daily  atorvastatin 40 milliGRAM(s) Oral at bedtime  enoxaparin Injectable 40 milliGRAM(s) SubCutaneous daily  famotidine    Tablet 20 milliGRAM(s) Oral daily  ferrous    sulfate 325 milliGRAM(s) Oral daily  folic acid 1 milliGRAM(s) Oral daily  influenza   Vaccine 0.5 milliLiter(s) IntraMuscular once  levothyroxine 100 MICROGram(s) Oral daily  magnesium oxide 400 milliGRAM(s) Oral three times a day with meals  metoprolol tartrate 12.5 milliGRAM(s) Oral two times a day  midodrine. 5 milliGRAM(s) Oral three times a day  potassium chloride    Tablet ER 20 milliEquivalent(s) Oral daily  senna 2 Tablet(s) Oral at bedtime  torsemide 20 milliGRAM(s) Oral daily    MEDICATIONS  (PRN):  acetaminophen   Tablet .. 650 milliGRAM(s) Oral every 6 hours PRN Moderate Pain (4 - 6)  melatonin 3 milliGRAM(s) Oral at bedtime PRN Insomnia  polyethylene glycol 3350 17 Gram(s) Oral daily PRN Constipation      ASSESSMENT AND PLAN  76/F PMH of anemia, hypothyroidism, and GI bleed presented as transfer from [place] to [facility] on  for cardiac cath for NSTEMI. 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)    Comprehensive Multidisciplinary Rehab Program:  - Gait, ADL, Functional impairments  - CMS Impairment group: 09 (Cardiac)  - PT 90 mins/OT 60 mins/ SLP 30 mins for total of 3 hours a day 5 days a week    #Functional deficits 2/2 Acute Systolic Heart Failure s/p CABG x3, AVR, and BIV AICD placement); EF 55-60% after surgery (TTE 3/9)  - device interrogated, no afib seen  - asa, Lipitor  - lopressor 12.5mg bid  - torsemide 20mg qd and daily potassium supplementation of 20mEq, MgOxide 400mg; maintain K>4 and Mg >2  - bilateral LE ace wraps for edema  - midodrine 5mg TID for BP support  - incentive spirometry  - daily weights, strict Is and Os  - weekly CXR per cardiology  - has follow up with EP doctor  Dr. Ortiz on March 15 to check on new cardiac device/BIV-ICD    will need to call to reschedule/ inform she is in rehab    #Bilateral Carotid Stenosis- seen on US and CT Angio Carotid  - seen by vascular surgery at [facility] – will need OP follow up    #Hiatal Hernia  - found during endoscopy  - follow up as outpatient    #Hypothyroid  - c/w Synthroid 100mcg  - will need OP f/u in 4-6 weeks for TFT monitoring    ***  Sleep- Melatonin PRN  Pain:- Tylenol PRN    GI/Bowel:  - Senna 2 tabs at qhs, Miralax daily prn  - GI ppx: pepcid  /Bladder:  - Monitor Voiding well, toileting schedule q4h    Diet / Dysphagia:   Carbohydrate Consistent; Endure supplementation, Sander, Vit C    Skin/ Pressure Injury Prevention:  - assessment on admission:     Stage II sacral and b/l buttock pressure injuries     - barrier cream and Allevyn dressing placed    - offloading   - Incisions:     Sternal incision open to air    drain incisions: one oozing mild amount of blood - dry, sterile, dressing placed  -Turn Q2hrs in bed while awake, OOB to Chair, PT/OT/SLP   -Sander daily, vitamin C    DVT prophylaxis: Lovenox, SCDs    Precautions/ Restrictions  - Falls, Cardiac, Sternal  - Lungs: Aspiration, Incentive Spirometer    - COVID PCR: neg 3/12    Dispo: IDT Rounds Pending

## 2021-03-16 PROCEDURE — 99232 SBSQ HOSP IP/OBS MODERATE 35: CPT

## 2021-03-16 RX ADMIN — Medication 12.5 MILLIGRAM(S): at 17:32

## 2021-03-16 RX ADMIN — Medication 81 MILLIGRAM(S): at 11:17

## 2021-03-16 RX ADMIN — MAGNESIUM OXIDE 400 MG ORAL TABLET 400 MILLIGRAM(S): 241.3 TABLET ORAL at 13:56

## 2021-03-16 RX ADMIN — MAGNESIUM OXIDE 400 MG ORAL TABLET 400 MILLIGRAM(S): 241.3 TABLET ORAL at 08:25

## 2021-03-16 RX ADMIN — SENNA PLUS 2 TABLET(S): 8.6 TABLET ORAL at 20:58

## 2021-03-16 RX ADMIN — Medication 20 MILLIEQUIVALENT(S): at 11:18

## 2021-03-16 RX ADMIN — Medication 325 MILLIGRAM(S): at 11:17

## 2021-03-16 RX ADMIN — Medication 20 MILLIGRAM(S): at 06:12

## 2021-03-16 RX ADMIN — MAGNESIUM OXIDE 400 MG ORAL TABLET 400 MILLIGRAM(S): 241.3 TABLET ORAL at 17:31

## 2021-03-16 RX ADMIN — Medication 500 MILLIGRAM(S): at 11:17

## 2021-03-16 RX ADMIN — Medication 100 MICROGRAM(S): at 06:12

## 2021-03-16 RX ADMIN — ENOXAPARIN SODIUM 40 MILLIGRAM(S): 100 INJECTION SUBCUTANEOUS at 20:58

## 2021-03-16 RX ADMIN — Medication 1 MILLIGRAM(S): at 11:17

## 2021-03-16 RX ADMIN — FAMOTIDINE 20 MILLIGRAM(S): 10 INJECTION INTRAVENOUS at 11:17

## 2021-03-16 RX ADMIN — MIDODRINE HYDROCHLORIDE 5 MILLIGRAM(S): 2.5 TABLET ORAL at 06:12

## 2021-03-16 RX ADMIN — ATORVASTATIN CALCIUM 40 MILLIGRAM(S): 80 TABLET, FILM COATED ORAL at 20:58

## 2021-03-16 NOTE — PROGRESS NOTE ADULT - ASSESSMENT
77yo F with PMH of anemia, hypothyroidism, and GI bleed presented as transfer from [place] to [facility] on 2/16 for cardiac cath for NSTEMI. 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 Admitted to [facility] for PT/ OT/ DVT ppx    #HFrEF, improved  - c/w torsemide  - low bp- unable to start ACEI, reassess in am  - c/w metoprolol     # elevated lft's- likely due to NSTEMI- improving , will monitor    # Aortic Stenosis   -s/p bio-AVR (2/24)  - monitor    # NSTEMI and CAD s/p CABGx3 (2/24)  - CHB s/p BiV AICD (3/2) – device interrogated, no afib seen  - TTE on 3/9 shows EF improved to 55-60% after surgery  - EF on 2/28/21 25 to 30%, decreased global left ventricular systolic function  - c/w asa  - c/w lipitor    # hypotension- c/w  midodrine 5mg to bid for BP support    #LE Edema  - 2/2 acute HF  - diuresis with torsemide    #Bilateral Carotid Stenosis  - seen by vascular surgery at [facility]; note/ recommendations reviewed – will need OP follow up  - on pre-operative eval for CABG found to have >70% L ICA stenosis, asymptomatic  -patient with asymptomatic high grade stenosis of L ICA, no indication for CEA at this time       #Hiatal Hernia  -found during endoscopy [facility]  -seen by GI at [facility]; note /recommendations reviewed- will need OP follow up  - c/w famotidine     #Anemia- likely blood loss vs ACD   s/p 2 PRBC at [facility]  -Hgb currently stable  -Monitor Hgb  -Ferrous sulfate, folic acid    #Hypothyroid  - c/w Synthroid 100mcg    #Pain:  - Tylenol     -#GI ppx:  - pepcid      #DVT prophylaxis:  - Lovenox    will follow  d/w dr. greer

## 2021-03-16 NOTE — PROGRESS NOTE ADULT - SUBJECTIVE AND OBJECTIVE BOX
75yo F with PMH of anemia, hypothyroidism, and GI bleed presented as transfer from [place] to [facility] on 2/16 for cardiac cath for NSTEMI. 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    Vital Signs Last 24 Hrs  T(C): 36.5 (16 Mar 2021 08:42), Max: 36.7 (15 Mar 2021 20:40)  T(F): 97.7 (16 Mar 2021 08:42), Max: 98.1 (15 Mar 2021 20:40)  HR: 96 (16 Mar 2021 08:42) (78 - 97)  BP: 110/68 (16 Mar 2021 08:42) (98/59 - 128/70)  BP(mean): --  RR: 15 (16 Mar 2021 08:42) (15 - 15)  SpO2: 98% (16 Mar 2021 08:42) (96% - 99%)      GENERAL- NAD  EAR/NOSE/MOUTH/THROAT - no pharyngeal exudates, no oral lesions  MMM  EYES- ÓSCAR, conjunctiva and Sclera clear  NECK- supple  RESPIRATORY-  clear to auscultation bilaterally  CARDIOVASCULAR - SIS2, RRR  GI - soft NT BS present  EXTREMITIES- +1 pitting edema B/L LE  NEUROLOGY- no gross focal deficits  SKIN- no rashes, warm to touch, chest surgical incision clean  PSYCHIATRY- AAO X 3   MUSCULOSKELETAL- ROM normal                9.1                  141  | 30   | 25           8.01  >-----------< 345     ------------------------< 111                   30.0                 4.6  | 104  | 0.70                                         Ca 9.4   Mg x     Ph x        Labs reviewed:     CXR personally reviewed: Clear lung fields bilaterally  < from: Xray Chest 1 View- PORTABLE-Routine (Xray Chest 1 View- PORTABLE-Routine in AM.) (03.15.21 @ 11:30) >    EXAM:  XR CHEST PORTABLE ROUTINE 1V      PROCEDURE DATE:  03/15/2021        INTERPRETATION:  AP chest on March 15, 2021 11:16 AM. Patient has abnormal chest sounds.    Heart enlargement, sternotomy, and left-sided defibrillator again noted.    There is a mild right base effusion somewhat increased from March 12.    Small left base effusion is stable.    Some loculated pleural fluid is increasing in the right mid lateral chest.    IMPRESSION: As above.    < end of copied text >      MEDICATIONS  (STANDING):  ascorbic acid 500 milliGRAM(s) Oral daily  aspirin enteric coated 81 milliGRAM(s) Oral daily  atorvastatin 40 milliGRAM(s) Oral at bedtime  enoxaparin Injectable 40 milliGRAM(s) SubCutaneous daily  famotidine    Tablet 20 milliGRAM(s) Oral daily  ferrous    sulfate 325 milliGRAM(s) Oral daily  folic acid 1 milliGRAM(s) Oral daily  influenza   Vaccine 0.5 milliLiter(s) IntraMuscular once  levothyroxine 100 MICROGram(s) Oral daily  magnesium oxide 400 milliGRAM(s) Oral three times a day with meals  metoprolol tartrate 12.5 milliGRAM(s) Oral two times a day  midodrine. 5 milliGRAM(s) Oral <User Schedule>  potassium chloride    Tablet ER 20 milliEquivalent(s) Oral daily  senna 2 Tablet(s) Oral at bedtime  torsemide 20 milliGRAM(s) Oral daily    MEDICATIONS  (PRN):  acetaminophen   Tablet .. 650 milliGRAM(s) Oral every 6 hours PRN Moderate Pain (4 - 6)  melatonin 3 milliGRAM(s) Oral at bedtime PRN Insomnia  polyethylene glycol 3350 17 Gram(s) Oral daily PRN Constipation

## 2021-03-16 NOTE — PROGRESS NOTE ADULT - SUBJECTIVE AND OBJECTIVE BOX
Patient is a 76y old  Female who presents with a chief complaint of Functional Deficits 2/2 CABG and AVR (9 - Cardiac) (14 Mar 2021 10:51)    75yo F with PMH of anemia, hypothyroidism, and GI bleed presented as transfer from [facility] to [facility] on 2/16 for cardiac cath for NSTEMI. Pt initially presented a few days prior to [facility] with fever and SOB and was found to have NSTEMI, urosepsis s/p course of ceftriaxone (completed 2/17), anemia requiring 2 PRBC (no GI work up because pt wanted to sign out AMA. Cardiac cath 2/16 showed multivessel CAD and severe aortic stenosis. Preop carotid US with bilateral carotid stenosis confirmed by CTA. Patient was seen by vascular and cleared for OR (will need outpatient follow up for carotid stenosis). Had endoscopy 2/17 and colonoscopy 2/19 without evidence of acute bleed, however was noted to have hiatal hernia. She is s/p CABG x3 with LIMA and AVR 2/24 with Dr. Cruz. 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 TTE on 3/9 showing EF improved to 55-60%. Last chest tube removed 3/9. Coccyx noted to have some skin breakdown-pt complaining of buttock pain.  Patient was evaluated by PM&R and therapy for functional deficits and gait/ ADL impairments and recommended acute rehabilitation. Patient was medically optimized for discharge to [facility] on 3/12/21.   (12 Mar 2021 16:12)    PAST MEDICAL & SURGICAL HISTORY:  Iron deficiency anemia due to chronic blood loss  Hypothyroid  History of tonsillectomy    TODAY'S SUBJECTIVE & REVIEW OF SYMPTOMS: Patient seen and evaluated this morning. No acute events overnight. Participating well in therapy. Pain is well controlled. Denies chest pain, SOB, nausea, vomiting, constipation or diarrhea. Slept well last night.     Allergies  No Known Allergies    PHYSICAL EXAM  76y  Vital Signs Last 24 Hrs  Vital Signs Last 24 Hrs  T(C): 36.5 (16 Mar 2021 08:42), Max: 36.7 (15 Mar 2021 20:40)  T(F): 97.7 (16 Mar 2021 08:42), Max: 98.1 (15 Mar 2021 20:40)  HR: 96 (16 Mar 2021 08:42) (78 - 97)  BP: 110/68 (16 Mar 2021 08:42) (98/59 - 128/70)  BP(mean): --  RR: 15 (16 Mar 2021 08:42) (15 - 15)  SpO2: 98% (16 Mar 2021 08:42) (96% - 99%)    Constitutional - NAD, Comfortable  HEENT - NCAT, EOMI, +dentures   Chest - good respiratory effort, CTAB   Cardio - warm and well perfused,   Abdomen -  Soft, NT ND  Extremities - bilateral pitting edema 2+ feet and 1+ shin  Neurologic Exam:                    Cognitive -             Orientation: Awake, Alert and oriented            Speech - Fluent, Comprehensible, No dysarthria, No aphasia      Cranial Nerves - No facial asymmetry, Tongue midline, EOMI, Shoulder shrug intact     Motor -                      LEFT    UE - ShAB 5/5, EF 5/5, EE 5/5, WE 5/5,  WNL                    RIGHT UE - ShAB 5/5, EF 5/5, EE 5/5, WE 5/5,  WNL                    LEFT    LE - HF 4/5, KE 5/5, DF 5/5, PF 5/5                    RIGHT LE - HF 4/5, KE 5/5, DF 5/5, PF 5/5        Sensory - Intact to LT bilateral  Psychiatric - Mood stable, Affect WNL  Skin on admission:     Sternal incision well approximated, no erythema or drainage      Drain incision site below sternal incision; right oozing blood, mild      Stage II sacral 0.5x0.5     Stage II R/L buttock 0.5x0.5     Multiple areas of ecchymosis on abdomen and extremities      RECENT LABS:                                   9.1    8.01  )-----------( 345      ( 15 Mar 2021 06:55 )             30.0     03-15    141  |  104  |  25<H>  ----------------------------<  111<H>  4.6   |  30  |  0.70    Ca    9.4      15 Mar 2021 06:55    TPro  5.8<L>  /  Alb  2.2<L>  /  TBili  0.6  /  DBili  x   /  AST  33  /  ALT  55<H>  /  AlkPhos  263<H>  03-15     MEDICATIONS  (STANDING):  ascorbic acid 500 milliGRAM(s) Oral daily  aspirin enteric coated 81 milliGRAM(s) Oral daily  atorvastatin 40 milliGRAM(s) Oral at bedtime  enoxaparin Injectable 40 milliGRAM(s) SubCutaneous daily  famotidine    Tablet 20 milliGRAM(s) Oral daily  ferrous    sulfate 325 milliGRAM(s) Oral daily  folic acid 1 milliGRAM(s) Oral daily  influenza   Vaccine 0.5 milliLiter(s) IntraMuscular once  levothyroxine 100 MICROGram(s) Oral daily  magnesium oxide 400 milliGRAM(s) Oral three times a day with meals  metoprolol tartrate 12.5 milliGRAM(s) Oral two times a day  midodrine. 5 milliGRAM(s) Oral three times a day  potassium chloride    Tablet ER 20 milliEquivalent(s) Oral daily  senna 2 Tablet(s) Oral at bedtime  torsemide 20 milliGRAM(s) Oral daily    MEDICATIONS  (PRN):  acetaminophen   Tablet .. 650 milliGRAM(s) Oral every 6 hours PRN Moderate Pain (4 - 6)  melatonin 3 milliGRAM(s) Oral at bedtime PRN Insomnia  polyethylene glycol 3350 17 Gram(s) Oral daily PRN Constipation      ASSESSMENT AND PLAN  76/F PMH of anemia, hypothyroidism, and GI bleed presented as transfer from [facility] to [facility] on 2/16 for cardiac cath for NSTEMI. 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)    Comprehensive Multidisciplinary Rehab Program:  - Gait, ADL, Functional impairments  - CMS Impairment group: 09 (Cardiac)  - PT 90 mins/OT 60 mins/ SLP 30 mins for total of 3 hours a day 5 days a week    #Functional deficits 2/2 Acute Systolic Heart Failure s/p CABG x3, AVR, and BIV AICD placement); EF 55-60% after surgery (TTE 3/9)  - device interrogated, no afib seen  - asa, Lipitor  - lopressor 12.5mg bid  - torsemide 20mg qd and daily potassium supplementation of 20mEq, MgOxide 400mg; maintain K>4 and Mg >2  - bilateral LE ace wraps for edema  - midodrine 5mg TID for BP support  - incentive spirometry  - daily weights, strict Is and Os  - weekly CXR per cardiology  - has follow up with EP doctor  Dr. Ortiz on March 15 to check on new cardiac device/BIV-ICD    will need to call to reschedule/ inform she is in rehab    #Bilateral Carotid Stenosis- seen on US and CT Angio Carotid  - seen by vascular surgery at [facility] – will need OP follow up    #Hiatal Hernia  - found during endoscopy  - follow up as outpatient    #Hypothyroid  - c/w Synthroid 100mcg  - will need OP f/u in 4-6 weeks for TFT monitoring       Sleep- Melatonin PRN  Pain:- Tylenol PRN    GI/Bowel:  - Senna 2 tabs at qhs, Miralax daily prn  - GI ppx: pepcid  /Bladder:  - Monitor Voiding well, toileting schedule q4h    Diet / Dysphagia:   Carbohydrate Consistent; Endure supplementation, Sander, Vit C    Skin/ Pressure Injury Prevention:  - assessment on admission:     Stage II sacral and b/l buttock pressure injuries     - barrier cream and Allevyn dressing placed    - offloading   - Incisions:     Sternal incision open to air    drain incisions: one oozing mild amount of blood - dry, sterile, dressing placed  -Turn Q2hrs in bed while awake, OOB to Chair, PT/OT/SLP   -Sander daily, vitamin C    DVT prophylaxis: Lovenox, SCDs    Precautions/ Restrictions  - Falls, Cardiac, Sternal  - Lungs: Aspiration, Incentive Spirometer    - COVID PCR: neg 3/12    Dispo: IDT rounds 3/16/21- Team meeting: Discussed with multidisciplinary team about patient's current function, progress, goals, barriers, discharge plan and set up. Updated patient about discharge plan. DC planned 3/30/21

## 2021-03-17 PROCEDURE — 99232 SBSQ HOSP IP/OBS MODERATE 35: CPT

## 2021-03-17 PROCEDURE — 99232 SBSQ HOSP IP/OBS MODERATE 35: CPT | Mod: GC

## 2021-03-17 RX ADMIN — MAGNESIUM OXIDE 400 MG ORAL TABLET 400 MILLIGRAM(S): 241.3 TABLET ORAL at 12:56

## 2021-03-17 RX ADMIN — ATORVASTATIN CALCIUM 40 MILLIGRAM(S): 80 TABLET, FILM COATED ORAL at 22:09

## 2021-03-17 RX ADMIN — MAGNESIUM OXIDE 400 MG ORAL TABLET 400 MILLIGRAM(S): 241.3 TABLET ORAL at 07:46

## 2021-03-17 RX ADMIN — SENNA PLUS 2 TABLET(S): 8.6 TABLET ORAL at 22:09

## 2021-03-17 RX ADMIN — Medication 12.5 MILLIGRAM(S): at 06:07

## 2021-03-17 RX ADMIN — Medication 500 MILLIGRAM(S): at 12:56

## 2021-03-17 RX ADMIN — Medication 100 MICROGRAM(S): at 06:08

## 2021-03-17 RX ADMIN — MAGNESIUM OXIDE 400 MG ORAL TABLET 400 MILLIGRAM(S): 241.3 TABLET ORAL at 17:31

## 2021-03-17 RX ADMIN — ENOXAPARIN SODIUM 40 MILLIGRAM(S): 100 INJECTION SUBCUTANEOUS at 22:09

## 2021-03-17 RX ADMIN — Medication 325 MILLIGRAM(S): at 12:57

## 2021-03-17 RX ADMIN — Medication 81 MILLIGRAM(S): at 12:56

## 2021-03-17 RX ADMIN — Medication 12.5 MILLIGRAM(S): at 17:31

## 2021-03-17 RX ADMIN — Medication 1 MILLIGRAM(S): at 12:56

## 2021-03-17 RX ADMIN — Medication 20 MILLIEQUIVALENT(S): at 12:56

## 2021-03-17 RX ADMIN — FAMOTIDINE 20 MILLIGRAM(S): 10 INJECTION INTRAVENOUS at 12:55

## 2021-03-17 RX ADMIN — Medication 20 MILLIGRAM(S): at 06:08

## 2021-03-17 NOTE — PROGRESS NOTE ADULT - ASSESSMENT
77yo F with PMH of anemia, hypothyroidism, and GI bleed presented as transfer from [place] to [facility] on 2/16 for cardiac cath for NSTEMI. 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 Admitted to [facility] for PT/ OT/ DVT ppx    #HFrEF, improved  - c/w torsemide  - low bp- unable to start ACEI  - c/w metoprolol     # elevated lft's- likely due to NSTEMI- improving , will monitor    # Aortic Stenosis   -s/p bio-AVR (2/24)  - monitor    # NSTEMI and CAD s/p CABGx3 (2/24)  - CHB s/p BiV AICD (3/2) – device interrogated, no afib seen  - TTE on 3/9 shows EF improved to 55-60% after surgery  - EF on 2/28/21 25 to 30%, decreased global left ventricular systolic function  - c/w asa  - c/w lipitor    # hypotension- c/w  midodrine 5mg to bid for BP support    #LE Edema  - 2/2 acute HF  - diuresis with torsemide    #Bilateral Carotid Stenosis  - seen by vascular surgery at [facility]; note/ recommendations reviewed – will need OP follow up  - on pre-operative eval for CABG found to have >70% L ICA stenosis, asymptomatic  -patient with asymptomatic high grade stenosis of L ICA, no indication for CEA at this time       #Hiatal Hernia  -found during endoscopy [facility]  -seen by GI at [facility]; note /recommendations reviewed- will need OP follow up  - c/w famotidine     #Anemia- likely blood loss vs ACD   s/p 2 PRBC at [facility]  -Hgb currently stable  -Monitor Hgb  -Ferrous sulfate, folic acid    #Hypothyroid  - c/w Synthroid 100mcg    #Pain:  - Tylenol     -#GI ppx:  - pepcid      #DVT prophylaxis:  - Lovenox    will follow  d/w dr. greer 77yo F with PMH of anemia, hypothyroidism, and GI bleed presented as transfer from [place] to [facility] on 2/16 for cardiac cath for NSTEMI. 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 Admitted to [facility] for PT/ OT/ DVT ppx    #HFrEF, improved  - c/w torsemide  - low bp- unable to start ACEI  - c/w metoprolol     # hypotension- c/w  midodrine 5mg to bid for BP support    # elevated lft's- likely due to NSTEMI- improving , will monitor    # Aortic Stenosis   -s/p bio-AVR (2/24)  - monitor    # NSTEMI and CAD s/p CABGx3 (2/24)  - CHB s/p BiV AICD (3/2) – device interrogated, no afib seen  - TTE on 3/9 shows EF improved to 55-60% after surgery  - EF on 2/28/21 25 to 30%, decreased global left ventricular systolic function  - c/w asa  - c/w lipitor      #LE Edema  - 2/2 acute HF  - diuresis with torsemide    #Bilateral Carotid Stenosis  - seen by vascular surgery at [facility]; note/ recommendations reviewed – will need OP follow up  - on pre-operative eval for CABG found to have >70% L ICA stenosis, asymptomatic  -patient with asymptomatic high grade stenosis of L ICA, no indication for CEA at this time       #Hiatal Hernia  -found during endoscopy [facility]  -seen by GI at [facility]; note /recommendations reviewed- will need OP follow up  - c/w famotidine     #Anemia- likely blood loss vs ACD   s/p 2 PRBC at [facility]  -Hgb currently stable  -Monitor Hgb  -Ferrous sulfate, folic acid    #Hypothyroid  - c/w Synthroid 100mcg    #Pain:  - Tylenol     -#GI ppx:  - pepcid      #DVT prophylaxis:  - Lovenox    will follow  d/w dr. greer

## 2021-03-17 NOTE — PROGRESS NOTE ADULT - SUBJECTIVE AND OBJECTIVE BOX
77yo F with PMH of anemia, hypothyroidism, and GI bleed presented as transfer from [place] to [facility] on 2/16 for cardiac cath for NSTEMI. 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 States slept well overnight, tolerating PO intake well.     Vital Signs Last 24 Hrs  T(C): 36.3 (17 Mar 2021 07:45), Max: 37 (16 Mar 2021 20:54)  T(F): 97.4 (17 Mar 2021 07:45), Max: 98.6 (16 Mar 2021 20:54)  HR: 100 (17 Mar 2021 07:45) (92 - 100)  BP: 118/56 (17 Mar 2021 07:45) (96/64 - 146/84)  BP(mean): --  RR: 16 (17 Mar 2021 07:45) (15 - 16)  SpO2: 98% (17 Mar 2021 07:45) (96% - 98%)    GENERAL- NAD  EAR/NOSE/MOUTH/THROAT - no pharyngeal exudates, no oral lesions  MMM  EYES- ÓSCAR, conjunctiva and Sclera clear  NECK- supple  RESPIRATORY-  clear to auscultation bilaterally  CARDIOVASCULAR - SIS2, RRR  GI - soft NT BS present  EXTREMITIES- +1 pitting edema B/L LE  NEUROLOGY- no gross focal deficits  SKIN- no rashes, warm to touch, chest surgical incision approximated no drainage  PSYCHIATRY- AAO X 3   MUSCULOSKELETAL- ROM normal    (03.15.21 @ 0655)              9.1                  141  | 30   | 25           8.01  >-----------< 345     ------------------------< 111                   30.0                 4.6  | 104  | 0.70                                         Ca 9.4   Mg x     Ph x        Labs reviewed:     CXR personally reviewed: Clear lung fields bilaterally  < from: Xray Chest 1 View- PORTABLE-Routine (Xray Chest 1 View- PORTABLE-Routine in AM.) (03.15.21 @ 11:30) >    EXAM:  XR CHEST PORTABLE ROUTINE 1V      PROCEDURE DATE:  03/15/2021        INTERPRETATION:  AP chest on March 15, 2021 11:16 AM. Patient has abnormal chest sounds.    Heart enlargement, sternotomy, and left-sided defibrillator again noted.    There is a mild right base effusion somewhat increased from March 12.    Small left base effusion is stable.    Some loculated pleural fluid is increasing in the right mid lateral chest.    IMPRESSION: As above.    < end of copied text >      MEDICATIONS  (STANDING):  ascorbic acid 500 milliGRAM(s) Oral daily  aspirin enteric coated 81 milliGRAM(s) Oral daily  atorvastatin 40 milliGRAM(s) Oral at bedtime  enoxaparin Injectable 40 milliGRAM(s) SubCutaneous daily  famotidine    Tablet 20 milliGRAM(s) Oral daily  ferrous    sulfate 325 milliGRAM(s) Oral daily  folic acid 1 milliGRAM(s) Oral daily  influenza   Vaccine 0.5 milliLiter(s) IntraMuscular once  levothyroxine 100 MICROGram(s) Oral daily  magnesium oxide 400 milliGRAM(s) Oral three times a day with meals  metoprolol tartrate 12.5 milliGRAM(s) Oral two times a day  midodrine. 5 milliGRAM(s) Oral <User Schedule>  potassium chloride    Tablet ER 20 milliEquivalent(s) Oral daily  senna 2 Tablet(s) Oral at bedtime  torsemide 20 milliGRAM(s) Oral daily    MEDICATIONS  (PRN):  acetaminophen   Tablet .. 650 milliGRAM(s) Oral every 6 hours PRN Moderate Pain (4 - 6)  melatonin 3 milliGRAM(s) Oral at bedtime PRN Insomnia  polyethylene glycol 3350 17 Gram(s) Oral daily PRN Constipation   77yo F with PMH of anemia, hypothyroidism, and GI bleed presented as transfer from [place] to [facility] on 2/16 for cardiac cath for NSTEMI. 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 States slept well overnight, tolerating PO intake well.     Vital Signs Last 24 Hrs  T(C): 36.3 (17 Mar 2021 07:45), Max: 37 (16 Mar 2021 20:54)  T(F): 97.4 (17 Mar 2021 07:45), Max: 98.6 (16 Mar 2021 20:54)  HR: 100 (17 Mar 2021 07:45) (92 - 100)  BP: 118/56 (17 Mar 2021 07:45) (96/64 - 146/84)  BP(mean): --  RR: 16 (17 Mar 2021 07:45) (15 - 16)  SpO2: 98% (17 Mar 2021 07:45) (96% - 98%)    GENERAL- NAD  EAR/NOSE/MOUTH/THROAT - no pharyngeal exudates, no oral lesions  MMM  EYES- ÓSCAR, conjunctiva and Sclera clear  NECK- supple  RESPIRATORY-  clear to auscultation bilaterally  CARDIOVASCULAR - SIS2, RRR  GI - soft NT BS present  EXTREMITIES- +1 pitting edema B/L LE  NEUROLOGY- no gross focal deficits  SKIN- no rashes, warm to touch, chest surgical incision approximated no drainage  PSYCHIATRY- AAO X 3   MUSCULOSKELETAL- ROM normal                  9.1                  141  | 30   | 25           8.01  >-----------< 345     ------------------------< 111                   30.0                 4.6  | 104  | 0.70                                         Ca 9.4   Mg x     Ph x        Labs reviewed:       MEDICATIONS  (STANDING):  ascorbic acid 500 milliGRAM(s) Oral daily  aspirin enteric coated 81 milliGRAM(s) Oral daily  atorvastatin 40 milliGRAM(s) Oral at bedtime  enoxaparin Injectable 40 milliGRAM(s) SubCutaneous daily  famotidine    Tablet 20 milliGRAM(s) Oral daily  ferrous    sulfate 325 milliGRAM(s) Oral daily  folic acid 1 milliGRAM(s) Oral daily  influenza   Vaccine 0.5 milliLiter(s) IntraMuscular once  levothyroxine 100 MICROGram(s) Oral daily  magnesium oxide 400 milliGRAM(s) Oral three times a day with meals  metoprolol tartrate 12.5 milliGRAM(s) Oral two times a day  midodrine. 5 milliGRAM(s) Oral <User Schedule>  potassium chloride    Tablet ER 20 milliEquivalent(s) Oral daily  senna 2 Tablet(s) Oral at bedtime  torsemide 20 milliGRAM(s) Oral daily    MEDICATIONS  (PRN):  acetaminophen   Tablet .. 650 milliGRAM(s) Oral every 6 hours PRN Moderate Pain (4 - 6)  melatonin 3 milliGRAM(s) Oral at bedtime PRN Insomnia  polyethylene glycol 3350 17 Gram(s) Oral daily PRN Constipation

## 2021-03-18 LAB
ALBUMIN SERPL ELPH-MCNC: 2.3 G/DL — LOW (ref 3.3–5)
ALP SERPL-CCNC: 248 U/L — HIGH (ref 40–120)
ALT FLD-CCNC: 45 U/L — SIGNIFICANT CHANGE UP (ref 10–45)
ANION GAP SERPL CALC-SCNC: 6 MMOL/L — SIGNIFICANT CHANGE UP (ref 5–17)
AST SERPL-CCNC: 34 U/L — SIGNIFICANT CHANGE UP (ref 10–40)
BILIRUB SERPL-MCNC: 0.5 MG/DL — SIGNIFICANT CHANGE UP (ref 0.2–1.2)
BUN SERPL-MCNC: 26 MG/DL — HIGH (ref 7–23)
CALCIUM SERPL-MCNC: 9.2 MG/DL — SIGNIFICANT CHANGE UP (ref 8.4–10.5)
CHLORIDE SERPL-SCNC: 105 MMOL/L — SIGNIFICANT CHANGE UP (ref 96–108)
CO2 SERPL-SCNC: 30 MMOL/L — SIGNIFICANT CHANGE UP (ref 22–31)
CREAT SERPL-MCNC: 0.73 MG/DL — SIGNIFICANT CHANGE UP (ref 0.5–1.3)
GLUCOSE SERPL-MCNC: 104 MG/DL — HIGH (ref 70–99)
HCT VFR BLD CALC: 30.8 % — LOW (ref 34.5–45)
HGB BLD-MCNC: 9.3 G/DL — LOW (ref 11.5–15.5)
MAGNESIUM SERPL-MCNC: 1.9 MG/DL — SIGNIFICANT CHANGE UP (ref 1.6–2.6)
MCHC RBC-ENTMCNC: 29.2 PG — SIGNIFICANT CHANGE UP (ref 27–34)
MCHC RBC-ENTMCNC: 30.2 GM/DL — LOW (ref 32–36)
MCV RBC AUTO: 96.6 FL — SIGNIFICANT CHANGE UP (ref 80–100)
NRBC # BLD: 0 /100 WBCS — SIGNIFICANT CHANGE UP (ref 0–0)
PLATELET # BLD AUTO: 367 K/UL — SIGNIFICANT CHANGE UP (ref 150–400)
POTASSIUM SERPL-MCNC: 4.4 MMOL/L — SIGNIFICANT CHANGE UP (ref 3.5–5.3)
POTASSIUM SERPL-SCNC: 4.4 MMOL/L — SIGNIFICANT CHANGE UP (ref 3.5–5.3)
PROT SERPL-MCNC: 5.9 G/DL — LOW (ref 6–8.3)
RBC # BLD: 3.19 M/UL — LOW (ref 3.8–5.2)
RBC # FLD: 20.4 % — HIGH (ref 10.3–14.5)
SODIUM SERPL-SCNC: 141 MMOL/L — SIGNIFICANT CHANGE UP (ref 135–145)
WBC # BLD: 6.36 K/UL — SIGNIFICANT CHANGE UP (ref 3.8–10.5)
WBC # FLD AUTO: 6.36 K/UL — SIGNIFICANT CHANGE UP (ref 3.8–10.5)

## 2021-03-18 PROCEDURE — 99232 SBSQ HOSP IP/OBS MODERATE 35: CPT

## 2021-03-18 RX ADMIN — ENOXAPARIN SODIUM 40 MILLIGRAM(S): 100 INJECTION SUBCUTANEOUS at 21:44

## 2021-03-18 RX ADMIN — Medication 1 MILLIGRAM(S): at 12:28

## 2021-03-18 RX ADMIN — Medication 20 MILLIGRAM(S): at 05:50

## 2021-03-18 RX ADMIN — Medication 100 MICROGRAM(S): at 05:50

## 2021-03-18 RX ADMIN — ATORVASTATIN CALCIUM 40 MILLIGRAM(S): 80 TABLET, FILM COATED ORAL at 21:44

## 2021-03-18 RX ADMIN — Medication 81 MILLIGRAM(S): at 12:29

## 2021-03-18 RX ADMIN — Medication 12.5 MILLIGRAM(S): at 05:50

## 2021-03-18 RX ADMIN — Medication 3 MILLIGRAM(S): at 21:45

## 2021-03-18 RX ADMIN — Medication 12.5 MILLIGRAM(S): at 17:22

## 2021-03-18 RX ADMIN — SENNA PLUS 2 TABLET(S): 8.6 TABLET ORAL at 21:45

## 2021-03-18 RX ADMIN — MAGNESIUM OXIDE 400 MG ORAL TABLET 400 MILLIGRAM(S): 241.3 TABLET ORAL at 12:28

## 2021-03-18 RX ADMIN — MIDODRINE HYDROCHLORIDE 5 MILLIGRAM(S): 2.5 TABLET ORAL at 05:50

## 2021-03-18 RX ADMIN — FAMOTIDINE 20 MILLIGRAM(S): 10 INJECTION INTRAVENOUS at 12:29

## 2021-03-18 RX ADMIN — Medication 500 MILLIGRAM(S): at 12:29

## 2021-03-18 RX ADMIN — MAGNESIUM OXIDE 400 MG ORAL TABLET 400 MILLIGRAM(S): 241.3 TABLET ORAL at 07:52

## 2021-03-18 RX ADMIN — MAGNESIUM OXIDE 400 MG ORAL TABLET 400 MILLIGRAM(S): 241.3 TABLET ORAL at 17:23

## 2021-03-18 RX ADMIN — Medication 325 MILLIGRAM(S): at 12:28

## 2021-03-18 RX ADMIN — Medication 20 MILLIEQUIVALENT(S): at 12:29

## 2021-03-18 NOTE — PROGRESS NOTE ADULT - SUBJECTIVE AND OBJECTIVE BOX
77yo F with PMH of anemia, hypothyroidism, and GI bleed presented as transfer from [place] to [facility] on 2/16 for cardiac cath for NSTEMI. 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 States slept well overnight, tolerating PO intake well.     Vital Signs Last 24 Hrs  T(C): 36.5 (18 Mar 2021 07:51), Max: 36.9 (17 Mar 2021 22:07)  T(F): 97.7 (18 Mar 2021 07:51), Max: 98.5 (17 Mar 2021 22:07)  HR: 92 (18 Mar 2021 07:51) (85 - 92)  BP: 113/71 (18 Mar 2021 07:51) (107/58 - 136/74)  BP(mean): --  RR: 16 (18 Mar 2021 07:51) (16 - 17)  SpO2: 96% (18 Mar 2021 07:51) (96% - 99%)        GENERAL- NAD  EAR/NOSE/MOUTH/THROAT - no pharyngeal exudates, no oral lesions  MMM  EYES- ÓCSAR, conjunctiva and Sclera clear  NECK- supple  RESPIRATORY-  clear to auscultation bilaterally  CARDIOVASCULAR - SIS2, RRR  GI - soft NT BS present  EXTREMITIES- +1 pitting edema B/L LE  NEUROLOGY- no gross focal deficits  SKIN- no rashes, warm to touch, chest surgical incision approximated no drainage  PSYCHIATRY- AAO X 3   MUSCULOSKELETAL- ROM normal                    9.3                  141  | 30   | 26           6.36  >-----------< 367     ------------------------< 104                   30.8                 4.4  | 105  | 0.73                                         Ca 9.2   Mg 1.9   Ph x        MEDICATIONS  (STANDING):  ascorbic acid 500 milliGRAM(s) Oral daily  aspirin enteric coated 81 milliGRAM(s) Oral daily  atorvastatin 40 milliGRAM(s) Oral at bedtime  enoxaparin Injectable 40 milliGRAM(s) SubCutaneous daily  famotidine    Tablet 20 milliGRAM(s) Oral daily  ferrous    sulfate 325 milliGRAM(s) Oral daily  folic acid 1 milliGRAM(s) Oral daily  influenza   Vaccine 0.5 milliLiter(s) IntraMuscular once  levothyroxine 100 MICROGram(s) Oral daily  magnesium oxide 400 milliGRAM(s) Oral three times a day with meals  metoprolol tartrate 12.5 milliGRAM(s) Oral two times a day  midodrine. 5 milliGRAM(s) Oral <User Schedule>  potassium chloride    Tablet ER 20 milliEquivalent(s) Oral daily  senna 2 Tablet(s) Oral at bedtime  torsemide 20 milliGRAM(s) Oral daily    MEDICATIONS  (PRN):  acetaminophen   Tablet .. 650 milliGRAM(s) Oral every 6 hours PRN Moderate Pain (4 - 6)  melatonin 3 milliGRAM(s) Oral at bedtime PRN Insomnia  polyethylene glycol 3350 17 Gram(s) Oral daily PRN Constipation

## 2021-03-18 NOTE — PROGRESS NOTE ADULT - ASSESSMENT
77yo F with PMH of anemia, hypothyroidism, and GI bleed presented as transfer from [place] to [facility] on 2/16 for cardiac cath for NSTEMI. 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 Admitted to [facility] for PT/ OT/ DVT ppx    #HFrEF, improved  - c/w torsemide  - low bp- unable to start ACEI  - c/w metoprolol     # hypotension- c/w  midodrine 5mg to bid for BP support    # elevated lft's- likely due to NSTEMI- improving , will monitor    # Aortic Stenosis   -s/p bio-AVR (2/24)  - monitor    # NSTEMI and CAD s/p CABGx3 (2/24)  - CHB s/p BiV AICD (3/2) – device interrogated, no afib seen  - TTE on 3/9 shows EF improved to 55-60% after surgery  - EF on 2/28/21 25 to 30%, decreased global left ventricular systolic function  - c/w asa  - c/w lipitor      #LE Edema  - 2/2 acute HF  - diuresis with torsemide    #Bilateral Carotid Stenosis  - seen by vascular surgery at [facility]; note/ recommendations reviewed – will need OP follow up  - on pre-operative eval for CABG found to have >70% L ICA stenosis, asymptomatic  -patient with asymptomatic high grade stenosis of L ICA, no indication for CEA at this time       #Hiatal Hernia  -found during endoscopy [facility]  -seen by GI at [facility]; note /recommendations reviewed- will need OP follow up  - c/w famotidine     #Anemia- likely blood loss vs ACD   s/p 2 PRBC at [facility]  -Hgb currently stable  -Monitor Hgb  -Ferrous sulfate, folic acid    #Hypothyroid  - c/w Synthroid 100mcg    #Pain:  - Tylenol     -#GI ppx:  - pepcid      #DVT prophylaxis:  - Lovenox    will follow  d/w dr. greer

## 2021-03-18 NOTE — PROGRESS NOTE ADULT - SUBJECTIVE AND OBJECTIVE BOX
Patient is a 76y old  Female who presents with a chief complaint of Functional Deficits 2/2 CABG and AVR (9 - Cardiac) (14 Mar 2021 10:51)    77yo F with PMH of anemia, hypothyroidism, and GI bleed presented as transfer from [place] to [facility] on 2/16 for cardiac cath for NSTEMI. Pt initially presented a few days prior to [place] with fever and SOB and was found to have NSTEMI, urosepsis s/p course of ceftriaxone (completed 2/17), anemia requiring 2 PRBC (no GI work up because pt wanted to sign out AMA. Cardiac cath 2/16 showed multivessel CAD and severe aortic stenosis. Preop carotid US with bilateral carotid stenosis confirmed by CTA. Patient was seen by vascular and cleared for OR (will need outpatient follow up for carotid stenosis). Had endoscopy 2/17 and colonoscopy 2/19 without evidence of acute bleed, however was noted to have hiatal hernia. She is s/p CABG x3 with LIMA and AVR 2/24 with Dr. Cruz. 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 TTE on 3/9 showing EF improved to 55-60%. Last chest tube removed 3/9. Coccyx noted to have some skin breakdown-pt complaining of buttock pain.  Patient was evaluated by PM&R and therapy for functional deficits and gait/ ADL impairments and recommended acute rehabilitation. Patient was medically optimized for discharge to [facility] on 3/12/21.   (12 Mar 2021 16:12)    PAST MEDICAL & SURGICAL HISTORY:  Iron deficiency anemia due to chronic blood loss  Hypothyroid  History of tonsillectomy    TODAY'S SUBJECTIVE & REVIEW OF SYMPTOMS: Patient seen and evaluated this morning. No acute events overnight. She was sleeping with her neck flexed forward in wheelchair at bedside. Participating well in therapy. Pain is well controlled. Denies chest pain, SOB, nausea, vomiting, constipation or diarrhea. Had bowel movement yesterday Slept well last night. Reports feeling confused sometimes at night at baseline. Has not seen any physician for this. No falls at home. NO impulsivity or fall or agitation at nighttime. Denies any confusion last night.    Allergies  No Known Allergies    PHYSICAL EXAM  Vital Signs Last 24 Hrs  T(C): 36.5 (18 Mar 2021 07:51), Max: 36.9 (17 Mar 2021 22:07)  T(F): 97.7 (18 Mar 2021 07:51), Max: 98.5 (17 Mar 2021 22:07)  HR: 92 (18 Mar 2021 07:51) (85 - 92)  BP: 113/71 (18 Mar 2021 07:51) (107/58 - 136/74)  BP(mean): --  RR: 16 (18 Mar 2021 07:51) (16 - 17)  SpO2: 96% (18 Mar 2021 07:51) (96% - 99%)    Constitutional - NAD, Comfortable  HEENT - NCAT, EOMI, +dentures   Chest - good respiratory effort, CTAB   Cardio - warm and well perfused,   Abdomen -  Soft, NT ND  Extremities - bilateral pitting edema 2+ feet and 1+ shin  Neurologic Exam:                    Cognitive -             Orientation: Awake, Alert and oriented            Speech - Fluent, Comprehensible, No dysarthria, No aphasia      Cranial Nerves - No facial asymmetry, Tongue midline, EOMI, Shoulder shrug intact     Motor -                      LEFT    UE - ShAB 5/5, EF 5/5, EE 5/5, WE 5/5,  WNL                    RIGHT UE - ShAB 5/5, EF 5/5, EE 5/5, WE 5/5,  WNL                    LEFT    LE - HF 4/5, KE 5/5, DF 5/5, PF 5/5                    RIGHT LE - HF 4/5, KE 5/5, DF 5/5, PF 5/5        Sensory - Intact to LT bilateral  Psychiatric - Mood stable, Affect WNL  Skin on admission:     Sternal incision well approximated, no erythema or drainage      Drain incision site below sternal incision; right oozing blood, mild      Stage II sacral 0.5x0.5     Stage II R/L buttock 0.5x0.5     Multiple areas of ecchymosis on abdomen and extremities      RECENT LABS:                        9.3    6.36  )-----------( 367      ( 18 Mar 2021 05:30 )             30.8     03-18    141  |  105  |  26<H>  ----------------------------<  104<H>  4.4   |  30  |  0.73    Ca    9.2      18 Mar 2021 05:30  Mg     1.9     03-18    TPro  5.9<L>  /  Alb  2.3<L>  /  TBili  0.5  /  DBili  x   /  AST  34  /  ALT  45  /  AlkPhos  248<H>  03-18      MEDICATIONS  (STANDING):  ascorbic acid 500 milliGRAM(s) Oral daily  aspirin enteric coated 81 milliGRAM(s) Oral daily  atorvastatin 40 milliGRAM(s) Oral at bedtime  enoxaparin Injectable 40 milliGRAM(s) SubCutaneous daily  famotidine    Tablet 20 milliGRAM(s) Oral daily  ferrous    sulfate 325 milliGRAM(s) Oral daily  folic acid 1 milliGRAM(s) Oral daily  influenza   Vaccine 0.5 milliLiter(s) IntraMuscular once  levothyroxine 100 MICROGram(s) Oral daily  magnesium oxide 400 milliGRAM(s) Oral three times a day with meals  metoprolol tartrate 12.5 milliGRAM(s) Oral two times a day  midodrine. 5 milliGRAM(s) Oral <User Schedule>  potassium chloride    Tablet ER 20 milliEquivalent(s) Oral daily  senna 2 Tablet(s) Oral at bedtime  torsemide 20 milliGRAM(s) Oral daily    MEDICATIONS  (PRN):  acetaminophen   Tablet .. 650 milliGRAM(s) Oral every 6 hours PRN Moderate Pain (4 - 6)  melatonin 3 milliGRAM(s) Oral at bedtime PRN Insomnia  polyethylene glycol 3350 17 Gram(s) Oral daily PRN Constipation      ASSESSMENT AND PLAN  76/F PMH of anemia, hypothyroidism, and GI bleed presented as transfer from [place] to [facility] on 2/16 for cardiac cath for NSTEMI. 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)    Comprehensive Multidisciplinary Rehab Program:  - Gait, ADL, Functional impairments  - CMS Impairment group: 09 (Cardiac)  - PT 90 mins/OT 60 mins/ SLP 30 mins for total of 3 hours a day 5 days a week    #Functional deficits 2/2 Acute Systolic Heart Failure s/p CABG x3, AVR, and BIV AICD placement); EF 55-60% after surgery (TTE 3/9)  - device interrogated, no afib seen  - asa, Lipitor  - lopressor 12.5mg bid  - torsemide 20mg qd and daily potassium supplementation of 20mEq, MgOxide 400mg; maintain K>4 and Mg >2  - bilateral LE ace wraps for edema  - midodrine 5mg TID for BP support  - incentive spirometry  - daily weights, strict Is and Os  - weekly CXR per cardiology  - has follow up with EP doctor  Dr. Ortiz on March 15 to check on new cardiac device/BIV-ICD    will need to call to reschedule/ inform she is in rehab    #Bilateral Carotid Stenosis- seen on US and CT Angio Carotid  - seen by vascular surgery at [facility] – will need OP follow up    #Hiatal Hernia  - found during endoscopy  - follow up as outpatient    #Hypothyroid  - c/w Synthroid 100mcg  - will need OP f/u in 4-6 weeks for TFT monitoring     Sleep- Melatonin PRN  cognitive deficits- memory deficits as per speech therapy. Patient reports nighttime confusion but no falls. NO agitation or confusion at night as per night staff. Plan to continue to monitor for now.     Pain:- Tylenol PRN    GI/Bowel:  - Senna 2 tabs at qhs, Miralax daily prn  - GI ppx: pepcid  /Bladder:  - Monitor Voiding well, toileting schedule q4h    Diet / Dysphagia:   Carbohydrate Consistent; Endure supplementation, Sander, Vit C    Skin/ Pressure Injury Prevention:  - assessment on admission:     Stage II sacral and b/l buttock pressure injuries     - barrier cream and Allevyn dressing placed    - offloading   - Incisions:     Sternal incision open to air    drain incisions: one oozing mild amount of blood - dry, sterile, dressing placed  -Turn Q2hrs in bed while awake, OOB to Chair, PT/OT/SLP   -Sander daily, vitamin C    DVT prophylaxis: Lovenox, SCDs    Precautions/ Restrictions  - Falls, Cardiac, Sternal  - Lungs: Aspiration, Incentive Spirometer    - COVID PCR: neg 3/12    Dispo: IDT rounds 3/16/21- Team meeting: Discussed with multidisciplinary team about patient's current function, progress, goals, barriers, discharge plan and set up. Updated patient about discharge plan. DC planned 3/30/21

## 2021-03-19 ENCOUNTER — TRANSCRIPTION ENCOUNTER (OUTPATIENT)
Age: 76
End: 2021-03-19

## 2021-03-19 PROCEDURE — 99232 SBSQ HOSP IP/OBS MODERATE 35: CPT

## 2021-03-19 RX ADMIN — ATORVASTATIN CALCIUM 40 MILLIGRAM(S): 80 TABLET, FILM COATED ORAL at 22:03

## 2021-03-19 RX ADMIN — MAGNESIUM OXIDE 400 MG ORAL TABLET 400 MILLIGRAM(S): 241.3 TABLET ORAL at 12:41

## 2021-03-19 RX ADMIN — MAGNESIUM OXIDE 400 MG ORAL TABLET 400 MILLIGRAM(S): 241.3 TABLET ORAL at 07:37

## 2021-03-19 RX ADMIN — ENOXAPARIN SODIUM 40 MILLIGRAM(S): 100 INJECTION SUBCUTANEOUS at 19:59

## 2021-03-19 RX ADMIN — Medication 12.5 MILLIGRAM(S): at 18:15

## 2021-03-19 RX ADMIN — Medication 3 MILLIGRAM(S): at 22:03

## 2021-03-19 RX ADMIN — FAMOTIDINE 20 MILLIGRAM(S): 10 INJECTION INTRAVENOUS at 12:41

## 2021-03-19 RX ADMIN — Medication 81 MILLIGRAM(S): at 12:41

## 2021-03-19 RX ADMIN — MIDODRINE HYDROCHLORIDE 5 MILLIGRAM(S): 2.5 TABLET ORAL at 06:04

## 2021-03-19 RX ADMIN — Medication 325 MILLIGRAM(S): at 12:42

## 2021-03-19 RX ADMIN — SENNA PLUS 2 TABLET(S): 8.6 TABLET ORAL at 22:03

## 2021-03-19 RX ADMIN — MIDODRINE HYDROCHLORIDE 5 MILLIGRAM(S): 2.5 TABLET ORAL at 18:15

## 2021-03-19 RX ADMIN — Medication 20 MILLIGRAM(S): at 06:04

## 2021-03-19 RX ADMIN — Medication 1 MILLIGRAM(S): at 12:41

## 2021-03-19 RX ADMIN — MAGNESIUM OXIDE 400 MG ORAL TABLET 400 MILLIGRAM(S): 241.3 TABLET ORAL at 18:15

## 2021-03-19 RX ADMIN — Medication 100 MICROGRAM(S): at 06:04

## 2021-03-19 RX ADMIN — Medication 500 MILLIGRAM(S): at 12:41

## 2021-03-19 RX ADMIN — Medication 20 MILLIEQUIVALENT(S): at 12:42

## 2021-03-19 NOTE — PROGRESS NOTE ADULT - SUBJECTIVE AND OBJECTIVE BOX
Patient is a 76y old  Female who presents with a chief complaint of Functional Deficits 2/2 CABG and AVR (9 - Cardiac) (14 Mar 2021 10:51)    75yo F with PMH of anemia, hypothyroidism, and GI bleed presented as transfer from [place] to [facility] on 2/16 for cardiac cath for NSTEMI. Pt initially presented a few days prior to [place] with fever and SOB and was found to have NSTEMI, urosepsis s/p course of ceftriaxone (completed 2/17), anemia requiring 2 PRBC (no GI work up because pt wanted to sign out AMA. Cardiac cath 2/16 showed multivessel CAD and severe aortic stenosis. Preop carotid US with bilateral carotid stenosis confirmed by CTA. Patient was seen by vascular and cleared for OR (will need outpatient follow up for carotid stenosis). Had endoscopy 2/17 and colonoscopy 2/19 without evidence of acute bleed, however was noted to have hiatal hernia. She is s/p CABG x3 with LIMA and AVR 2/24 with Dr. Cruz. 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 TTE on 3/9 showing EF improved to 55-60%. Last chest tube removed 3/9. Coccyx noted to have some skin breakdown-pt complaining of buttock pain.  Patient was evaluated by PM&R and therapy for functional deficits and gait/ ADL impairments and recommended acute rehabilitation. Patient was medically optimized for discharge to [facility] on 3/12/21.   (12 Mar 2021 16:12)    PAST MEDICAL & SURGICAL HISTORY:  Iron deficiency anemia due to chronic blood loss  Hypothyroid  History of tonsillectomy    TODAY'S SUBJECTIVE & REVIEW OF SYMPTOMS: Patient seen and evaluated this morning. No acute events overnight. Participating well in therapy. Pain is well controlled. Denies chest pain, SOB, nausea, vomiting, constipation or diarrhea. Slept well last night. NO impulsivity or fall or agitation at nighttime. Denies any confusion last night.    Allergies  No Known Allergies    PHYSICAL EXAM  Vital Signs Last 24 Hrs  T(C): 36.4 (19 Mar 2021 07:39), Max: 36.9 (18 Mar 2021 20:23)  T(F): 97.6 (19 Mar 2021 07:39), Max: 98.5 (18 Mar 2021 20:23)  HR: 108 (19 Mar 2021 07:39) (94 - 108)  BP: 106/65 (19 Mar 2021 07:39) (96/60 - 106/65)  BP(mean): --  RR: 16 (19 Mar 2021 07:39) (16 - 16)  SpO2: 99% (19 Mar 2021 07:39) (94% - 99%)    Constitutional - NAD, Comfortable  HEENT - NCAT, EOMI, +dentures   Chest - good respiratory effort, CTAB   Cardio - warm and well perfused,   Abdomen -  Soft, NT ND  Extremities - no calf tenderness  Neurologic Exam:                    Cognitive -             Orientation: Awake, Alert and oriented            Speech - Fluent, Comprehensible, No dysarthria, No aphasia      Cranial Nerves - No facial asymmetry, Tongue midline, EOMI, Shoulder shrug intact     Motor -                      LEFT    UE - ShAB 5/5, EF 5/5, EE 5/5, WE 5/5,  WNL                    RIGHT UE - ShAB 5/5, EF 5/5, EE 5/5, WE 5/5,  WNL                    LEFT    LE - HF 4/5, KE 5/5, DF 5/5, PF 5/5                    RIGHT LE - HF 4/5, KE 5/5, DF 5/5, PF 5/5        Sensory - Intact to LT bilateral  Psychiatric - Mood stable, Affect WNL  Skin on admission:     Sternal incision well approximated, no erythema or drainage      Drain incision site below sternal incision; right oozing blood, mild      Stage II sacral 0.5x0.5     Stage II R/L buttock 0.5x0.5     Multiple areas of ecchymosis on abdomen and extremities      RECENT LABS:                        9.3    6.36  )-----------( 367      ( 18 Mar 2021 05:30 )             30.8     03-18    141  |  105  |  26<H>  ----------------------------<  104<H>  4.4   |  30  |  0.73    Ca    9.2      18 Mar 2021 05:30  Mg     1.9     03-18    TPro  5.9<L>  /  Alb  2.3<L>  /  TBili  0.5  /  DBili  x   /  AST  34  /  ALT  45  /  AlkPhos  248<H>  03-18      MEDICATIONS  (STANDING):  ascorbic acid 500 milliGRAM(s) Oral daily  aspirin enteric coated 81 milliGRAM(s) Oral daily  atorvastatin 40 milliGRAM(s) Oral at bedtime  enoxaparin Injectable 40 milliGRAM(s) SubCutaneous daily  famotidine    Tablet 20 milliGRAM(s) Oral daily  ferrous    sulfate 325 milliGRAM(s) Oral daily  folic acid 1 milliGRAM(s) Oral daily  influenza   Vaccine 0.5 milliLiter(s) IntraMuscular once  levothyroxine 100 MICROGram(s) Oral daily  magnesium oxide 400 milliGRAM(s) Oral three times a day with meals  metoprolol tartrate 12.5 milliGRAM(s) Oral two times a day  midodrine. 5 milliGRAM(s) Oral <User Schedule>  potassium chloride    Tablet ER 20 milliEquivalent(s) Oral daily  senna 2 Tablet(s) Oral at bedtime  torsemide 20 milliGRAM(s) Oral daily    MEDICATIONS  (PRN):  acetaminophen   Tablet .. 650 milliGRAM(s) Oral every 6 hours PRN Moderate Pain (4 - 6)  melatonin 3 milliGRAM(s) Oral at bedtime PRN Insomnia  polyethylene glycol 3350 17 Gram(s) Oral daily PRN Constipation        ASSESSMENT AND PLAN  76/F PMH of anemia, hypothyroidism, and GI bleed presented as transfer from [place] to [facility] on 2/16 for cardiac cath for NSTEMI. 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)    Comprehensive Multidisciplinary Rehab Program:  - Gait, ADL, Functional impairments  - CMS Impairment group: 09 (Cardiac)  - PT 90 mins/OT 60 mins/ SLP 30 mins for total of 3 hours a day 5 days a week    #Functional deficits 2/2 Acute Systolic Heart Failure s/p CABG x3, AVR, and BIV AICD placement); EF 55-60% after surgery (TTE 3/9)  - device interrogated, no afib seen  - asa, Lipitor  - lopressor 12.5mg bid  - torsemide 20mg qd and daily potassium supplementation of 20mEq, MgOxide 400mg; maintain K>4 and Mg >2  - bilateral LE ace wraps for edema  - midodrine 5mg TID for BP support  - incentive spirometry  - daily weights, strict Is and Os  - weekly CXR per cardiology  - has follow up with EP doctor  Dr. Ortiz on March 15 to check on new cardiac device/BIV-ICD    will need to call to reschedule/ inform she is in rehab    #Bilateral Carotid Stenosis- seen on US and CT Angio Carotid  - seen by vascular surgery at [facility] – will need OP follow up    #Hiatal Hernia  - found during endoscopy  - follow up as outpatient    #Hypothyroid  - c/w Synthroid 100mcg  - will need OP f/u in 4-6 weeks for TFT monitoring     Sleep- Melatonin PRN  cognitive deficits- memory deficits as per speech therapy. Patient reports nighttime confusion but no falls. NO agitation or confusion at night as per night staff. Plan to continue to monitor for now.     Pain:- Tylenol PRN    GI/Bowel:  - Senna 2 tabs at qhs, Miralax daily prn  - GI ppx: pepcid  /Bladder:  - Monitor Voiding well, toileting schedule q4h    Diet / Dysphagia:   Carbohydrate Consistent; Endure supplementation, Sander, Vit C    Skin/ Pressure Injury Prevention:  - assessment on admission:     Stage II sacral and b/l buttock pressure injuries     - barrier cream and Allevyn dressing placed    - offloading   - Incisions:     Sternal incision open to air    drain incisions: one oozing mild amount of blood - dry, sterile, dressing placed  -Turn Q2hrs in bed while awake, OOB to Chair, PT/OT/SLP   -Sander daily, vitamin C    DVT prophylaxis: Lovenox, SCDs    Precautions/ Restrictions  - Falls, Cardiac, Sternal  - Lungs: Aspiration, Incentive Spirometer    - COVID PCR: neg 3/12    Dispo: IDT rounds 3/16/21- Team meeting: Discussed with multidisciplinary team about patient's current function, progress, goals, barriers, discharge plan and set up. Updated patient about discharge plan. DC planned 3/30/21

## 2021-03-19 NOTE — PROGRESS NOTE ADULT - SUBJECTIVE AND OBJECTIVE BOX
Patient is a 76y old  Female who presents with a chief complaint of Functional Deficits 2/2 CABG and AVR (9 - Cardiac) (19 Mar 2021 10:50)    No overnight events noted.  Patient without new/acute symptoms.  Patient seen and examined at bedside.    ALLERGIES:  No Known Allergies    MEDICATIONS  (STANDING):  ascorbic acid 500 milliGRAM(s) Oral daily  aspirin enteric coated 81 milliGRAM(s) Oral daily  atorvastatin 40 milliGRAM(s) Oral at bedtime  enoxaparin Injectable 40 milliGRAM(s) SubCutaneous daily  famotidine    Tablet 20 milliGRAM(s) Oral daily  ferrous    sulfate 325 milliGRAM(s) Oral daily  folic acid 1 milliGRAM(s) Oral daily  influenza   Vaccine 0.5 milliLiter(s) IntraMuscular once  levothyroxine 100 MICROGram(s) Oral daily  magnesium oxide 400 milliGRAM(s) Oral three times a day with meals  metoprolol tartrate 12.5 milliGRAM(s) Oral two times a day  midodrine. 5 milliGRAM(s) Oral <User Schedule>  potassium chloride    Tablet ER 20 milliEquivalent(s) Oral daily  senna 2 Tablet(s) Oral at bedtime  torsemide 20 milliGRAM(s) Oral daily    MEDICATIONS  (PRN):  acetaminophen   Tablet .. 650 milliGRAM(s) Oral every 6 hours PRN Moderate Pain (4 - 6)  melatonin 3 milliGRAM(s) Oral at bedtime PRN Insomnia  polyethylene glycol 3350 17 Gram(s) Oral daily PRN Constipation    Vital Signs Last 24 Hrs  T(F): 97.6 (19 Mar 2021 07:39), Max: 98.5 (18 Mar 2021 20:23)  HR: 108 (19 Mar 2021 07:39) (94 - 108)  BP: 106/65 (19 Mar 2021 07:39) (96/60 - 106/65)  RR: 16 (19 Mar 2021 07:39) (16 - 16)  SpO2: 99% (19 Mar 2021 07:39) (94% - 99%)    GENERAL- NAD  NECK- supple  RESPIRATORY-  clear to auscultation bilaterally  CARDIOVASCULAR - SIS2, RRR  GI - soft NT BS present  EXTREMITIES- +1 pitting edema B/L LE  NEUROLOGY- no gross focal deficits  SKIN- no rashes, warm to touch, chest surgical incision approximated no drainage  PSYCHIATRY- AAO X 3   MUSCULOSKELETAL- ROM normal    LABS:                        9.3    6.36  )-----------( 367      ( 18 Mar 2021 05:30 )             30.8       03-18    141  |  105  |  26  ----------------------------<  104  4.4   |  30  |  0.73    Ca    9.2      18 Mar 2021 05:30  Mg     1.9     03-18    TPro  5.9  /  Alb  2.3  /  TBili  0.5  /  DBili  x   /  AST  34  /  ALT  45  /  AlkPhos  248  03-18     eGFR if Non African American: 79 mL/min/1.73M2 (03-18-21 @ 05:30)  eGFR if African American: 92 mL/min/1.73M2 (03-18-21 @ 05:30)    02-17 Chol 105 mg/dL LDL -- HDL 39 mg/dL Trig 62 mg/dL

## 2021-03-19 NOTE — PROGRESS NOTE ADULT - ASSESSMENT
75yo F with PMH of anemia, hypothyroidism, and GI bleed presented as transfer from [place] to [facility] on 2/16 for cardiac cath for NSTEMI. 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 Admitted to [facility] for PT/ OT/ DVT ppx    #HFrEF, improved  - c/w torsemide  - low bp- unable to start ACEI  - c/w metoprolol     # hypotension  - c/w  midodrine 5mg for BP support    # transaminitis with isolated elevated AP  - resolved, likely due to NSTEMI    # aortic stenosis  s/p bio-AVR (2/24)    # NSTEMI and CAD s/p CABGx3 (2/24)  - CHB s/p BiV AICD (3/2) – device interrogated, no afib seen  - TTE on 3/9 shows EF improved to 55-60% after surgery  - EF on 2/28/21 25 to 30%, decreased global left ventricular systolic function  - c/w asa  - c/w lipitor    #LE Edema  - 2/2 acute HF  - diuresis with torsemide    #Bilateral Carotid Stenosis  - seen by vascular surgery at [facility]; note/ recommendations reviewed – will need OP follow up  - on pre-operative eval for CABG found to have >70% L ICA stenosis, asymptomatic  -patient with asymptomatic high grade stenosis of L ICA, no indication for CEA at this time      #Hiatal Hernia  -found during endoscopy [facility]  -seen by GI at [facility]; note /recommendations reviewed- will need OP follow up  -c/w famotidine     #Anemia- likely blood loss vs ACD s/p 2 PRBC at [facility]  -Hgb currently stable  -Ferrous sulfate, folic acid    #Hypothyroid  - c/w Synthroid 100mcg    #DVT prophylaxis:  Lovenox SQ

## 2021-03-20 LAB — SARS-COV-2 RNA SPEC QL NAA+PROBE: SIGNIFICANT CHANGE UP

## 2021-03-20 PROCEDURE — 99232 SBSQ HOSP IP/OBS MODERATE 35: CPT

## 2021-03-20 RX ADMIN — Medication 81 MILLIGRAM(S): at 13:36

## 2021-03-20 RX ADMIN — Medication 325 MILLIGRAM(S): at 13:37

## 2021-03-20 RX ADMIN — Medication 100 MICROGRAM(S): at 06:06

## 2021-03-20 RX ADMIN — ATORVASTATIN CALCIUM 40 MILLIGRAM(S): 80 TABLET, FILM COATED ORAL at 20:59

## 2021-03-20 RX ADMIN — ENOXAPARIN SODIUM 40 MILLIGRAM(S): 100 INJECTION SUBCUTANEOUS at 20:58

## 2021-03-20 RX ADMIN — MAGNESIUM OXIDE 400 MG ORAL TABLET 400 MILLIGRAM(S): 241.3 TABLET ORAL at 08:41

## 2021-03-20 RX ADMIN — Medication 1 MILLIGRAM(S): at 13:36

## 2021-03-20 RX ADMIN — Medication 20 MILLIGRAM(S): at 06:06

## 2021-03-20 RX ADMIN — MAGNESIUM OXIDE 400 MG ORAL TABLET 400 MILLIGRAM(S): 241.3 TABLET ORAL at 13:36

## 2021-03-20 RX ADMIN — Medication 20 MILLIEQUIVALENT(S): at 13:36

## 2021-03-20 RX ADMIN — MAGNESIUM OXIDE 400 MG ORAL TABLET 400 MILLIGRAM(S): 241.3 TABLET ORAL at 17:11

## 2021-03-20 RX ADMIN — MIDODRINE HYDROCHLORIDE 5 MILLIGRAM(S): 2.5 TABLET ORAL at 06:06

## 2021-03-20 RX ADMIN — FAMOTIDINE 20 MILLIGRAM(S): 10 INJECTION INTRAVENOUS at 13:36

## 2021-03-20 RX ADMIN — Medication 12.5 MILLIGRAM(S): at 17:11

## 2021-03-20 RX ADMIN — SENNA PLUS 2 TABLET(S): 8.6 TABLET ORAL at 20:59

## 2021-03-20 RX ADMIN — Medication 500 MILLIGRAM(S): at 13:36

## 2021-03-20 NOTE — PROGRESS NOTE ADULT - SUBJECTIVE AND OBJECTIVE BOX
Patient is a 76y old  Female who presents with a chief complaint of Functional Deficits 2/2 CABG and AVR (9 - Cardiac) (20 Mar 2021 14:04)      Patient seen and examined at bedside. no complaints today. denies headache, dizziness, lightheadedness, sob, cp, palpitations.     ALLERGIES:  No Known Allergies    MEDICATIONS  (STANDING):  ascorbic acid 500 milliGRAM(s) Oral daily  aspirin enteric coated 81 milliGRAM(s) Oral daily  atorvastatin 40 milliGRAM(s) Oral at bedtime  enoxaparin Injectable 40 milliGRAM(s) SubCutaneous daily  famotidine    Tablet 20 milliGRAM(s) Oral daily  ferrous    sulfate 325 milliGRAM(s) Oral daily  folic acid 1 milliGRAM(s) Oral daily  influenza   Vaccine 0.5 milliLiter(s) IntraMuscular once  levothyroxine 100 MICROGram(s) Oral daily  magnesium oxide 400 milliGRAM(s) Oral three times a day with meals  metoprolol tartrate 12.5 milliGRAM(s) Oral two times a day  midodrine. 5 milliGRAM(s) Oral <User Schedule>  potassium chloride    Tablet ER 20 milliEquivalent(s) Oral daily  senna 2 Tablet(s) Oral at bedtime  torsemide 20 milliGRAM(s) Oral daily    MEDICATIONS  (PRN):  acetaminophen   Tablet .. 650 milliGRAM(s) Oral every 6 hours PRN Moderate Pain (4 - 6)  melatonin 3 milliGRAM(s) Oral at bedtime PRN Insomnia  polyethylene glycol 3350 17 Gram(s) Oral daily PRN Constipation    Vital Signs Last 24 Hrs  T(F): 97.8 (20 Mar 2021 08:42), Max: 98.6 (19 Mar 2021 19:57)  HR: 78 (20 Mar 2021 08:42) (78 - 90)  BP: 93/54 (20 Mar 2021 08:42) (92/60 - 104/66)  RR: 16 (20 Mar 2021 08:42) (16 - 16)  SpO2: 95% (20 Mar 2021 08:42) (95% - 96%)  I&O's Summary    PHYSICAL EXAM:  General: NAD, A/O x 3  ENT: MMM  Neck: Supple, No JVD  Lungs: Clear to auscultation bilaterally  Cardio: RRR, S1/S2, No murmurs  Abdomen: Soft, Nontender, Nondistended; Bowel sounds present  Extremities: No calf tenderness, No pitting edema    LABS:                        9.3    6.36  )-----------( 367      ( 18 Mar 2021 05:30 )             30.8     03-18    141  |  105  |  26  ----------------------------<  104  4.4   |  30  |  0.73    Ca    9.2      18 Mar 2021 05:30  Mg     1.9     03-18    TPro  5.9  /  Alb  2.3  /  TBili  0.5  /  DBili  x   /  AST  34  /  ALT  45  /  AlkPhos  248  03-18    eGFR if Non African American: 79 mL/min/1.73M2 (03-18-21 @ 05:30)  eGFR if African American: 92 mL/min/1.73M2 (03-18-21 @ 05:30)            02-17 Chol 105 mg/dL LDL -- HDL 39 mg/dL Trig 62 mg/dL                        RADIOLOGY & ADDITIONAL TESTS:    Care Discussed with Consultants/Other Providers:

## 2021-03-20 NOTE — PROGRESS NOTE ADULT - ASSESSMENT
77yo F with PMH of anemia, hypothyroidism, and GI bleed presented as transfer from [place] to [facility] on 2/16 for cardiac cath for NSTEMI. 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 Admitted to [facility] for PT/ OT/ DVT ppx    #HFrEF, improved  - c/w torsemide  - low bp- unable to start ACEI  - c/w metoprolol     # hypotension  - c/w  midodrine 5mg for BP support  - denies any symptoms    # transaminitis with isolated elevated AP  - resolved, likely due to NSTEMI    # aortic stenosis  s/p bio-AVR (2/24)    # NSTEMI and CAD s/p CABGx3 (2/24)  - CHB s/p BiV AICD (3/2) – device interrogated, no afib seen  - TTE on 3/9 shows EF improved to 55-60% after surgery  - EF on 2/28/21 25 to 30%, decreased global left ventricular systolic function  - c/w asa  - c/w lipitor    #LE Edema  - 2/2 acute HF  - diuresis with torsemide    #Bilateral Carotid Stenosis  - seen by vascular surgery at [facility]; note/ recommendations reviewed – will need OP follow up  - on pre-operative eval for CABG found to have >70% L ICA stenosis, asymptomatic  -patient with asymptomatic high grade stenosis of L ICA, no indication for CEA at this time      #Hiatal Hernia  -found during endoscopy [facility]  -seen by GI at [facility]; note /recommendations reviewed- will need OP follow up  -c/w famotidine     #Anemia- likely blood loss vs ACD s/p 2 PRBC at [facility]  -Hgb currently stable  -Ferrous sulfate, folic acid    #Hypothyroid  - c/w Synthroid 100mcg    #DVT prophylaxis:  Lovenox SQ   77yo F with PMH of anemia, hypothyroidism, and GI bleed presented as transfer from [place] to [facility] on 2/16 for cardiac cath for NSTEMI. 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 Admitted to [facility] for PT/ OT/ DVT ppx    #HFrEF, improved  - c/w torsemide  - low bp- unable to start ACEI  - c/w metoprolol     # hypotension  - c/w  midodrine 5mg for BP support  - denies any symptoms    # transaminitis with isolated elevated AP  - likely due to NSTEMI  - monitor    # aortic stenosis  s/p bio-AVR (2/24)    # NSTEMI and CAD s/p CABGx3 (2/24)  - CHB s/p BiV AICD (3/2) – device interrogated, no afib seen  - TTE on 3/9 shows EF improved to 55-60% after surgery  - EF on 2/28/21 25 to 30%, decreased global left ventricular systolic function  - c/w asa  - c/w lipitor    #LE Edema - improved  - 2/2 acute HF  - diuresis with torsemide    #Bilateral Carotid Stenosis  - seen by vascular surgery at [facility]; note/ recommendations reviewed – will need OP follow up  - on pre-operative eval for CABG found to have >70% L ICA stenosis, asymptomatic  -patient with asymptomatic high grade stenosis of L ICA, no indication for CEA at this time      #Hiatal Hernia  -found during endoscopy [facility]  -seen by GI at [facility]; note /recommendations reviewed- will need OP follow up  -c/w famotidine     #Anemia- likely blood loss vs ACD s/p 2 PRBC at [facility]  -Hgb currently stable  -Ferrous sulfate, folic acid    #Hypothyroid  - c/w Synthroid 100mcg    #Moderate protein-calorie malnutrition  -diet as per nutritionist     #DVT prophylaxis:  Lovenox SQ

## 2021-03-20 NOTE — PROGRESS NOTE ADULT - SUBJECTIVE AND OBJECTIVE BOX
No overnight events.  Slept very well.  Reports pain is controlled.  Pain controlled, no chest pain, no N/V, no Fevers/Chills. No other new ROS.  Has been tolerating rehabilitation program. Looking to go home.     VITALS  T(C): 37 (03-19-21 @ 19:57), Max: 37 (03-19-21 @ 19:57)  HR: 79 (03-20-21 @ 06:04) (79 - 90)  BP: 92/60 (03-20-21 @ 06:04) (92/60 - 104/66)  RR: 16 (03-19-21 @ 19:57) (16 - 16)  SpO2: 96% (03-19-21 @ 19:57) (96% - 96%)  Wt(kg): --     MEDICATIONS   acetaminophen   Tablet .. 650 milliGRAM(s) every 6 hours PRN  ascorbic acid 500 milliGRAM(s) daily  aspirin enteric coated 81 milliGRAM(s) daily  atorvastatin 40 milliGRAM(s) at bedtime  enoxaparin Injectable 40 milliGRAM(s) daily  famotidine    Tablet 20 milliGRAM(s) daily  ferrous    sulfate 325 milliGRAM(s) daily  folic acid 1 milliGRAM(s) daily  influenza   Vaccine 0.5 milliLiter(s) once  levothyroxine 100 MICROGram(s) daily  magnesium oxide 400 milliGRAM(s) three times a day with meals  melatonin 3 milliGRAM(s) at bedtime PRN  metoprolol tartrate 12.5 milliGRAM(s) two times a day  midodrine. 5 milliGRAM(s) <User Schedule>  polyethylene glycol 3350 17 Gram(s) daily PRN  potassium chloride    Tablet ER 20 milliEquivalent(s) daily  senna 2 Tablet(s) at bedtime  torsemide 20 milliGRAM(s) daily      RECENT LABS/IMAGING                          ------------------------------------------  PHYSICAL EXAM  Constitutional - NAD, Comfortable  Pulm - Breathing comfortably, wheezing  Abd - Nondistended  Extremities - No calf tenderness  Neurologic Exam - Awake, Alert            Motor - Exam unchanged  Psychiatric - Mood WNL     ASSESSMENT/PLAN  76y Female with impairments in mobility and ADLs   - Continue current rehabilitation program 3hrs a day   - Continue current medications, patient is medically stable   - DVT prophylaxis    - Skin - OOB and mobilization daily

## 2021-03-21 LAB
ANION GAP SERPL CALC-SCNC: 4 MMOL/L — LOW (ref 5–17)
APPEARANCE UR: ABNORMAL
BACTERIA # UR AUTO: ABNORMAL /HPF
BASOPHILS # BLD AUTO: 0.04 K/UL — SIGNIFICANT CHANGE UP (ref 0–0.2)
BASOPHILS NFR BLD AUTO: 0.7 % — SIGNIFICANT CHANGE UP (ref 0–2)
BILIRUB UR-MCNC: NEGATIVE — SIGNIFICANT CHANGE UP
BUN SERPL-MCNC: 33 MG/DL — HIGH (ref 7–23)
CALCIUM SERPL-MCNC: 9.6 MG/DL — SIGNIFICANT CHANGE UP (ref 8.4–10.5)
CHLORIDE SERPL-SCNC: 103 MMOL/L — SIGNIFICANT CHANGE UP (ref 96–108)
CO2 SERPL-SCNC: 34 MMOL/L — HIGH (ref 22–31)
COLOR SPEC: YELLOW — SIGNIFICANT CHANGE UP
CREAT SERPL-MCNC: 0.91 MG/DL — SIGNIFICANT CHANGE UP (ref 0.5–1.3)
DIFF PNL FLD: NEGATIVE — SIGNIFICANT CHANGE UP
EOSINOPHIL # BLD AUTO: 0.12 K/UL — SIGNIFICANT CHANGE UP (ref 0–0.5)
EOSINOPHIL NFR BLD AUTO: 2 % — SIGNIFICANT CHANGE UP (ref 0–6)
EPI CELLS # UR: ABNORMAL
GLUCOSE SERPL-MCNC: 140 MG/DL — HIGH (ref 70–99)
GLUCOSE UR QL: NEGATIVE — SIGNIFICANT CHANGE UP
HCT VFR BLD CALC: 35.8 % — SIGNIFICANT CHANGE UP (ref 34.5–45)
HGB BLD-MCNC: 10.6 G/DL — LOW (ref 11.5–15.5)
IMM GRANULOCYTES NFR BLD AUTO: 0.5 % — SIGNIFICANT CHANGE UP (ref 0–1.5)
KETONES UR-MCNC: NEGATIVE — SIGNIFICANT CHANGE UP
LEUKOCYTE ESTERASE UR-ACNC: NEGATIVE — SIGNIFICANT CHANGE UP
LYMPHOCYTES # BLD AUTO: 0.58 K/UL — LOW (ref 1–3.3)
LYMPHOCYTES # BLD AUTO: 9.8 % — LOW (ref 13–44)
MCHC RBC-ENTMCNC: 28.4 PG — SIGNIFICANT CHANGE UP (ref 27–34)
MCHC RBC-ENTMCNC: 29.6 GM/DL — LOW (ref 32–36)
MCV RBC AUTO: 96 FL — SIGNIFICANT CHANGE UP (ref 80–100)
MONOCYTES # BLD AUTO: 0.63 K/UL — SIGNIFICANT CHANGE UP (ref 0–0.9)
MONOCYTES NFR BLD AUTO: 10.7 % — SIGNIFICANT CHANGE UP (ref 2–14)
NEUTROPHILS # BLD AUTO: 4.49 K/UL — SIGNIFICANT CHANGE UP (ref 1.8–7.4)
NEUTROPHILS NFR BLD AUTO: 76.3 % — SIGNIFICANT CHANGE UP (ref 43–77)
NITRITE UR-MCNC: NEGATIVE — SIGNIFICANT CHANGE UP
NRBC # BLD: 0 /100 WBCS — SIGNIFICANT CHANGE UP (ref 0–0)
PH UR: 8 — SIGNIFICANT CHANGE UP (ref 5–8)
PLATELET # BLD AUTO: 413 K/UL — HIGH (ref 150–400)
POTASSIUM SERPL-MCNC: 4.4 MMOL/L — SIGNIFICANT CHANGE UP (ref 3.5–5.3)
POTASSIUM SERPL-SCNC: 4.4 MMOL/L — SIGNIFICANT CHANGE UP (ref 3.5–5.3)
PROT UR-MCNC: NEGATIVE — SIGNIFICANT CHANGE UP
RBC # BLD: 3.73 M/UL — LOW (ref 3.8–5.2)
RBC # FLD: 20.6 % — HIGH (ref 10.3–14.5)
RBC CASTS # UR COMP ASSIST: NEGATIVE /HPF — SIGNIFICANT CHANGE UP (ref 0–4)
SODIUM SERPL-SCNC: 141 MMOL/L — SIGNIFICANT CHANGE UP (ref 135–145)
SP GR SPEC: 1.01 — SIGNIFICANT CHANGE UP (ref 1.01–1.02)
UROBILINOGEN FLD QL: NEGATIVE — SIGNIFICANT CHANGE UP
WBC # BLD: 5.89 K/UL — SIGNIFICANT CHANGE UP (ref 3.8–10.5)
WBC # FLD AUTO: 5.89 K/UL — SIGNIFICANT CHANGE UP (ref 3.8–10.5)
WBC UR QL: SIGNIFICANT CHANGE UP /HPF (ref 0–5)

## 2021-03-21 PROCEDURE — 99232 SBSQ HOSP IP/OBS MODERATE 35: CPT

## 2021-03-21 RX ADMIN — Medication 1 MILLIGRAM(S): at 13:24

## 2021-03-21 RX ADMIN — SENNA PLUS 2 TABLET(S): 8.6 TABLET ORAL at 22:26

## 2021-03-21 RX ADMIN — Medication 12.5 MILLIGRAM(S): at 06:05

## 2021-03-21 RX ADMIN — FAMOTIDINE 20 MILLIGRAM(S): 10 INJECTION INTRAVENOUS at 13:24

## 2021-03-21 RX ADMIN — MAGNESIUM OXIDE 400 MG ORAL TABLET 400 MILLIGRAM(S): 241.3 TABLET ORAL at 13:24

## 2021-03-21 RX ADMIN — MAGNESIUM OXIDE 400 MG ORAL TABLET 400 MILLIGRAM(S): 241.3 TABLET ORAL at 17:00

## 2021-03-21 RX ADMIN — ENOXAPARIN SODIUM 40 MILLIGRAM(S): 100 INJECTION SUBCUTANEOUS at 22:26

## 2021-03-21 RX ADMIN — Medication 81 MILLIGRAM(S): at 13:24

## 2021-03-21 RX ADMIN — ATORVASTATIN CALCIUM 40 MILLIGRAM(S): 80 TABLET, FILM COATED ORAL at 22:26

## 2021-03-21 RX ADMIN — Medication 12.5 MILLIGRAM(S): at 17:00

## 2021-03-21 RX ADMIN — Medication 100 MICROGRAM(S): at 06:05

## 2021-03-21 RX ADMIN — MAGNESIUM OXIDE 400 MG ORAL TABLET 400 MILLIGRAM(S): 241.3 TABLET ORAL at 07:49

## 2021-03-21 RX ADMIN — Medication 500 MILLIGRAM(S): at 13:24

## 2021-03-21 RX ADMIN — Medication 20 MILLIGRAM(S): at 06:05

## 2021-03-21 RX ADMIN — Medication 325 MILLIGRAM(S): at 13:24

## 2021-03-21 RX ADMIN — Medication 20 MILLIEQUIVALENT(S): at 13:24

## 2021-03-21 NOTE — PROGRESS NOTE ADULT - SUBJECTIVE AND OBJECTIVE BOX
Patient was restless overnight.  Reports pain in her legs from the tight TEDs.  Discussed importance.   Reports no chest pain, no N/V, no Fevers/Chills. No other new ROS.  Has been tolerating rehabilitation program.    VITALS  T(C): 36.9 (03-20-21 @ 20:57), Max: 36.9 (03-20-21 @ 20:57)  HR: 80 (03-21-21 @ 06:04) (78 - 97)  BP: 111/65 (03-21-21 @ 06:04) (93/54 - 113/67)  RR: 15 (03-20-21 @ 20:57) (15 - 16)  SpO2: 94% (03-20-21 @ 20:57) (94% - 95%)  Wt(kg): --     MEDICATIONS   acetaminophen   Tablet .. 650 milliGRAM(s) every 6 hours PRN  ascorbic acid 500 milliGRAM(s) daily  aspirin enteric coated 81 milliGRAM(s) daily  atorvastatin 40 milliGRAM(s) at bedtime  enoxaparin Injectable 40 milliGRAM(s) daily  famotidine    Tablet 20 milliGRAM(s) daily  ferrous    sulfate 325 milliGRAM(s) daily  folic acid 1 milliGRAM(s) daily  influenza   Vaccine 0.5 milliLiter(s) once  levothyroxine 100 MICROGram(s) daily  magnesium oxide 400 milliGRAM(s) three times a day with meals  melatonin 3 milliGRAM(s) at bedtime PRN  metoprolol tartrate 12.5 milliGRAM(s) two times a day  midodrine. 5 milliGRAM(s) <User Schedule>  polyethylene glycol 3350 17 Gram(s) daily PRN  potassium chloride    Tablet ER 20 milliEquivalent(s) daily  senna 2 Tablet(s) at bedtime  torsemide 20 milliGRAM(s) daily      RECENT LABS/IMAGING        ------------------------------------------  PHYSICAL EXAM  Constitutional - NAD, Comfortable  Pulm - Breathing comfortably, No wheezing  Abd - Nondistended  Extremities - No calf tenderness  Neurologic Exam - Awake, Alert  Psychiatric - Mood WNL     ASSESSMENT/PLAN  76y Female with impairments in mobility and ADLs   - Continue current rehabilitation program 3hrs a day   - Continue current medications, patient is medically stable   - DVT prophylaxis    - Skin - OOB and mobilization daily

## 2021-03-21 NOTE — PROGRESS NOTE ADULT - SUBJECTIVE AND OBJECTIVE BOX
Patient is a 76y old  Female who presents with a chief complaint of Functional Deficits 2/2 CABG and AVR (9 - Cardiac) (20 Mar 2021 14:04)      Patient seen and examined at bedside. c/o compression stocking is too tight, hurting her foot. however, no foot swelling. there is mild redness on lateral 5th toe.    ALLERGIES:  No Known Allergies    MEDICATIONS  (STANDING):  ascorbic acid 500 milliGRAM(s) Oral daily  aspirin enteric coated 81 milliGRAM(s) Oral daily  atorvastatin 40 milliGRAM(s) Oral at bedtime  enoxaparin Injectable 40 milliGRAM(s) SubCutaneous daily  famotidine    Tablet 20 milliGRAM(s) Oral daily  ferrous    sulfate 325 milliGRAM(s) Oral daily  folic acid 1 milliGRAM(s) Oral daily  influenza   Vaccine 0.5 milliLiter(s) IntraMuscular once  levothyroxine 100 MICROGram(s) Oral daily  magnesium oxide 400 milliGRAM(s) Oral three times a day with meals  metoprolol tartrate 12.5 milliGRAM(s) Oral two times a day  midodrine. 5 milliGRAM(s) Oral <User Schedule>  potassium chloride    Tablet ER 20 milliEquivalent(s) Oral daily  senna 2 Tablet(s) Oral at bedtime  torsemide 20 milliGRAM(s) Oral daily    MEDICATIONS  (PRN):  acetaminophen   Tablet .. 650 milliGRAM(s) Oral every 6 hours PRN Moderate Pain (4 - 6)  melatonin 3 milliGRAM(s) Oral at bedtime PRN Insomnia  polyethylene glycol 3350 17 Gram(s) Oral daily PRN Constipation    Vital Signs Last 24 Hrs  T(F): 98.1 (21 Mar 2021 09:02), Max: 98.5 (20 Mar 2021 20:57)  HR: 71 (21 Mar 2021 09:02) (71 - 97)  BP: 102/59 (21 Mar 2021 09:02) (102/59 - 113/67)  RR: 16 (21 Mar 2021 09:02) (15 - 16)  SpO2: 96% (21 Mar 2021 09:02) (94% - 96%)  I&O's Summary    PHYSICAL EXAM:  General: NAD, A/O x 3  ENT: MMM  Neck: Supple, No JVD  Lungs: Clear to auscultation bilaterally  Cardio: RRR, S1/S2, No murmurs  Abdomen: Soft, Nontender, Nondistended; Bowel sounds present  Extremities: No calf tenderness, No pitting edema    LABS:          02-17 Chol 105 mg/dL LDL -- HDL 39 mg/dL Trig 62 mg/dL                  RADIOLOGY & ADDITIONAL TESTS:    Care Discussed with Consultants/Other Providers:

## 2021-03-21 NOTE — PROGRESS NOTE ADULT - ASSESSMENT
75yo F with PMH of anemia, hypothyroidism, and GI bleed presented as transfer from [place] to [facility] on 2/16 for cardiac cath for NSTEMI. 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 Admitted to [facility] for PT/ OT/ DVT ppx    #HFrEF, improved  - c/w torsemide  - low bp- unable to start ACEI  - c/w metoprolol     # hypotension  - c/w midodrine 5mg for BP support  - denies any symptoms  - KELLEE stocking as tolerated    # transaminitis with isolated elevated AP  - likely due to NSTEMI  - monitor  - alk phos level stable    # aortic stenosis  s/p bio-AVR (2/24)    # NSTEMI and CAD s/p CABGx3 (2/24)  - CHB s/p BiV AICD (3/2) – device interrogated, no afib seen  - TTE on 3/9 shows EF improved to 55-60% after surgery  - EF on 2/28/21 25 to 30%, decreased global left ventricular systolic function  - c/w asa  - c/w lipitor    #LE Edema - improved  - 2/2 acute HF  - diuresis with torsemide    #Bilateral Carotid Stenosis  - seen by vascular surgery at [facility]; note/ recommendations reviewed – will need OP follow up  - on pre-operative eval for CABG found to have >70% L ICA stenosis, asymptomatic  -patient with asymptomatic high grade stenosis of L ICA, no indication for CEA at this time    #Hiatal Hernia  -found during endoscopy [facility]  -seen by GI at [facility]; note /recommendations reviewed- will need OP follow up  -c/w famotidine     #Anemia- likely blood loss vs ACD s/p 2 PRBC at [facility]  -Hgb currently stable  -Ferrous sulfate, folic acid    #Hypothyroid  - c/w Synthroid 100mcg    #Moderate protein-calorie malnutrition  -diet as per nutritionist     #DVT prophylaxis:  Lovenox SQ

## 2021-03-22 LAB
ALBUMIN SERPL ELPH-MCNC: 2.5 G/DL — LOW (ref 3.3–5)
ALP SERPL-CCNC: 192 U/L — HIGH (ref 40–120)
ALT FLD-CCNC: 30 U/L — SIGNIFICANT CHANGE UP (ref 10–45)
ANION GAP SERPL CALC-SCNC: 4 MMOL/L — LOW (ref 5–17)
AST SERPL-CCNC: 33 U/L — SIGNIFICANT CHANGE UP (ref 10–40)
BILIRUB SERPL-MCNC: 0.5 MG/DL — SIGNIFICANT CHANGE UP (ref 0.2–1.2)
BUN SERPL-MCNC: 29 MG/DL — HIGH (ref 7–23)
CALCIUM SERPL-MCNC: 8.9 MG/DL — SIGNIFICANT CHANGE UP (ref 8.4–10.5)
CHLORIDE SERPL-SCNC: 105 MMOL/L — SIGNIFICANT CHANGE UP (ref 96–108)
CO2 SERPL-SCNC: 31 MMOL/L — SIGNIFICANT CHANGE UP (ref 22–31)
CREAT SERPL-MCNC: 0.78 MG/DL — SIGNIFICANT CHANGE UP (ref 0.5–1.3)
GLUCOSE SERPL-MCNC: 85 MG/DL — SIGNIFICANT CHANGE UP (ref 70–99)
HCT VFR BLD CALC: 30.2 % — LOW (ref 34.5–45)
HGB BLD-MCNC: 9.3 G/DL — LOW (ref 11.5–15.5)
MCHC RBC-ENTMCNC: 28.4 PG — SIGNIFICANT CHANGE UP (ref 27–34)
MCHC RBC-ENTMCNC: 30.8 GM/DL — LOW (ref 32–36)
MCV RBC AUTO: 92.4 FL — SIGNIFICANT CHANGE UP (ref 80–100)
NRBC # BLD: 0 /100 WBCS — SIGNIFICANT CHANGE UP (ref 0–0)
NT-PROBNP SERPL-SCNC: 6068 PG/ML — HIGH (ref 0–300)
PLATELET # BLD AUTO: 353 K/UL — SIGNIFICANT CHANGE UP (ref 150–400)
POTASSIUM SERPL-MCNC: 4 MMOL/L — SIGNIFICANT CHANGE UP (ref 3.5–5.3)
POTASSIUM SERPL-SCNC: 4 MMOL/L — SIGNIFICANT CHANGE UP (ref 3.5–5.3)
PROT SERPL-MCNC: 6 G/DL — SIGNIFICANT CHANGE UP (ref 6–8.3)
RBC # BLD: 3.27 M/UL — LOW (ref 3.8–5.2)
RBC # FLD: 20.1 % — HIGH (ref 10.3–14.5)
SODIUM SERPL-SCNC: 140 MMOL/L — SIGNIFICANT CHANGE UP (ref 135–145)
TROPONIN I SERPL-MCNC: 0.03 NG/ML — SIGNIFICANT CHANGE UP (ref 0.02–0.06)
TROPONIN I SERPL-MCNC: 0.03 NG/ML — SIGNIFICANT CHANGE UP (ref 0.02–0.06)
WBC # BLD: 6.1 K/UL — SIGNIFICANT CHANGE UP (ref 3.8–10.5)
WBC # FLD AUTO: 6.1 K/UL — SIGNIFICANT CHANGE UP (ref 3.8–10.5)

## 2021-03-22 PROCEDURE — 71045 X-RAY EXAM CHEST 1 VIEW: CPT | Mod: 26

## 2021-03-22 PROCEDURE — 99232 SBSQ HOSP IP/OBS MODERATE 35: CPT | Mod: GC

## 2021-03-22 PROCEDURE — 99233 SBSQ HOSP IP/OBS HIGH 50: CPT

## 2021-03-22 PROCEDURE — 93010 ELECTROCARDIOGRAM REPORT: CPT

## 2021-03-22 PROCEDURE — 70450 CT HEAD/BRAIN W/O DYE: CPT | Mod: 26

## 2021-03-22 RX ORDER — CHLORHEXIDINE GLUCONATE 4 %
325 (65 FE) LIQUID (ML) TOPICAL
Refills: 0 | Status: ACTIVE | COMMUNITY

## 2021-03-22 RX ADMIN — Medication 12.5 MILLIGRAM(S): at 06:17

## 2021-03-22 RX ADMIN — Medication 20 MILLIEQUIVALENT(S): at 11:54

## 2021-03-22 RX ADMIN — FAMOTIDINE 20 MILLIGRAM(S): 10 INJECTION INTRAVENOUS at 11:54

## 2021-03-22 RX ADMIN — Medication 20 MILLIGRAM(S): at 06:17

## 2021-03-22 RX ADMIN — ATORVASTATIN CALCIUM 40 MILLIGRAM(S): 80 TABLET, FILM COATED ORAL at 23:17

## 2021-03-22 RX ADMIN — Medication 12.5 MILLIGRAM(S): at 17:12

## 2021-03-22 RX ADMIN — Medication 500 MILLIGRAM(S): at 11:54

## 2021-03-22 RX ADMIN — MAGNESIUM OXIDE 400 MG ORAL TABLET 400 MILLIGRAM(S): 241.3 TABLET ORAL at 17:10

## 2021-03-22 RX ADMIN — MIDODRINE HYDROCHLORIDE 5 MILLIGRAM(S): 2.5 TABLET ORAL at 06:17

## 2021-03-22 RX ADMIN — SENNA PLUS 2 TABLET(S): 8.6 TABLET ORAL at 23:17

## 2021-03-22 RX ADMIN — Medication 100 MICROGRAM(S): at 06:15

## 2021-03-22 RX ADMIN — Medication 325 MILLIGRAM(S): at 11:54

## 2021-03-22 RX ADMIN — Medication 1 MILLIGRAM(S): at 11:54

## 2021-03-22 RX ADMIN — ENOXAPARIN SODIUM 40 MILLIGRAM(S): 100 INJECTION SUBCUTANEOUS at 23:17

## 2021-03-22 RX ADMIN — MAGNESIUM OXIDE 400 MG ORAL TABLET 400 MILLIGRAM(S): 241.3 TABLET ORAL at 11:54

## 2021-03-22 RX ADMIN — Medication 81 MILLIGRAM(S): at 11:54

## 2021-03-22 RX ADMIN — MAGNESIUM OXIDE 400 MG ORAL TABLET 400 MILLIGRAM(S): 241.3 TABLET ORAL at 07:57

## 2021-03-22 NOTE — PROVIDER CONTACT NOTE (OTHER) - ASSESSMENT
Pt is alert, VSS. Pt verbalized seeing her dog in the room, reoriented to place and time.
vs, 104/63,93,98%, no chest pain

## 2021-03-22 NOTE — PROGRESS NOTE ADULT - ASSESSMENT
77yo F with PMH of anemia, hypothyroidism, and GI bleed presented as transfer from [place] to [facility] on 2/16 for cardiac cath for NSTEMI. 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 Admitted to [facility] for PT/ OT/ DVT ppx    #Blurry vision  - c/o blurry vision today 3/22  - check stat CTH  - neuro check q4    #SOB   - mild sob in therapy today 3/22. denies CP  - check pro- bnp, EKG, troponin and CXR    #HFrEF, improved  - c/w torsemide  - low bp- unable to start ACEI  - c/w metoprolol   - Echo 3/9/21: EF 55-60%, grade I DD. severe concentric LVH, bioprosthetic aortic valve.     # hypotension  - c/w midodrine 5mg for BP support  - denies any symptoms  - KELLEE stocking as tolerated    # transaminitis with isolated elevated AP - improving  - likely due to NSTEMI  - monitor  - alk phos level stable    # aortic stenosis  s/p bio-AVR (2/24)    # NSTEMI and CAD s/p CABGx3 (2/24)  - CHB s/p BiV AICD (3/2) – device interrogated, no afib seen  - TTE on 3/9 shows EF improved to 55-60% after surgery  - EF on 2/28/21 25 to 30%, decreased global left ventricular systolic function  - c/w asa  - c/w lipitor    #Bilateral Carotid Stenosis  - seen by vascular surgery at [facility]; note/ recommendations reviewed – will need OP follow up  - on pre-operative eval for CABG found to have >70% L ICA stenosis, asymptomatic  -patient with asymptomatic high grade stenosis of L ICA, no indication for CEA at this time    #Hiatal Hernia  -found during endoscopy [facility]  -seen by GI at [facility]; note /recommendations reviewed- will need OP follow up  -c/w famotidine     #Anemia- likely blood loss vs ACD s/p 2 PRBC at [facility]  -Hgb currently stable  -Ferrous sulfate, folic acid    #Hypothyroid  - c/w Synthroid 100mcg    #Moderate protein-calorie malnutrition  -diet as per nutritionist     #DVT prophylaxis:  Lovenox SQ   77yo F with PMH of anemia, hypothyroidism, and GI bleed presented as transfer from [place] to [facility] on 2/16 for cardiac cath for NSTEMI. 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 Admitted to [facility] for PT/ OT/ DVT ppx    #Blurry vision- symptoms currently resolved  - c/o blurry vision today 3/22  - check stat CTH  - neuro check q4    #SOB   - mild sob in therapy today 3/22. denies CP  - check pro- bnp, EKG, troponin and CXR    #HFrEF, improved  - c/w torsemide 20mg daily  - low bp- unable to start ACEI  - c/w metoprolol   - Echo 3/9/21: EF 55-60%, grade I DD. severe concentric LVH, bioprosthetic aortic valve.     # hypotension  - c/w midodrine 5mg for BP support  - denies any symptoms  - KELLEE stocking as tolerated    # transaminitis with isolated elevated AP - improving  - likely due to NSTEMI  - monitor  - alk phos level stable    # aortic stenosis  s/p bio-AVR (2/24)    # NSTEMI and CAD s/p CABGx3 (2/24)  - CHB s/p BiV AICD (3/2) – device interrogated, no afib seen  - TTE on 3/9 shows EF improved to 55-60% after surgery  - EF on 2/28/21 25 to 30%, decreased global left ventricular systolic function  - c/w asa  - c/w lipitor    #Bilateral Carotid Stenosis  - seen by vascular surgery at [facility]; note/ recommendations reviewed – will need OP follow up  - on pre-operative eval for CABG found to have >70% L ICA stenosis, asymptomatic  -patient with asymptomatic high grade stenosis of L ICA, no indication for CEA at this time    #Hiatal Hernia  -found during endoscopy [facility]  -seen by GI at [facility]; note /recommendations reviewed- will need OP follow up  -c/w famotidine     #Anemia- likely blood loss vs ACD s/p 2 PRBC at [facility]  -Hgb currently stable  -Ferrous sulfate, folic acid    #Hypothyroid  - c/w Synthroid 100mcg    #Moderate protein-calorie malnutrition  -diet as per nutritionist     #DVT prophylaxis:  Lovenox SQ   77yo F with PMH of anemia, hypothyroidism, and GI bleed presented as transfer from [place] to [facility] on 2/16 for cardiac cath for NSTEMI. 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 Admitted to [facility] for PT/ OT/ DVT ppx    #Blurry vision and lightheadedness- symptoms currently resolved  - c/o blurry vision today 3/22  - check stat CTH  - check orthostatic VS  - neuro check q4    #SOB   - mild sob in therapy today 3/22. denies CP  - check pro- bnp, EKG, troponin and CXR    #HFrEF  - c/w torsemide 20mg daily  - low bp- unable to start ACEI  - c/w metoprolol   - Echo 3/9/21: EF 55-60%, grade I DD. severe concentric LVH, bioprosthetic aortic valve.     #Hypotension  - c/w midodrine 5mg for BP support  - denies any symptoms  - KELLEE stocking as tolerated    # transaminitis with isolated elevated AP - improving  - likely due to NSTEMI  - monitor  - alk phos level stable    # aortic stenosis  s/p bio-AVR (2/24)    # NSTEMI and CAD s/p CABGx3 (2/24)  - CHB s/p BiV AICD (3/2) – device interrogated, no afib seen  - TTE on 3/9 shows EF improved to 55-60% after surgery  - EF on 2/28/21 25 to 30%, decreased global left ventricular systolic function  - c/w asa  - c/w lipitor    #Bilateral Carotid Stenosis  - seen by vascular surgery at [facility]; note/ recommendations reviewed – will need OP follow up  - on pre-operative eval for CABG found to have >70% L ICA stenosis, asymptomatic  -patient with asymptomatic high grade stenosis of L ICA, no indication for CEA at this time    #Hiatal Hernia  -found during endoscopy [facility]  -seen by GI at [facility]; note /recommendations reviewed- will need OP follow up  -c/w famotidine     #Anemia- likely blood loss vs ACD s/p 2 PRBC at [facility]  -Hgb currently stable  -Ferrous sulfate, folic acid    #Hypothyroid  - c/w Synthroid 100mcg    #Moderate protein-calorie malnutrition  -diet as per nutritionist     #DVT prophylaxis:  Lovenox SQ   75yo F with PMH of anemia, hypothyroidism, and GI bleed presented as transfer from [place] to [facility] on 2/16 for cardiac cath for NSTEMI. 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 Admitted to [facility] for PT/ OT/ DVT ppx    #Blurry vision and lightheadedness- symptoms currently resolved  - c/o blurry vision today 3/22  - check stat CTH  - check orthostatic VS  - neuro check q4    #SOB   - mild sob in therapy today 3/22. denies CP  - check pro- bnp, EKG, troponin and CXR    #HFrEF  - c/w torsemide 20mg daily  - low bp- unable to start ACEI  - c/w metoprolol   - Echo 3/9/21: EF 55-60%, grade I DD. severe concentric LVH, bioprosthetic aortic valve.     #Hypotension  - c/w midodrine 5mg for BP support  - denies any symptoms  - KELLEE stocking as tolerated    # transaminitis with isolated elevated AP - improving  - likely due to NSTEMI  - monitor  - alk phos level stable    # aortic stenosis  s/p bio-AVR (2/24)    # NSTEMI and CAD s/p CABGx3 (2/24)  - CHB s/p BiV AICD (3/2) – device interrogated, no afib seen  - TTE on 3/9 shows EF improved to 55-60% after surgery  - EF on 2/28/21 25 to 30%, decreased global left ventricular systolic function  - c/w asa  - c/w lipitor    #Bilateral Carotid Stenosis  - seen by vascular surgery at [facility]; note/ recommendations reviewed – will need OP follow up  - on pre-operative eval for CABG found to have >70% L ICA stenosis, asymptomatic  -patient with asymptomatic high grade stenosis of L ICA, no indication for CEA at this time    #Hiatal Hernia  -found during endoscopy [facility]  -seen by GI at [facility]; note /recommendations reviewed- will need OP follow up  -c/w famotidine     #Anemia- likely blood loss vs ACD s/p 2 PRBC at [facility]  -Hgb currently stable  -Ferrous sulfate, folic acid    #Hypothyroid  - c/w Synthroid 100mcg    #Moderate protein-calorie malnutrition  -diet as per nutritionist     #DVT prophylaxis:  Lovenox SQ    update: CTH reviewed. negative acute intracranial pathology

## 2021-03-22 NOTE — PROVIDER CONTACT NOTE (OTHER) - SITUATION
donell from pt said pt is dizzy and blurry vision
Pt known to be confused at night, however presents with current increased confusion throughout the day.

## 2021-03-22 NOTE — PROGRESS NOTE ADULT - SUBJECTIVE AND OBJECTIVE BOX
Patient is a 76y old  Female who presents with a chief complaint of Functional Deficits 2/2 CABG and AVR (9 - Cardiac) (14 Mar 2021 10:51)    75yo F with PMH of anemia, hypothyroidism, and GI bleed presented as transfer from [place] to [facility] on 2/16 for cardiac cath for NSTEMI. Pt initially presented a few days prior to [place] with fever and SOB and was found to have NSTEMI, urosepsis s/p course of ceftriaxone (completed 2/17), anemia requiring 2 PRBC (no GI work up because pt wanted to sign out AMA. Cardiac cath 2/16 showed multivessel CAD and severe aortic stenosis. Preop carotid US with bilateral carotid stenosis confirmed by CTA. Patient was seen by vascular and cleared for OR (will need outpatient follow up for carotid stenosis). Had endoscopy 2/17 and colonoscopy 2/19 without evidence of acute bleed, however was noted to have hiatal hernia. She is s/p CABG x3 with LIMA and AVR 2/24 with Dr. Cruz. 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 TTE on 3/9 showing EF improved to 55-60%. Last chest tube removed 3/9. Coccyx noted to have some skin breakdown-pt complaining of buttock pain.  Patient was evaluated by PM&R and therapy for functional deficits and gait/ ADL impairments and recommended acute rehabilitation. Patient was medically optimized for discharge to [facility] on 3/12/21.   (12 Mar 2021 16:12)    PAST MEDICAL & SURGICAL HISTORY:  Iron deficiency anemia due to chronic blood loss  Hypothyroid  History of tonsillectomy    TODAY'S SUBJECTIVE & REVIEW OF SYMPTOMS: Patient seen and evaluated this morning. No acute events overnight. Over the weekend noted to be confused at one point but redirectable. UA and lab workup normal. This morning she is alert and oriented. Participating well in therapy. Pain is well controlled. Denies chest pain, SOB, nausea, vomiting, constipation or diarrhea. Slept well last night. NO impulsivity or fall or agitation at nighttime. Denies any confusion last night.    Allergies  No Known Allergies    PHYSICAL EXAM  Vital Signs Last 24 Hrs  T(C): 36.8 (22 Mar 2021 07:41), Max: 36.8 (22 Mar 2021 07:41)  T(F): 98.3 (22 Mar 2021 07:41), Max: 98.3 (22 Mar 2021 07:41)  HR: 69 (22 Mar 2021 07:41) (69 - 84)  BP: 95/61 (22 Mar 2021 07:41) (95/61 - 117/71)  BP(mean): --  RR: 15 (22 Mar 2021 07:41) (15 - 18)  SpO2: 99% (22 Mar 2021 07:41) (96% - 99%)    Constitutional - NAD, Comfortable  HEENT - NCAT, EOMI, +dentures   Chest - good respiratory effort, CTAB   Cardio - warm and well perfused,   Abdomen -  Soft, NT ND  Extremities - no calf tenderness  Neurologic Exam:                    Cognitive -             Orientation: Awake, Alert and oriented            Speech - Fluent, Comprehensible, No dysarthria, No aphasia      Cranial Nerves - No facial asymmetry, Tongue midline, EOMI, Shoulder shrug intact     Motor -                      LEFT    UE - ShAB 5/5, EF 5/5, EE 5/5, WE 5/5,  WNL                    RIGHT UE - ShAB 5/5, EF 5/5, EE 5/5, WE 5/5,  WNL                    LEFT    LE - HF 4/5, KE 5/5, DF 5/5, PF 5/5                    RIGHT LE - HF 4/5, KE 5/5, DF 5/5, PF 5/5        Sensory - Intact to LT bilateral  Psychiatric - Mood stable, Affect WNL  Skin:     Sternal incision well approximated, no erythema or drainage      Drain incision site below sternal incision; bilateral, dry no erythema or drainage      Stage II sacral 0.5x0.5     Stage II R/L buttock 0.5x0.5     Multiple areas of ecchymosis on abdomen and extremities      RECENT LABS:                        9.3    6.10  )-----------( 353      ( 22 Mar 2021 05:00 )             30.2     03-22    140  |  105  |  29<H>  ----------------------------<  85  4.0   |  31  |  0.78    Ca    8.9      22 Mar 2021 05:00    TPro  6.0  /  Alb  2.5<L>  /  TBili  0.5  /  DBili  x   /  AST  33  /  ALT  30  /  AlkPhos  192<H>  03-22        MEDICATIONS  (STANDING):  ascorbic acid 500 milliGRAM(s) Oral daily  aspirin enteric coated 81 milliGRAM(s) Oral daily  atorvastatin 40 milliGRAM(s) Oral at bedtime  enoxaparin Injectable 40 milliGRAM(s) SubCutaneous daily  famotidine    Tablet 20 milliGRAM(s) Oral daily  ferrous    sulfate 325 milliGRAM(s) Oral daily  folic acid 1 milliGRAM(s) Oral daily  influenza   Vaccine 0.5 milliLiter(s) IntraMuscular once  levothyroxine 100 MICROGram(s) Oral daily  magnesium oxide 400 milliGRAM(s) Oral three times a day with meals  metoprolol tartrate 12.5 milliGRAM(s) Oral two times a day  midodrine. 5 milliGRAM(s) Oral <User Schedule>  potassium chloride    Tablet ER 20 milliEquivalent(s) Oral daily  senna 2 Tablet(s) Oral at bedtime  torsemide 20 milliGRAM(s) Oral daily    MEDICATIONS  (PRN):  acetaminophen   Tablet .. 650 milliGRAM(s) Oral every 6 hours PRN Moderate Pain (4 - 6)  melatonin 3 milliGRAM(s) Oral at bedtime PRN Insomnia  polyethylene glycol 3350 17 Gram(s) Oral daily PRN Constipation          ASSESSMENT AND PLAN  76/F PMH of anemia, hypothyroidism, and GI bleed presented as transfer from [place] to [facility] on 2/16 for cardiac cath for NSTEMI. 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)    Comprehensive Multidisciplinary Rehab Program:  - Gait, ADL, Functional impairments  - CMS Impairment group: 09 (Cardiac)  - PT 90 mins/OT 60 mins/ SLP 30 mins for total of 3 hours a day 5 days a week    #Functional deficits 2/2 Acute Systolic Heart Failure s/p CABG x3, AVR, and BIV AICD placement); EF 55-60% after surgery (TTE 3/9)  - device interrogated, no afib seen  - asa, Lipitor  - lopressor 12.5mg bid  - torsemide 20mg qd and daily potassium supplementation of 20mEq, MgOxide 400mg; maintain K>4 and Mg >2  - bilateral LE ace wraps for edema  - midodrine 5mg TID for BP support  - incentive spirometry  - daily weights, strict Is and Os  - weekly CXR per cardiology  - has follow up with EP doctor  Dr. Ortiz on March 15 to check on new cardiac device/BIV-ICD    will need to call to reschedule/ inform she is in rehab    #Bilateral Carotid Stenosis- seen on US and CT Angio Carotid  - seen by vascular surgery at [facility] – will need OP follow up    #Hiatal Hernia  - found during endoscopy  - follow up as outpatient    #Hypothyroid  - c/w Synthroid 100mcg  - will need OP f/u in 4-6 weeks for TFT monitoring     Sleep- Melatonin PRN  cognitive deficits- memory deficits as per speech therapy. Patient reports nighttime confusion but no falls- redirectable. NO agitation or confusion at night as per night staff. Plan to continue to monitor for now.     Pain:- Tylenol PRN    GI/Bowel:  - Senna 2 tabs at qhs, Miralax daily prn  - GI ppx: pepcid  /Bladder:  - Monitor Voiding well, toileting schedule q4h    Diet / Dysphagia:   Carbohydrate Consistent; Endure supplementation, Sander, Vit C    Skin/ Pressure Injury Prevention:  - assessment on admission:     Stage II sacral and b/l buttock pressure injuries     - barrier cream and Allevyn dressing placed    - offloading   - Incisions:     Sternal incision open to air    drain incisions: dry, healing well - dry, sterile, dressing placed  -Turn Q2hrs in bed while awake, OOB to Chair, PT/OT/SLP   -Sander daily, vitamin C    DVT prophylaxis: Lovenox, SCDs    Precautions/ Restrictions  - Falls, Cardiac, Sternal  - Lungs: Aspiration, Incentive Spirometer    - COVID PCR: neg 3/12    Dispo: IDT rounds 3/16/21- Team meeting: Discussed with multidisciplinary team about patient's current function, progress, goals, barriers, discharge plan and set up. Updated patient about discharge plan. DC planned 3/30/21       Patient is a 76y old  Female who presents with a chief complaint of Functional Deficits 2/2 CABG and AVR (9 - Cardiac) (14 Mar 2021 10:51)    77yo F with PMH of anemia, hypothyroidism, and GI bleed presented as transfer from [place] to [facility] on 2/16 for cardiac cath for NSTEMI. Pt initially presented a few days prior to [place] with fever and SOB and was found to have NSTEMI, urosepsis s/p course of ceftriaxone (completed 2/17), anemia requiring 2 PRBC (no GI work up because pt wanted to sign out AMA. Cardiac cath 2/16 showed multivessel CAD and severe aortic stenosis. Preop carotid US with bilateral carotid stenosis confirmed by CTA. Patient was seen by vascular and cleared for OR (will need outpatient follow up for carotid stenosis). Had endoscopy 2/17 and colonoscopy 2/19 without evidence of acute bleed, however was noted to have hiatal hernia. She is s/p CABG x3 with LIMA and AVR 2/24 with Dr. Cruz. 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 TTE on 3/9 showing EF improved to 55-60%. Last chest tube removed 3/9. Coccyx noted to have some skin breakdown-pt complaining of buttock pain.  Patient was evaluated by PM&R and therapy for functional deficits and gait/ ADL impairments and recommended acute rehabilitation. Patient was medically optimized for discharge to [facility] on 3/12/21.   (12 Mar 2021 16:12)    PAST MEDICAL & SURGICAL HISTORY:  Iron deficiency anemia due to chronic blood loss  Hypothyroid  History of tonsillectomy    TODAY'S SUBJECTIVE & REVIEW OF SYMPTOMS: Patient seen and evaluated this morning. No acute events overnight. Over the weekend noted to be confused at one point but redirectable. UA and lab workup normal. This morning she is alert and oriented. Participating well in therapy. Pain is well controlled. Denies chest pain, SOB, nausea, vomiting, constipation or diarrhea. Slept well last night. NO impulsivity or fall or agitation at nighttime. Denies any confusion last night.  Addendum:  During therapy, patient noted to be feeling weak, complained of some blurry vision and shortness of breath. Workup ordered- CTH, EKG, trop, BNP, CXR    Allergies  No Known Allergies    PHYSICAL EXAM  Vital Signs Last 24 Hrs  T(C): 36.8 (22 Mar 2021 07:41), Max: 36.8 (22 Mar 2021 07:41)  T(F): 98.3 (22 Mar 2021 07:41), Max: 98.3 (22 Mar 2021 07:41)  HR: 69 (22 Mar 2021 07:41) (69 - 84)  BP: 95/61 (22 Mar 2021 07:41) (95/61 - 117/71)  BP(mean): --  RR: 15 (22 Mar 2021 07:41) (15 - 18)  SpO2: 99% (22 Mar 2021 07:41) (96% - 99%)    Constitutional - NAD, Comfortable  HEENT - NCAT, EOMI, +dentures   Chest - good respiratory effort, CTAB   Cardio - warm and well perfused,   Abdomen -  Soft, NT ND  Extremities - no calf tenderness  Neurologic Exam:                    Cognitive -             Orientation: Awake, Alert and oriented            Speech - Fluent, Comprehensible, No dysarthria, No aphasia      Cranial Nerves - No facial asymmetry, Tongue midline, EOMI, Shoulder shrug intact     Motor -                      LEFT    UE - ShAB 5/5, EF 5/5, EE 5/5, WE 5/5,  WNL                    RIGHT UE - ShAB 5/5, EF 5/5, EE 5/5, WE 5/5,  WNL                    LEFT    LE - HF 4/5, KE 5/5, DF 5/5, PF 5/5                    RIGHT LE - HF 4/5, KE 5/5, DF 5/5, PF 5/5        Sensory - Intact to LT bilateral  Psychiatric - Mood stable, Affect WNL  Skin:     Sternal incision well approximated, no erythema or drainage      Drain incision site below sternal incision; bilateral, dry no erythema or drainage      Stage II sacral 0.5x0.5     Stage II R/L buttock 0.5x0.5     Multiple areas of ecchymosis on abdomen and extremities      RECENT LABS:                        9.3    6.10  )-----------( 353      ( 22 Mar 2021 05:00 )             30.2     03-22    140  |  105  |  29<H>  ----------------------------<  85  4.0   |  31  |  0.78    Ca    8.9      22 Mar 2021 05:00    TPro  6.0  /  Alb  2.5<L>  /  TBili  0.5  /  DBili  x   /  AST  33  /  ALT  30  /  AlkPhos  192<H>  03-22        MEDICATIONS  (STANDING):  ascorbic acid 500 milliGRAM(s) Oral daily  aspirin enteric coated 81 milliGRAM(s) Oral daily  atorvastatin 40 milliGRAM(s) Oral at bedtime  enoxaparin Injectable 40 milliGRAM(s) SubCutaneous daily  famotidine    Tablet 20 milliGRAM(s) Oral daily  ferrous    sulfate 325 milliGRAM(s) Oral daily  folic acid 1 milliGRAM(s) Oral daily  influenza   Vaccine 0.5 milliLiter(s) IntraMuscular once  levothyroxine 100 MICROGram(s) Oral daily  magnesium oxide 400 milliGRAM(s) Oral three times a day with meals  metoprolol tartrate 12.5 milliGRAM(s) Oral two times a day  midodrine. 5 milliGRAM(s) Oral <User Schedule>  potassium chloride    Tablet ER 20 milliEquivalent(s) Oral daily  senna 2 Tablet(s) Oral at bedtime  torsemide 20 milliGRAM(s) Oral daily    MEDICATIONS  (PRN):  acetaminophen   Tablet .. 650 milliGRAM(s) Oral every 6 hours PRN Moderate Pain (4 - 6)  melatonin 3 milliGRAM(s) Oral at bedtime PRN Insomnia  polyethylene glycol 3350 17 Gram(s) Oral daily PRN Constipation          ASSESSMENT AND PLAN  76/F PMH of anemia, hypothyroidism, and GI bleed presented as transfer from [place] to [facility] on 2/16 for cardiac cath for NSTEMI. 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)    Comprehensive Multidisciplinary Rehab Program:  - Gait, ADL, Functional impairments  - CMS Impairment group: 09 (Cardiac)  - PT 90 mins/OT 60 mins/ SLP 30 mins for total of 3 hours a day 5 days a week    #Functional deficits 2/2 Acute Systolic Heart Failure s/p CABG x3, AVR, and BIV AICD placement); EF 55-60% after surgery (TTE 3/9)  - device interrogated, no afib seen  - asa, Lipitor  - lopressor 12.5mg bid  - torsemide 20mg qd and daily potassium supplementation of 20mEq, MgOxide 400mg; maintain K>4 and Mg >2  - bilateral LE ace wraps for edema  - midodrine 5mg TID for BP support  - incentive spirometry  - daily weights, strict Is and Os  - weekly CXR per cardiology  - has follow up with EP doctor  Dr. Ortiz on March 15 to check on new cardiac device/BIV-ICD    will need to call to reschedule/ inform she is in rehab    #Bilateral Carotid Stenosis- seen on US and CT Angio Carotid  - seen by vascular surgery at [facility] – will need OP follow up    #Hiatal Hernia  - found during endoscopy  - follow up as outpatient    #Hypothyroid  - c/w Synthroid 100mcg  - will need OP f/u in 4-6 weeks for TFT monitoring     Sleep- Melatonin PRN  cognitive deficits- memory deficits as per speech therapy. Patient reports nighttime confusion but no falls- redirectable. NO agitation or confusion at night as per night staff. Plan to continue to monitor for now.     Pain:- Tylenol PRN    GI/Bowel:  - Senna 2 tabs at qhs, Miralax daily prn  - GI ppx: pepcid  /Bladder:  - Monitor Voiding well, toileting schedule q4h    Diet / Dysphagia:   Carbohydrate Consistent; Endure supplementation, Sander, Vit C    Skin/ Pressure Injury Prevention:  - assessment on admission:     Stage II sacral and b/l buttock pressure injuries     - barrier cream and Allevyn dressing placed    - offloading   - Incisions:     Sternal incision open to air    drain incisions: dry, healing well - dry, sterile, dressing placed  -Turn Q2hrs in bed while awake, OOB to Chair, PT/OT/SLP   -Sander daily, vitamin C    DVT prophylaxis: Lovenox, SCDs    Precautions/ Restrictions  - Falls, Cardiac, Sternal  - Lungs: Aspiration, Incentive Spirometer    - COVID PCR: neg 3/12    Dispo: IDT rounds 3/16/21- Team meeting: Discussed with multidisciplinary team about patient's current function, progress, goals, barriers, discharge plan and set up. Updated patient about discharge plan. DC planned 3/30/21

## 2021-03-22 NOTE — PROGRESS NOTE ADULT - SUBJECTIVE AND OBJECTIVE BOX
Patient is a 76y old  Female who presents with a chief complaint of Functional Deficits 2/2 CABG and AVR (9 - Cardiac) (22 Mar 2021 09:45)      Patient seen and examined at bedside. reports "I just don't feel right" in therapy today, reports having blurry vision started early in the morning. pt also admits to SOB, denies chest pain, headache, cough, abd pain, any focal weakness.      ALLERGIES:  No Known Allergies    MEDICATIONS  (STANDING):  ascorbic acid 500 milliGRAM(s) Oral daily  aspirin enteric coated 81 milliGRAM(s) Oral daily  atorvastatin 40 milliGRAM(s) Oral at bedtime  enoxaparin Injectable 40 milliGRAM(s) SubCutaneous daily  famotidine    Tablet 20 milliGRAM(s) Oral daily  ferrous    sulfate 325 milliGRAM(s) Oral daily  folic acid 1 milliGRAM(s) Oral daily  influenza   Vaccine 0.5 milliLiter(s) IntraMuscular once  levothyroxine 100 MICROGram(s) Oral daily  magnesium oxide 400 milliGRAM(s) Oral three times a day with meals  metoprolol tartrate 12.5 milliGRAM(s) Oral two times a day  midodrine. 5 milliGRAM(s) Oral <User Schedule>  potassium chloride    Tablet ER 20 milliEquivalent(s) Oral daily  senna 2 Tablet(s) Oral at bedtime  torsemide 20 milliGRAM(s) Oral daily    MEDICATIONS  (PRN):  acetaminophen   Tablet .. 650 milliGRAM(s) Oral every 6 hours PRN Moderate Pain (4 - 6)  melatonin 3 milliGRAM(s) Oral at bedtime PRN Insomnia  polyethylene glycol 3350 17 Gram(s) Oral daily PRN Constipation    Vital Signs Last 24 Hrs  T(F): 98.3 (22 Mar 2021 07:41), Max: 98.3 (22 Mar 2021 07:41)  HR: 93 (22 Mar 2021 10:31) (69 - 93)  BP: 104/63 (22 Mar 2021 10:31) (95/61 - 117/71)  RR: 15 (22 Mar 2021 10:31) (15 - 18)  SpO2: 98% (22 Mar 2021 10:31) (96% - 99%)  I&O's Summary    PHYSICAL EXAM:  General: NAD, A/O x 3, appears tired  ENT: MMM  Neck: Supple, No JVD  Lungs: Clear to auscultation bilaterally  Cardio: IRIR, s1s2, no murmurs  Abdomen: Soft, Nontender, Nondistended; Bowel sounds present  Extremities: No calf tenderness, No pitting edema  Neuro: CN II to XII grossly intact. strength 5/5 throughout and sensation intact    LABS:                        9.3    6.10  )-----------( 353      ( 22 Mar 2021 05:00 )             30.2     03-    140  |  105  |  29  ----------------------------<  85  4.0   |  31  |  0.78    Ca    8.9      22 Mar 2021 05:00    TPro  6.0  /  Alb  2.5  /  TBili  0.5  /  DBili  x   /  AST  33  /  ALT  30  /  AlkPhos  192  -    eGFR if Non African American: 73 mL/min/1.73M2 (21 @ 05:00)  eGFR if African American: 84 mL/min/1.73M2 (21 @ 05:00)            02-17 Chol 105 mg/dL LDL -- HDL 39 mg/dL Trig 62 mg/dL                  Urinalysis Basic - ( 21 Mar 2021 13:10 )    Color: Yellow / Appearance: Slightly Turbid / S.015 / pH: x  Gluc: x / Ketone: Negative  / Bili: Negative / Urobili: Negative   Blood: x / Protein: Negative / Nitrite: Negative   Leuk Esterase: Negative / RBC: Negative /HPF / WBC 0-2 /HPF   Sq Epi: x / Non Sq Epi: Moderate / Bacteria: Few /HPF          RADIOLOGY & ADDITIONAL TESTS:    Care Discussed with Consultants/Other Providers: rehab    Patient is a 76y old  Female who presents with a chief complaint of Functional Deficits 2/2 CABG and AVR (9 - Cardiac) (22 Mar 2021 09:45)      Patient seen and examined at bedside. reports "I just don't feel right" and having blurry vision in therapy today. Blurry vision currently resolved. pt also admits to mild SOB, denies chest pain, headache, cough, abd pain, any focal weakness.      ALLERGIES:  No Known Allergies    MEDICATIONS  (STANDING):  ascorbic acid 500 milliGRAM(s) Oral daily  aspirin enteric coated 81 milliGRAM(s) Oral daily  atorvastatin 40 milliGRAM(s) Oral at bedtime  enoxaparin Injectable 40 milliGRAM(s) SubCutaneous daily  famotidine    Tablet 20 milliGRAM(s) Oral daily  ferrous    sulfate 325 milliGRAM(s) Oral daily  folic acid 1 milliGRAM(s) Oral daily  influenza   Vaccine 0.5 milliLiter(s) IntraMuscular once  levothyroxine 100 MICROGram(s) Oral daily  magnesium oxide 400 milliGRAM(s) Oral three times a day with meals  metoprolol tartrate 12.5 milliGRAM(s) Oral two times a day  midodrine. 5 milliGRAM(s) Oral <User Schedule>  potassium chloride    Tablet ER 20 milliEquivalent(s) Oral daily  senna 2 Tablet(s) Oral at bedtime  torsemide 20 milliGRAM(s) Oral daily    MEDICATIONS  (PRN):  acetaminophen   Tablet .. 650 milliGRAM(s) Oral every 6 hours PRN Moderate Pain (4 - 6)  melatonin 3 milliGRAM(s) Oral at bedtime PRN Insomnia  polyethylene glycol 3350 17 Gram(s) Oral daily PRN Constipation    Vital Signs Last 24 Hrs  T(F): 98.3 (22 Mar 2021 07:41), Max: 98.3 (22 Mar 2021 07:41)  HR: 93 (22 Mar 2021 10:31) (69 - 93)  BP: 104/63 (22 Mar 2021 10:31) (95/61 - 117/71)  RR: 15 (22 Mar 2021 10:31) (15 - 18)  SpO2: 98% (22 Mar 2021 10:31) (96% - 99%)  I&O's Summary    PHYSICAL EXAM:  General: NAD, A/O x 3, appears tired  ENT: MMM  Neck: Supple, No JVD  Lungs: Clear to auscultation bilaterally  Cardio: IRIR, s1s2, no murmurs  Abdomen: Soft, Nontender, Nondistended; Bowel sounds present  Extremities: No calf tenderness, No pitting edema  Neuro: CN II to XII grossly intact. strength 5/5 throughout and sensation intact    LABS:                        9.3    6.10  )-----------( 353      ( 22 Mar 2021 05:00 )             30.2     -    140  |  105  |  29  ----------------------------<  85  4.0   |  31  |  0.78    Ca    8.9      22 Mar 2021 05:00    TPro  6.0  /  Alb  2.5  /  TBili  0.5  /  DBili  x   /  AST  33  /  ALT  30  /  AlkPhos  192  -    eGFR if Non African American: 73 mL/min/1.73M2 (21 @ 05:00)  eGFR if African American: 84 mL/min/1.73M2 (21 @ 05:00)            02-17 Chol 105 mg/dL LDL -- HDL 39 mg/dL Trig 62 mg/dL                  Urinalysis Basic - ( 21 Mar 2021 13:10 )    Color: Yellow / Appearance: Slightly Turbid / S.015 / pH: x  Gluc: x / Ketone: Negative  / Bili: Negative / Urobili: Negative   Blood: x / Protein: Negative / Nitrite: Negative   Leuk Esterase: Negative / RBC: Negative /HPF / WBC 0-2 /HPF   Sq Epi: x / Non Sq Epi: Moderate / Bacteria: Few /HPF          RADIOLOGY & ADDITIONAL TESTS:    Care Discussed with Consultants/Other Providers: rehab    Patient is a 76y old  Female who presents with a chief complaint of Functional Deficits 2/2 CABG and AVR (9 - Cardiac) (22 Mar 2021 09:45)      Patient seen and examined at bedside. reports "I just don't feel right" and having blurry vision in therapy today. Blurry vision currently resolved. pt also admits to mild SOB and lightheadedness today, denies chest pain, headache, cough, abd pain, any focal weakness.      ALLERGIES:  No Known Allergies    MEDICATIONS  (STANDING):  ascorbic acid 500 milliGRAM(s) Oral daily  aspirin enteric coated 81 milliGRAM(s) Oral daily  atorvastatin 40 milliGRAM(s) Oral at bedtime  enoxaparin Injectable 40 milliGRAM(s) SubCutaneous daily  famotidine    Tablet 20 milliGRAM(s) Oral daily  ferrous    sulfate 325 milliGRAM(s) Oral daily  folic acid 1 milliGRAM(s) Oral daily  influenza   Vaccine 0.5 milliLiter(s) IntraMuscular once  levothyroxine 100 MICROGram(s) Oral daily  magnesium oxide 400 milliGRAM(s) Oral three times a day with meals  metoprolol tartrate 12.5 milliGRAM(s) Oral two times a day  midodrine. 5 milliGRAM(s) Oral <User Schedule>  potassium chloride    Tablet ER 20 milliEquivalent(s) Oral daily  senna 2 Tablet(s) Oral at bedtime  torsemide 20 milliGRAM(s) Oral daily    MEDICATIONS  (PRN):  acetaminophen   Tablet .. 650 milliGRAM(s) Oral every 6 hours PRN Moderate Pain (4 - 6)  melatonin 3 milliGRAM(s) Oral at bedtime PRN Insomnia  polyethylene glycol 3350 17 Gram(s) Oral daily PRN Constipation    Vital Signs Last 24 Hrs  T(F): 98.3 (22 Mar 2021 07:41), Max: 98.3 (22 Mar 2021 07:41)  HR: 93 (22 Mar 2021 10:31) (69 - 93)  BP: 104/63 (22 Mar 2021 10:31) (95/61 - 117/71)  RR: 15 (22 Mar 2021 10:31) (15 - 18)  SpO2: 98% (22 Mar 2021 10:31) (96% - 99%)  I&O's Summary    PHYSICAL EXAM:  General: NAD, A/O x 3, appears tired  ENT: MMM  Neck: Supple, No JVD  Lungs: Clear to auscultation bilaterally  Cardio: IRIR, s1s2, no murmurs  Abdomen: Soft, Nontender, Nondistended; Bowel sounds present  Extremities: No calf tenderness, No pitting edema  Neuro: CN II to XII grossly intact. strength 5/5 throughout and sensation intact    LABS:                        9.3    6.10  )-----------( 353      ( 22 Mar 2021 05:00 )             30.2     -    140  |  105  |  29  ----------------------------<  85  4.0   |  31  |  0.78    Ca    8.9      22 Mar 2021 05:00    TPro  6.0  /  Alb  2.5  /  TBili  0.5  /  DBili  x   /  AST  33  /  ALT  30  /  AlkPhos  192      eGFR if Non African American: 73 mL/min/1.73M2 (21 @ 05:00)  eGFR if African American: 84 mL/min/1.73M2 (21 @ 05:00)            02- Chol 105 mg/dL LDL -- HDL 39 mg/dL Trig 62 mg/dL                  Urinalysis Basic - ( 21 Mar 2021 13:10 )    Color: Yellow / Appearance: Slightly Turbid / S.015 / pH: x  Gluc: x / Ketone: Negative  / Bili: Negative / Urobili: Negative   Blood: x / Protein: Negative / Nitrite: Negative   Leuk Esterase: Negative / RBC: Negative /HPF / WBC 0-2 /HPF   Sq Epi: x / Non Sq Epi: Moderate / Bacteria: Few /HPF          RADIOLOGY & ADDITIONAL TESTS:    Care Discussed with Consultants/Other Providers: rehab

## 2021-03-23 ENCOUNTER — TRANSCRIPTION ENCOUNTER (OUTPATIENT)
Age: 76
End: 2021-03-23

## 2021-03-23 VITALS — SYSTOLIC BLOOD PRESSURE: 110 MMHG | HEART RATE: 81 BPM | DIASTOLIC BLOOD PRESSURE: 68 MMHG

## 2021-03-23 LAB — TROPONIN I SERPL-MCNC: 0.04 NG/ML — SIGNIFICANT CHANGE UP (ref 0.02–0.06)

## 2021-03-23 PROCEDURE — 99233 SBSQ HOSP IP/OBS HIGH 50: CPT | Mod: GC

## 2021-03-23 PROCEDURE — 99232 SBSQ HOSP IP/OBS MODERATE 35: CPT | Mod: GC

## 2021-03-23 RX ORDER — LANOLIN ALCOHOL/MO/W.PET/CERES
1 CREAM (GRAM) TOPICAL
Qty: 0 | Refills: 0 | DISCHARGE
Start: 2021-03-23

## 2021-03-23 RX ORDER — ACETAMINOPHEN 500 MG
2 TABLET ORAL
Qty: 0 | Refills: 0 | DISCHARGE
Start: 2021-03-23

## 2021-03-23 RX ORDER — FAMOTIDINE 10 MG/ML
1 INJECTION INTRAVENOUS
Qty: 30 | Refills: 0
Start: 2021-03-23 | End: 2021-04-21

## 2021-03-23 RX ORDER — FERROUS SULFATE 325(65) MG
1 TABLET ORAL
Qty: 30 | Refills: 0
Start: 2021-03-23 | End: 2021-04-21

## 2021-03-23 RX ORDER — ATORVASTATIN CALCIUM 80 MG/1
1 TABLET, FILM COATED ORAL
Qty: 30 | Refills: 0
Start: 2021-03-23 | End: 2021-04-21

## 2021-03-23 RX ORDER — ASPIRIN/CALCIUM CARB/MAGNESIUM 324 MG
1 TABLET ORAL
Qty: 30 | Refills: 0
Start: 2021-03-23 | End: 2021-04-21

## 2021-03-23 RX ORDER — ASCORBIC ACID 60 MG
1 TABLET,CHEWABLE ORAL
Qty: 30 | Refills: 0
Start: 2021-03-23 | End: 2021-04-21

## 2021-03-23 RX ORDER — LEVOTHYROXINE SODIUM 125 MCG
1 TABLET ORAL
Qty: 30 | Refills: 0
Start: 2021-03-23 | End: 2021-04-21

## 2021-03-23 RX ORDER — POLYETHYLENE GLYCOL 3350 17 G/17G
17 POWDER, FOR SOLUTION ORAL
Qty: 0 | Refills: 0 | DISCHARGE
Start: 2021-03-23

## 2021-03-23 RX ORDER — MIDODRINE HYDROCHLORIDE 2.5 MG/1
1 TABLET ORAL
Qty: 60 | Refills: 0
Start: 2021-03-23 | End: 2021-04-21

## 2021-03-23 RX ORDER — MAGNESIUM OXIDE 400 MG ORAL TABLET 241.3 MG
1 TABLET ORAL
Qty: 90 | Refills: 0
Start: 2021-03-23 | End: 2021-04-21

## 2021-03-23 RX ORDER — METOPROLOL TARTRATE 50 MG
0.5 TABLET ORAL
Qty: 30 | Refills: 0
Start: 2021-03-23 | End: 2021-04-21

## 2021-03-23 RX ORDER — SENNA PLUS 8.6 MG/1
2 TABLET ORAL
Qty: 0 | Refills: 0 | DISCHARGE
Start: 2021-03-23

## 2021-03-23 RX ORDER — FOLIC ACID 0.8 MG
1 TABLET ORAL
Qty: 0 | Refills: 0 | DISCHARGE
Start: 2021-03-23

## 2021-03-23 RX ORDER — POTASSIUM CHLORIDE 20 MEQ
1 PACKET (EA) ORAL
Qty: 30 | Refills: 0
Start: 2021-03-23 | End: 2021-04-21

## 2021-03-23 RX ADMIN — MAGNESIUM OXIDE 400 MG ORAL TABLET 400 MILLIGRAM(S): 241.3 TABLET ORAL at 17:20

## 2021-03-23 RX ADMIN — MIDODRINE HYDROCHLORIDE 5 MILLIGRAM(S): 2.5 TABLET ORAL at 05:55

## 2021-03-23 RX ADMIN — Medication 12.5 MILLIGRAM(S): at 17:21

## 2021-03-23 RX ADMIN — Medication 1 MILLIGRAM(S): at 11:30

## 2021-03-23 RX ADMIN — MAGNESIUM OXIDE 400 MG ORAL TABLET 400 MILLIGRAM(S): 241.3 TABLET ORAL at 14:36

## 2021-03-23 RX ADMIN — Medication 325 MILLIGRAM(S): at 11:29

## 2021-03-23 RX ADMIN — MAGNESIUM OXIDE 400 MG ORAL TABLET 400 MILLIGRAM(S): 241.3 TABLET ORAL at 08:16

## 2021-03-23 RX ADMIN — Medication 81 MILLIGRAM(S): at 11:29

## 2021-03-23 RX ADMIN — Medication 20 MILLIGRAM(S): at 05:55

## 2021-03-23 RX ADMIN — Medication 500 MILLIGRAM(S): at 11:30

## 2021-03-23 RX ADMIN — FAMOTIDINE 20 MILLIGRAM(S): 10 INJECTION INTRAVENOUS at 11:29

## 2021-03-23 RX ADMIN — Medication 100 MICROGRAM(S): at 05:54

## 2021-03-23 RX ADMIN — Medication 20 MILLIEQUIVALENT(S): at 11:30

## 2021-03-23 RX ADMIN — Medication 12.5 MILLIGRAM(S): at 05:55

## 2021-03-23 NOTE — PROGRESS NOTE ADULT - REASON FOR ADMISSION
Functional Deficits 2/2 CABG and AVR (9 - Cardiac)
2/2 CABG and AVR
2/2 CABG and AVR
Functional Deficits 2/2 CABG and AVR
Functional Deficits 2/2 CABG and AVR
Functional Deficits 2/2 CABG and AVR (9 - Cardiac)
Functional Deficits 2/2 CABG and AVR
Functional Deficits 2/2 CABG and AVR (9 - Cardiac)
Functional Deficits 2/2 CABG and AVR (9 - Cardiac)

## 2021-03-23 NOTE — PROGRESS NOTE ADULT - ASSESSMENT
75yo F with PMH of anemia, hypothyroidism, and GI bleed presented as transfer from [place] to [facility] on 2/16 for cardiac cath for NSTEMI. 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 Admitted to [facility] for PT/ OT/ DVT ppx    #Blurry vision and lightheadedness- symptoms currently resolved  - c/o blurry vision yesterday 3/22  - Unremarkable noncontrast CT scan of the brain yesterday 3/22  - Orthostatic VS WNL  - neuro check q4    #SOB   - mild sob in therapy yesterday 3/22: states improving today, denies CP  - Pro BNP improving since 3/12/21  - Troponin  - 3/22/21 ECG showing new RBBB from ECG 3/3/21    #HFrEF now improved  - c/w torsemide 20mg daily  - low bp- unable to start ACEI  - c/w metoprolol   - Echo 3/9/21: EF 55-60%, grade I DD. severe concentric LVH, bioprosthetic aortic valve.     #Hypotension  - c/w midodrine 5mg for BP support  - denies any symptoms  - KELLEE stocking as tolerated    # transaminitis with isolated elevated AP - improving  - likely due to NSTEMI  - monitor  - alk phos level stable    # aortic stenosis  s/p bio-AVR (2/24)    # NSTEMI and CAD s/p CABGx3 (2/24)  - CHB s/p BiV AICD (3/2) – device interrogated, no afib seen  - TTE on 3/9 shows EF improved to 55-60% after surgery  - EF on 2/28/21 25 to 30%, decreased global left ventricular systolic function  - c/w asa  - c/w lipitor    #Bilateral Carotid Stenosis  - seen by vascular surgery at [facility]; note/ recommendations reviewed – will need OP follow up  - on pre-operative eval for CABG found to have >70% L ICA stenosis, asymptomatic  -patient with asymptomatic high grade stenosis of L ICA, no indication for CEA at this time    #Hiatal Hernia  -found during endoscopy [facility]  -seen by GI at [facility]; note /recommendations reviewed- will need OP follow up  -c/w famotidine     #Anemia- likely blood loss vs ACD s/p 2 PRBC at [facility]  -Hgb currently stable  -Ferrous sulfate, folic acid    #Hypothyroid  - c/w Synthroid 100mcg      #DVT prophylaxis:  Lovenox      Will follow, discussed with rehab attending Dr. Jones   77yo F with PMH of anemia, hypothyroidism, and GI bleed presented as transfer from [place] to [facility] on 2/16 for cardiac cath for NSTEMI. 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 Admitted to [facility] for PT/ OT/ DVT ppx    #Blurry vision and lightheadedness- symptoms currently resolved  - c/o blurry vision yesterday 3/22  - Unremarkable noncontrast CT scan of the brain yesterday 3/22  - Orthostatic VS WNL  - neuro check q4    #SOB   - mild sob in therapy yesterday 3/22: states improving today, denies CP  - Pro BNP improving since 3/12/21  - Troponin  - 3/22/21 ECG showing new RBBB from ECG 3/3/21    #HFrEF now improved  - c/w torsemide 20mg daily  - low bp- unable to start ACEI  - c/w metoprolol   - Echo 3/9/21: EF 55-60%, grade I DD. severe concentric LVH, bioprosthetic aortic valve.     #Hypotension  - c/w midodrine 5mg for BP support  - denies any symptoms  - KELLEE stocking as tolerated    # transaminitis with isolated elevated AP - improving  - likely due to NSTEMI  - monitor  - alk phos level downtrending    # aortic stenosis  s/p bio-AVR (2/24)    # NSTEMI and CAD s/p CABGx3 (2/24)  - CHB s/p BiV AICD (3/2) – device interrogated, no afib seen  - TTE on 3/9 shows EF improved to 55-60% after surgery  - EF on 2/28/21 25 to 30%, decreased global left ventricular systolic function  - c/w asa  - c/w lipitor    #Bilateral Carotid Stenosis  - seen by vascular surgery at [facility]; note/ recommendations reviewed – will need OP follow up  - on pre-operative eval for CABG found to have >70% L ICA stenosis, asymptomatic  -patient with asymptomatic high grade stenosis of L ICA, no indication for CEA at this time    #Hiatal Hernia  -found during endoscopy [facility]  -seen by GI at [facility]; note /recommendations reviewed- will need OP follow up  -c/w famotidine     #Anemia- likely blood loss vs ACD s/p 2 PRBC at [facility]  -Hgb currently stable  -Ferrous sulfate, folic acid    #Hypothyroid  - c/w Synthroid 100mcg      #DVT prophylaxis:  Lovenox      Will follow, discussed with rehab attending Dr. Jones

## 2021-03-23 NOTE — DISCHARGE NOTE NURSING/CASE MANAGEMENT/SOCIAL WORK - NSDCFUADDAPPT_GEN_ALL_CORE_FT
Dr. Jones will cover home care; however, a new primary care MD.  The following referrals are provided:   [facility], [address]. [place], [place]  [phone]  [facility], [address] [place] [address]  [phone]; Arthur Dunn MD  [address]. [place], [phone];  Nash Quesada MD   [place], [place]   [phone]; Fannie Barker MD  [address] [place], [place]  [phone].

## 2021-03-23 NOTE — PROGRESS NOTE ADULT - ATTENDING COMMENTS
Patient seen and discussed with resident, I agree with above assessment and plan.
Patient seen and examined. I agree with note/plan.
Patient seen and discussed with resident, I agree with above assessment and plan.
Patient seen and discussed with resident. Agree with above assessment and plan.
77yo F with PMH of anemia, hypothyroidism, and GI bleed presented as transfer from [place] to [facility] on 2/16 for cardiac cath for NSTEMI. 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 events noted, c/o blurry vision over past weekend, resolved now,  all w/u negative,   this am C/O weakness, fatigue this morning, Endorses mild SOB improving from yesterday.   Denies CP, ABD pain, N/V/D, constipation.   States slept well overnight with no events. Tolerating PO intake well.       will monitor  c/w current care  d/w rehab team
77yo F with PMH of anemia, hypothyroidism, and GI bleed presented as transfer from [place] to [facility] on 2/16 for cardiac cath for NSTEMI. 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

## 2021-03-23 NOTE — DISCHARGE NOTE PROVIDER - PROVIDER TOKENS
PROVIDER:[TOKEN:[[id number]]],PROVIDER:[TOKEN:[[id number]]],PROVIDER:[TOKEN:[[id number]]]

## 2021-03-23 NOTE — DISCHARGE NOTE NURSING/CASE MANAGEMENT/SOCIAL WORK - PATIENT PORTAL LINK FT
You can access the FollowMyHealth Patient Portal offered by [facility] by registering at the following website: [email or web address]. By joining [employer]’s FollowMyHealth portal, you will also be able to view your health information using other applications (apps) compatible with our system.

## 2021-03-23 NOTE — PROGRESS NOTE ADULT - SUBJECTIVE AND OBJECTIVE BOX
Patient is a 76y old  Female who presents with a chief complaint of Functional Deficits 2/2 CABG and AVR (9 - Cardiac) (14 Mar 2021 10:51)    75yo F with PMH of anemia, hypothyroidism, and GI bleed presented as transfer from [place] to [facility] on 2/16 for cardiac cath for NSTEMI. Pt initially presented a few days prior to [place] with fever and SOB and was found to have NSTEMI, urosepsis s/p course of ceftriaxone (completed 2/17), anemia requiring 2 PRBC (no GI work up because pt wanted to sign out AMA. Cardiac cath 2/16 showed multivessel CAD and severe aortic stenosis. Preop carotid US with bilateral carotid stenosis confirmed by CTA. Patient was seen by vascular and cleared for OR (will need outpatient follow up for carotid stenosis). Had endoscopy 2/17 and colonoscopy 2/19 without evidence of acute bleed, however was noted to have hiatal hernia. She is s/p CABG x3 with LIMA and AVR 2/24 with Dr. Cruz. 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 TTE on 3/9 showing EF improved to 55-60%. Last chest tube removed 3/9. Coccyx noted to have some skin breakdown-pt complaining of buttock pain.  Patient was evaluated by PM&R and therapy for functional deficits and gait/ ADL impairments and recommended acute rehabilitation. Patient was medically optimized for discharge to [facility] on 3/12/21.   (12 Mar 2021 16:12)    PAST MEDICAL & SURGICAL HISTORY:  Iron deficiency anemia due to chronic blood loss  Hypothyroid  History of tonsillectomy    TODAY'S SUBJECTIVE & REVIEW OF SYMPTOMS:   Patient seen and evaluated this morning. No acute events overnight. Patient states she is doing well, still with some confusion at times. As per PT, a little lethargic during session today. At bedside this morning stated she was feeling better. Denies headache, chest pain, shortness of breath, pain.  Spoke with patient's son, he plans on hiring private help for the time he is not at home with her and wants to take her home as soon as today. Will discuss with therapy.    Allergies  No Known Allergies    PHYSICAL EXAM  Vital Signs Last 24 Hrs  T(C): 36.9 (23 Mar 2021 08:58), Max: 36.9 (23 Mar 2021 08:58)  T(F): 98.4 (23 Mar 2021 08:58), Max: 98.4 (23 Mar 2021 08:58)  HR: 82 (23 Mar 2021 08:58) (82 - 93)  BP: 106/67 (23 Mar 2021 08:58) (104/63 - 114/70)  BP(mean): --  RR: 15 (23 Mar 2021 08:58) (15 - 16)  SpO2: 96% (23 Mar 2021 08:58) (96% - 98%)    Constitutional - NAD, Comfortable  HEENT - NCAT, EOMI, +dentures   Chest - good respiratory effort, CTAB   Cardio - warm and well perfused,   Abdomen -  Soft, NT ND  Extremities - no calf tenderness  Neurologic Exam:                    Cognitive -             Orientation: Awake, Alert and oriented            Speech - Fluent, Comprehensible, No dysarthria, No aphasia      Cranial Nerves - No facial asymmetry, Tongue midline, EOMI, Shoulder shrug intact     Motor -                      LEFT    UE - ShAB 5/5, EF 5/5, EE 5/5, WE 5/5,  WNL                    RIGHT UE - ShAB 5/5, EF 5/5, EE 5/5, WE 5/5,  WNL                    LEFT    LE - HF 4/5, KE 5/5, DF 5/5, PF 5/5                    RIGHT LE - HF 4/5, KE 5/5, DF 5/5, PF 5/5        Sensory - Intact to LT bilateral  Psychiatric - Mood stable, Affect WNL  Skin:     Sternal incision well approximated, no erythema or drainage      Drain incision site below sternal incision; bilateral, dry no erythema or drainage      Stage II sacral 0.5x0.5     Stage II R/L buttock 0.5x0.5     Multiple areas of ecchymosis on abdomen and extremities      RECENT LABS:                        9.3    6.10  )-----------( 353      ( 22 Mar 2021 05:00 )             30.2     03-22    140  |  105  |  29<H>  ----------------------------<  85  4.0   |  31  |  0.78    Ca    8.9      22 Mar 2021 05:00    TPro  6.0  /  Alb  2.5<L>  /  TBili  0.5  /  DBili  x   /  AST  33  /  ALT  30  /  AlkPhos  192<H>  03-22        MEDICATIONS  (STANDING):  ascorbic acid 500 milliGRAM(s) Oral daily  aspirin enteric coated 81 milliGRAM(s) Oral daily  atorvastatin 40 milliGRAM(s) Oral at bedtime  enoxaparin Injectable 40 milliGRAM(s) SubCutaneous daily  famotidine    Tablet 20 milliGRAM(s) Oral daily  ferrous    sulfate 325 milliGRAM(s) Oral daily  folic acid 1 milliGRAM(s) Oral daily  influenza   Vaccine 0.5 milliLiter(s) IntraMuscular once  levothyroxine 100 MICROGram(s) Oral daily  magnesium oxide 400 milliGRAM(s) Oral three times a day with meals  metoprolol tartrate 12.5 milliGRAM(s) Oral two times a day  midodrine. 5 milliGRAM(s) Oral <User Schedule>  potassium chloride    Tablet ER 20 milliEquivalent(s) Oral daily  senna 2 Tablet(s) Oral at bedtime  torsemide 20 milliGRAM(s) Oral daily    MEDICATIONS  (PRN):  acetaminophen   Tablet .. 650 milliGRAM(s) Oral every 6 hours PRN Moderate Pain (4 - 6)  melatonin 3 milliGRAM(s) Oral at bedtime PRN Insomnia  polyethylene glycol 3350 17 Gram(s) Oral daily PRN Constipation        ASSESSMENT AND PLAN  76/F PMH of anemia, hypothyroidism, and GI bleed presented as transfer from [place] to [facility] on 2/16 for cardiac cath for NSTEMI. 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)    Comprehensive Multidisciplinary Rehab Program:  - Gait, ADL, Functional impairments  - CMS Impairment group: 09 (Cardiac)  - PT 90 mins/OT 60 mins/ SLP 30 mins for total of 3 hours a day 5 days a week    #Functional deficits 2/2 Acute Systolic Heart Failure s/p CABG x3, AVR, and BIV AICD placement); EF 55-60% after surgery (TTE 3/9)  - device interrogated, no afib seen  - asa, Lipitor  - lopressor 12.5mg bid  - torsemide 20mg qd and daily potassium supplementation of 20mEq, MgOxide 400mg; maintain K>4 and Mg >2  - bilateral LE ace wraps for edema  - midodrine 5mg TID for BP support  - incentive spirometry  - daily weights, strict Is and Os  - weekly CXR per cardiology  - has follow up with EP doctor  Dr. Ortiz on March 15 to check on new cardiac device/BIV-ICD    will need to call to reschedule/ inform she is in rehab  - Blurry vision, fatigue during therapy 3/22- CTH, EKG, CXR, troponins x3 all negative for any acute change    #Bilateral Carotid Stenosis- seen on US and CT Angio Carotid  - seen by vascular surgery at [facility] – will need OP follow up    #Hiatal Hernia  - found during endoscopy  - follow up as outpatient    #Hypothyroid  - c/w Synthroid 100mcg  - will need OP f/u in 4-6 weeks for TFT monitoring     Sleep- Melatonin PRN  cognitive deficits- memory deficits as per speech therapy. Patient reports nighttime confusion but no falls- redirectable. NO agitation or confusion at night as per night staff. Plan to continue to monitor for now.     Pain:- Tylenol PRN    GI/Bowel:  - Senna 2 tabs at qhs, Miralax daily prn  - GI ppx: pepcid  /Bladder:  - Monitor Voiding well, toileting schedule q4h    Diet / Dysphagia:   Carbohydrate Consistent; Endure supplementation, Sander, Vit C    Skin/ Pressure Injury Prevention:  - assessment on admission:     Stage II sacral and b/l buttock pressure injuries     - barrier cream and Allevyn dressing placed    - offloading   - Incisions:     Sternal incision open to air    drain incisions: dry, healing well - dry, sterile, dressing placed  -Turn Q2hrs in bed while awake, OOB to Chair, PT/OT/SLP   -Sander daily, vitamin C    DVT prophylaxis: Lovenox, SCDs    Precautions/ Restrictions  - Falls, Cardiac, Sternal  - Lungs: Aspiration, Incentive Spirometer    - COVID PCR: neg 3/12    Dispo: IDT rounds 3/16/21- Team meeting:   Discussed with multidisciplinary team about patient's current function, progress, goals, barriers, discharge plan and set up. Updated patient about discharge plan. DC planned 3/30/21  Son wants to take her home earlier and may hire private help

## 2021-03-23 NOTE — DISCHARGE NOTE PROVIDER - CARE PROVIDER_API CALL
Kristy Ortiz)  Cardiac Electrophysiology; Cardiovascular Disease; Internal Medicine  [address] [place] [address]  Phone: [phone]  Fax: [phone]  Follow Up Time:     Quin Perez)  EndocrinologyMetabDiabetes; Internal Medicine  [address] [place] [address]  Phone: [phone]  Fax: [phone]  Follow Up Time:     Rodriguez Cruz)  Surgery; Thoracic and Cardiac Surgery  [address] [place] [address]  Phone: [phone]  Fax: [phone]  Follow Up Time:

## 2021-03-23 NOTE — PROGRESS NOTE ADULT - SUBJECTIVE AND OBJECTIVE BOX
77yo F with PMH of anemia, hypothyroidism, and GI bleed presented as transfer from [place] to [facility] on  for cardiac cath for NSTEMI. Found to have multivessel CAD and severe AS,  s/p CABG x3 , Postoperative course notable for KIMMY (resolved), CHB with junctional escape (s/p BIV AICD placement 3/)      Patient seen and examined at bedside. C/O weakness, fatigue this morning, Endorses mild SOB improving from yesterday. Denies CP, ABD pain, N/V/D, constipation. States slept well overnight with no events. Tolerating PO intake well.         Vital Signs Last 24 Hrs  T(C): 36.9 (23 Mar 2021 08:58), Max: 36.9 (23 Mar 2021 08:58)  T(F): 98.4 (23 Mar 2021 08:58), Max: 98.4 (23 Mar 2021 08:58)  HR: 82 (23 Mar 2021 08:58) (82 - 90)  BP: 106/67 (23 Mar 2021 08:58) (106/56 - 114/70)  BP(mean): --  RR: 15 (23 Mar 2021 08:58) (15 - 16)  SpO2: 96% (23 Mar 2021 08:58) (96% - 98%)    GENERAL- sitting in wheelchair, appears fatigued  EAR/NOSE/MOUTH/THROAT - no pharyngeal exudates, no oral lesions  MMM  EYES- ÓSCAR, conjunctiva and Sclera clear  NECK- supple  RESPIRATORY-  clear to auscultation bilaterally  CARDIOVASCULAR - SIS2, RRR  GI - soft NT BS present  EXTREMITIES- no pedal edema  NEUROLOGY- no gross focal deficits  SKIN- no rashes, warm to touch  PSYCHIATRY- AAO X 3  MUSCULOSKELETAL- ROM normal      Labs reviewed:     (21 @ 0500)              9.3                  140  | 31   | 29           6.10  >-----------< 353     ------------------------< 85                    30.2                 4.0  | 105  | 0.78                                         Ca 8.9   Mg x     Ph x        Troponin I, Serum (21 @ 05:23)    Troponin I, Serum: .038:     Troponin I, Serum (21 @ 17:30)    Troponin I, Serum: .032:     Troponin I, Serum (21 @ 12:39)    Troponin I, Serum: .027:     Serum Pro-Brain Natriuretic Peptide (21 @ 12:39)    Serum Pro-Brain Natriuretic Peptide: 6068 pg/mL        Urinalysis Basic - ( 21 Mar 2021 13:10 )    Color: Yellow / Appearance: Slightly Turbid / S.015 / pH: x  Gluc: x / Ketone: Negative  / Bili: Negative / Urobili: Negative   Blood: x / Protein: Negative / Nitrite: Negative   Leuk Esterase: Negative / RBC: Negative /HPF / WBC 0-2 /HPF   Sq Epi: x / Non Sq Epi: Moderate / Bacteria: Few /HPF      ECG reviewed and interpreted:     < from: 12 Lead ECG (21 @ 12:29) >    Atrial Rate 79 BPM    P-R Interval 140 ms    QRS Duration 130 ms    Q-T Interval 438 ms    QTC Calculation(Bazett) 502 ms    P Axis 55 degrees    R Axis -73 degrees    T Axis 130 degrees    Diagnosis Line Atrial-sensed ventricular-paced rhythm  right bundle branch block configuration  (RBBB and left anterior fascicular block)  *** Bifascicular block ***  Abnormal ECG    < end of copied text >    < from: 12 Lead ECG (21 @ 06:59) >  entricular Rate 78 BPM    Atrial Rate 78 BPM    QRS Duration 134 ms    Q-T Interval 464 ms    QTC Calculation(Bazett) 528 ms    P Axis 33 degrees    R Axis -67 degrees    T Axis 67 degrees    Diagnosis Line Ventricular-paced rhythm  Abnormal ECG    < end of copied text >          RADIOLOGY & ADDITIONAL TESTS:    < from: CT Head No Cont (21 @ 13:00) >  Impression:  1. Unremarkable noncontrast CT scan of the brain.    < end of copied text >      CXR personally reviewed:       < from: Xray Chest 1 View- PORTABLE-Routine (Xray Chest 1 View- PORTABLE-Routine .) (21 @ 10:49) >  INTERPRETATION:  Single view chest    History coronary bypass    Comparison 03/15/21    There is been sternotomy and pacemaker placement. There is mild cardiomegaly. There is mild blunting of the right costophrenic angle which may represent pleural thickening versus a small effusion. There is no art central edema or new consolidation.    < end of copied text >      < from: Xray Chest 1 View- PORTABLE-Routine (Xray Chest 1 View- PORTABLE-Routine in AM.) (03.15.21 @ 11:30) >  EXAM:  XR CHEST PORTABLE ROUTINE 1V      PROCEDURE DATE:  03/15/2021        INTERPRETATION:  AP chest on March 15, 2021 11:16 AM. Patient has abnormal chest sounds.    Heart enlargement, sternotomy, and left-sided defibrillator again noted.    There is a mild right base effusion somewhat increased from .    Small left base effusion is stable.    Some loculated pleural fluid is increasing in the right mid lateral chest.    IMPRESSION: As above.    < end of copied text >      < from: TTE Echo Complete w/ Contrast w/ Doppler (21 @ 11:08) >  Summary:   1. Small circumferential pericardial effusion without echo evidence of cardiac tamponade.   2. Left ventricular ejection fraction, by visual estimation, is 55 to 60%.   3. Normal global left ventricular systolic function.   4. Spectral Doppler shows impaired relaxation pattern of left ventricular myocardial filling (Grade I diastolic dysfunction).   5. There is severe concentric left ventricular hypertrophy.   6. Severely enlarged left atrium.   7. Normal right ventricular size and function.   8. Mild tricuspid regurgitation.   9. Mild mitral valve regurgitation.  10. Mitral valve mean gradient is 4.2 mmHg (HR 76) consistent with mild mitral stenosis.  11. Severe thickening and calcification of the anterior and posterior mitral valve leaflets.  12. Mild mitral annular calcification.  13. Aortic valve is normally functioning bioprosthetic valve. There is trivial para-valvular leak. Mean gradient is 14 mm Hg, acceleration time is 63 msec.  14. Compared to a prior study from 21, there is significant improvement in LV function and a small pericardial effusion.    < end of copied text >        MEDICATIONS  (STANDING):  ascorbic acid 500 milliGRAM(s) Oral daily  aspirin enteric coated 81 milliGRAM(s) Oral daily  atorvastatin 40 milliGRAM(s) Oral at bedtime  enoxaparin Injectable 40 milliGRAM(s) SubCutaneous daily  famotidine    Tablet 20 milliGRAM(s) Oral daily  ferrous    sulfate 325 milliGRAM(s) Oral daily  folic acid 1 milliGRAM(s) Oral daily  influenza   Vaccine 0.5 milliLiter(s) IntraMuscular once  levothyroxine 100 MICROGram(s) Oral daily  magnesium oxide 400 milliGRAM(s) Oral three times a day with meals  metoprolol tartrate 12.5 milliGRAM(s) Oral two times a day  midodrine. 5 milliGRAM(s) Oral <User Schedule>  potassium chloride    Tablet ER 20 milliEquivalent(s) Oral daily  senna 2 Tablet(s) Oral at bedtime  torsemide 20 milliGRAM(s) Oral daily    MEDICATIONS  (PRN):  acetaminophen   Tablet .. 650 milliGRAM(s) Oral every 6 hours PRN Moderate Pain (4 - 6)  melatonin 3 milliGRAM(s) Oral at bedtime PRN Insomnia  polyethylene glycol 3350 17 Gram(s) Oral daily PRN Constipation           77yo F with PMH of anemia, hypothyroidism, and GI bleed presented as transfer from [place] to [facility] on  for cardiac cath for NSTEMI. 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 events noted, c/o blurry vision over past weekend, resolved now,  all w/u negative,   this am C/O weakness, fatigue this morning, Endorses mild SOB improving from yesterday.   Denies CP, ABD pain, N/V/D, constipation.   States slept well overnight with no events. Tolerating PO intake well.     Vital Signs Last 24 Hrs  T(C): 36.9 (23 Mar 2021 08:58), Max: 36.9 (23 Mar 2021 08:58)  T(F): 98.4 (23 Mar 2021 08:58), Max: 98.4 (23 Mar 2021 08:58)  HR: 82 (23 Mar 2021 08:58) (82 - 90)  BP: 106/67 (23 Mar 2021 08:58) (106/56 - 114/70)  BP(mean): --  RR: 15 (23 Mar 2021 08:58) (15 - 16)  SpO2: 96% (23 Mar 2021 08:58) (96% - 98%)    GENERAL- NAD  EAR/NOSE/MOUTH/THROAT - no pharyngeal exudates, no oral lesions  MMM  EYES- ÓSCAR, conjunctiva and Sclera clear  NECK- supple  RESPIRATORY-  clear to auscultation bilaterally  CARDIOVASCULAR - SIS2, RRR  GI - soft NT BS present  EXTREMITIES- +1 pitting edema B/L LE improving  NEUROLOGY- no gross focal deficits  SKIN- no rashes, warm to touch, chest surgical incision approximated no drainage  PSYCHIATRY- AAO X 3   MUSCULOSKELETAL- ROM       Labs reviewed:     (21 @ 0500)              9.3                  140  | 31   | 29           6.10  >-----------< 353     ------------------------< 85                    30.2                 4.0  | 105  | 0.78                                         Ca 8.9   Mg x     Ph x        Troponin I, Serum (21 @ 05:23)    Troponin I, Serum: .038:     Troponin I, Serum (21 @ 17:30)    Troponin I, Serum: .032:     Troponin I, Serum (21 @ 12:39)    Troponin I, Serum: .027:     Serum Pro-Brain Natriuretic Peptide (21 @ 12:39)    Serum Pro-Brain Natriuretic Peptide: 6068 pg/mL        Urinalysis Basic - ( 21 Mar 2021 13:10 )    Color: Yellow / Appearance: Slightly Turbid / S.015 / pH: x  Gluc: x / Ketone: Negative  / Bili: Negative / Urobili: Negative   Blood: x / Protein: Negative / Nitrite: Negative   Leuk Esterase: Negative / RBC: Negative /HPF / WBC 0-2 /HPF   Sq Epi: x / Non Sq Epi: Moderate / Bacteria: Few /HPF      ECG reviewed and interpreted:     < from: 12 Lead ECG (21 @ 12:29) >    Atrial Rate 79 BPM    P-R Interval 140 ms    QRS Duration 130 ms    Q-T Interval 438 ms    QTC Calculation(Bazett) 502 ms    P Axis 55 degrees    R Axis -73 degrees    T Axis 130 degrees    Diagnosis Line Atrial-sensed ventricular-paced rhythm  right bundle branch block configuration  (RBBB and left anterior fascicular block)  *** Bifascicular block ***  Abnormal ECG    < end of copied text >    < from: 12 Lead ECG (21 @ 06:59) >  entricular Rate 78 BPM    Atrial Rate 78 BPM    QRS Duration 134 ms    Q-T Interval 464 ms    QTC Calculation(Bazett) 528 ms    P Axis 33 degrees    R Axis -67 degrees    T Axis 67 degrees    Diagnosis Line Ventricular-paced rhythm  Abnormal ECG    < end of copied text >          RADIOLOGY & ADDITIONAL TESTS:    < from: CT Head No Cont (21 @ 13:00) >  Impression:  1. Unremarkable noncontrast CT scan of the brain.    < end of copied text >      CXR personally reviewed:       < from: Xray Chest 1 View- PORTABLE-Routine (Xray Chest 1 View- PORTABLE-Routine .) (21 @ 10:49) >  INTERPRETATION:  Single view chest    History coronary bypass    Comparison 03/15/21    There is been sternotomy and pacemaker placement. There is mild cardiomegaly. There is mild blunting of the right costophrenic angle which may represent pleural thickening versus a small effusion. There is no atr central edema or new consolidation.    < end of copied text >      < from: Xray Chest 1 View- PORTABLE-Routine (Xray Chest 1 View- PORTABLE-Routine in AM.) (03.15.21 @ 11:30) >  EXAM:  XR CHEST PORTABLE ROUTINE 1V      PROCEDURE DATE:  03/15/2021        INTERPRETATION:  AP chest on March 15, 2021 11:16 AM. Patient has abnormal chest sounds.    Heart enlargement, sternotomy, and left-sided defibrillator again noted.    There is a mild right base effusion somewhat increased from .    Small left base effusion is stable.    Some loculated pleural fluid is increasing in the right mid lateral chest.    IMPRESSION: As above.    < end of copied text >      < from: TTE Echo Complete w/ Contrast w/ Doppler (21 @ 11:08) >  Summary:   1. Small circumferential pericardial effusion without echo evidence of cardiac tamponade.   2. Left ventricular ejection fraction, by visual estimation, is 55 to 60%.   3. Normal global left ventricular systolic function.   4. Spectral Doppler shows impaired relaxation pattern of left ventricular myocardial filling (Grade I diastolic dysfunction).   5. There is severe concentric left ventricular hypertrophy.   6. Severely enlarged left atrium.   7. Normal right ventricular size and function.   8. Mild tricuspid regurgitation.   9. Mild mitral valve regurgitation.  10. Mitral valve mean gradient is 4.2 mmHg (HR 76) consistent with mild mitral stenosis.  11. Severe thickening and calcification of the anterior and posterior mitral valve leaflets.  12. Mild mitral annular calcification.  13. Aortic valve is normally functioning bioprosthetic valve. There is trivial para-valvular leak. Mean gradient is 14 mm Hg, acceleration time is 63 msec.  14. Compared to a prior study from 21, there is significant improvement in LV function and a small pericardial effusion.    < end of copied text >        MEDICATIONS  (STANDING):  ascorbic acid 500 milliGRAM(s) Oral daily  aspirin enteric coated 81 milliGRAM(s) Oral daily  atorvastatin 40 milliGRAM(s) Oral at bedtime  enoxaparin Injectable 40 milliGRAM(s) SubCutaneous daily  famotidine    Tablet 20 milliGRAM(s) Oral daily  ferrous    sulfate 325 milliGRAM(s) Oral daily  folic acid 1 milliGRAM(s) Oral daily  influenza   Vaccine 0.5 milliLiter(s) IntraMuscular once  levothyroxine 100 MICROGram(s) Oral daily  magnesium oxide 400 milliGRAM(s) Oral three times a day with meals  metoprolol tartrate 12.5 milliGRAM(s) Oral two times a day  midodrine. 5 milliGRAM(s) Oral <User Schedule>  potassium chloride    Tablet ER 20 milliEquivalent(s) Oral daily  senna 2 Tablet(s) Oral at bedtime  torsemide 20 milliGRAM(s) Oral daily    MEDICATIONS  (PRN):  acetaminophen   Tablet .. 650 milliGRAM(s) Oral every 6 hours PRN Moderate Pain (4 - 6)  melatonin 3 milliGRAM(s) Oral at bedtime PRN Insomnia  polyethylene glycol 3350 17 Gram(s) Oral daily PRN Constipation

## 2021-03-23 NOTE — DISCHARGE NOTE PROVIDER - HOSPITAL COURSE
77yo F with PMH of anemia, hypothyroidism, and GI bleed presented as transfer from [place] to [facility] on 2/16 for cardiac cath for NSTEMI. Pt initially presented a few days prior to [place] with fever and SOB and was found to have NSTEMI, urosepsis s/p course of ceftriaxone (completed 2/17), anemia requiring 2 PRBC (no GI work up because pt wanted to sign out AMA. Cardiac cath 2/16 showed multivessel CAD and severe aortic stenosis. Preop carotid US with bilateral carotid stenosis confirmed by CTA. Patient was seen by vascular and cleared for OR (will need outpatient follow up for carotid stenosis). Had endoscopy 2/17 and colonoscopy 2/19 without evidence of acute bleed, however was noted to have hiatal hernia. She is s/p CABG x3 with LIMA and AVR 2/24 with Dr. Cruz. 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 TTE on 3/9 showing EF improved to 55-60%. Last chest tube removed 3/9. Coccyx noted to have some skin breakdown-pt complaining of buttock pain.  Patient was evaluated by PM&R and therapy for functional deficits and gait/ ADL impairments and recommended acute rehabilitation. Patient was medically optimized for discharge to [facility] on 3/12/21.    Rehab Course    76/F PMH of anemia, hypothyroidism, and GI bleed presented as transfer from [place] to [facility] on 2/16 for cardiac cath for NSTEMI. 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)    Comprehensive Multidisciplinary Rehab Program:  - Gait, ADL, Functional impairments  - CMS Impairment group: 09 (Cardiac)  - PT 90 mins/OT 60 mins/ SLP 30 mins for total of 3 hours a day 5 days a week    #Functional deficits 2/2 Acute Systolic Heart Failure s/p CABG x3, AVR, and BIV AICD placement); EF 55-60% after surgery (TTE 3/9)  - device interrogated, no afib seen  - asa, Lipitor  - lopressor 12.5mg bid  - torsemide 20mg qd and daily potassium supplementation of 20mEq, MgOxide 400mg; maintain K>4 and Mg >2  - bilateral LE ace wraps for edema  - midodrine 5mg TID for BP support  - incentive spirometry  - daily weights, strict Is and Os  - weekly CXR per cardiology  - has follow up with EP doctor  Dr. Ortiz on March 15 to check on new cardiac device/BIV-ICD    will need to call to reschedule/ inform she is in rehab  - Blurry vision, fatigue during therapy 3/22- CTH, EKG, CXR, troponins x3 all negative for any acute change    #Bilateral Carotid Stenosis- seen on US and CT Angio Carotid  - seen by vascular surgery at [facility] – will need OP follow up    #Hiatal Hernia  - found during endoscopy  - follow up as outpatient    #Hypothyroid  - c/w Synthroid 100mcg  - will need OP f/u in 4-6 weeks for TFT monitoring     Sleep- Melatonin PRN  cognitive deficits- memory deficits as per speech therapy. Patient reports nighttime confusion but no falls- redirectable. NO agitation or confusion at night as per night staff. Plan to continue to monitor for now.     Pain:- Tylenol PRN    GI/Bowel:  - Senna 2 tabs at qhs, Miralax daily prn  - GI ppx: pepcid  /Bladder:  - Monitor Voiding well, toileting schedule q4h    Diet / Dysphagia:   Carbohydrate Consistent; Endure supplementation, Sander, Vit C    Skin/ Pressure Injury Prevention:  - assessment on admission:     Stage II sacral and b/l buttock pressure injuries     - barrier cream and Allevyn dressing placed    - offloading   - Incisions:     Sternal incision open to air    drain incisions: dry, healing well - dry, sterile, dressing placed  -Turn Q2hrs in bed while awake, OOB to Chair, PT/OT/SLP   -Sander daily, vitamin C    DVT prophylaxis: Lovenox, SCDs    Precautions/ Restrictions  - Falls, Cardiac, Sternal  - Lungs: Aspiration, Incentive Spirometer    - COVID PCR: neg 3/12    Dispo: IDT rounds 3/16/21- Team meeting:   Discussed with multidisciplinary team about patient's current function, progress, goals, barriers, discharge plan and set up. Updated patient about discharge plan. DC  3/23/21  Son has hired private help for daytime supervision for patient

## 2021-03-23 NOTE — DISCHARGE NOTE PROVIDER - NSDCFUSCHEDAPPT_GEN_ALL_CORE_FT
WayneJAMES ; 03/24/2021 [facility] ; NPP Cardio Electro [facility]

## 2021-03-23 NOTE — DISCHARGE NOTE PROVIDER - NSDCCPCAREPLAN_GEN_ALL_CORE_FT
PRINCIPAL DISCHARGE DIAGNOSIS  Diagnosis: Coronary artery disease involving native coronary artery of native heart without angina pectoris  Assessment and Plan of Treatment: You have a follow up appointment with Dr. Cruz in March 24 at 2:15pm. Follow up with your cardiologist and primary care doctor within 7-10 days after discharge. Sternal wound precautions, Blood glucose fingerstick checks qAC/HS, daily weights, DASH diet, ensure supplements bid, daily shower,  PT/OT Rehab  per rehab facility. BMP, CBC daily to monitor H & H and BUN/ CR. CXR weekly. Vitals per routine.      1. Take ALL of your medications as ordered. Fill your prescriptions the day you are discharged and take according to the schedule you were given. Continue to take a stool softener if you are taking narcotic pain medications. AVOID medications such as ibuprofen or naproxen if you have had bypass surgery. If you have any questions or are unable to fill the prescriptions, please call the office right away at [phone].  2. Shower daily. Clean all incisions daily while showering with warm water and mild soap, pat dry with a clean towel and do not cover with any dressings unless instructed to. No bathing, swimming in a pool or the ocean until instructed by MD.  DO NOT use creams or lotions on the wound.  3. We advise that you do not drive until instructed by MD.   4. You may not return to work until instructed by MD.   5. Please eat a low fat, low cholesterol, low salt diet. (No added/extra salt). Please drinks Ensures three TID   6. Weigh yourself every day in the morning and record it in the weight log in your red folder. Notify the office of any weight gain more than 2-3 pounds in 24 hours.  7. Continue breathing exercises several times a day. Continue to use your heart pillow.  8. No heavy lifting nothing greater than 5 pounds until cleared by MD.   9. Call / Notify MD any fever greater than 101.0, any drainage from incisions or if they become red, hot or very tender to the touch.  10. Increase activity as tolerated. Walk indoors and/or outdoors at least 3 times a day.        SECONDARY DISCHARGE DIAGNOSES  Diagnosis: Aortic valve stenosis, etiology of cardiac valve disease unspecified  Assessment and Plan of Treatment: - You will receive a wallet card about your new valve in the mail.  Please carry it with you to present to anyone who may ask if you have any medical implants.  - Be sure to inform your doctors including your dentist about your valve since you will need to take antibiotics to reduce the risk of infection before certain medical and dental procedures.      Diagnosis: Heart block  Assessment and Plan of Treatment: Please follow with EP doctor  Dr. Ortiz- you will need to call and reschedule appointment for your appt. to check on your new  cardiac device/BIV-ICD   1. You have a follow up for your cardiac device BIV -ICD on left chest wall   2. Keep affected arm below shoulder, with no heavy lifting for 6 weeks.    5. Please notify the physician if there is any bleeding, drainage or swelling of the site.    6. Please notify the physician of any fever greater than 100.4.      Diagnosis: Hypothyroidism, unspecified type  Assessment and Plan of Treatment: Take all medications as prescribed. Follow up with your primary care and endocrine doctor once discharged from Rehab    Diagnosis: Anemia  Assessment and Plan of Treatment: By taking iron (ferrous sulfate), folate and vitamin C as directed for one month, your red blood cell count should improve. Please take a stool softener with the medications as they may cause constipation.  In the rehab please monitor CBC daily

## 2021-03-23 NOTE — DISCHARGE NOTE PROVIDER - NSDCCPTREATMENT_GEN_ALL_CORE_FT
PRINCIPAL PROCEDURE  Procedure: CABG, with aortic valve replacement and LUIS MIGUEL  Findings and Treatment: Findings and Treatment: CABGx3 LIMA-LAD, Vein-OM1, OM2  AVR 23mm Capps 2700  Endoscopic harvest of greater saphenous vein from right lower extremity        SECONDARY PROCEDURE  Procedure: Implantation of biv ICD  Findings and Treatment:

## 2021-03-23 NOTE — DISCHARGE NOTE PROVIDER - DETAILS OF MALNUTRITION DIAGNOSIS/DIAGNOSES
This patient has been assessed with a concern for Malnutrition and was treated during this hospitalization for the following Nutrition diagnosis/diagnoses:     -  03/13/2021: Moderate protein-calorie malnutrition   This patient has been assessed with a concern for Malnutrition and was treated during this hospitalization for the following Nutrition diagnosis/diagnoses:     -  03/13/2021: Moderate protein-calorie malnutrition    This patient has been assessed with a concern for Malnutrition and was treated during this hospitalization for the following Nutrition diagnosis/diagnoses:     -  03/13/2021: Moderate protein-calorie malnutrition   This patient has been assessed with a concern for Malnutrition and was treated during this hospitalization for the following Nutrition diagnosis/diagnoses:     -  03/13/2021: Moderate protein-calorie malnutrition    This patient has been assessed with a concern for Malnutrition and was treated during this hospitalization for the following Nutrition diagnosis/diagnoses:     -  03/13/2021: Moderate protein-calorie malnutrition    This patient has been assessed with a concern for Malnutrition and was treated during this hospitalization for the following Nutrition diagnosis/diagnoses:     -  03/13/2021: Moderate protein-calorie malnutrition

## 2021-03-23 NOTE — PROGRESS NOTE ADULT - NUTRITIONAL ASSESSMENT
This patient has been assessed with a concern for Malnutrition and has been determined to have a diagnosis/diagnoses of Moderate protein-calorie malnutrition.    This patient is being managed with:   Diet Regular-  Low Sodium  Sander(7 Gm Arginine/7 Gm Glut/1.2 Gm HMB     Qty per Day:  1  Supplement Feeding Modality:  Oral  Ensure Enlive Cans or Servings Per Day:  1       Frequency:  Three Times a day  Entered: Mar 13 2021  9:52AM    Diet Regular-  Consistent Carbohydrate {No Snacks} (CSTCHO)  DASH/TLC {Sodium & Cholesterol Restricted} (DASH)  Supplement Feeding Modality:  Oral  Ensure Enlive Cans or Servings Per Day:  3       Frequency:  Daily  Entered: Mar 12 2021  8:28PM    The following pending diet order is being considered for treatment of Moderate protein-calorie malnutrition:null
This patient has been assessed with a concern for Malnutrition and has been determined to have a diagnosis/diagnoses of Moderate protein-calorie malnutrition.    This patient is being managed with:   Diet Regular-  Low Sodium  Sander(7 Gm Arginine/7 Gm Glut/1.2 Gm HMB     Qty per Day:  1  Supplement Feeding Modality:  Oral  Ensure Enlive Cans or Servings Per Day:  1       Frequency:  Three Times a day  Entered: Mar 13 2021  9:52AM    
This patient has been assessed with a concern for Malnutrition and has been determined to have a diagnosis/diagnoses of Moderate protein-calorie malnutrition.    This patient is being managed with:   Diet Regular-  Low Sodium  Sander(7 Gm Arginine/7 Gm Glut/1.2 Gm HMB     Qty per Day:  1  Supplement Feeding Modality:  Oral  Ensure Enlive Cans or Servings Per Day:  1       Frequency:  Three Times a day  Entered: Mar 13 2021  9:52AM    Diet Regular-  Consistent Carbohydrate {No Snacks} (CSTCHO)  DASH/TLC {Sodium & Cholesterol Restricted} (DASH)  Supplement Feeding Modality:  Oral  Ensure Enlive Cans or Servings Per Day:  3       Frequency:  Daily  Entered: Mar 12 2021  8:28PM    The following pending diet order is being considered for treatment of Moderate protein-calorie malnutrition:null
This patient has been assessed with a concern for Malnutrition and has been determined to have a diagnosis/diagnoses of Moderate protein-calorie malnutrition.    This patient is being managed with:   Diet Regular-  Low Sodium  Sander(7 Gm Arginine/7 Gm Glut/1.2 Gm HMB     Qty per Day:  1  Supplement Feeding Modality:  Oral  Ensure Enlive Cans or Servings Per Day:  1       Frequency:  Three Times a day  Entered: Mar 13 2021  9:52AM    

## 2021-03-23 NOTE — CHART NOTE - NSCHARTNOTEFT_GEN_A_CORE
NUTRITION FOLLOW UP    SOURCE: Patient [X)   Medical Record (X)    DIET: Regular, Low Sodium, Sander QD, Ensure Enlive TID    PATIENT REPORT  [X ] other: no GI distress    PO INTAKE:    50 [X]           CURRENT WEIGHT:  160.7 lb (3/23), 161.1 lbs (3/22), 170.1 lbs (3/19), 173.2 lbs (3/12)    Weight trends indicate fluctuation. Pt reports poor appetite. Pt reports consuming about half the meals, but makes an effort to complete the Ensure supplements that are being provided with each meal. Encouraged pt to consume adequate oral intake to maintain nutritional status. Pt c/o occasional constipation. Encouraged pt to consume adequate fluid intake. Weight trends indicate <10 lb weight loss, possibly to due edema +2 left foot, right foot ? Suggest continuation of Sander QD to promote wound healing.    PERTINENT MEDS:   Pertinent Medications: MEDICATIONS  (STANDING):  ascorbic acid 500 milliGRAM(s) Oral daily  aspirin enteric coated 81 milliGRAM(s) Oral daily  atorvastatin 40 milliGRAM(s) Oral at bedtime  enoxaparin Injectable 40 milliGRAM(s) SubCutaneous daily  famotidine    Tablet 20 milliGRAM(s) Oral daily  ferrous    sulfate 325 milliGRAM(s) Oral daily  folic acid 1 milliGRAM(s) Oral daily  influenza   Vaccine 0.5 milliLiter(s) IntraMuscular once  levothyroxine 100 MICROGram(s) Oral daily  magnesium oxide 400 milliGRAM(s) Oral three times a day with meals  metoprolol tartrate 12.5 milliGRAM(s) Oral two times a day  midodrine. 5 milliGRAM(s) Oral <User Schedule>  potassium chloride    Tablet ER 20 milliEquivalent(s) Oral daily  senna 2 Tablet(s) Oral at bedtime  torsemide 20 milliGRAM(s) Oral daily    MEDICATIONS  (PRN):  acetaminophen   Tablet .. 650 milliGRAM(s) Oral every 6 hours PRN Moderate Pain (4 - 6)  melatonin 3 milliGRAM(s) Oral at bedtime PRN Insomnia  polyethylene glycol 3350 17 Gram(s) Oral daily PRN Constipation      PERTINENT LABS:  03-22 Na140 mmol/L Glu 85 mg/dL K+ 4.0 mmol/L Cr  0.78 mg/dL BUN 29 mg/dL<H> 03-22 Alb 2.5 g/dL<L>        SKIN:  stage 2 pressure ulcer right buttock, left buttock, coccyx, stage 1 left heel, right heel  EDEMA: +2 left foot, right foot  LAST BM: 3/22    ESTIMATED NEEDS:   [X] no change since previous assessment      PREVIOUS NUTRITION DIAGNOSIS:  moderate malnutrition    NUTRITION DIAGNOSIS is :  (  X )  Ongoing            NUTRITION RECOMMENDATIONS:   1. Encourage adequate PO intake  2. Maintain nutrition / hydration status  3. Continue providing Ensure Enlive supplement TID and Sander QD    MONITORING AND EVALUATION:   1. Tolerance to diet prescription   2. PO intake  3. Weights  4. Labs  5. Follow Up per protocol     RD to remain available    VICKY Pinzon POST Dietetic Intern NUTRITION FOLLOW UP    SOURCE: Patient [X)   Medical Record (X)    DIET: Regular, Low Sodium, Sander QD, Ensure Enlive TID    PATIENT REPORT  [X ] other: no GI distress    PO INTAKE:    50 [X]           CURRENT WEIGHT:  160.7 lb (3/23), 161.1 lbs (3/22), 170.1 lbs (3/19), 173.2 lbs (3/12)    Pt reports poor appetite. Pt reports consuming about half the meals, but makes an effort to complete the Ensure supplements that are being provided with each meal. Encouraged pt to consume adequate oral intake to maintain nutritional status. Pt c/o occasional constipation. Encouraged pt to consume adequate fluid intake. Weight trends indicate <10 lb weight loss, possibly to due edema +2 left foot, right foot ? Suggest continuation of Sander QD to promote wound healing.    PERTINENT MEDS:   Pertinent Medications: MEDICATIONS  (STANDING):  ascorbic acid 500 milliGRAM(s) Oral daily  aspirin enteric coated 81 milliGRAM(s) Oral daily  atorvastatin 40 milliGRAM(s) Oral at bedtime  enoxaparin Injectable 40 milliGRAM(s) SubCutaneous daily  famotidine    Tablet 20 milliGRAM(s) Oral daily  ferrous    sulfate 325 milliGRAM(s) Oral daily  folic acid 1 milliGRAM(s) Oral daily  influenza   Vaccine 0.5 milliLiter(s) IntraMuscular once  levothyroxine 100 MICROGram(s) Oral daily  magnesium oxide 400 milliGRAM(s) Oral three times a day with meals  metoprolol tartrate 12.5 milliGRAM(s) Oral two times a day  midodrine. 5 milliGRAM(s) Oral <User Schedule>  potassium chloride    Tablet ER 20 milliEquivalent(s) Oral daily  senna 2 Tablet(s) Oral at bedtime  torsemide 20 milliGRAM(s) Oral daily    MEDICATIONS  (PRN):  acetaminophen   Tablet .. 650 milliGRAM(s) Oral every 6 hours PRN Moderate Pain (4 - 6)  melatonin 3 milliGRAM(s) Oral at bedtime PRN Insomnia  polyethylene glycol 3350 17 Gram(s) Oral daily PRN Constipation      PERTINENT LABS:  03-22 Na140 mmol/L Glu 85 mg/dL K+ 4.0 mmol/L Cr  0.78 mg/dL BUN 29 mg/dL<H> 03-22 Alb 2.5 g/dL<L>        SKIN:  stage 2 pressure ulcer right buttock, left buttock, coccyx, stage 1 left heel, right heel  EDEMA: +2 left foot, right foot  LAST BM: 3/22    ESTIMATED NEEDS:   [X] no change since previous assessment      PREVIOUS NUTRITION DIAGNOSIS:  moderate malnutrition    NUTRITION DIAGNOSIS is :  (  X )  Ongoing            NUTRITION RECOMMENDATIONS:   1. Encourage adequate PO intake  2. Maintain nutrition / hydration status  3. Continue providing Ensure Enlive supplement TID and Sander QD    MONITORING AND EVALUATION:   1. Tolerance to diet prescription   2. PO intake  3. Weights  4. Labs  5. Follow Up per protocol     RD to remain available    VICKY Pinzon POST Dietetic Intern NUTRITION FOLLOW UP    SOURCE: Patient [X)   Medical Record (X)    DIET: Regular, Low Sodium, Sander QD, Ensure Enlive TID    PATIENT REPORT  [X ] other: no GI distress    PO INTAKE:    50 [X]           CURRENT WEIGHT:  160.7 lb (3/23), 161.1 lbs (3/22), 170.1 lbs (3/19), 173.2 lbs (3/12) - Pt States UBW -160lb    Pt reports poor appetite. Pt reports consuming about half the meals, but makes an effort to complete the Ensure supplements that are being provided with each meal. Encouraged pt to consume adequate oral intake to maintain nutritional status. Pt c/o occasional constipation. Encouraged pt to consume adequate fluid intake. Weight trends indicate <10 lb weight loss, possibly to due edema +2 left foot, right foot ? Suggest continuation of Sander QD to promote wound healing.    PERTINENT MEDS:   Pertinent Medications: MEDICATIONS  (STANDING):  ascorbic acid 500 milliGRAM(s) Oral daily  aspirin enteric coated 81 milliGRAM(s) Oral daily  atorvastatin 40 milliGRAM(s) Oral at bedtime  enoxaparin Injectable 40 milliGRAM(s) SubCutaneous daily  famotidine    Tablet 20 milliGRAM(s) Oral daily  ferrous    sulfate 325 milliGRAM(s) Oral daily  folic acid 1 milliGRAM(s) Oral daily  influenza   Vaccine 0.5 milliLiter(s) IntraMuscular once  levothyroxine 100 MICROGram(s) Oral daily  magnesium oxide 400 milliGRAM(s) Oral three times a day with meals  metoprolol tartrate 12.5 milliGRAM(s) Oral two times a day  midodrine. 5 milliGRAM(s) Oral <User Schedule>  potassium chloride    Tablet ER 20 milliEquivalent(s) Oral daily  senna 2 Tablet(s) Oral at bedtime  torsemide 20 milliGRAM(s) Oral daily    MEDICATIONS  (PRN):  acetaminophen   Tablet .. 650 milliGRAM(s) Oral every 6 hours PRN Moderate Pain (4 - 6)  melatonin 3 milliGRAM(s) Oral at bedtime PRN Insomnia  polyethylene glycol 3350 17 Gram(s) Oral daily PRN Constipation      PERTINENT LABS:  03-22 Na140 mmol/L Glu 85 mg/dL K+ 4.0 mmol/L Cr  0.78 mg/dL BUN 29 mg/dL<H> 03-22 Alb 2.5 g/dL<L>        SKIN:  stage 2 pressure ulcer right buttock, left buttock, coccyx, stage 1 left heel, right heel  EDEMA: +2 left foot, right foot  LAST BM: 3/22    ESTIMATED NEEDS:   [X] no change since previous assessment      PREVIOUS NUTRITION DIAGNOSIS:  moderate malnutrition    NUTRITION DIAGNOSIS is :  (  X )  Ongoing            NUTRITION RECOMMENDATIONS:   1. Encourage adequate PO intake  2. Maintain nutrition / hydration status  3. Continue providing Ensure Enlive supplement TID and Sander QD    MONITORING AND EVALUATION:   1. Tolerance to diet prescription   2. PO intake  3. Weights  4. Labs  5. Follow Up per protocol     RD to remain available    VICKY Pinzon POST Dietetic Intern

## 2021-03-23 NOTE — PROGRESS NOTE ADULT - PROVIDER SPECIALTY LIST ADULT
Hospitalist
Rehab Medicine
Hospitalist
Internal Medicine
Rehab Medicine

## 2021-03-23 NOTE — DISCHARGE NOTE PROVIDER - NSDCMRMEDTOKEN_GEN_ALL_CORE_FT
acetaminophen 325 mg oral tablet: 2 tab(s) orally every 6 hours, As needed, Moderate Pain (4 - 6)  ascorbic acid 500 mg oral tablet: 1 tab(s) orally once a day  aspirin 81 mg oral delayed release tablet: 1 tab(s) orally once a day  atorvastatin 40 mg oral tablet: 1 tab(s) orally once a day (at bedtime)  enoxaparin: 30 milligram(s) subcutaneous once a day  famotidine 20 mg oral tablet: 1 tab(s) orally once a day  ferrous sulfate 325 mg (65 mg elemental iron) oral tablet: 1 tab(s) orally once a day  folic acid 1 mg oral tablet: 1 tab(s) orally once a day  levothyroxine 100 mcg (0.1 mg) oral tablet: 1 tab(s) orally once a day  magnesium oxide 400 mg (241.3 mg elemental magnesium) oral tablet: 1 tab(s) orally 3 times a day (with meals)  metoprolol: 12.5 milligram(s) orally 2 times a day  midodrine 5 mg oral tablet: 1 tab(s) orally 3 times a day  polyethylene glycol 3350 oral powder for reconstitution: 17 gram(s) orally once a day, As needed, Constipation  potassium chloride 20 mEq oral tablet, extended release: 1 tab(s) orally once a day  senna oral tablet: 2 tab(s) orally once a day (at bedtime)  torsemide 20 mg oral tablet: 1 tab(s) orally once a day   acetaminophen 325 mg oral tablet: 2 tab(s) orally every 6 hours, As needed, Moderate Pain (4 - 6)  Adult Aspirin Regimen 81 mg oral delayed release tablet: 1 tab(s) orally once a day  C-500-Gr oral tablet: 1 tab(s) orally once a day  Euthyrox 100 mcg (0.1 mg) oral tablet: 1 tab(s) orally once a day  FeroSul 325 mg (65 mg elemental iron) oral tablet: 1 tab(s) orally once a day  folic acid 1 mg oral tablet: 1 tab(s) orally once a day  Heartburn Relief 20 mg oral tablet: 1 tab(s) orally once a day  Klor-Con M20 oral tablet, extended release: 1 tab(s) orally once a day  Lipitor 40 mg oral tablet: 1 tab(s) orally once a day (at bedtime)  Mag-Ox 400 oral tablet: 1 tab(s) orally 3 times a day (with meals)  melatonin 3 mg oral tablet: 1 tab(s) orally once a day (at bedtime), As needed, Insomnia  Metoprolol Tartrate 25 mg oral tablet: 0.5 tab(s) orally 2 times a day   midodrine 5 mg oral tablet: 1 tab(s) orally 2 times a day   polyethylene glycol 3350 oral powder for reconstitution: 17 gram(s) orally once a day, As needed, Constipation  senna oral tablet: 2 tab(s) orally once a day (at bedtime)  torsemide 20 mg oral tablet: 1 tab(s) orally once a day

## 2021-03-24 ENCOUNTER — APPOINTMENT (OUTPATIENT)
Dept: CARDIOTHORACIC SURGERY | Facility: CLINIC | Age: 76
End: 2021-03-24

## 2021-03-24 ENCOUNTER — NON-APPOINTMENT (OUTPATIENT)
Age: 76
End: 2021-03-24

## 2021-03-26 ENCOUNTER — APPOINTMENT (OUTPATIENT)
Dept: CARE COORDINATION | Facility: HOME HEALTH | Age: 76
End: 2021-03-26
Payer: MEDICARE

## 2021-03-26 VITALS
RESPIRATION RATE: 16 BRPM | DIASTOLIC BLOOD PRESSURE: 68 MMHG | HEIGHT: 65 IN | OXYGEN SATURATION: 98 % | SYSTOLIC BLOOD PRESSURE: 102 MMHG | HEART RATE: 90 BPM

## 2021-03-26 PROCEDURE — 99024 POSTOP FOLLOW-UP VISIT: CPT

## 2021-03-26 NOTE — PHYSICAL EXAM
[Sclera] : the sclera and conjunctiva were normal [Neck Appearance] : the appearance of the neck was normal [Respiration, Rhythm And Depth] : normal respiratory rhythm and effort [Exaggerated Use Of Accessory Muscles For Inspiration] : no accessory muscle use [Auscultation Breath Sounds / Voice Sounds] : lungs were clear to auscultation bilaterally [Apical Impulse] : the apical impulse was normal [Heart Rate And Rhythm] : heart rate was normal and rhythm regular [Heart Sounds] : normal S1 and S2 [Murmurs] : no murmurs [Chest Visual Inspection Thoracic Asymmetry] : no chest asymmetry [FreeTextEntry2] : B/L LE without edema, B/L calves soft, NT [Bowel Sounds] : normal bowel sounds [Abdomen Soft] : soft [Abdomen Tenderness] : non-tender [Abnormal Walk] : normal gait [Musculoskeletal - Swelling] : no joint swelling seen [Skin Color & Pigmentation] : normal skin color and pigmentation [Skin Turgor] : normal skin turgor [] : no rash [FreeTextEntry1] : Resolving hematoma ecchymosis to left forehead [Oriented To Time, Place, And Person] : oriented to person, place, and time [Impaired Insight] : insight and judgment were intact [Affect] : the affect was normal

## 2021-03-26 NOTE — HISTORY OF PRESENT ILLNESS
[FreeTextEntry1] : 76 YOF with PMH of anemia, hypothyroidism, and GI bleed presented as transfer \par from [place] to [facility] on 2/16 for cardiac cath for NSTEMI. Pt initially presented \par a few days prior to [place] with fever and SOB and was found to have NSTEMI, \par urosepsis s/p course of ceftriaxone (completed 2/17), anemia requiring 2 PRBC \par (no GI work up because pt wanted to sign out AMA. Cardiac cath 2/16 showed \par multivessel CAD and severe aortic stenosis. Preop carotid US with bilateral \par carotid stenosis confirmed by CTA. Patient was seen by vascular and cleared for \par OR (will need outpatient follow up for carotid stenosis). Had endoscopy 2/17 \par and colonoscopy 2/19 without evidence of acute bleed, however was noted to have \par hiatal hernia. She is s/p CABG x3 with LIMA and AVR 2/24 with Dr. Cruz. 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 TTE on 3/9 showing EF improved to 55-60%. \par Last chest tube removed 3/9.  \par Patient was evaluated by PM&R and therapy for functional deficits and gait/ ADL \par impairments and recommended acute rehabilitation. Patient was medically \par optimized for discharge to [facility] on 3/12/21. In rehab pt progressed well. Mild forgetfulness, improving. Remains with Stage 1 sacral pressure ulcer. Pt discharged home with [facility] and private aide support during the day as her son who is staying with her works during the day. Of note her  is hospitalized at [facility] with heart failure. \par Initial [facility] visit completed in home, pt son and aide present for visit. Covid screening completed prior to visit and all participants negative and wearing proper PPE.\par CC "I'm feeling pretty good"

## 2021-03-26 NOTE — ASSESSMENT
[FreeTextEntry1] : Pt recovering well at home s/p OHS and AR stay. Pt did however 2 days ago suffer a mechanical fall attempting to sit on a very low toilet seat, she said she misjudged the space and missed toilet falling and hitting her head on the floor. Pt never lost consciousness and she denies dizzines prior to fall, she refused to be evaluated in ED or by EMS as per son. Her neuro status is at baseline and in fact "improving" since hospital stay as per son. Pt with resolving hematoma to forehead, soft, NT with surrounding soft ecchymosis. SHe denies visual changes, excessive sleepiness, dizziness or nausea.  Reviewed all medications and dosages with pt and pt son understanding. Pt has all medications in home and is taking as prescribed. Pain controlled with current medication regimen. No new symptoms, issues or concerns to report at this time.\par Emotional support provided to pt and pt son regarding pt  as he is still currently hospitalized and deemed to medically frail to operate on. He may come home on hospice care. \par

## 2021-03-26 NOTE — REVIEW OF SYSTEMS
[Fever] : no fever [Chills] : no chills [Feeling Poorly] : not feeling poorly [Feeling Tired] : feeling tired [Negative] : Psychiatric [FreeTextEntry7] : last BM today [de-identified] : small hematoma to left forehead

## 2021-03-26 NOTE — REASON FOR VISIT
[Follow-Up: _____] : a [unfilled] follow-up visit [Formal Caregiver] : formal caregiver [Family Member] : family member [FreeTextEntry1] : FOLLOW YOUR HEART- Transitional Care Management Program- [facility]\par \par

## 2021-03-31 ENCOUNTER — NON-APPOINTMENT (OUTPATIENT)
Age: 76
End: 2021-03-31

## 2021-04-12 ENCOUNTER — TRANSCRIPTION ENCOUNTER (OUTPATIENT)
Age: 76
End: 2021-04-12

## 2021-04-12 NOTE — DIETITIAN INITIAL EVALUATION ADULT. - NUTRITION DIAGNOSTIC #1
Anesthesiologist present for case.  See Anesthesia Record for details regarding sedation/anesthesia, medications, and vital signs.   Statement Selected

## 2021-04-16 ENCOUNTER — TRANSCRIPTION ENCOUNTER (OUTPATIENT)
Age: 76
End: 2021-04-16

## 2021-04-23 PROCEDURE — 85027 COMPLETE CBC AUTOMATED: CPT

## 2021-04-23 PROCEDURE — 97535 SELF CARE MNGMENT TRAINING: CPT

## 2021-04-23 PROCEDURE — 80048 BASIC METABOLIC PNL TOTAL CA: CPT

## 2021-04-23 PROCEDURE — 93005 ELECTROCARDIOGRAM TRACING: CPT

## 2021-04-23 PROCEDURE — 97163 PT EVAL HIGH COMPLEX 45 MIN: CPT

## 2021-04-23 PROCEDURE — 36415 COLL VENOUS BLD VENIPUNCTURE: CPT

## 2021-04-23 PROCEDURE — 92507 TX SP LANG VOICE COMM INDIV: CPT

## 2021-04-23 PROCEDURE — 70450 CT HEAD/BRAIN W/O DYE: CPT

## 2021-04-23 PROCEDURE — 92523 SPEECH SOUND LANG COMPREHEN: CPT

## 2021-04-23 PROCEDURE — 97530 THERAPEUTIC ACTIVITIES: CPT

## 2021-04-23 PROCEDURE — U0005: CPT

## 2021-04-23 PROCEDURE — 97110 THERAPEUTIC EXERCISES: CPT

## 2021-04-23 PROCEDURE — 97167 OT EVAL HIGH COMPLEX 60 MIN: CPT

## 2021-04-23 PROCEDURE — 83735 ASSAY OF MAGNESIUM: CPT

## 2021-04-23 PROCEDURE — 84484 ASSAY OF TROPONIN QUANT: CPT

## 2021-04-23 PROCEDURE — 85025 COMPLETE CBC W/AUTO DIFF WBC: CPT

## 2021-04-23 PROCEDURE — 97116 GAIT TRAINING THERAPY: CPT

## 2021-04-23 PROCEDURE — 83880 ASSAY OF NATRIURETIC PEPTIDE: CPT

## 2021-04-23 PROCEDURE — U0003: CPT

## 2021-04-23 PROCEDURE — 80053 COMPREHEN METABOLIC PANEL: CPT

## 2021-04-23 PROCEDURE — 71045 X-RAY EXAM CHEST 1 VIEW: CPT

## 2021-04-23 PROCEDURE — 81001 URINALYSIS AUTO W/SCOPE: CPT

## 2021-05-12 NOTE — CHART NOTE - NSCHARTNOTEFT_GEN_A_CORE
Nutrition Follow Up Note  Hospital Course   (Per Electronic Medical Record)    Source:  Patient [X]  Medical Record [X]      Diet:   Low Sodium Diet w/ Thin Liquids  Tolerates Diet Consistency Well  No Chewing/Swallowing Difficulties  No Recent Nausea, Vomiting, Diarrhea or Constipation (as Per Patient)  Consumes 100% of Meals (as Per Documentation) - States Fair Intake   on Ensure Enlive 8oz PO TID (Provides 1,050kcal & 60grams of Protein)  on Sander 1 Packet Daily (Provides 90kcal & 14grams of Protein)  Patient Takes Nutrition Supplement Well  Education Provided on Need for Supplementation   Obtained Food Preferences from Patient     Enteral/Parenteral Nutrition: Not Applicable    Current Weight: 168.2lb on 3/17  Weights Currently Stable @This Time  Obtain Weights Weekly     Pertinent Medications: MEDICATIONS  (STANDING):  ascorbic acid 500 milliGRAM(s) Oral daily  aspirin enteric coated 81 milliGRAM(s) Oral daily  atorvastatin 40 milliGRAM(s) Oral at bedtime  enoxaparin Injectable 40 milliGRAM(s) SubCutaneous daily  famotidine    Tablet 20 milliGRAM(s) Oral daily  ferrous    sulfate 325 milliGRAM(s) Oral daily  folic acid 1 milliGRAM(s) Oral daily  influenza   Vaccine 0.5 milliLiter(s) IntraMuscular once  levothyroxine 100 MICROGram(s) Oral daily  magnesium oxide 400 milliGRAM(s) Oral three times a day with meals  metoprolol tartrate 12.5 milliGRAM(s) Oral two times a day  midodrine. 5 milliGRAM(s) Oral <User Schedule>  potassium chloride    Tablet ER 20 milliEquivalent(s) Oral daily  senna 2 Tablet(s) Oral at bedtime  torsemide 20 milliGRAM(s) Oral daily    MEDICATIONS  (PRN):  acetaminophen   Tablet .. 650 milliGRAM(s) Oral every 6 hours PRN Moderate Pain (4 - 6)  melatonin 3 milliGRAM(s) Oral at bedtime PRN Insomnia  polyethylene glycol 3350 17 Gram(s) Oral daily PRN Constipation    Pertinent Labs:  03-15 Na141 mmol/L Glu 111 mg/dL<H> K+ 4.6 mmol/L Cr  0.70 mg/dL BUN 25 mg/dL<H> 03-15 Alb 2.2 g/dL<L> 02-16 PAB 15 mg/dL<L> 02-17 Chol 105 mg/dL LDL --    HDL 39 mg/dL<L> Trig 62 mg/dL    Skin: Multiple Pressure Ulcers     Edema: None Noted Recently     Last Bowel Movement: on 3/16    Estimated Needs:   [X] No Change Since Previous Assessment    Previous Nutrition Diagnosis:   Moderate Malnutrition     Nutrition Diagnosis is [X] Ongoing - Patient Continues on Nutrition Supplement, Patient Takes Nutrition Supplement & Nutrition Education Provided on Need for Supplementation     New Nutrition Diagnosis: [X] Not Applicable    Interventions:   1. Education Provided on Need for Supplementation   2. Recommend Continue Nutrition Plan of Care     Monitoring & Evaluation:   [X] Weights   [X] PO Intake   [X] Skin Integrity   [X] Follow Up (Per Protocol)  [X] Tolerance to Diet Prescription   [X] Other: Labs     Registered Dietitian/Nutritionist Remains Available.  Alex Nash RDN    Pager # [phone]  Phone# [phone] What Type Of Note Output Would You Prefer (Optional)?: Standard Output How Severe Are Your Spot(S)?: moderate Have Your Spot(S) Been Treated In The Past?: has not been treated Hpi Title: Evaluation of Skin Lesions

## 2021-06-04 ENCOUNTER — APPOINTMENT (OUTPATIENT)
Dept: ELECTROPHYSIOLOGY | Facility: CLINIC | Age: 76
End: 2021-06-04

## 2021-06-30 ENCOUNTER — APPOINTMENT (OUTPATIENT)
Dept: ELECTROPHYSIOLOGY | Facility: CLINIC | Age: 76
End: 2021-06-30

## 2021-08-02 ENCOUNTER — APPOINTMENT (OUTPATIENT)
Dept: ELECTROPHYSIOLOGY | Facility: CLINIC | Age: 76
End: 2021-08-02

## 2021-08-27 ENCOUNTER — APPOINTMENT (OUTPATIENT)
Dept: ELECTROPHYSIOLOGY | Facility: CLINIC | Age: 76
End: 2021-08-27
Payer: MEDICARE

## 2021-08-27 ENCOUNTER — NON-APPOINTMENT (OUTPATIENT)
Age: 76
End: 2021-08-27

## 2021-08-27 VITALS
HEART RATE: 80 BPM | TEMPERATURE: 98.7 F | WEIGHT: 154 LBS | SYSTOLIC BLOOD PRESSURE: 109 MMHG | HEIGHT: 65 IN | DIASTOLIC BLOOD PRESSURE: 70 MMHG | OXYGEN SATURATION: 95 % | BODY MASS INDEX: 25.66 KG/M2

## 2021-08-27 VITALS — SYSTOLIC BLOOD PRESSURE: 110 MMHG | DIASTOLIC BLOOD PRESSURE: 50 MMHG

## 2021-08-27 DIAGNOSIS — Z95.810 PRESENCE OF AUTOMATIC (IMPLANTABLE) CARDIAC DEFIBRILLATOR: ICD-10-CM

## 2021-08-27 DIAGNOSIS — I45.9 CONDUCTION DISORDER, UNSPECIFIED: ICD-10-CM

## 2021-08-27 PROCEDURE — 93000 ELECTROCARDIOGRAM COMPLETE: CPT | Mod: 59

## 2021-08-27 PROCEDURE — 93284 PRGRMG EVAL IMPLANTABLE DFB: CPT

## 2021-08-27 PROCEDURE — 99215 OFFICE O/P EST HI 40 MIN: CPT

## 2021-09-07 ENCOUNTER — APPOINTMENT (OUTPATIENT)
Dept: CARDIOLOGY | Facility: CLINIC | Age: 76
End: 2021-09-07
Payer: MEDICARE

## 2021-09-07 VITALS
BODY MASS INDEX: 25.66 KG/M2 | DIASTOLIC BLOOD PRESSURE: 62 MMHG | SYSTOLIC BLOOD PRESSURE: 122 MMHG | HEIGHT: 65 IN | OXYGEN SATURATION: 98 % | TEMPERATURE: 97 F | HEART RATE: 79 BPM | WEIGHT: 154 LBS

## 2021-09-07 DIAGNOSIS — E78.5 HYPERLIPIDEMIA, UNSPECIFIED: ICD-10-CM

## 2021-09-07 PROBLEM — I45.9 HEART BLOCK: Status: ACTIVE | Noted: 2021-03-22

## 2021-09-07 PROBLEM — Z95.810 BIVENTRICULAR ICD (IMPLANTABLE CARDIOVERTER-DEFIBRILLATOR) IN PLACE: Status: ACTIVE | Noted: 2021-09-07

## 2021-09-07 PROCEDURE — 99214 OFFICE O/P EST MOD 30 MIN: CPT

## 2021-09-07 RX ORDER — TORSEMIDE 20 MG/1
20 TABLET ORAL
Refills: 0 | Status: DISCONTINUED | COMMUNITY
End: 2021-09-07

## 2021-09-07 RX ORDER — POTASSIUM 75 MG
TABLET ORAL
Refills: 0 | Status: DISCONTINUED | COMMUNITY
End: 2021-09-07

## 2021-09-07 RX ORDER — ASPIRIN ENTERIC COATED TABLETS 81 MG 81 MG/1
81 TABLET, DELAYED RELEASE ORAL DAILY
Qty: 30 | Refills: 0 | Status: ACTIVE | COMMUNITY
Start: 2021-09-07

## 2021-09-07 NOTE — HISTORY OF PRESENT ILLNESS
[de-identified] : 76 year old Female with a PMH of iron-deficiency anemia, hypothyroidism, and GI bleed presented to [facility] with sob and fevers found to have urosepsis, anemia s/p transfusion, NSTEMI, severe AS and ischemic cardiomyopathy (EF 25-30%) who was transferred to [facility] found to have multi vessel CAD and severe AS s/p 3vCABG + bio-AVR on 2/24/2021 which was complicated by CHB and junctional escape (RBBB + LAFB).  TTE showed depressed EF of 25-30% s/p CRT-D implant (3/2/21).\par \par Patient never followed up after hospitalization.  Has been feeling very well and chose not too.  Remains asymptomatic and denies CP, SOB, GANN, dizziness, palpitations, syncope or presyncope. Prior to hospitalization and TAVR has significant SOB and GANN which has now resolved.\par \par On BB.  Not on GDMT with ACE or ARB.  Per hospital record, medication was held in the setting of KIMMY which resolved and low BP.  Remains on Midodrine.  PCP has been prescribing medications. \par \par

## 2021-09-07 NOTE — ASSESSMENT
[FreeTextEntry1] : CAD: Patient status post coronary artery bypass grafting.  The patient got tremendous clinical benefit.  The patient has good exercise capacity without exertional symptoms.\par \par Aortic stenosis: The patient is status post AVR.\par \par Overall have elected to discontinue torsemide.  Hopefully status post revascularization the patient will not need diuretic therapy.\par \par AICD: Device clinic follow-up has been arranged.  The patient previously was taken care of at [place] but wishes to come here.\par \par Hyperlipidemia: LFTs and cholesterol profile have been arranged.

## 2021-09-07 NOTE — DISCUSSION/SUMMARY
[FreeTextEntry1] : 76 year old Female with a PMH of iron-deficiency anemia, hypothyroidism, and GI bleed presented to 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  TTE showed depressed EF of 25-30% s/p CRT-D implant (3/2/21).\par \par Patient never followed up after hospitalization.  Has been feeling very well and chose not too.  Remains asymptomatic and denies CP, SOB, GANN, dizziness, palpitations, syncope or presyncope. Prior to hospitalization and TAVR has significant SOB and GANN which has now resolved.\par \par On BB.  Not on GDMT with ACE or ARB.  Per hospital record, medication was held in the setting of KIMMY which resolved and low BP.  Remains on Midodrine.  PCP has been prescribing medications. \par \par 1)  MDT CRT-D: Normal function.  No events. Left infraclavicular implant site is healing well and WNL.  NO evidence of hematoma or infection.  Effective CRT 99%\par 2) ICM s/p CABG, EF 25-30%: On BB.  Not on ACE/ARB due to KIMMY in [facility] and low BP. Advised to establish cardiology f/up for medical management. On ASA and Statin.  Consider repeat TTE s/p CRT\par 3) AS s/p TAVR:  On ASA \par \par Dr. Dr. Shipley.  Establish cardiology f/up ASAP\par Patient would like to see Dr. Reyes who is her sons cardiologist\par Jenniffer Ardon PAC\par \par

## 2021-09-07 NOTE — HISTORY OF PRESENT ILLNESS
[FreeTextEntry1] : CAD: Patient status post coronary artery bypass grafting.  The patient got tremendous clinical benefit.  The patient has good exercise capacity without exertional symptoms.\par \par Aortic stenosis: The patient is status post AVR.\par \par Overall have elected to discontinue torsemide.  Hopefully status post revascularization the patient will not need diuretic therapy.\par \par AICD: Device clinic follow-up has been arranged.  The patient previously was taken care of at [place] but wishes to come here.\par \par Hyperlipidemia: LFTs and cholesterol profile have been arranged.

## 2021-09-07 NOTE — REVIEW OF SYSTEMS
[Fever] : no fever [Chills] : no chills [Feeling Fatigued] : not feeling fatigued [SOB] : no shortness of breath [Dyspnea on exertion] : not dyspnea during exertion [Chest Discomfort] : no chest discomfort [Leg Claudication] : no intermittent leg claudication [Palpitations] : no palpitations [Orthopnea] : no orthopnea [Syncope] : no syncope [Cough] : no cough [Dizziness] : no dizziness [Easy Bleeding] : no tendency for easy bleeding [Easy Bruising] : no tendency for easy bruising

## 2021-09-07 NOTE — PHYSICAL EXAM
[General Appearance - Well Developed] : well developed [General Appearance - Well Nourished] : well nourished [Heart Sounds] : normal S1 and S2 [Heart Rate And Rhythm] : heart rate and rhythm were normal [Murmurs] : no murmurs present [Edema] : no peripheral edema present [] : no respiratory distress [Left Infraclavicular] : left infraclavicular area [Clean] : clean [Dry] : dry [Well-Healed] : well-healed [Bowel Sounds] : normal bowel sounds [Erythema] : not erythematous [Warm] : not warm [Tender] : not tender [Indurated] : not indurated

## 2021-09-07 NOTE — PROCEDURE
[No] : not [NSR] : normal sinus rhythm [Normal] : The battery status is normal. [See Device Printout] : See device printout [CRT-D] : Cardiac resynchronization therapy defibrillator [Sensing Amplitude ___mv] : sensing amplitude was [unfilled] mv [Lead Imp:  ___ohms] : lead impedance was [unfilled] ohms [___V @] : [unfilled] V [___ ms] : [unfilled] ms [Outputs/Safety Margin] : output changed to allow for adequate safety margin [Programmed for Longevity] : output reprogrammed for improved battery longevity [de-identified] : Cobalt [de-identified] : [id number] [de-identified] : 3/2/21 [de-identified] : NO events for review\par Effective CRT 99%

## 2021-09-28 ENCOUNTER — APPOINTMENT (OUTPATIENT)
Dept: CARDIOLOGY | Facility: CLINIC | Age: 76
End: 2021-09-28

## 2021-09-29 ENCOUNTER — APPOINTMENT (OUTPATIENT)
Dept: ELECTROPHYSIOLOGY | Facility: CLINIC | Age: 76
End: 2021-09-29

## 2021-10-18 ENCOUNTER — APPOINTMENT (OUTPATIENT)
Dept: ELECTROPHYSIOLOGY | Facility: CLINIC | Age: 76
End: 2021-10-18
Payer: MEDICARE

## 2021-10-18 VITALS
BODY MASS INDEX: 26.16 KG/M2 | HEIGHT: 65 IN | TEMPERATURE: 97.9 F | HEART RATE: 76 BPM | OXYGEN SATURATION: 97 % | SYSTOLIC BLOOD PRESSURE: 127 MMHG | DIASTOLIC BLOOD PRESSURE: 70 MMHG | RESPIRATION RATE: 17 BRPM | WEIGHT: 157 LBS

## 2021-10-18 PROCEDURE — 93284 PRGRMG EVAL IMPLANTABLE DFB: CPT

## 2021-10-18 PROCEDURE — 99214 OFFICE O/P EST MOD 30 MIN: CPT

## 2021-10-18 RX ORDER — SENNOSIDES 8.6 MG TABLETS 8.6 MG/1
TABLET ORAL
Refills: 0 | Status: DISCONTINUED | COMMUNITY
End: 2021-10-18

## 2021-10-18 RX ORDER — FAMOTIDINE 20 MG/1
20 TABLET, FILM COATED ORAL
Refills: 0 | Status: ACTIVE | COMMUNITY

## 2021-10-18 RX ORDER — MIDODRINE HYDROCHLORIDE 5 MG/1
5 TABLET ORAL
Refills: 0 | Status: DISCONTINUED | COMMUNITY
End: 2021-10-18

## 2021-10-18 RX ORDER — FOLIC ACID 1 MG/1
1 TABLET ORAL DAILY
Refills: 0 | Status: ACTIVE | COMMUNITY

## 2021-10-23 NOTE — DISCUSSION/SUMMARY
[FreeTextEntry1] : 76 year old Female with a PMH of iron-deficiency anemia, hypothyroidism, and GI bleed presented to 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 TTE showed depressed EF of 25-30% s/p CRT-D implant (3/2/21).\par \par During last follow up patient was un BB, but Not on GDMT with ACE or ARB. Per hospital record, medication was held in the setting of KIMMY which resolved and low BP. \par \par Since last follow up pt established care with Dr. Reyes. TTE planned. Requesting device follow up to be transitioned closer to home. \par \par Interrogation of CRT-D reveals normal function. A, RV and LV with adequate sense and pace thresholds. LV vector testing performed resulting in lower cature threshold programmed LV2 to RV coil. One brief NSVT in arrhythmia log. With settings adjustments battery longevity now estimated at 7.8 years. \par \par Recommendation:\par \par -Reprogrammed LV pacing vector from (LV3 to LV2) to (LV 2 to Rv coil) with autocapture on to maximize battery longevity. Estimated battery longevity from 4.5 years to 7.8 years. \par -Provided get connected service number to establish remote monitoring. \par -Requesting transfer to device follow up with EP out [facility], Provided information for Dr. Miguel. \par -EP follow up with Dr. Angel MAGUIRE. \par \par Jossy Johnston ANP-C

## 2021-10-23 NOTE — PROCEDURE
[No] : not [CRT-D] : Cardiac resynchronization therapy defibrillator [DDD] : DDD [Longevity: ___ months] : The estimated remaining battery life is [unfilled] months [Threshold Testing Performed] : Threshold testing was performed [Sensing Amplitude ___mv] : sensing amplitude was [unfilled] mv [Lead Imp:  ___ohms] : lead impedance was [unfilled] ohms [___V @] : [unfilled] V [___ ms] : [unfilled] ms [de-identified] : SR, CHB with escape  [de-identified] : Medtronic  [de-identified] : Cobalt [de-identified] : [id number] [de-identified] : 50 [de-identified] : 03/02/21

## 2021-10-23 NOTE — REVIEW OF SYSTEMS
[Headache] : no headache [Feeling Fatigued] : not feeling fatigued [SOB] : no shortness of breath [Dyspnea on exertion] : not dyspnea during exertion [Chest Discomfort] : no chest discomfort [Lower Ext Edema] : no extremity edema [Palpitations] : no palpitations [Orthopnea] : no orthopnea [Syncope] : no syncope [Dizziness] : no dizziness

## 2021-10-23 NOTE — HISTORY OF PRESENT ILLNESS
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 TTE showed depressed EF of 25-30% s/p CRT-D implant (3/2/21).\par \par During last follow up patient was un BB, but Not on GDMT with ACE or ARB. Per hospital record, medication was held in the setting of KIMMY which resolved and low BP. \par \par Since last follow up pt established care with Dr. Reyes. TTE planned. Requesting device follow up to be transitioned closer to home. \par \par Interrogation of CRT-D reveals normal function. A, RV and LV with adequate sense and pace thresholds. LV vector testing performed resulting in lower cature threshold programmed LV2 to RV coil. One brief NSVT in arrhythmia log. With settings adjustments battery longevity now estimated at 7.8 years.

## 2021-10-29 ENCOUNTER — APPOINTMENT (OUTPATIENT)
Dept: CARDIOLOGY | Facility: CLINIC | Age: 76
End: 2021-10-29
Payer: MEDICARE

## 2021-10-29 PROCEDURE — 93306 TTE W/DOPPLER COMPLETE: CPT

## 2021-12-03 NOTE — PHYSICAL THERAPY INITIAL EVALUATION ADULT - WEIGHT-BEARING RESTRICTIONS: GAIT, REHAB EVAL
Ajit w/ [facility] calling to report: weight gain of 10lbs in the last month, today 252.4lbs (on 21 242.8lbs). Patient believes it's from her recent prednisone use but nurse is concerned about congestive heart failure (because it is listed on her diagnosis list). Patient also has increased shortness of breath, lungs sound good. Recently had pneumonia, provider ordered prednisone for this but patient no longer wants to take due to weight gain. Elevated Blood sugars, today 210 (fasting). Patient states that she has talked with provider about this and requesting imaging to be done on lump on left upper quadrant and under left rib cage. Also wondering about XRAY for pneumonia f/u. Nurse wants to clarify lasix dose.    Reason for call:  Home Healthcare Reason for Call:  Home Health Care    Ajit with [facility] called regarding (reason for call): orders    Orders are needed for this patient.    Skilled Nursinx/weekly visit    Pt Provider: Dr. Chawla    Phone Number Homecare Nurse can be reached at: [phone]    Can we leave a detailed message on this number? YES    Call taken on 12/3/2021 at 12:39 PM by Brianda Ramirez   weight-bearing as tolerated

## 2022-02-23 ENCOUNTER — NON-APPOINTMENT (OUTPATIENT)
Age: 77
End: 2022-02-23

## 2022-02-24 ENCOUNTER — APPOINTMENT (OUTPATIENT)
Dept: ELECTROPHYSIOLOGY | Facility: CLINIC | Age: 77
End: 2022-02-24

## 2022-02-24 ENCOUNTER — APPOINTMENT (OUTPATIENT)
Dept: CARDIOLOGY | Facility: CLINIC | Age: 77
End: 2022-02-24
Payer: SELF-PAY

## 2022-02-24 PROCEDURE — 93296 REM INTERROG EVL PM/IDS: CPT

## 2022-02-24 PROCEDURE — 93295 DEV INTERROG REMOTE 1/2/MLT: CPT

## 2022-04-08 NOTE — PROGRESS NOTE ADULT - SUBJECTIVE AND OBJECTIVE BOX
Called patient and advised there should not be any issues with the biopsy site healing if she gets her 2nd Covid vaccine booster next week. Patient verbalizes understanding.     Florence Walker RN  [facility]    Patient is a 76y old  Female who presents with a chief complaint of Functional Deficits 2/2 CABG and AVR (9 - Cardiac) (14 Mar 2021 10:51)    77yo F with PMH of anemia, hypothyroidism, and GI bleed presented as transfer from [place] to [facility] on 2/16 for cardiac cath for NSTEMI. Pt initially presented a few days prior to [place] with fever and SOB and was found to have NSTEMI, urosepsis s/p course of ceftriaxone (completed 2/17), anemia requiring 2 PRBC (no GI work up because pt wanted to sign out AMA. Cardiac cath 2/16 showed multivessel CAD and severe aortic stenosis. Preop carotid US with bilateral carotid stenosis confirmed by CTA. Patient was seen by vascular and cleared for OR (will need outpatient follow up for carotid stenosis). Had endoscopy 2/17 and colonoscopy 2/19 without evidence of acute bleed, however was noted to have hiatal hernia. She is s/p CABG x3 with LIMA and AVR 2/24 with Dr. Cruz. 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 TTE on 3/9 showing EF improved to 55-60%. Last chest tube removed 3/9. Coccyx noted to have some skin breakdown-pt complaining of buttock pain.  Patient was evaluated by PM&R and therapy for functional deficits and gait/ ADL impairments and recommended acute rehabilitation. Patient was medically optimized for discharge to [facility] on 3/12/21.   (12 Mar 2021 16:12)    PAST MEDICAL & SURGICAL HISTORY:  Iron deficiency anemia due to chronic blood loss  Hypothyroid  History of tonsillectomy    TODAY'S SUBJECTIVE & REVIEW OF SYMPTOMS: Patient seen and evaluated this morning. No acute events overnight. Participating well in therapy. Pain is well controlled. Denies chest pain, SOB, nausea, vomiting, constipation or diarrhea. Slept well last night. Reports feeling confused sometimes at night at baseline. Has not seen any physician for this. No falls at home. NO impulsivity or fall or agitation at nighttime. As per night nurse, patient was not confused at night.     Allergies  No Known Allergies    PHYSICAL EXAM  76y Vital Signs Last 24 Hrs  T(C): 36.3 (17 Mar 2021 07:45), Max: 37 (16 Mar 2021 20:54)  T(F): 97.4 (17 Mar 2021 07:45), Max: 98.6 (16 Mar 2021 20:54)  HR: 100 (17 Mar 2021 07:45) (92 - 100)  BP: 118/56 (17 Mar 2021 07:45) (96/64 - 146/84)  BP(mean): --  RR: 16 (17 Mar 2021 07:45) (15 - 16)  SpO2: 98% (17 Mar 2021 07:45) (96% - 98%)    Constitutional - NAD, Comfortable  HEENT - NCAT, EOMI, +dentures   Chest - good respiratory effort, CTAB   Cardio - warm and well perfused,   Abdomen -  Soft, NT ND  Extremities - bilateral pitting edema 2+ feet and 1+ shin  Neurologic Exam:                    Cognitive -             Orientation: Awake, Alert and oriented            Speech - Fluent, Comprehensible, No dysarthria, No aphasia      Cranial Nerves - No facial asymmetry, Tongue midline, EOMI, Shoulder shrug intact     Motor -                      LEFT    UE - ShAB 5/5, EF 5/5, EE 5/5, WE 5/5,  WNL                    RIGHT UE - ShAB 5/5, EF 5/5, EE 5/5, WE 5/5,  WNL                    LEFT    LE - HF 4/5, KE 5/5, DF 5/5, PF 5/5                    RIGHT LE - HF 4/5, KE 5/5, DF 5/5, PF 5/5        Sensory - Intact to LT bilateral  Psychiatric - Mood stable, Affect WNL  Skin on admission:     Sternal incision well approximated, no erythema or drainage      Drain incision site below sternal incision; right oozing blood, mild      Stage II sacral 0.5x0.5     Stage II R/L buttock 0.5x0.5     Multiple areas of ecchymosis on abdomen and extremities      RECENT LABS:   Vital Signs Last 24 Hrs  T(C): 36.3 (17 Mar 2021 07:45), Max: 37 (16 Mar 2021 20:54)  T(F): 97.4 (17 Mar 2021 07:45), Max: 98.6 (16 Mar 2021 20:54)  HR: 100 (17 Mar 2021 07:45) (92 - 100)  BP: 118/56 (17 Mar 2021 07:45) (96/64 - 146/84)  BP(mean): --  RR: 16 (17 Mar 2021 07:45) (15 - 16)  SpO2: 98% (17 Mar 2021 07:45) (96% - 98%)    MEDICATIONS  (STANDING):  ascorbic acid 500 milliGRAM(s) Oral daily  aspirin enteric coated 81 milliGRAM(s) Oral daily  atorvastatin 40 milliGRAM(s) Oral at bedtime  enoxaparin Injectable 40 milliGRAM(s) SubCutaneous daily  famotidine    Tablet 20 milliGRAM(s) Oral daily  ferrous    sulfate 325 milliGRAM(s) Oral daily  folic acid 1 milliGRAM(s) Oral daily  influenza   Vaccine 0.5 milliLiter(s) IntraMuscular once  levothyroxine 100 MICROGram(s) Oral daily  magnesium oxide 400 milliGRAM(s) Oral three times a day with meals  metoprolol tartrate 12.5 milliGRAM(s) Oral two times a day  midodrine. 5 milliGRAM(s) Oral three times a day  potassium chloride    Tablet ER 20 milliEquivalent(s) Oral daily  senna 2 Tablet(s) Oral at bedtime  torsemide 20 milliGRAM(s) Oral daily    MEDICATIONS  (PRN):  acetaminophen   Tablet .. 650 milliGRAM(s) Oral every 6 hours PRN Moderate Pain (4 - 6)  melatonin 3 milliGRAM(s) Oral at bedtime PRN Insomnia  polyethylene glycol 3350 17 Gram(s) Oral daily PRN Constipation      ASSESSMENT AND PLAN  76/F PMH of anemia, hypothyroidism, and GI bleed presented as transfer from [place] to [facility] on 2/16 for cardiac cath for NSTEMI. 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)    Comprehensive Multidisciplinary Rehab Program:  - Gait, ADL, Functional impairments  - CMS Impairment group: 09 (Cardiac)  - PT 90 mins/OT 60 mins/ SLP 30 mins for total of 3 hours a day 5 days a week    #Functional deficits 2/2 Acute Systolic Heart Failure s/p CABG x3, AVR, and BIV AICD placement); EF 55-60% after surgery (TTE 3/9)  - device interrogated, no afib seen  - asa, Lipitor  - lopressor 12.5mg bid  - torsemide 20mg qd and daily potassium supplementation of 20mEq, MgOxide 400mg; maintain K>4 and Mg >2  - bilateral LE ace wraps for edema  - midodrine 5mg TID for BP support  - incentive spirometry  - daily weights, strict Is and Os  - weekly CXR per cardiology  - has follow up with EP doctor  Dr. Ortiz on March 15 to check on new cardiac device/BIV-ICD    will need to call to reschedule/ inform she is in rehab    #Bilateral Carotid Stenosis- seen on US and CT Angio Carotid  - seen by vascular surgery at [facility] – will need OP follow up    #Hiatal Hernia  - found during endoscopy  - follow up as outpatient    #Hypothyroid  - c/w Synthroid 100mcg  - will need OP f/u in 4-6 weeks for TFT monitoring       Sleep- Melatonin PRN  cognitive deficits- memory deficits as per speech therapy. Patient reports nighttime confusion but no falls. NO agitation or confusion at night as per night staff. Plan to continue to monitor for now.     Pain:- Tylenol PRN    GI/Bowel:  - Senna 2 tabs at qhs, Miralax daily prn  - GI ppx: pepcid  /Bladder:  - Monitor Voiding well, toileting schedule q4h    Diet / Dysphagia:   Carbohydrate Consistent; Endure supplementation, Sander, Vit C    Skin/ Pressure Injury Prevention:  - assessment on admission:     Stage II sacral and b/l buttock pressure injuries     - barrier cream and Allevyn dressing placed    - offloading   - Incisions:     Sternal incision open to air    drain incisions: one oozing mild amount of blood - dry, sterile, dressing placed  -Turn Q2hrs in bed while awake, OOB to Chair, PT/OT/SLP   -Sander daily, vitamin C    DVT prophylaxis: Lovenox, SCDs    Precautions/ Restrictions  - Falls, Cardiac, Sternal  - Lungs: Aspiration, Incentive Spirometer    - COVID PCR: neg 3/12    Dispo: IDT rounds 3/16/21- Team meeting: Discussed with multidisciplinary team about patient's current function, progress, goals, barriers, discharge plan and set up. Updated patient about discharge plan. DC planned 3/30/21

## 2022-05-25 ENCOUNTER — RX RENEWAL (OUTPATIENT)
Age: 77
End: 2022-05-25

## 2022-05-26 ENCOUNTER — NON-APPOINTMENT (OUTPATIENT)
Age: 77
End: 2022-05-26

## 2022-05-26 ENCOUNTER — APPOINTMENT (OUTPATIENT)
Dept: CARDIOLOGY | Facility: CLINIC | Age: 77
End: 2022-05-26
Payer: MEDICARE

## 2022-05-26 PROCEDURE — 93296 REM INTERROG EVL PM/IDS: CPT

## 2022-05-26 PROCEDURE — 93295 DEV INTERROG REMOTE 1/2/MLT: CPT

## 2022-08-25 ENCOUNTER — APPOINTMENT (OUTPATIENT)
Dept: CARDIOLOGY | Facility: CLINIC | Age: 77
End: 2022-08-25

## 2022-08-25 ENCOUNTER — NON-APPOINTMENT (OUTPATIENT)
Age: 77
End: 2022-08-25

## 2022-08-25 PROCEDURE — 93295 DEV INTERROG REMOTE 1/2/MLT: CPT

## 2022-08-25 PROCEDURE — 93296 REM INTERROG EVL PM/IDS: CPT

## 2022-09-15 NOTE — AIRWAY REMOVAL NOTE  ADULT & PEDS - REASON ARTIFICAL AIRWAY REMOVED:
[id number]  YOB: 1960          Assessment & Plan:     Severe oliguric LAVINIA on CVVH then transition to IHD from 9/12  Severe lactic acidosis  Ketoacidosis  Resp failure s/p intubation and extubation   DM2  Thrombocytopenia  Hypophosphatemia     Rec:  HD MWF schedule   Plan for extra UF today for 2 hr  Avoid nephrotoxins   ICU support       Subjective:   CC: LAVINIA  HPI: Seen, feels better , edema + NGT  ROS: unable to obtain due to pt condition  Current Facility-Administered Medications   Medication Dose Route Frequency    dilTIAZem (CARDIZEM) 100 mg in 0.9% sodium chloride (MBP/ADV) 100 mL infusion  0-15 mg/hr IntraVENous TITRATE    albuterol-ipratropium (DUO-NEB) 2.5 MG-0.5 MG/3 ML  3 mL Nebulization Q6H RT    amiodarone (CORDARONE) 375 mg in dextrose 5% 250 mL infusion  0.5-1 mg/min IntraVENous TITRATE    albuterol-ipratropium (DUO-NEB) 2.5 MG-0.5 MG/3 ML  3 mL Nebulization Q4H PRN    polyethylene glycol (MIRALAX) packet 17 g  17 g Oral DAILY    sennosides (SENOKOT) 8.8 mg/5 mL syrup 8.8 mg  5 mL Oral DAILY    hydrALAZINE (APRESOLINE) 20 mg/mL injection 10 mg  10 mg IntraVENous Q6H PRN    acetaminophen (TYLENOL) tablet 650 mg  650 mg Oral Q6H PRN    OLANZapine (ZyPREXA) tablet 5 mg  5 mg Per NG tube BID PRN    QUEtiapine (SEROquel) tablet 50 mg  50 mg Oral BID    insulin glargine (LANTUS) injection 16 Units  16 Units SubCUTAneous DAILY    folic acid (FOLVITE) tablet 1 mg  1 mg Oral DAILY    cyanocobalamin (VITAMIN B12) tablet 500 mcg  500 mcg Per NG tube DAILY    dexmedeTOMidine in 0.9 % NaCl (PRECEDEX) 400 mcg/100 mL (4 mcg/mL) infusion soln  0.1-1.5 mcg/kg/hr IntraVENous TITRATE    dextrose 10% infusion 0-250 mL  0-250 mL IntraVENous PRN    insulin lispro (HUMALOG) injection   SubCUTAneous Q6H    levothyroxine (SYNTHROID) tablet 100 mcg  100 mcg Oral ACB    glucose chewable tablet 16 g  4 Tablet Oral PRN    glucagon (GLUCAGEN) injection 1 mg  1 mg IntraMUSCular PRN    heparin (porcine) injection 5,000 Units  5,000 Units SubCUTAneous Q12H          Objective:     Vitals:  Blood pressure 107/69, pulse (!) 119, temperature 98.2 °F (36.8 °C), resp. rate 30, height 5' 6\" (1.676 m), weight 87.8 kg (193 lb 9 oz), SpO2 97 %. Temp (24hrs), Av.7 °F (37.1 °C), Min:98.2 °F (36.8 °C), Max:99 °F (37.2 °C)      Intake and Output:  No intake/output data recorded.  1901 - 09/15 0700  In: 1993.1 [I.V.:1153.1]  Out: 0849 [Drains:150]    Physical Exam:               GENERAL ASSESSMENT: NAD  HEENT: peerla   CHEST: diminished BS +  HEART: S1S2  ABDOMEN: Soft,NT  : +Vega:   EXTREMITY: + EDEMA        ECG/rhythm:    Data Review      No results for input(s): TNIPOC in the last 72 hours. No lab exists for component: ITNL     No results for input(s): CPK, CKMB, TROIQ in the last 72 hours. Recent Labs     09/15/22  0919 09/15/22  0004 22  0007     --  140 136   < > 139   K 3.3*  --  3.4* 3.7   < > 3.1*     --  104 101   < > 103   CO2 30  --  31 28   < > 27   BUN 48*  --  36* 67*   < > 26*   CREA 3.93*  --  2.91* 4.39*   < > 2.10*   *  --  155* 225*   < > 225*   PHOS 3.9  --  2.2* 4.5   < > 1.6*   MG 2.4  --  2.3 2.5*   < > 2.4   CA 7.5*  --  7.7* 8.1*   < > 7.9*   WBC  --  8.1  --   --   --  10.2   HGB  --  7.1*  --   --   --  7.6*   HCT  --  21.3*  --   --   --  21.8*   PLT  --  135*  --   --   --  116*    < > = values in this interval not displayed. No results for input(s): INR, PTP, APTT, INREXT, INREXT in the last 72 hours. Needs: urine analysis, urine sodium, protein and creatinine  No results found for: [facility]        : Ashif Hildreth Spurling, MD  9/15/2022        [facility]:  [email or web address]  [place] Agarwal[place] office:  [address] [place] [address], [address] [place] [address],[address]  [facility], [address] [facility]  Phone: [phone]  Fax : [phone]    [place] office:  [address]  [place], [place]  Phone - [phone]  Fax - [phone] reason for artificial airway has resolved

## 2022-09-19 ENCOUNTER — APPOINTMENT (OUTPATIENT)
Dept: CARDIOLOGY | Facility: CLINIC | Age: 77
End: 2022-09-19

## 2022-09-19 VITALS
OXYGEN SATURATION: 95 % | BODY MASS INDEX: 25.16 KG/M2 | HEART RATE: 65 BPM | HEIGHT: 65 IN | DIASTOLIC BLOOD PRESSURE: 68 MMHG | SYSTOLIC BLOOD PRESSURE: 110 MMHG | TEMPERATURE: 97.1 F | WEIGHT: 151 LBS

## 2022-09-19 PROCEDURE — 93284 PRGRMG EVAL IMPLANTABLE DFB: CPT

## 2022-11-04 NOTE — PROGRESS NOTE ADULT - CONSTITUTIONAL COMMENTS
Patient is here for follow up on her thumbs.  Karen Mcgowan LPN .....................11/4/2022 8:09 AM     found resting comfortably in bed

## 2022-11-28 ENCOUNTER — NON-APPOINTMENT (OUTPATIENT)
Age: 77
End: 2022-11-28

## 2022-11-28 ENCOUNTER — APPOINTMENT (OUTPATIENT)
Dept: CARDIOLOGY | Facility: CLINIC | Age: 77
End: 2022-11-28

## 2022-11-28 PROCEDURE — 93295 DEV INTERROG REMOTE 1/2/MLT: CPT

## 2022-11-28 PROCEDURE — 93296 REM INTERROG EVL PM/IDS: CPT

## 2022-12-01 ENCOUNTER — APPOINTMENT (OUTPATIENT)
Dept: CARDIOLOGY | Facility: CLINIC | Age: 77
End: 2022-12-01

## 2022-12-01 VITALS
WEIGHT: 150 LBS | HEIGHT: 67 IN | BODY MASS INDEX: 23.54 KG/M2 | DIASTOLIC BLOOD PRESSURE: 78 MMHG | HEART RATE: 66 BPM | TEMPERATURE: 97.1 F | SYSTOLIC BLOOD PRESSURE: 120 MMHG | OXYGEN SATURATION: 99 %

## 2022-12-01 DIAGNOSIS — I25.10 ATHEROSCLEROTIC HEART DISEASE OF NATIVE CORONARY ARTERY W/OUT ANGINA PECTORIS: ICD-10-CM

## 2022-12-01 DIAGNOSIS — I35.0 NONRHEUMATIC AORTIC (VALVE) STENOSIS: ICD-10-CM

## 2022-12-01 PROCEDURE — 99214 OFFICE O/P EST MOD 30 MIN: CPT

## 2022-12-01 RX ORDER — ATORVASTATIN CALCIUM 40 MG/1
40 TABLET, FILM COATED ORAL
Refills: 0 | Status: DISCONTINUED | COMMUNITY
End: 2022-12-01

## 2022-12-01 NOTE — HISTORY OF PRESENT ILLNESS
[FreeTextEntry1] : CAD: The patient is status post coronary artery bypass grafting.  There is no exertional chest discomfort or shortness of breath.\par \par Aortic stenosis: The patient is status post TAVR.  Postoperative echocardiography reveals a normally functioning prosthesis.\par \par \par Brain fog: The patient is complaining of memory disturbance and brain fog-like symptoms.  Overall we elected to discontinue statin therapy.  We will reevaluate at follow-up visit and decide on reinstitution of statin therapy.\par \par Nonsustained ventricular tachycardia: On routine interrogation of her device brief nonsustained ventricular tachycardia was found.  The patient was asymptomatic.  The patient has normal left ventricular function.  We will consider up titration of beta-blocker therapy after we address her brain fog issue.

## 2022-12-18 NOTE — BRIEF OPERATIVE NOTE - CONDITION POST OP
I recommend coming in if persistent swelling and pain of throat. Viral pharyngitis can last 7 to 10 days, but if worsening ST, we should take a look at the throat.     Arti Ashton PA-C    
I talked to the pt at 10am on 12/18 and she is getting better, she did got to UC on Tuesday 12/13, no follow up is needed .Dom Aragon CMA    
Patient calling stating she was seen in UC and told to call back if not getting better by Friday.    Pt states her throat pain hasn't gotten better and is now having a loss of voice. She states the UC provider told her to call back and maybe a steroid would be prescribed.  She has been taking 600 mg of ibuprofen that helps with pain but does not last longer than 4 hours.    Please advise    Florentin BINGHAM RN, BSN        
Please see note below, is that something you can help over the phone or should they come in .Dom Aragon CMA    
stable
Stable.
critical

## 2023-02-07 ENCOUNTER — APPOINTMENT (OUTPATIENT)
Dept: CARDIOLOGY | Facility: CLINIC | Age: 78
End: 2023-02-07

## 2023-02-27 ENCOUNTER — NON-APPOINTMENT (OUTPATIENT)
Age: 78
End: 2023-02-27

## 2023-02-27 ENCOUNTER — APPOINTMENT (OUTPATIENT)
Dept: CARDIOLOGY | Facility: CLINIC | Age: 78
End: 2023-02-27
Payer: MEDICARE

## 2023-02-27 PROCEDURE — 93295 DEV INTERROG REMOTE 1/2/MLT: CPT

## 2023-02-27 PROCEDURE — 93296 REM INTERROG EVL PM/IDS: CPT

## 2023-04-04 NOTE — BRIEF OPERATIVE NOTE - NSICDXBRIEFOPLAUNCH_GEN_ALL_CORE
<--- Click to Launch ICDx for PreOp, PostOp and Procedure
<--- Click to Launch ICDx for PreOp, PostOp and Procedure
normal...

## 2023-05-30 ENCOUNTER — APPOINTMENT (OUTPATIENT)
Dept: CARDIOLOGY | Facility: CLINIC | Age: 78
End: 2023-05-30
Payer: MEDICARE

## 2023-05-30 ENCOUNTER — NON-APPOINTMENT (OUTPATIENT)
Age: 78
End: 2023-05-30

## 2023-05-30 PROCEDURE — 93296 REM INTERROG EVL PM/IDS: CPT

## 2023-05-30 PROCEDURE — 93295 DEV INTERROG REMOTE 1/2/MLT: CPT

## 2023-08-29 ENCOUNTER — NON-APPOINTMENT (OUTPATIENT)
Age: 78
End: 2023-08-29

## 2023-08-29 ENCOUNTER — APPOINTMENT (OUTPATIENT)
Dept: CARDIOLOGY | Facility: CLINIC | Age: 78
End: 2023-08-29
Payer: MEDICARE

## 2023-08-29 PROCEDURE — 93295 DEV INTERROG REMOTE 1/2/MLT: CPT

## 2023-08-29 PROCEDURE — 93296 REM INTERROG EVL PM/IDS: CPT

## 2023-09-11 ENCOUNTER — RX RENEWAL (OUTPATIENT)
Age: 78
End: 2023-09-11

## 2023-09-11 RX ORDER — METOPROLOL TARTRATE 25 MG/1
25 TABLET, FILM COATED ORAL
Qty: 90 | Refills: 0 | Status: ACTIVE | COMMUNITY
Start: 2022-05-25 | End: 1900-01-01

## 2023-11-28 ENCOUNTER — APPOINTMENT (OUTPATIENT)
Dept: CARDIOLOGY | Facility: CLINIC | Age: 78
End: 2023-11-28
Payer: MEDICARE

## 2023-11-28 ENCOUNTER — NON-APPOINTMENT (OUTPATIENT)
Age: 78
End: 2023-11-28

## 2023-11-28 PROCEDURE — 93296 REM INTERROG EVL PM/IDS: CPT

## 2023-11-28 PROCEDURE — 93295 DEV INTERROG REMOTE 1/2/MLT: CPT

## 2024-02-26 ENCOUNTER — NON-APPOINTMENT (OUTPATIENT)
Age: 79
End: 2024-02-26

## 2024-02-27 ENCOUNTER — APPOINTMENT (OUTPATIENT)
Dept: CARDIOLOGY | Facility: CLINIC | Age: 79
End: 2024-02-27
Payer: MEDICARE

## 2024-02-27 PROCEDURE — 93295 DEV INTERROG REMOTE 1/2/MLT: CPT

## 2024-02-27 PROCEDURE — 93296 REM INTERROG EVL PM/IDS: CPT

## 2024-02-28 NOTE — PROGRESS NOTE ADULT - PEDAL EDEMA SEVERITY
February 28, 2024       Bret Palomino MD  [address] [address]  Via In Basket      Patient: Yvonne R Wozniczka Doege   YOB: 1979   Date of Visit: 2/28/2024       Dear Dr. Palomino:    Thank you for referring Yvonne Wozniczka Doege to me for evaluation. Below are my notes for this visit     If you have questions, please do not hesitate to call me.       Sincerely,        Selwyn Morgan [facility]        CC: Nori Vera MD  [facility]    Selwyn Morgan CGC  2/28/2024 11:44 AM  Signed  02/28/24     Patient Name: Yvonne R Wozniczka Doege  Referring Provider: Bret Palomino MD  Patient Seen By: Selwyn Morgan MS, [facility]    Yvonne Wozniczka Doege met with me on 2/28/2024 for genetic consultation due to their family history of ovarian and other cancer.  The history below was obtained from your patient and their medical records.  This letter summarizes our visit.      This was a telehealth visit via live interactive video with a secure connection. This visit was not recorded or stored. Consent for telehealth visit was verified including risk of electronic data, possibility of equipment failure or need for in-person visit if condition cannot be fully assessed by telehealth.      Provider location: Home  Patient Location: Home they were unaccompanied    Reason for visit: Genetic counseling    Starting time of visit: 9:50 AM  Ending time of visit: 10:45 AM       HPI:    CANCER HISTORY:  Yvonne Wozniczka Doege does not have a personal history of cancer    Past Medical History  Breast: No prior breast concerns  Uterus: intact  Ovaries: intact, history of atypical ovarian cyst found before the age of 42 which has changed over time and grown recently  Colonoscopy: no  Cumulative polyps to date: n/a  Skin concerns: denied  Thyroid disease: denied  Other medical issues:  History of kidney infections, UTIs, and atypical menses, HPV  The above information is based on the  patient's report only.    REPRODUCTIVE HISTORY:  Menarche: 9   Menopause: Postmenopausal, natural, perimenopausal at 34, postmenopausal at 44  Delivered their first child at age 30. They are   Breast fed: No   Oral contraceptive use: No  HRT: No    FAMILY HISTORY:  A complete 3 generation family history was obtained from your patient  Cancer family history:    Relative Cancer Site Age at Diagnosis Other Relevant History   Maternal Grandmother Ovarian 45  82   Maternal Grandfather Kidney 63  63   Father Lung 68  68 ; History of Smoking   Paternal Uncle Stomach 57  60   Paternal Uncle Prostate 74 Currently 75   Paternal First Cousin Ovarian 42 Currently 46   Paternal Grandfather Stomach (?) 78  78     Additional Family History  Yvonne Wozniczka Doege mentioned that there are extended maternal relatives through their grandmother with cancer but is uncertain of the types.  Maternal ancestry: [place]   Paternal ancestry: [place]   Ashkenazi Yazidism ancestry: No  Consanguinity: No  No previous genetic testing reported in the family  See pedigree (scanned into the Media tab) for additional family history    Unless otherwise noted, this family history is based upon patient report and not confirmed by medical records.  Significant differences in the actual family history may change your patient's risk assessment.    RISK MODELING  Harrison syndrome pathogenic variant probability: 0.91% (PREMM1,2,6)    It was emphasized risk modeling tools do not take into account all familial and personal risk factors, and, therefore, may be an over- or underestimate of their actual risks.    NCCN Guidelines:  Yvonne Wozniczka Doege meets NCCN criteria for genetic testing for high risk ovarian cancer susceptibility genes due to their family history of ovarian cancer in a second degree relative    DISCUSSION  We discussed that ~20% of ovarian cancers are currently  thought to occur due to a single gene hereditary risk factor. The most common known cause are pathogenic variants of the BRCA1 or BRCA2 genes which cause Hereditary Breast and Ovarian Cancer Syndrome (HBOC) . Individuals with HBOC have significantly increased cancer risks including a greater than 60% risk to develop breast cancer, a 13-60% risk to develop ovarian cancer, and an increased risk for other primary cancers including male breast cancer (1-7%), prostate cancer(7-61%), pancreatic cancer (5-10%), and melanoma, depending on which gene has a pathogenic variant.     Harrison syndrome is another known cause of hereditary ovarian cancer and is estimated to account for 2-4% of all cases. The lifetime risk for ovarian cancer in people with Harrison syndrome can be as high as 38%. Harrison syndrome also predisposes to a wide range of other malignancies including colon, uterine, gastric, small bowel and others. Other hereditary causes of ovarian cancer include a number of more rare gene pathogenic variants that imply risk for other cancers as well.    We also discussed the family history of gastric cancer. While most gastric cancers are sporadic, about 5-10% have a familial component and 3-5% are associated with a hereditary cancer syndrome. Environmental factors, including Helicobacter pylori infection, diet, and tobacco use, are strong risk factors that likely underlie many gastric cancer cases.     There are a number of hereditary cancer syndromes for which there is a known association with increased risk for cancers of the stomach. These include Hereditary Diffuse Gastric Cancer syndrome (stomach, breast), Harrison syndrome (colorectal, uterine, GI cancers), juvenile polyposis syndrome (colorectal, stomach, HHT), and Peutz-Jeghers Syndrome (colorectal, GI, breast, pancreatic, ovarian, others).    A majority of renal cell carcinoma (RCC) is thought to occur sporadically. The general population risk for RCC is approximately  1.5%. Other causes may be shared among family members including lower risk genes, combined genetic and environmental factors, or chance alone.  We discussed that an estimated 4% of renal cell carcinoma (RCC) cases are due to a hereditary cancer syndrome.  In general, these hereditary cancer syndromes are associated with bilateral/multifocal tumors as well as early ages of onset. There are also extrarenal clinical features that may suggest a specific hereditary cancer syndrome.     Some of the well characterized RCC-associated hereditary cancer syndromes below:   Von Hippel-Lindau (VHL) is caused by pathogenic variants in the VHL gene. Clinical features of Von Hippel Lindau include hemangioblastomas of the central nervous, renal lesions such as multiple renal cysts and renal cell carcinoma (the lifetime risk is up to 70%), pancreatic lesions including multiple pancreatic cysts and neuroendocrine tumors (approximately 5%-17%), adrenal pheochromocytomas, paragangliomas, and endolymphatic sac tumors which can result in unilateral or bilateral hearing loss.  Wmsy-Wkcm-Digx (BHD) is caused by caused by pathogenic variants in the FLCN gene.  The classic triad of features seen in people with BHD includes renal tumors (14%-34%), pulmonary cysts (83%) and spontaneous pneumothorax (23%), and cutaneous lesions (90%).     Hereditary leiomyomatosis and renal cell cancer (HLRCC) is caused by pathogenic genetic variants in the FH gene.  HLRCC is characterized by three main features: cutaneous leiomyomata (76%), uterine leiomyomata (uterine fibroids), and renal tumors (10-16%).    PTEN Hamartoma Tumor Syndrome (PHTS) is a genetic condition caused by pathogenic variants in the PTEN gene. Other names for this disorder include Cowden syndrome, Hnvmupxw-Inqsa-Uofvnnfgz syndrome and Proteus syndrome. Cancer risks associated with PHTS include a greater than 60% lifetime risk of breast cancer, 28% lifetime risk of endometrial cancer, and a  35% lifetime risk of non-medullary thyroid cancer. The lifetime risk for colorectal cancer is estimated at 11-20% and for renal cancer is estimated at 34%.  Additional hereditary cancer syndromes associated with RCC include hereditary papillary RCC, BAP1 hereditary cancer predisposition syndrome, Tuberous Sclerosis complex (TSC) and hereditary paraganglioma-pheochromocytoma (PGL-PCC) syndrome. We also discussed that there are cases of Non-syndromic inherited RCC.  Up to 20% of these cases will have a pathogenic variant in a gene associated with familial RCC.     Yvonne Wozniczka Doege was counseled that aspects of their family history such as related cancers across successive generations, rarer cancers like ovarian cancer, and young onset disease may reflect a hereditary cancer risk.  The benefits, risks, and limitations of genetic testing were discussed at length including the relevant medical, insurance, psychosocial, and familial implications.      Yvonne Wozniczka Doege understands they are not the most informative family member to have genetic testing, as they have not had cancer.  If their test results are negative, we cannot determine if there is a pathogenic variant in the family that they did not inherit, or if there is not a pathogenic variant in the family. Yvonne Wozniczka Doege's paternal first cousin is a more ideal genetic testing candidate, as they have had ovarian cancer. Should they pursue testing, it would not only help them with their own risks and management, but better inform family members, such as Yvonne Wozniczka Doege, of theirs. However, as Yvonne Wozniczka Doege has hereditary cancer risks from both sides of their family, they have the option to pursue genetic testing to better understand their risks and management options.    We also discussed the possibility of discovering a variant of uncertain significance (VUS).  The option of multi-gene panel testing for more rare causes of  hereditary cancer was also discussed in detail.   The concept of high risk, moderate risk, and newly described genetic pathogenic variants was reviewed with your patient, and they understand recommendations for future surgery and screening depend on the current literature, which may change with additional research.  This discussion included consideration of the reported family history, the possibility of identifying a VUS, and limitations in the currently available risks and management guidelines for other gene pathogenic variants.  Depending on the panel, the chance to discover a VUS is estimated to approach 30%.      Autosomal dominant and recessive inheritance was reviewed.  Your patient realizes they are at risk for having a pathogenic variant in a gene, or genes, which may contribute to the reported family history. [facility] understands their test results may alter their recommended medical management, including consideration of intensive surveillance or prophylactic surgeries, and impact other family members.      Additionally, we discussed general recommendations for high-risk ovarian cancer screening. Ovarian screening may include transvaginal ultrasound, pelvic exam every six months beginning at age 30-35, and CA-125 testing every six months. We also reviewed ways to reduce cancer risk with chemoprevention and prophylactic surgery.      The carrier status of family members who choose not to pursue genetic testing may be able to infer their status by results of others who are tested.   We also briefly reviewed the protections and limitations of the Genetic Information Nondiscrimination Act (LEANN).  Documentation of genetic testing results will be entered into your patient's electronic medical record in our hospital system.    All of Yvonne Wozniczka Doege's questions were answered to their satisfaction and they demonstrated a clear understanding of the information and considerations discussed  today.    Plan:  Yvonne Wozniczka Doege elected to proceed with testing today by a panel of 70 genes associated with multiple hereditary cancer risks and syndromes.   Informed consent was obtained, a blood sample will be obtained, and the specimen will be sent to [facility] for analysis.    Results are typically returned in 2-4 weeks, and I will call Yvonne Wozniczka Doege at that time to discuss the implications.      Time:   Time Spent with Patient: 55 minutes with greater than 50% of the time spent face-to-face in counseling, discussion, and coordination of care.     Sincerely,  Selwyn Morgan, [facility]    [phone]    Send to:  Bret Palomino MD (fax: [phone])  Nori Vera MD (fax: [phone])  Yvonne R Wozniczka Doege       2+

## 2024-05-28 ENCOUNTER — APPOINTMENT (OUTPATIENT)
Dept: CARDIOLOGY | Facility: CLINIC | Age: 79
End: 2024-05-28
Payer: MEDICARE

## 2024-05-28 ENCOUNTER — NON-APPOINTMENT (OUTPATIENT)
Age: 79
End: 2024-05-28

## 2024-05-28 PROCEDURE — 93296 REM INTERROG EVL PM/IDS: CPT

## 2024-05-28 PROCEDURE — 93295 DEV INTERROG REMOTE 1/2/MLT: CPT

## 2024-09-20 ENCOUNTER — APPOINTMENT (OUTPATIENT)
Dept: CARDIOLOGY | Facility: CLINIC | Age: 79
End: 2024-09-20
Payer: MEDICARE

## 2024-09-20 ENCOUNTER — NON-APPOINTMENT (OUTPATIENT)
Age: 79
End: 2024-09-20

## 2024-09-20 VITALS
DIASTOLIC BLOOD PRESSURE: 60 MMHG | OXYGEN SATURATION: 97 % | SYSTOLIC BLOOD PRESSURE: 126 MMHG | WEIGHT: 146 LBS | HEART RATE: 62 BPM | HEIGHT: 67 IN | BODY MASS INDEX: 22.91 KG/M2

## 2024-09-20 DIAGNOSIS — Z78.9 OTHER SPECIFIED HEALTH STATUS: ICD-10-CM

## 2024-09-20 DIAGNOSIS — Z79.899 OTHER LONG TERM (CURRENT) DRUG THERAPY: ICD-10-CM

## 2024-09-20 DIAGNOSIS — E78.5 HYPERLIPIDEMIA, UNSPECIFIED: ICD-10-CM

## 2024-09-20 DIAGNOSIS — I25.5 ISCHEMIC CARDIOMYOPATHY: ICD-10-CM

## 2024-09-20 DIAGNOSIS — Z95.810 PRESENCE OF AUTOMATIC (IMPLANTABLE) CARDIAC DEFIBRILLATOR: ICD-10-CM

## 2024-09-20 DIAGNOSIS — I25.10 ATHEROSCLEROTIC HEART DISEASE OF NATIVE CORONARY ARTERY W/OUT ANGINA PECTORIS: ICD-10-CM

## 2024-09-20 DIAGNOSIS — I45.9 CONDUCTION DISORDER, UNSPECIFIED: ICD-10-CM

## 2024-09-20 DIAGNOSIS — I35.0 NONRHEUMATIC AORTIC (VALVE) STENOSIS: ICD-10-CM

## 2024-09-20 PROCEDURE — 93306 TTE W/DOPPLER COMPLETE: CPT

## 2024-09-20 PROCEDURE — G2211 COMPLEX E/M VISIT ADD ON: CPT

## 2024-09-20 PROCEDURE — 99215 OFFICE O/P EST HI 40 MIN: CPT

## 2024-09-20 PROCEDURE — 93000 ELECTROCARDIOGRAM COMPLETE: CPT

## 2024-09-20 RX ORDER — POTASSIUM CHLORIDE 750 MG/1
10 CAPSULE, EXTENDED RELEASE ORAL DAILY
Refills: 0 | Status: ACTIVE | COMMUNITY

## 2024-09-20 RX ORDER — LEVOTHYROXINE SODIUM 0.09 MG/1
88 TABLET ORAL DAILY
Refills: 0 | Status: ACTIVE | COMMUNITY

## 2024-09-20 RX ORDER — TORSEMIDE 20 MG/1
20 TABLET ORAL DAILY
Refills: 0 | Status: ACTIVE | COMMUNITY

## 2024-09-20 RX ORDER — ATORVASTATIN CALCIUM 40 MG/1
40 TABLET, FILM COATED ORAL DAILY
Refills: 0 | Status: ACTIVE | COMMUNITY

## 2024-09-20 NOTE — DISCUSSION/SUMMARY
[FreeTextEntry1] : 1. 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 TTE showed depressed EF of 25-30% s/p CRT-D implant (3/2/21). Patient has recovered LV EF on most recent echocardiogram, 9/20/2024. Continue Aspirin 81mg daily, atorvastatin 40mg daily Lopressor 25mg daily (high risk medication with no signs of toxicity), torsemide 20mg daily with KCl supplementation. Regularly scheduled device checks. SBE prophylaxis recommended.  2. HLD: goal LDL less than 70. Continue atorvastatin 40mg daily.   Follow up in 6 months. [EKG obtained to assist in diagnosis and management of assessed problem(s)] : EKG obtained to assist in diagnosis and management of assessed problem(s)

## 2024-09-20 NOTE — HISTORY OF PRESENT ILLNESS
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 TTE showed depressed EF of 25-30% s/p CRT-D implant (3/2/21). Patient has recovered LV EF on most recent echocardiogram, 9/20/2024.  Patient has no chest pain, dyspnea or palpitations.  Compliant with her medications and reports no adverse effects.

## 2024-11-26 NOTE — PHYSICAL THERAPY INITIAL EVALUATION ADULT - PHYSICAL ASSIST/NONPHYSICAL ASSIST: CHAIR TO BED, REHAB EVAL
ASPIRATION AND OR INJECTION  2024     THYROID CYST ASPIRATION AND OR INJECTION 2024 [facility] ULTRASOUND    WRIST SURGERY Left 2015    OPEN REDUCTION INTERNAL FIXATION LEFT WRIST       Family History     Family History   Problem Relation Age of Onset    Hypertension Mother     Heart Disease Mother     Heart Attack Mother     Hypertension Father     Heart Disease Father     High Blood Pressure Sister     Stroke Sister     High Blood Pressure Sister     Heart Disease Brother     Heart Attack Brother     Heart Disease Maternal Uncle     Stroke Other     Heart Disease Other     Other Other         hypercoag state       Social History     Social History     Socioeconomic History    Marital status:      Spouse name: Km    Number of children: 0    Years of education: 12    Highest education level: Not on file   Occupational History    Not on file   Tobacco Use    Smoking status: Former     Current packs/day: 0.00     Average packs/day: 0.5 packs/day for 20.0 years (10.0 ttl pk-yrs)     Types: Cigarettes     Start date: 1987     Quit date: 2007     Years since quittin.8     Passive exposure: Never    Smokeless tobacco: Never   Vaping Use    Vaping status: Never Used   Substance and Sexual Activity    Alcohol use: No    Drug use: No    Sexual activity: Yes     Partners: Male   Other Topics Concern    Not on file   Social History Narrative    Not on file     Social Determinants of Health     Financial Resource Strain: Low Risk  (2024)    Overall Financial Resource Strain (CARDIA)     Difficulty of Paying Living Expenses: Not hard at all   Food Insecurity: No Food Insecurity (2024)    Hunger Vital Sign     Worried About Running Out of Food in the Last Year: Never true     Ran Out of Food in the Last Year: Never true   Transportation Needs: No Transportation Needs (2024)    PRAPARE - Transportation     Lack of Transportation (Medical): No     Lack of Transportation  verbal cues/2 person assist

## 2024-12-20 ENCOUNTER — NON-APPOINTMENT (OUTPATIENT)
Age: 79
End: 2024-12-20

## 2024-12-20 ENCOUNTER — APPOINTMENT (OUTPATIENT)
Dept: CARDIOLOGY | Facility: CLINIC | Age: 79
End: 2024-12-20
Payer: MEDICARE

## 2024-12-20 PROCEDURE — 93296 REM INTERROG EVL PM/IDS: CPT

## 2024-12-20 PROCEDURE — 93295 DEV INTERROG REMOTE 1/2/MLT: CPT

## 2025-03-21 ENCOUNTER — APPOINTMENT (OUTPATIENT)
Dept: CARDIOLOGY | Facility: CLINIC | Age: 80
End: 2025-03-21

## 2025-06-24 ENCOUNTER — NON-APPOINTMENT (OUTPATIENT)
Age: 80
End: 2025-06-24

## 2025-06-24 ENCOUNTER — APPOINTMENT (OUTPATIENT)
Dept: CARDIOLOGY | Facility: CLINIC | Age: 80
End: 2025-06-24

## 2025-06-24 PROCEDURE — 93296 REM INTERROG EVL PM/IDS: CPT

## 2025-06-24 PROCEDURE — 93295 DEV INTERROG REMOTE 1/2/MLT: CPT

## 2025-07-24 NOTE — PHYSICAL EXAM
[Normal] : normal conjunctiva [Normal S1, S2] : normal S1, S2 [Murmur] : murmur [Clear Lung Fields] : clear lung fields [Good Air Entry] : good air entry [Moves all extremities] : moves all extremities [No Focal Deficits] : no focal deficits [de-identified] : systolic flow murmur base of heart [de-identified] : slow gait none [de-identified] : mild pitting edema bilateral lower extremities.

## 2025-08-29 ENCOUNTER — APPOINTMENT (OUTPATIENT)
Dept: CARDIOLOGY | Facility: CLINIC | Age: 80
End: 2025-08-29